# Patient Record
Sex: FEMALE | Race: OTHER | Employment: FULL TIME | URBAN - METROPOLITAN AREA
[De-identification: names, ages, dates, MRNs, and addresses within clinical notes are randomized per-mention and may not be internally consistent; named-entity substitution may affect disease eponyms.]

---

## 2017-07-12 ENCOUNTER — HOSPITAL ENCOUNTER (EMERGENCY)
Age: 22
Discharge: HOME | End: 2017-07-13
Attending: EMERGENCY MEDICINE
Payer: MEDICARE

## 2017-07-12 VITALS
TEMPERATURE: 97.9 F | WEIGHT: 165 LBS | HEIGHT: 63 IN | SYSTOLIC BLOOD PRESSURE: 93 MMHG | BODY MASS INDEX: 29.23 KG/M2 | OXYGEN SATURATION: 100 % | DIASTOLIC BLOOD PRESSURE: 53 MMHG | RESPIRATION RATE: 18 BRPM | HEART RATE: 89 BPM

## 2017-07-12 DIAGNOSIS — G89.18 POST-OPERATIVE PAIN: Primary | ICD-10-CM

## 2017-07-12 PROCEDURE — 99284 EMERGENCY DEPT VISIT MOD MDM: CPT

## 2017-07-12 RX ORDER — 0.9 % SODIUM CHLORIDE 0.9 %
2000 INTRAVENOUS SOLUTION INTRAVENOUS ONCE
Status: COMPLETED | OUTPATIENT
Start: 2017-07-13 | End: 2017-07-13

## 2017-07-12 RX ORDER — AZITHROMYCIN 250 MG/1
TABLET, FILM COATED ORAL
COMMUNITY
Start: 2017-04-24 | End: 2018-05-14

## 2017-07-12 RX ORDER — METRONIDAZOLE 500 MG/1
500 TABLET ORAL 3 TIMES DAILY
COMMUNITY
End: 2018-05-14

## 2017-07-12 RX ORDER — MISOPROSTOL 200 UG/1
200 TABLET ORAL 4 TIMES DAILY
COMMUNITY
End: 2018-05-14

## 2017-07-12 RX ORDER — IBUPROFEN 600 MG/1
600 TABLET ORAL
COMMUNITY
Start: 2017-04-24 | End: 2018-05-14

## 2017-07-12 RX ORDER — ONDANSETRON 2 MG/ML
4 INJECTION INTRAMUSCULAR; INTRAVENOUS ONCE
Status: COMPLETED | OUTPATIENT
Start: 2017-07-13 | End: 2017-07-13

## 2017-07-12 RX ORDER — FENTANYL CITRATE 50 UG/ML
50 INJECTION, SOLUTION INTRAMUSCULAR; INTRAVENOUS ONCE
Status: COMPLETED | OUTPATIENT
Start: 2017-07-13 | End: 2017-07-13

## 2017-07-12 ASSESSMENT — PAIN DESCRIPTION - LOCATION: LOCATION: ABDOMEN

## 2017-07-12 ASSESSMENT — PAIN SCALES - GENERAL: PAINLEVEL_OUTOF10: 10

## 2017-07-13 ENCOUNTER — APPOINTMENT (OUTPATIENT)
Dept: CT IMAGING | Age: 22
End: 2017-07-13
Payer: MEDICARE

## 2017-07-13 LAB
ABO/RH: NORMAL
ABSOLUTE EOS #: 0.2 K/UL (ref 0–0.4)
ABSOLUTE LYMPH #: 1.2 K/UL (ref 1.2–5.2)
ABSOLUTE MONO #: 0.7 K/UL (ref 0.2–0.8)
ALBUMIN SERPL-MCNC: 3.9 G/DL (ref 3.5–5.2)
ALBUMIN/GLOBULIN RATIO: NORMAL (ref 1–2.5)
ALP BLD-CCNC: 36 U/L (ref 35–104)
ALT SERPL-CCNC: 21 U/L (ref 5–33)
ANION GAP SERPL CALCULATED.3IONS-SCNC: 14 MMOL/L (ref 9–17)
AST SERPL-CCNC: 26 U/L
BASOPHILS # BLD: 0 %
BASOPHILS ABSOLUTE: 0 K/UL (ref 0–0.2)
BILIRUB SERPL-MCNC: 0.94 MG/DL (ref 0.3–1.2)
BILIRUBIN DIRECT: 0.2 MG/DL
BILIRUBIN, INDIRECT: 0.74 MG/DL (ref 0–1)
BUN BLDV-MCNC: 9 MG/DL (ref 6–20)
BUN/CREAT BLD: 12 (ref 9–20)
CALCIUM SERPL-MCNC: 8.6 MG/DL (ref 8.6–10.4)
CHLORIDE BLD-SCNC: 101 MMOL/L (ref 98–107)
CO2: 23 MMOL/L (ref 20–31)
CREAT SERPL-MCNC: 0.73 MG/DL (ref 0.5–0.9)
DIFFERENTIAL TYPE: ABNORMAL
EOSINOPHILS RELATIVE PERCENT: 2 %
GFR AFRICAN AMERICAN: >60 ML/MIN
GFR NON-AFRICAN AMERICAN: >60 ML/MIN
GFR SERPL CREATININE-BSD FRML MDRD: NORMAL ML/MIN/{1.73_M2}
GFR SERPL CREATININE-BSD FRML MDRD: NORMAL ML/MIN/{1.73_M2}
GLOBULIN: NORMAL G/DL (ref 1.5–3.8)
GLUCOSE BLD-MCNC: 89 MG/DL (ref 70–99)
HCG QUANTITATIVE: ABNORMAL IU/L
HCT VFR BLD CALC: 39.3 % (ref 36–46)
HEMOGLOBIN: 13.3 G/DL (ref 12–16)
LYMPHOCYTES # BLD: 10 %
MCH RBC QN AUTO: 29.6 PG (ref 26–34)
MCHC RBC AUTO-ENTMCNC: 33.8 G/DL (ref 31–37)
MCV RBC AUTO: 87.5 FL (ref 80–100)
MONOCYTES # BLD: 6 %
PDW BLD-RTO: 13.1 % (ref 11.5–14.5)
PLATELET # BLD: 216 K/UL (ref 130–400)
PLATELET ESTIMATE: ABNORMAL
PMV BLD AUTO: 8.7 FL (ref 6–12)
POTASSIUM SERPL-SCNC: 3.8 MMOL/L (ref 3.7–5.3)
RBC # BLD: 4.5 M/UL (ref 4–5.2)
RBC # BLD: ABNORMAL 10*6/UL
SEG NEUTROPHILS: 82 %
SEGMENTED NEUTROPHILS ABSOLUTE COUNT: 9.3 K/UL (ref 1.8–8)
SODIUM BLD-SCNC: 138 MMOL/L (ref 135–144)
TOTAL PROTEIN: 6.7 G/DL (ref 6.4–8.3)
WBC # BLD: 11.4 K/UL (ref 4.5–13.5)
WBC # BLD: ABNORMAL 10*3/UL

## 2017-07-13 PROCEDURE — 86901 BLOOD TYPING SEROLOGIC RH(D): CPT

## 2017-07-13 PROCEDURE — 96376 TX/PRO/DX INJ SAME DRUG ADON: CPT

## 2017-07-13 PROCEDURE — 86900 BLOOD TYPING SEROLOGIC ABO: CPT

## 2017-07-13 PROCEDURE — 6360000004 HC RX CONTRAST MEDICATION: Performed by: EMERGENCY MEDICINE

## 2017-07-13 PROCEDURE — 85025 COMPLETE CBC W/AUTO DIFF WBC: CPT

## 2017-07-13 PROCEDURE — 80048 BASIC METABOLIC PNL TOTAL CA: CPT

## 2017-07-13 PROCEDURE — 80076 HEPATIC FUNCTION PANEL: CPT

## 2017-07-13 PROCEDURE — 84702 CHORIONIC GONADOTROPIN TEST: CPT

## 2017-07-13 PROCEDURE — 2580000003 HC RX 258: Performed by: EMERGENCY MEDICINE

## 2017-07-13 PROCEDURE — 96374 THER/PROPH/DIAG INJ IV PUSH: CPT

## 2017-07-13 PROCEDURE — 96375 TX/PRO/DX INJ NEW DRUG ADDON: CPT

## 2017-07-13 PROCEDURE — 74177 CT ABD & PELVIS W/CONTRAST: CPT

## 2017-07-13 PROCEDURE — 6360000002 HC RX W HCPCS: Performed by: EMERGENCY MEDICINE

## 2017-07-13 RX ORDER — OXYCODONE HYDROCHLORIDE AND ACETAMINOPHEN 5; 325 MG/1; MG/1
1-2 TABLET ORAL EVERY 6 HOURS PRN
Qty: 20 TABLET | Refills: 0 | Status: SHIPPED | OUTPATIENT
Start: 2017-07-13 | End: 2018-05-14

## 2017-07-13 RX ORDER — AZITHROMYCIN 500 MG/1
1000 TABLET, FILM COATED ORAL ONCE
Qty: 2 TABLET | Refills: 0 | Status: SHIPPED | OUTPATIENT
Start: 2017-07-13 | End: 2017-07-13

## 2017-07-13 RX ORDER — ONDANSETRON 4 MG/1
4 TABLET, ORALLY DISINTEGRATING ORAL EVERY 4 HOURS PRN
Qty: 20 TABLET | Refills: 0 | Status: SHIPPED | OUTPATIENT
Start: 2017-07-13 | End: 2018-05-14

## 2017-07-13 RX ORDER — FENTANYL CITRATE 50 UG/ML
50 INJECTION, SOLUTION INTRAMUSCULAR; INTRAVENOUS ONCE
Status: COMPLETED | OUTPATIENT
Start: 2017-07-13 | End: 2017-07-13

## 2017-07-13 RX ADMIN — IOVERSOL 125 ML: 741 INJECTION INTRA-ARTERIAL; INTRAVENOUS at 01:27

## 2017-07-13 RX ADMIN — FENTANYL CITRATE 50 MCG: 50 INJECTION INTRAMUSCULAR; INTRAVENOUS at 00:12

## 2017-07-13 RX ADMIN — SODIUM CHLORIDE 2000 ML: 9 INJECTION, SOLUTION INTRAVENOUS at 00:11

## 2017-07-13 RX ADMIN — FENTANYL CITRATE 50 MCG: 50 INJECTION INTRAMUSCULAR; INTRAVENOUS at 02:43

## 2017-07-13 RX ADMIN — ONDANSETRON 4 MG: 2 INJECTION INTRAMUSCULAR; INTRAVENOUS at 00:12

## 2017-07-13 ASSESSMENT — PAIN SCALES - GENERAL
PAINLEVEL_OUTOF10: 7
PAINLEVEL_OUTOF10: 8

## 2018-05-14 ENCOUNTER — APPOINTMENT (OUTPATIENT)
Dept: CT IMAGING | Age: 23
DRG: 710 | End: 2018-05-14
Payer: MEDICARE

## 2018-05-14 ENCOUNTER — HOSPITAL ENCOUNTER (INPATIENT)
Age: 23
LOS: 5 days | Discharge: HOME | DRG: 710 | End: 2018-05-20
Attending: EMERGENCY MEDICINE | Admitting: FAMILY MEDICINE
Payer: MEDICARE

## 2018-05-14 DIAGNOSIS — N10 ACUTE PYELONEPHRITIS: Primary | ICD-10-CM

## 2018-05-14 DIAGNOSIS — K81.9 CHOLECYSTITIS: ICD-10-CM

## 2018-05-14 PROBLEM — N12 PYELONEPHRITIS: Status: ACTIVE | Noted: 2018-05-14

## 2018-05-14 PROBLEM — N39.0 UTI (URINARY TRACT INFECTION): Status: ACTIVE | Noted: 2018-05-14

## 2018-05-14 LAB
-: ABNORMAL
ABSOLUTE EOS #: 0 K/UL (ref 0–0.4)
ABSOLUTE IMMATURE GRANULOCYTE: ABNORMAL K/UL (ref 0–0.3)
ABSOLUTE LYMPH #: 0.8 K/UL (ref 1–4.8)
ABSOLUTE MONO #: 0.9 K/UL (ref 0.2–0.8)
ALBUMIN SERPL-MCNC: 4 G/DL (ref 3.5–5.2)
ALBUMIN/GLOBULIN RATIO: ABNORMAL (ref 1–2.5)
ALP BLD-CCNC: 59 U/L (ref 35–104)
ALT SERPL-CCNC: 78 U/L (ref 5–33)
AMORPHOUS: ABNORMAL
ANION GAP SERPL CALCULATED.3IONS-SCNC: 13 MMOL/L (ref 9–17)
AST SERPL-CCNC: 45 U/L
BACTERIA: ABNORMAL
BASOPHILS # BLD: 0 % (ref 0–2)
BASOPHILS ABSOLUTE: 0 K/UL (ref 0–0.2)
BILIRUB SERPL-MCNC: 0.82 MG/DL (ref 0.3–1.2)
BILIRUBIN DIRECT: 0.2 MG/DL
BILIRUBIN URINE: NEGATIVE
BILIRUBIN, INDIRECT: 0.62 MG/DL (ref 0–1)
BUN BLDV-MCNC: 8 MG/DL (ref 6–20)
BUN/CREAT BLD: 10 (ref 9–20)
CALCIUM SERPL-MCNC: 8.8 MG/DL (ref 8.6–10.4)
CASTS UA: ABNORMAL /LPF
CHLORIDE BLD-SCNC: 102 MMOL/L (ref 98–107)
CHP ED QC CHECK: YES
CO2: 25 MMOL/L (ref 20–31)
COLOR: YELLOW
COMMENT UA: ABNORMAL
CREAT SERPL-MCNC: 0.78 MG/DL (ref 0.5–0.9)
CRYSTALS, UA: ABNORMAL /HPF
DIFFERENTIAL TYPE: ABNORMAL
EKG ATRIAL RATE: 82 BPM
EKG P AXIS: 23 DEGREES
EKG P-R INTERVAL: 156 MS
EKG Q-T INTERVAL: 370 MS
EKG QRS DURATION: 76 MS
EKG QTC CALCULATION (BAZETT): 432 MS
EKG R AXIS: 61 DEGREES
EKG T AXIS: 34 DEGREES
EKG VENTRICULAR RATE: 82 BPM
EOSINOPHILS RELATIVE PERCENT: 0 % (ref 1–4)
EPITHELIAL CELLS UA: ABNORMAL /HPF (ref 0–5)
GFR AFRICAN AMERICAN: >60 ML/MIN
GFR NON-AFRICAN AMERICAN: >60 ML/MIN
GFR SERPL CREATININE-BSD FRML MDRD: ABNORMAL ML/MIN/{1.73_M2}
GFR SERPL CREATININE-BSD FRML MDRD: ABNORMAL ML/MIN/{1.73_M2}
GLOBULIN: ABNORMAL G/DL (ref 1.5–3.8)
GLUCOSE BLD-MCNC: 114 MG/DL (ref 70–99)
GLUCOSE URINE: NEGATIVE
HCT VFR BLD CALC: 40.3 % (ref 36–46)
HEMOGLOBIN: 13.3 G/DL (ref 12–16)
IMMATURE GRANULOCYTES: ABNORMAL %
KETONES, URINE: NEGATIVE
LACTIC ACID: 2 MMOL/L (ref 0.5–2.2)
LEUKOCYTE ESTERASE, URINE: ABNORMAL
LIPASE: 31 U/L (ref 13–60)
LYMPHOCYTES # BLD: 8 % (ref 25–45)
MCH RBC QN AUTO: 29.5 PG (ref 26–34)
MCHC RBC AUTO-ENTMCNC: 33.1 G/DL (ref 31–37)
MCV RBC AUTO: 89.1 FL (ref 80–100)
MONOCYTES # BLD: 8 % (ref 2–8)
MUCUS: ABNORMAL
NITRITE, URINE: POSITIVE
NRBC AUTOMATED: ABNORMAL PER 100 WBC
OTHER OBSERVATIONS UA: ABNORMAL
PDW BLD-RTO: 14 % (ref 11.5–14.5)
PH UA: 6 (ref 5–8)
PLATELET # BLD: 188 K/UL (ref 130–400)
PLATELET ESTIMATE: ABNORMAL
PMV BLD AUTO: 8 FL (ref 6–12)
POTASSIUM SERPL-SCNC: 4.2 MMOL/L (ref 3.7–5.3)
PREGNANCY TEST URINE, POC: NEGATIVE
PROTEIN UA: ABNORMAL
RBC # BLD: 4.52 M/UL (ref 4–5.2)
RBC # BLD: ABNORMAL 10*6/UL
RBC UA: ABNORMAL /HPF (ref 0–2)
RENAL EPITHELIAL, UA: ABNORMAL /HPF
SEG NEUTROPHILS: 84 % (ref 34–64)
SEGMENTED NEUTROPHILS ABSOLUTE COUNT: 9.3 K/UL (ref 1.8–7.7)
SODIUM BLD-SCNC: 140 MMOL/L (ref 135–144)
SPECIFIC GRAVITY UA: 1.02 (ref 1–1.03)
TOTAL PROTEIN: 7.4 G/DL (ref 6.4–8.3)
TRICHOMONAS: ABNORMAL
TURBIDITY: ABNORMAL
URINE HGB: ABNORMAL
UROBILINOGEN, URINE: NORMAL
WBC # BLD: 11.1 K/UL (ref 4.5–13.5)
WBC # BLD: ABNORMAL 10*3/UL
WBC UA: ABNORMAL /HPF (ref 0–5)
YEAST: ABNORMAL

## 2018-05-14 PROCEDURE — 96376 TX/PRO/DX INJ SAME DRUG ADON: CPT

## 2018-05-14 PROCEDURE — 2580000003 HC RX 258: Performed by: NURSE PRACTITIONER

## 2018-05-14 PROCEDURE — 99285 EMERGENCY DEPT VISIT HI MDM: CPT

## 2018-05-14 PROCEDURE — 6370000000 HC RX 637 (ALT 250 FOR IP): Performed by: CLINICAL NURSE SPECIALIST

## 2018-05-14 PROCEDURE — 84703 CHORIONIC GONADOTROPIN ASSAY: CPT

## 2018-05-14 PROCEDURE — 87086 URINE CULTURE/COLONY COUNT: CPT

## 2018-05-14 PROCEDURE — 96365 THER/PROPH/DIAG IV INF INIT: CPT

## 2018-05-14 PROCEDURE — 85025 COMPLETE CBC W/AUTO DIFF WBC: CPT

## 2018-05-14 PROCEDURE — 80076 HEPATIC FUNCTION PANEL: CPT

## 2018-05-14 PROCEDURE — 99222 1ST HOSP IP/OBS MODERATE 55: CPT | Performed by: FAMILY MEDICINE

## 2018-05-14 PROCEDURE — G0378 HOSPITAL OBSERVATION PER HR: HCPCS

## 2018-05-14 PROCEDURE — 36415 COLL VENOUS BLD VENIPUNCTURE: CPT

## 2018-05-14 PROCEDURE — 81001 URINALYSIS AUTO W/SCOPE: CPT

## 2018-05-14 PROCEDURE — 80048 BASIC METABOLIC PNL TOTAL CA: CPT

## 2018-05-14 PROCEDURE — 6360000002 HC RX W HCPCS: Performed by: NURSE PRACTITIONER

## 2018-05-14 PROCEDURE — 2580000003 HC RX 258: Performed by: CLINICAL NURSE SPECIALIST

## 2018-05-14 PROCEDURE — 6370000000 HC RX 637 (ALT 250 FOR IP): Performed by: EMERGENCY MEDICINE

## 2018-05-14 PROCEDURE — 83690 ASSAY OF LIPASE: CPT

## 2018-05-14 PROCEDURE — 93005 ELECTROCARDIOGRAM TRACING: CPT

## 2018-05-14 PROCEDURE — 96372 THER/PROPH/DIAG INJ SC/IM: CPT

## 2018-05-14 PROCEDURE — 74176 CT ABD & PELVIS W/O CONTRAST: CPT

## 2018-05-14 PROCEDURE — 83605 ASSAY OF LACTIC ACID: CPT

## 2018-05-14 PROCEDURE — 87186 SC STD MICRODIL/AGAR DIL: CPT

## 2018-05-14 PROCEDURE — 87088 URINE BACTERIA CULTURE: CPT

## 2018-05-14 PROCEDURE — 6360000002 HC RX W HCPCS: Performed by: CLINICAL NURSE SPECIALIST

## 2018-05-14 PROCEDURE — 96375 TX/PRO/DX INJ NEW DRUG ADDON: CPT

## 2018-05-14 RX ORDER — SODIUM CHLORIDE 0.9 % (FLUSH) 0.9 %
10 SYRINGE (ML) INJECTION PRN
Status: DISCONTINUED | OUTPATIENT
Start: 2018-05-14 | End: 2018-05-20 | Stop reason: HOSPADM

## 2018-05-14 RX ORDER — BISACODYL 10 MG
10 SUPPOSITORY, RECTAL RECTAL DAILY PRN
Status: DISCONTINUED | OUTPATIENT
Start: 2018-05-14 | End: 2018-05-20 | Stop reason: HOSPADM

## 2018-05-14 RX ORDER — 0.9 % SODIUM CHLORIDE 0.9 %
1000 INTRAVENOUS SOLUTION INTRAVENOUS ONCE
Status: COMPLETED | OUTPATIENT
Start: 2018-05-14 | End: 2018-05-14

## 2018-05-14 RX ORDER — ONDANSETRON 2 MG/ML
4 INJECTION INTRAMUSCULAR; INTRAVENOUS ONCE
Status: COMPLETED | OUTPATIENT
Start: 2018-05-14 | End: 2018-05-14

## 2018-05-14 RX ORDER — MORPHINE SULFATE 4 MG/ML
4 INJECTION, SOLUTION INTRAMUSCULAR; INTRAVENOUS
Status: COMPLETED | OUTPATIENT
Start: 2018-05-14 | End: 2018-05-14

## 2018-05-14 RX ORDER — CIPROFLOXACIN 2 MG/ML
400 INJECTION, SOLUTION INTRAVENOUS EVERY 12 HOURS
Status: DISCONTINUED | OUTPATIENT
Start: 2018-05-15 | End: 2018-05-19

## 2018-05-14 RX ORDER — SODIUM CHLORIDE 0.9 % (FLUSH) 0.9 %
10 SYRINGE (ML) INJECTION EVERY 12 HOURS SCHEDULED
Status: DISCONTINUED | OUTPATIENT
Start: 2018-05-14 | End: 2018-05-20 | Stop reason: HOSPADM

## 2018-05-14 RX ORDER — IBUPROFEN 800 MG/1
800 TABLET ORAL EVERY 6 HOURS PRN
COMMUNITY
Start: 2018-04-05 | End: 2019-02-14

## 2018-05-14 RX ORDER — NORETHINDRONE ACETATE AND ETHINYL ESTRADIOL 1MG-20(21)
1 KIT ORAL DAILY
COMMUNITY
Start: 2018-04-02 | End: 2019-02-14

## 2018-05-14 RX ORDER — 0.9 % SODIUM CHLORIDE 0.9 %
1000 INTRAVENOUS SOLUTION INTRAVENOUS ONCE
Status: DISCONTINUED | OUTPATIENT
Start: 2018-05-14 | End: 2018-05-14

## 2018-05-14 RX ORDER — PHENAZOPYRIDINE HYDROCHLORIDE 200 MG/1
200 TABLET, FILM COATED ORAL
Status: DISCONTINUED | OUTPATIENT
Start: 2018-05-14 | End: 2018-05-20 | Stop reason: HOSPADM

## 2018-05-14 RX ORDER — NORETHINDRONE ACETATE AND ETHINYL ESTRADIOL 1MG-20(21)
1 KIT ORAL DAILY
Status: DISCONTINUED | OUTPATIENT
Start: 2018-05-14 | End: 2018-05-20 | Stop reason: HOSPADM

## 2018-05-14 RX ORDER — ACETAMINOPHEN 500 MG
1000 TABLET ORAL ONCE
Status: COMPLETED | OUTPATIENT
Start: 2018-05-14 | End: 2018-05-14

## 2018-05-14 RX ORDER — MORPHINE SULFATE 4 MG/ML
4 INJECTION, SOLUTION INTRAMUSCULAR; INTRAVENOUS ONCE
Status: COMPLETED | OUTPATIENT
Start: 2018-05-14 | End: 2018-05-14

## 2018-05-14 RX ORDER — CIPROFLOXACIN 2 MG/ML
400 INJECTION, SOLUTION INTRAVENOUS ONCE
Status: COMPLETED | OUTPATIENT
Start: 2018-05-14 | End: 2018-05-14

## 2018-05-14 RX ORDER — MORPHINE SULFATE 4 MG/ML
4 INJECTION, SOLUTION INTRAMUSCULAR; INTRAVENOUS
Status: DISCONTINUED | OUTPATIENT
Start: 2018-05-14 | End: 2018-05-18

## 2018-05-14 RX ORDER — MORPHINE SULFATE 2 MG/ML
2 INJECTION, SOLUTION INTRAMUSCULAR; INTRAVENOUS
Status: DISCONTINUED | OUTPATIENT
Start: 2018-05-14 | End: 2018-05-18

## 2018-05-14 RX ORDER — HYDROCODONE BITARTRATE AND ACETAMINOPHEN 5; 325 MG/1; MG/1
1 TABLET ORAL EVERY 4 HOURS PRN
Status: DISCONTINUED | OUTPATIENT
Start: 2018-05-14 | End: 2018-05-20 | Stop reason: HOSPADM

## 2018-05-14 RX ORDER — ONDANSETRON 2 MG/ML
4 INJECTION INTRAMUSCULAR; INTRAVENOUS EVERY 6 HOURS PRN
Status: DISCONTINUED | OUTPATIENT
Start: 2018-05-14 | End: 2018-05-20 | Stop reason: HOSPADM

## 2018-05-14 RX ORDER — HYDROCODONE BITARTRATE AND ACETAMINOPHEN 5; 325 MG/1; MG/1
2 TABLET ORAL EVERY 4 HOURS PRN
Status: DISCONTINUED | OUTPATIENT
Start: 2018-05-14 | End: 2018-05-20 | Stop reason: HOSPADM

## 2018-05-14 RX ORDER — SODIUM CHLORIDE 9 MG/ML
INJECTION, SOLUTION INTRAVENOUS CONTINUOUS
Status: DISCONTINUED | OUTPATIENT
Start: 2018-05-14 | End: 2018-05-20

## 2018-05-14 RX ADMIN — SODIUM CHLORIDE 1000 ML: 9 INJECTION, SOLUTION INTRAVENOUS at 16:45

## 2018-05-14 RX ADMIN — MORPHINE SULFATE 4 MG: 4 INJECTION INTRAVENOUS at 17:43

## 2018-05-14 RX ADMIN — ENOXAPARIN SODIUM 40 MG: 40 INJECTION SUBCUTANEOUS at 18:03

## 2018-05-14 RX ADMIN — MORPHINE SULFATE 4 MG: 4 INJECTION INTRAVENOUS at 12:54

## 2018-05-14 RX ADMIN — ONDANSETRON 4 MG: 2 INJECTION INTRAMUSCULAR; INTRAVENOUS at 21:40

## 2018-05-14 RX ADMIN — ONDANSETRON 4 MG: 2 INJECTION INTRAMUSCULAR; INTRAVENOUS at 11:57

## 2018-05-14 RX ADMIN — Medication 10 ML: at 21:40

## 2018-05-14 RX ADMIN — PHENAZOPYRIDINE HYDROCHLORIDE 200 MG: 200 TABLET ORAL at 17:37

## 2018-05-14 RX ADMIN — SODIUM CHLORIDE: 9 INJECTION, SOLUTION INTRAVENOUS at 21:44

## 2018-05-14 RX ADMIN — SODIUM CHLORIDE: 9 INJECTION, SOLUTION INTRAVENOUS at 17:34

## 2018-05-14 RX ADMIN — SODIUM CHLORIDE 1000 ML: 9 INJECTION, SOLUTION INTRAVENOUS at 11:51

## 2018-05-14 RX ADMIN — CIPROFLOXACIN 400 MG: 2 INJECTION, SOLUTION INTRAVENOUS at 12:54

## 2018-05-14 RX ADMIN — ACETAMINOPHEN 1000 MG: 500 TABLET ORAL at 16:45

## 2018-05-14 RX ADMIN — MORPHINE SULFATE 4 MG: 4 INJECTION INTRAVENOUS at 11:58

## 2018-05-14 RX ADMIN — HYDROCODONE BITARTRATE AND ACETAMINOPHEN 2 TABLET: 5; 325 TABLET ORAL at 21:36

## 2018-05-14 ASSESSMENT — PAIN SCALES - GENERAL
PAINLEVEL_OUTOF10: 6
PAINLEVEL_OUTOF10: 8
PAINLEVEL_OUTOF10: 4
PAINLEVEL_OUTOF10: 6
PAINLEVEL_OUTOF10: 6
PAINLEVEL_OUTOF10: 8
PAINLEVEL_OUTOF10: 8
PAINLEVEL_OUTOF10: 5
PAINLEVEL_OUTOF10: 6
PAINLEVEL_OUTOF10: 7

## 2018-05-14 ASSESSMENT — ENCOUNTER SYMPTOMS
COUGH: 0
SHORTNESS OF BREATH: 0
NAUSEA: 1
DIARRHEA: 1
VOMITING: 1
ABDOMINAL PAIN: 1
BACK PAIN: 1
COLOR CHANGE: 0

## 2018-05-14 ASSESSMENT — PAIN DESCRIPTION - DESCRIPTORS
DESCRIPTORS: SHARP;SHOOTING;CONSTANT
DESCRIPTORS: CONSTANT
DESCRIPTORS: SHARP;SHOOTING;CONSTANT

## 2018-05-14 ASSESSMENT — PAIN DESCRIPTION - FREQUENCY: FREQUENCY: CONTINUOUS

## 2018-05-14 ASSESSMENT — PAIN DESCRIPTION - PAIN TYPE
TYPE: ACUTE PAIN

## 2018-05-14 ASSESSMENT — PAIN DESCRIPTION - DIRECTION: RADIATING_TOWARDS: LOWER ABDOMEN

## 2018-05-14 ASSESSMENT — PAIN DESCRIPTION - PROGRESSION: CLINICAL_PROGRESSION: NOT CHANGED

## 2018-05-14 ASSESSMENT — PAIN DESCRIPTION - LOCATION
LOCATION: FLANK;ABDOMEN
LOCATION: FLANK
LOCATION: FLANK
LOCATION: ABDOMEN

## 2018-05-14 ASSESSMENT — PAIN DESCRIPTION - ONSET: ONSET: ON-GOING

## 2018-05-14 ASSESSMENT — PAIN DESCRIPTION - ORIENTATION
ORIENTATION: RIGHT

## 2018-05-15 ENCOUNTER — APPOINTMENT (OUTPATIENT)
Dept: ULTRASOUND IMAGING | Age: 23
DRG: 710 | End: 2018-05-15
Payer: MEDICARE

## 2018-05-15 ENCOUNTER — APPOINTMENT (OUTPATIENT)
Dept: GENERAL RADIOLOGY | Age: 23
DRG: 710 | End: 2018-05-15
Payer: MEDICARE

## 2018-05-15 ENCOUNTER — APPOINTMENT (OUTPATIENT)
Dept: CT IMAGING | Age: 23
DRG: 710 | End: 2018-05-15
Payer: MEDICARE

## 2018-05-15 LAB
ALBUMIN SERPL-MCNC: 3 G/DL (ref 3.5–5.2)
ALBUMIN/GLOBULIN RATIO: ABNORMAL (ref 1–2.5)
ALP BLD-CCNC: 66 U/L (ref 35–104)
ALT SERPL-CCNC: 105 U/L (ref 5–33)
ANION GAP SERPL CALCULATED.3IONS-SCNC: 10 MMOL/L (ref 9–17)
AST SERPL-CCNC: 78 U/L
BILIRUB SERPL-MCNC: 1.12 MG/DL (ref 0.3–1.2)
BUN BLDV-MCNC: 5 MG/DL (ref 6–20)
BUN/CREAT BLD: 6 (ref 9–20)
CALCIUM SERPL-MCNC: 6.9 MG/DL (ref 8.6–10.4)
CHLORIDE BLD-SCNC: 105 MMOL/L (ref 98–107)
CO2: 24 MMOL/L (ref 20–31)
CREAT SERPL-MCNC: 0.8 MG/DL (ref 0.5–0.9)
CULTURE: ABNORMAL
CULTURE: ABNORMAL
GFR AFRICAN AMERICAN: >60 ML/MIN
GFR NON-AFRICAN AMERICAN: >60 ML/MIN
GFR SERPL CREATININE-BSD FRML MDRD: ABNORMAL ML/MIN/{1.73_M2}
GFR SERPL CREATININE-BSD FRML MDRD: ABNORMAL ML/MIN/{1.73_M2}
GLUCOSE BLD-MCNC: 115 MG/DL (ref 70–99)
HAV IGM SER IA-ACNC: NONREACTIVE
HCG, PREGNANCY URINE (POC): NEGATIVE
HCT VFR BLD CALC: 33.3 % (ref 36–46)
HEMOGLOBIN: 11.1 G/DL (ref 12–16)
HEPATITIS B CORE IGM ANTIBODY: NONREACTIVE
HEPATITIS B SURFACE ANTIGEN: NONREACTIVE
HEPATITIS C ANTIBODY: NONREACTIVE
INR BLD: 1.1
LACTIC ACID: 1.6 MMOL/L (ref 0.5–2.2)
Lab: ABNORMAL
MCH RBC QN AUTO: 29.7 PG (ref 26–34)
MCHC RBC AUTO-ENTMCNC: 33.2 G/DL (ref 31–37)
MCV RBC AUTO: 89.4 FL (ref 80–100)
NRBC AUTOMATED: ABNORMAL PER 100 WBC
ORGANISM: ABNORMAL
PDW BLD-RTO: 13.6 % (ref 11.5–14.5)
PLATELET # BLD: 148 K/UL (ref 130–400)
PMV BLD AUTO: 8.2 FL (ref 6–12)
POTASSIUM SERPL-SCNC: 3.6 MMOL/L (ref 3.7–5.3)
PROTHROMBIN TIME: 11.5 SEC (ref 9.7–11.6)
RBC # BLD: 3.72 M/UL (ref 4–5.2)
SODIUM BLD-SCNC: 139 MMOL/L (ref 135–144)
SPECIMEN DESCRIPTION: ABNORMAL
SPECIMEN DESCRIPTION: ABNORMAL
STATUS: ABNORMAL
TOTAL PROTEIN: 5.8 G/DL (ref 6.4–8.3)
WBC # BLD: 8.3 K/UL (ref 4.5–13.5)

## 2018-05-15 PROCEDURE — 85610 PROTHROMBIN TIME: CPT

## 2018-05-15 PROCEDURE — 96366 THER/PROPH/DIAG IV INF ADDON: CPT

## 2018-05-15 PROCEDURE — 6360000002 HC RX W HCPCS: Performed by: CLINICAL NURSE SPECIALIST

## 2018-05-15 PROCEDURE — 6360000002 HC RX W HCPCS: Performed by: NURSE PRACTITIONER

## 2018-05-15 PROCEDURE — 2580000003 HC RX 258: Performed by: CLINICAL NURSE SPECIALIST

## 2018-05-15 PROCEDURE — 87591 N.GONORRHOEAE DNA AMP PROB: CPT

## 2018-05-15 PROCEDURE — 96372 THER/PROPH/DIAG INJ SC/IM: CPT

## 2018-05-15 PROCEDURE — 76775 US EXAM ABDO BACK WALL LIM: CPT

## 2018-05-15 PROCEDURE — 71260 CT THORAX DX C+: CPT

## 2018-05-15 PROCEDURE — 36415 COLL VENOUS BLD VENIPUNCTURE: CPT

## 2018-05-15 PROCEDURE — 71045 X-RAY EXAM CHEST 1 VIEW: CPT

## 2018-05-15 PROCEDURE — 6370000000 HC RX 637 (ALT 250 FOR IP): Performed by: CLINICAL NURSE SPECIALIST

## 2018-05-15 PROCEDURE — 85027 COMPLETE CBC AUTOMATED: CPT

## 2018-05-15 PROCEDURE — 80053 COMPREHEN METABOLIC PANEL: CPT

## 2018-05-15 PROCEDURE — 87040 BLOOD CULTURE FOR BACTERIA: CPT

## 2018-05-15 PROCEDURE — 87149 DNA/RNA DIRECT PROBE: CPT

## 2018-05-15 PROCEDURE — 83605 ASSAY OF LACTIC ACID: CPT

## 2018-05-15 PROCEDURE — 87491 CHLMYD TRACH DNA AMP PROBE: CPT

## 2018-05-15 PROCEDURE — 6370000000 HC RX 637 (ALT 250 FOR IP): Performed by: NURSE PRACTITIONER

## 2018-05-15 PROCEDURE — 87205 SMEAR GRAM STAIN: CPT

## 2018-05-15 PROCEDURE — 80074 ACUTE HEPATITIS PANEL: CPT

## 2018-05-15 PROCEDURE — 6360000004 HC RX CONTRAST MEDICATION: Performed by: NURSE PRACTITIONER

## 2018-05-15 PROCEDURE — 96376 TX/PRO/DX INJ SAME DRUG ADON: CPT

## 2018-05-15 PROCEDURE — 2580000003 HC RX 258: Performed by: FAMILY MEDICINE

## 2018-05-15 PROCEDURE — 2580000003 HC RX 258: Performed by: NURSE PRACTITIONER

## 2018-05-15 PROCEDURE — 99232 SBSQ HOSP IP/OBS MODERATE 35: CPT | Performed by: FAMILY MEDICINE

## 2018-05-15 PROCEDURE — 1200000000 HC SEMI PRIVATE

## 2018-05-15 RX ORDER — PROMETHAZINE HYDROCHLORIDE 25 MG/ML
25 INJECTION, SOLUTION INTRAMUSCULAR; INTRAVENOUS EVERY 6 HOURS PRN
Status: DISCONTINUED | OUTPATIENT
Start: 2018-05-15 | End: 2018-05-20 | Stop reason: HOSPADM

## 2018-05-15 RX ORDER — ACETAMINOPHEN 650 MG/1
650 SUPPOSITORY RECTAL EVERY 4 HOURS PRN
Status: DISCONTINUED | OUTPATIENT
Start: 2018-05-15 | End: 2018-05-20 | Stop reason: HOSPADM

## 2018-05-15 RX ORDER — 0.9 % SODIUM CHLORIDE 0.9 %
50 INTRAVENOUS SOLUTION INTRAVENOUS ONCE
Status: COMPLETED | OUTPATIENT
Start: 2018-05-15 | End: 2018-05-15

## 2018-05-15 RX ORDER — 0.9 % SODIUM CHLORIDE 0.9 %
1000 INTRAVENOUS SOLUTION INTRAVENOUS ONCE
Status: COMPLETED | OUTPATIENT
Start: 2018-05-15 | End: 2018-05-15

## 2018-05-15 RX ORDER — SODIUM CHLORIDE 0.9 % (FLUSH) 0.9 %
10 SYRINGE (ML) INJECTION PRN
Status: DISCONTINUED | OUTPATIENT
Start: 2018-05-15 | End: 2018-05-20 | Stop reason: HOSPADM

## 2018-05-15 RX ADMIN — PHENAZOPYRIDINE HYDROCHLORIDE 200 MG: 200 TABLET ORAL at 20:38

## 2018-05-15 RX ADMIN — MORPHINE SULFATE 4 MG: 4 INJECTION INTRAVENOUS at 00:57

## 2018-05-15 RX ADMIN — SODIUM CHLORIDE 1000 ML: 9 INJECTION, SOLUTION INTRAVENOUS at 03:30

## 2018-05-15 RX ADMIN — CIPROFLOXACIN 400 MG: 2 INJECTION, SOLUTION INTRAVENOUS at 14:43

## 2018-05-15 RX ADMIN — SODIUM CHLORIDE: 9 INJECTION, SOLUTION INTRAVENOUS at 03:00

## 2018-05-15 RX ADMIN — SODIUM CHLORIDE 80 ML: 9 INJECTION, SOLUTION INTRAVENOUS at 04:42

## 2018-05-15 RX ADMIN — PHENAZOPYRIDINE HYDROCHLORIDE 200 MG: 200 TABLET ORAL at 09:16

## 2018-05-15 RX ADMIN — SODIUM CHLORIDE: 9 INJECTION, SOLUTION INTRAVENOUS at 19:28

## 2018-05-15 RX ADMIN — PHENAZOPYRIDINE HYDROCHLORIDE 200 MG: 200 TABLET ORAL at 14:44

## 2018-05-15 RX ADMIN — MORPHINE SULFATE 4 MG: 4 INJECTION INTRAVENOUS at 19:30

## 2018-05-15 RX ADMIN — CIPROFLOXACIN 400 MG: 2 INJECTION, SOLUTION INTRAVENOUS at 00:51

## 2018-05-15 RX ADMIN — ONDANSETRON 4 MG: 2 INJECTION INTRAMUSCULAR; INTRAVENOUS at 15:50

## 2018-05-15 RX ADMIN — Medication 10 ML: at 10:01

## 2018-05-15 RX ADMIN — Medication 10 ML: at 04:43

## 2018-05-15 RX ADMIN — IOPAMIDOL 75 ML: 755 INJECTION, SOLUTION INTRAVENOUS at 04:43

## 2018-05-15 RX ADMIN — PROMETHAZINE HYDROCHLORIDE 25 MG: 25 INJECTION INTRAMUSCULAR; INTRAVENOUS at 21:24

## 2018-05-15 RX ADMIN — PROMETHAZINE HYDROCHLORIDE 25 MG: 25 INJECTION INTRAMUSCULAR; INTRAVENOUS at 02:55

## 2018-05-15 RX ADMIN — ACETAMINOPHEN 650 MG: 650 SUPPOSITORY RECTAL at 22:23

## 2018-05-15 RX ADMIN — SODIUM CHLORIDE: 9 INJECTION, SOLUTION INTRAVENOUS at 09:14

## 2018-05-15 RX ADMIN — HYDROCODONE BITARTRATE AND ACETAMINOPHEN 2 TABLET: 5; 325 TABLET ORAL at 10:00

## 2018-05-15 RX ADMIN — ONDANSETRON 4 MG: 2 INJECTION INTRAMUSCULAR; INTRAVENOUS at 09:12

## 2018-05-15 RX ADMIN — ACETAMINOPHEN 650 MG: 650 SUPPOSITORY RECTAL at 04:11

## 2018-05-15 RX ADMIN — IBUPROFEN 600 MG: 200 TABLET, FILM COATED ORAL at 06:29

## 2018-05-15 ASSESSMENT — PAIN DESCRIPTION - PROGRESSION
CLINICAL_PROGRESSION: NOT CHANGED
CLINICAL_PROGRESSION: GRADUALLY WORSENING

## 2018-05-15 ASSESSMENT — PAIN DESCRIPTION - LOCATION
LOCATION: FLANK
LOCATION: FLANK
LOCATION: ABDOMEN;FLANK
LOCATION: ABDOMEN;FLANK

## 2018-05-15 ASSESSMENT — PAIN DESCRIPTION - FREQUENCY
FREQUENCY: INTERMITTENT
FREQUENCY: CONTINUOUS

## 2018-05-15 ASSESSMENT — PAIN DESCRIPTION - ONSET
ONSET: ON-GOING
ONSET: ON-GOING

## 2018-05-15 ASSESSMENT — PAIN DESCRIPTION - DESCRIPTORS
DESCRIPTORS: SHARP;SHOOTING
DESCRIPTORS: ACHING

## 2018-05-15 ASSESSMENT — PAIN SCALES - GENERAL
PAINLEVEL_OUTOF10: 8
PAINLEVEL_OUTOF10: 4
PAINLEVEL_OUTOF10: 0
PAINLEVEL_OUTOF10: 7
PAINLEVEL_OUTOF10: 8
PAINLEVEL_OUTOF10: 6
PAINLEVEL_OUTOF10: 7

## 2018-05-15 ASSESSMENT — PAIN DESCRIPTION - ORIENTATION
ORIENTATION: RIGHT
ORIENTATION: RIGHT
ORIENTATION: RIGHT;LEFT

## 2018-05-15 ASSESSMENT — PAIN DESCRIPTION - PAIN TYPE
TYPE: ACUTE PAIN

## 2018-05-16 PROBLEM — B96.20 E. COLI UTI (URINARY TRACT INFECTION): Status: ACTIVE | Noted: 2018-05-14

## 2018-05-16 LAB
ABSOLUTE EOS #: 0.1 K/UL (ref 0–0.4)
ABSOLUTE IMMATURE GRANULOCYTE: ABNORMAL K/UL (ref 0–0.3)
ABSOLUTE LYMPH #: 1.2 K/UL (ref 1–4.8)
ABSOLUTE MONO #: 0.8 K/UL (ref 0.2–0.8)
ALBUMIN SERPL-MCNC: 2.9 G/DL (ref 3.5–5.2)
ALBUMIN/GLOBULIN RATIO: ABNORMAL (ref 1–2.5)
ALP BLD-CCNC: 81 U/L (ref 35–104)
ALT SERPL-CCNC: 91 U/L (ref 5–33)
ANION GAP SERPL CALCULATED.3IONS-SCNC: 11 MMOL/L (ref 9–17)
AST SERPL-CCNC: 48 U/L
BASOPHILS # BLD: 0 % (ref 0–2)
BASOPHILS ABSOLUTE: 0 K/UL (ref 0–0.2)
BILIRUB SERPL-MCNC: 0.49 MG/DL (ref 0.3–1.2)
BILIRUBIN DIRECT: 0.17 MG/DL
BILIRUBIN, INDIRECT: 0.32 MG/DL (ref 0–1)
BUN BLDV-MCNC: 5 MG/DL (ref 6–20)
C. TRACHOMATIS DNA ,URINE: NEGATIVE
CALCIUM SERPL-MCNC: 7.7 MG/DL (ref 8.6–10.4)
CHLORIDE BLD-SCNC: 105 MMOL/L (ref 98–107)
CO2: 23 MMOL/L (ref 20–31)
CREAT SERPL-MCNC: 0.72 MG/DL (ref 0.5–0.9)
DIFFERENTIAL TYPE: ABNORMAL
EOSINOPHILS RELATIVE PERCENT: 1 % (ref 1–4)
GFR AFRICAN AMERICAN: >60 ML/MIN
GFR NON-AFRICAN AMERICAN: >60 ML/MIN
GFR SERPL CREATININE-BSD FRML MDRD: ABNORMAL ML/MIN/{1.73_M2}
GFR SERPL CREATININE-BSD FRML MDRD: ABNORMAL ML/MIN/{1.73_M2}
GLUCOSE BLD-MCNC: 103 MG/DL (ref 70–99)
HCT VFR BLD CALC: 35.6 % (ref 36–46)
HEMOGLOBIN: 11.7 G/DL (ref 12–16)
IMMATURE GRANULOCYTES: ABNORMAL %
LYMPHOCYTES # BLD: 13 % (ref 25–45)
MCH RBC QN AUTO: 29.3 PG (ref 26–34)
MCHC RBC AUTO-ENTMCNC: 32.8 G/DL (ref 31–37)
MCV RBC AUTO: 89.5 FL (ref 80–100)
MONOCYTES # BLD: 9 % (ref 2–8)
N. GONORRHOEAE DNA, URINE: NEGATIVE
NRBC AUTOMATED: ABNORMAL PER 100 WBC
PDW BLD-RTO: 13.6 % (ref 11.5–14.5)
PLATELET # BLD: 192 K/UL (ref 130–400)
PLATELET ESTIMATE: ABNORMAL
PMV BLD AUTO: 7.7 FL (ref 6–12)
POTASSIUM SERPL-SCNC: 3.7 MMOL/L (ref 3.7–5.3)
RBC # BLD: 3.97 M/UL (ref 4–5.2)
RBC # BLD: ABNORMAL 10*6/UL
SEG NEUTROPHILS: 77 % (ref 34–64)
SEGMENTED NEUTROPHILS ABSOLUTE COUNT: 7.2 K/UL (ref 1.8–7.7)
SODIUM BLD-SCNC: 139 MMOL/L (ref 135–144)
TOTAL PROTEIN: 5.8 G/DL (ref 6.4–8.3)
WBC # BLD: 9.3 K/UL (ref 4.5–13.5)
WBC # BLD: ABNORMAL 10*3/UL

## 2018-05-16 PROCEDURE — 99232 SBSQ HOSP IP/OBS MODERATE 35: CPT | Performed by: FAMILY MEDICINE

## 2018-05-16 PROCEDURE — 6370000000 HC RX 637 (ALT 250 FOR IP): Performed by: CLINICAL NURSE SPECIALIST

## 2018-05-16 PROCEDURE — 1200000000 HC SEMI PRIVATE

## 2018-05-16 PROCEDURE — 99253 IP/OBS CNSLTJ NEW/EST LOW 45: CPT | Performed by: INTERNAL MEDICINE

## 2018-05-16 PROCEDURE — 82248 BILIRUBIN DIRECT: CPT

## 2018-05-16 PROCEDURE — 80053 COMPREHEN METABOLIC PANEL: CPT

## 2018-05-16 PROCEDURE — 85025 COMPLETE CBC W/AUTO DIFF WBC: CPT

## 2018-05-16 PROCEDURE — 36415 COLL VENOUS BLD VENIPUNCTURE: CPT

## 2018-05-16 PROCEDURE — 6360000002 HC RX W HCPCS: Performed by: CLINICAL NURSE SPECIALIST

## 2018-05-16 PROCEDURE — 2580000003 HC RX 258: Performed by: FAMILY MEDICINE

## 2018-05-16 PROCEDURE — 6370000000 HC RX 637 (ALT 250 FOR IP): Performed by: FAMILY MEDICINE

## 2018-05-16 RX ORDER — OXYMETAZOLINE HYDROCHLORIDE 0.05 G/100ML
2 SPRAY NASAL 2 TIMES DAILY
Status: COMPLETED | OUTPATIENT
Start: 2018-05-16 | End: 2018-05-19

## 2018-05-16 RX ADMIN — BISACODYL 10 MG: 10 SUPPOSITORY RECTAL at 20:35

## 2018-05-16 RX ADMIN — ONDANSETRON 4 MG: 2 INJECTION INTRAMUSCULAR; INTRAVENOUS at 10:45

## 2018-05-16 RX ADMIN — OXYMETAZOLINE HYDROCHLORIDE 2 SPRAY: 5 SPRAY NASAL at 20:21

## 2018-05-16 RX ADMIN — PHENAZOPYRIDINE HYDROCHLORIDE 200 MG: 200 TABLET ORAL at 12:48

## 2018-05-16 RX ADMIN — SODIUM CHLORIDE: 9 INJECTION, SOLUTION INTRAVENOUS at 03:03

## 2018-05-16 RX ADMIN — PHENAZOPYRIDINE HYDROCHLORIDE 200 MG: 200 TABLET ORAL at 17:04

## 2018-05-16 RX ADMIN — CIPROFLOXACIN 400 MG: 2 INJECTION, SOLUTION INTRAVENOUS at 12:48

## 2018-05-16 RX ADMIN — HYDROCODONE BITARTRATE AND ACETAMINOPHEN 2 TABLET: 5; 325 TABLET ORAL at 17:04

## 2018-05-16 RX ADMIN — ENOXAPARIN SODIUM 40 MG: 40 INJECTION SUBCUTANEOUS at 10:45

## 2018-05-16 RX ADMIN — CIPROFLOXACIN 400 MG: 2 INJECTION, SOLUTION INTRAVENOUS at 00:48

## 2018-05-16 RX ADMIN — MORPHINE SULFATE 4 MG: 4 INJECTION INTRAVENOUS at 10:45

## 2018-05-16 RX ADMIN — SODIUM CHLORIDE: 9 INJECTION, SOLUTION INTRAVENOUS at 19:03

## 2018-05-16 ASSESSMENT — PAIN DESCRIPTION - DIRECTION: RADIATING_TOWARDS: BILATERAL FLANK

## 2018-05-16 ASSESSMENT — PAIN DESCRIPTION - LOCATION: LOCATION: ABDOMEN

## 2018-05-16 ASSESSMENT — PAIN SCALES - GENERAL
PAINLEVEL_OUTOF10: 7
PAINLEVEL_OUTOF10: 2
PAINLEVEL_OUTOF10: 2
PAINLEVEL_OUTOF10: 7

## 2018-05-16 ASSESSMENT — PAIN DESCRIPTION - PAIN TYPE: TYPE: ACUTE PAIN

## 2018-05-16 ASSESSMENT — PAIN DESCRIPTION - FREQUENCY: FREQUENCY: CONTINUOUS

## 2018-05-16 ASSESSMENT — PAIN DESCRIPTION - ONSET: ONSET: ON-GOING

## 2018-05-16 ASSESSMENT — PAIN DESCRIPTION - DESCRIPTORS: DESCRIPTORS: SHARP

## 2018-05-17 ENCOUNTER — APPOINTMENT (OUTPATIENT)
Dept: NUCLEAR MEDICINE | Age: 23
DRG: 710 | End: 2018-05-17
Payer: MEDICARE

## 2018-05-17 LAB
ABSOLUTE EOS #: 0.1 K/UL (ref 0–0.4)
ABSOLUTE IMMATURE GRANULOCYTE: ABNORMAL K/UL (ref 0–0.3)
ABSOLUTE LYMPH #: 1.5 K/UL (ref 1–4.8)
ABSOLUTE MONO #: 0.8 K/UL (ref 0.2–0.8)
ALBUMIN SERPL-MCNC: 2.9 G/DL (ref 3.5–5.2)
ALBUMIN/GLOBULIN RATIO: ABNORMAL (ref 1–2.5)
ALP BLD-CCNC: 74 U/L (ref 35–104)
ALT SERPL-CCNC: 69 U/L (ref 5–33)
ANION GAP SERPL CALCULATED.3IONS-SCNC: 12 MMOL/L (ref 9–17)
AST SERPL-CCNC: 30 U/L
BASOPHILS # BLD: 0 % (ref 0–2)
BASOPHILS ABSOLUTE: 0 K/UL (ref 0–0.2)
BILIRUB SERPL-MCNC: 0.39 MG/DL (ref 0.3–1.2)
BILIRUBIN DIRECT: 0.12 MG/DL
BILIRUBIN, INDIRECT: 0.27 MG/DL (ref 0–1)
BUN BLDV-MCNC: 3 MG/DL (ref 6–20)
CALCIUM SERPL-MCNC: 7.8 MG/DL (ref 8.6–10.4)
CHLORIDE BLD-SCNC: 104 MMOL/L (ref 98–107)
CO2: 24 MMOL/L (ref 20–31)
CREAT SERPL-MCNC: 0.75 MG/DL (ref 0.5–0.9)
DIFFERENTIAL TYPE: ABNORMAL
EOSINOPHILS RELATIVE PERCENT: 1 % (ref 1–4)
GFR AFRICAN AMERICAN: >60 ML/MIN
GFR NON-AFRICAN AMERICAN: >60 ML/MIN
GFR SERPL CREATININE-BSD FRML MDRD: ABNORMAL ML/MIN/{1.73_M2}
GFR SERPL CREATININE-BSD FRML MDRD: ABNORMAL ML/MIN/{1.73_M2}
GLUCOSE BLD-MCNC: 121 MG/DL (ref 70–99)
HCT VFR BLD CALC: 33.9 % (ref 36–46)
HEMOGLOBIN: 11.3 G/DL (ref 12–16)
IMMATURE GRANULOCYTES: ABNORMAL %
LYMPHOCYTES # BLD: 20 % (ref 25–45)
MCH RBC QN AUTO: 29.5 PG (ref 26–34)
MCHC RBC AUTO-ENTMCNC: 33.2 G/DL (ref 31–37)
MCV RBC AUTO: 89 FL (ref 80–100)
MONOCYTES # BLD: 10 % (ref 2–8)
NRBC AUTOMATED: ABNORMAL PER 100 WBC
PDW BLD-RTO: 13.6 % (ref 11.5–14.5)
PLATELET # BLD: 190 K/UL (ref 130–400)
PLATELET ESTIMATE: ABNORMAL
PMV BLD AUTO: 8 FL (ref 6–12)
POTASSIUM SERPL-SCNC: 3.7 MMOL/L (ref 3.7–5.3)
RBC # BLD: 3.81 M/UL (ref 4–5.2)
RBC # BLD: ABNORMAL 10*6/UL
SEG NEUTROPHILS: 69 % (ref 34–64)
SEGMENTED NEUTROPHILS ABSOLUTE COUNT: 5.1 K/UL (ref 1.8–7.7)
SODIUM BLD-SCNC: 140 MMOL/L (ref 135–144)
TOTAL PROTEIN: 6 G/DL (ref 6.4–8.3)
WBC # BLD: 7.4 K/UL (ref 4.5–13.5)
WBC # BLD: ABNORMAL 10*3/UL

## 2018-05-17 PROCEDURE — 80053 COMPREHEN METABOLIC PANEL: CPT

## 2018-05-17 PROCEDURE — 6370000000 HC RX 637 (ALT 250 FOR IP): Performed by: CLINICAL NURSE SPECIALIST

## 2018-05-17 PROCEDURE — 99232 SBSQ HOSP IP/OBS MODERATE 35: CPT | Performed by: FAMILY MEDICINE

## 2018-05-17 PROCEDURE — 85025 COMPLETE CBC W/AUTO DIFF WBC: CPT

## 2018-05-17 PROCEDURE — 78227 HEPATOBIL SYST IMAGE W/DRUG: CPT

## 2018-05-17 PROCEDURE — 3430000000 HC RX DIAGNOSTIC RADIOPHARMACEUTICAL: Performed by: SURGERY

## 2018-05-17 PROCEDURE — A9537 TC99M MEBROFENIN: HCPCS | Performed by: SURGERY

## 2018-05-17 PROCEDURE — 82248 BILIRUBIN DIRECT: CPT

## 2018-05-17 PROCEDURE — 2580000003 HC RX 258: Performed by: FAMILY MEDICINE

## 2018-05-17 PROCEDURE — 36415 COLL VENOUS BLD VENIPUNCTURE: CPT

## 2018-05-17 PROCEDURE — 99232 SBSQ HOSP IP/OBS MODERATE 35: CPT | Performed by: INTERNAL MEDICINE

## 2018-05-17 PROCEDURE — 1200000000 HC SEMI PRIVATE

## 2018-05-17 PROCEDURE — 6360000002 HC RX W HCPCS: Performed by: CLINICAL NURSE SPECIALIST

## 2018-05-17 RX ADMIN — MEBROFENIN 2.4 MILLICURIE: 45 INJECTION, POWDER, LYOPHILIZED, FOR SOLUTION INTRAVENOUS at 14:32

## 2018-05-17 RX ADMIN — OXYMETAZOLINE HYDROCHLORIDE 2 SPRAY: 5 SPRAY NASAL at 08:29

## 2018-05-17 RX ADMIN — SODIUM CHLORIDE: 9 INJECTION, SOLUTION INTRAVENOUS at 00:32

## 2018-05-17 RX ADMIN — PHENAZOPYRIDINE HYDROCHLORIDE 200 MG: 200 TABLET ORAL at 18:26

## 2018-05-17 RX ADMIN — SODIUM CHLORIDE: 9 INJECTION, SOLUTION INTRAVENOUS at 08:29

## 2018-05-17 RX ADMIN — ONDANSETRON 4 MG: 2 INJECTION INTRAMUSCULAR; INTRAVENOUS at 09:20

## 2018-05-17 RX ADMIN — Medication 2 MG: at 14:29

## 2018-05-17 RX ADMIN — MORPHINE SULFATE 4 MG: 4 INJECTION INTRAVENOUS at 09:20

## 2018-05-17 RX ADMIN — CIPROFLOXACIN 400 MG: 2 INJECTION, SOLUTION INTRAVENOUS at 12:25

## 2018-05-17 RX ADMIN — CIPROFLOXACIN 400 MG: 2 INJECTION, SOLUTION INTRAVENOUS at 00:31

## 2018-05-17 RX ADMIN — MEBROFENIN 3.4 MILLICURIE: 45 INJECTION, POWDER, LYOPHILIZED, FOR SOLUTION INTRAVENOUS at 13:05

## 2018-05-17 RX ADMIN — PHENAZOPYRIDINE HYDROCHLORIDE 200 MG: 200 TABLET ORAL at 08:29

## 2018-05-17 RX ADMIN — ENOXAPARIN SODIUM 40 MG: 40 INJECTION SUBCUTANEOUS at 08:29

## 2018-05-17 RX ADMIN — HYDROCODONE BITARTRATE AND ACETAMINOPHEN 2 TABLET: 5; 325 TABLET ORAL at 17:20

## 2018-05-17 RX ADMIN — OXYMETAZOLINE HYDROCHLORIDE 2 SPRAY: 5 SPRAY NASAL at 21:01

## 2018-05-17 RX ADMIN — SODIUM CHLORIDE: 9 INJECTION, SOLUTION INTRAVENOUS at 18:25

## 2018-05-17 ASSESSMENT — PAIN SCALES - GENERAL
PAINLEVEL_OUTOF10: 3
PAINLEVEL_OUTOF10: 8
PAINLEVEL_OUTOF10: 6
PAINLEVEL_OUTOF10: 10
PAINLEVEL_OUTOF10: 7

## 2018-05-17 ASSESSMENT — PAIN DESCRIPTION - DESCRIPTORS
DESCRIPTORS: SHARP
DESCRIPTORS: SHARP

## 2018-05-17 ASSESSMENT — PAIN DESCRIPTION - ONSET
ONSET: ON-GOING
ONSET: ON-GOING

## 2018-05-17 ASSESSMENT — PAIN DESCRIPTION - FREQUENCY
FREQUENCY: CONTINUOUS
FREQUENCY: CONTINUOUS

## 2018-05-17 ASSESSMENT — PAIN DESCRIPTION - PAIN TYPE
TYPE: ACUTE PAIN
TYPE: ACUTE PAIN

## 2018-05-17 ASSESSMENT — PAIN DESCRIPTION - LOCATION
LOCATION: ABDOMEN
LOCATION: ABDOMEN

## 2018-05-17 ASSESSMENT — PAIN DESCRIPTION - ORIENTATION
ORIENTATION: RIGHT;LEFT
ORIENTATION: RIGHT

## 2018-05-17 ASSESSMENT — PAIN DESCRIPTION - DIRECTION: RADIATING_TOWARDS: BILATERAL FLANK

## 2018-05-17 ASSESSMENT — PAIN DESCRIPTION - PROGRESSION: CLINICAL_PROGRESSION: NOT CHANGED

## 2018-05-18 ENCOUNTER — ANESTHESIA (OUTPATIENT)
Dept: OPERATING ROOM | Age: 23
DRG: 710 | End: 2018-05-18
Payer: MEDICARE

## 2018-05-18 ENCOUNTER — ANESTHESIA EVENT (OUTPATIENT)
Dept: OPERATING ROOM | Age: 23
DRG: 710 | End: 2018-05-18
Payer: MEDICARE

## 2018-05-18 VITALS — DIASTOLIC BLOOD PRESSURE: 56 MMHG | SYSTOLIC BLOOD PRESSURE: 107 MMHG | TEMPERATURE: 97.3 F | OXYGEN SATURATION: 90 %

## 2018-05-18 PROBLEM — N10 ACUTE PYELONEPHRITIS: Status: ACTIVE | Noted: 2018-05-14

## 2018-05-18 LAB
ABSOLUTE EOS #: 0.2 K/UL (ref 0–0.4)
ABSOLUTE IMMATURE GRANULOCYTE: ABNORMAL K/UL (ref 0–0.3)
ABSOLUTE LYMPH #: 1.7 K/UL (ref 1–4.8)
ABSOLUTE MONO #: 0.6 K/UL (ref 0.2–0.8)
BASOPHILS # BLD: 0 % (ref 0–2)
BASOPHILS ABSOLUTE: 0 K/UL (ref 0–0.2)
DIFFERENTIAL TYPE: ABNORMAL
EOSINOPHILS RELATIVE PERCENT: 2 % (ref 1–4)
HCT VFR BLD CALC: 37.6 % (ref 36–46)
HEMOGLOBIN: 12.4 G/DL (ref 12–16)
IMMATURE GRANULOCYTES: ABNORMAL %
LYMPHOCYTES # BLD: 24 % (ref 25–45)
MCH RBC QN AUTO: 29.1 PG (ref 26–34)
MCHC RBC AUTO-ENTMCNC: 32.9 G/DL (ref 31–37)
MCV RBC AUTO: 88.7 FL (ref 80–100)
MONOCYTES # BLD: 9 % (ref 2–8)
NRBC AUTOMATED: ABNORMAL PER 100 WBC
PDW BLD-RTO: 13.7 % (ref 11.5–14.5)
PLATELET # BLD: 241 K/UL (ref 130–400)
PLATELET ESTIMATE: ABNORMAL
PMV BLD AUTO: 8.1 FL (ref 6–12)
RBC # BLD: 4.24 M/UL (ref 4–5.2)
RBC # BLD: ABNORMAL 10*6/UL
SEG NEUTROPHILS: 65 % (ref 34–64)
SEGMENTED NEUTROPHILS ABSOLUTE COUNT: 4.6 K/UL (ref 1.8–7.7)
WBC # BLD: 7 K/UL (ref 4.5–13.5)
WBC # BLD: ABNORMAL 10*3/UL

## 2018-05-18 PROCEDURE — 1200000000 HC SEMI PRIVATE

## 2018-05-18 PROCEDURE — 99232 SBSQ HOSP IP/OBS MODERATE 35: CPT | Performed by: INTERNAL MEDICINE

## 2018-05-18 PROCEDURE — 36415 COLL VENOUS BLD VENIPUNCTURE: CPT

## 2018-05-18 PROCEDURE — 7100000001 HC PACU RECOVERY - ADDTL 15 MIN: Performed by: SURGERY

## 2018-05-18 PROCEDURE — 2720000010 HC SURG SUPPLY STERILE: Performed by: SURGERY

## 2018-05-18 PROCEDURE — 6370000000 HC RX 637 (ALT 250 FOR IP): Performed by: CLINICAL NURSE SPECIALIST

## 2018-05-18 PROCEDURE — 7100000000 HC PACU RECOVERY - FIRST 15 MIN: Performed by: SURGERY

## 2018-05-18 PROCEDURE — 2580000003 HC RX 258: Performed by: CLINICAL NURSE SPECIALIST

## 2018-05-18 PROCEDURE — 3600000003 HC SURGERY LEVEL 3 BASE: Performed by: SURGERY

## 2018-05-18 PROCEDURE — 3600000013 HC SURGERY LEVEL 3 ADDTL 15MIN: Performed by: SURGERY

## 2018-05-18 PROCEDURE — 88304 TISSUE EXAM BY PATHOLOGIST: CPT

## 2018-05-18 PROCEDURE — 6360000002 HC RX W HCPCS: Performed by: NURSE ANESTHETIST, CERTIFIED REGISTERED

## 2018-05-18 PROCEDURE — 85025 COMPLETE CBC W/AUTO DIFF WBC: CPT

## 2018-05-18 PROCEDURE — 6360000002 HC RX W HCPCS: Performed by: ANESTHESIOLOGY

## 2018-05-18 PROCEDURE — 2500000003 HC RX 250 WO HCPCS: Performed by: NURSE ANESTHETIST, CERTIFIED REGISTERED

## 2018-05-18 PROCEDURE — 99232 SBSQ HOSP IP/OBS MODERATE 35: CPT | Performed by: FAMILY MEDICINE

## 2018-05-18 PROCEDURE — 3700000001 HC ADD 15 MINUTES (ANESTHESIA): Performed by: SURGERY

## 2018-05-18 PROCEDURE — 0FT44ZZ RESECTION OF GALLBLADDER, PERCUTANEOUS ENDOSCOPIC APPROACH: ICD-10-PCS | Performed by: SURGERY

## 2018-05-18 PROCEDURE — 3700000000 HC ANESTHESIA ATTENDED CARE: Performed by: SURGERY

## 2018-05-18 PROCEDURE — 2580000003 HC RX 258: Performed by: FAMILY MEDICINE

## 2018-05-18 PROCEDURE — 6360000002 HC RX W HCPCS: Performed by: CLINICAL NURSE SPECIALIST

## 2018-05-18 PROCEDURE — 2500000003 HC RX 250 WO HCPCS

## 2018-05-18 PROCEDURE — 6360000002 HC RX W HCPCS: Performed by: SURGERY

## 2018-05-18 RX ORDER — FENTANYL CITRATE 50 UG/ML
25 INJECTION, SOLUTION INTRAMUSCULAR; INTRAVENOUS EVERY 5 MIN PRN
Status: DISCONTINUED | OUTPATIENT
Start: 2018-05-18 | End: 2018-05-18 | Stop reason: HOSPADM

## 2018-05-18 RX ORDER — FENTANYL CITRATE 50 UG/ML
INJECTION, SOLUTION INTRAMUSCULAR; INTRAVENOUS PRN
Status: DISCONTINUED | OUTPATIENT
Start: 2018-05-18 | End: 2018-05-18 | Stop reason: SDUPTHER

## 2018-05-18 RX ORDER — ONDANSETRON 2 MG/ML
INJECTION INTRAMUSCULAR; INTRAVENOUS PRN
Status: DISCONTINUED | OUTPATIENT
Start: 2018-05-18 | End: 2018-05-18 | Stop reason: SDUPTHER

## 2018-05-18 RX ORDER — ROCURONIUM BROMIDE 10 MG/ML
INJECTION, SOLUTION INTRAVENOUS PRN
Status: DISCONTINUED | OUTPATIENT
Start: 2018-05-18 | End: 2018-05-18 | Stop reason: SDUPTHER

## 2018-05-18 RX ORDER — MIDAZOLAM HYDROCHLORIDE 1 MG/ML
INJECTION INTRAMUSCULAR; INTRAVENOUS PRN
Status: DISCONTINUED | OUTPATIENT
Start: 2018-05-18 | End: 2018-05-18 | Stop reason: SDUPTHER

## 2018-05-18 RX ORDER — PROMETHAZINE HYDROCHLORIDE 25 MG/ML
6.25 INJECTION, SOLUTION INTRAMUSCULAR; INTRAVENOUS
Status: DISCONTINUED | OUTPATIENT
Start: 2018-05-18 | End: 2018-05-18 | Stop reason: HOSPADM

## 2018-05-18 RX ORDER — HYDROMORPHONE HCL 110MG/55ML
0.5 PATIENT CONTROLLED ANALGESIA SYRINGE INTRAVENOUS EVERY 5 MIN PRN
Status: DISCONTINUED | OUTPATIENT
Start: 2018-05-18 | End: 2018-05-18 | Stop reason: HOSPADM

## 2018-05-18 RX ORDER — LABETALOL HYDROCHLORIDE 5 MG/ML
5 INJECTION, SOLUTION INTRAVENOUS EVERY 10 MIN PRN
Status: DISCONTINUED | OUTPATIENT
Start: 2018-05-18 | End: 2018-05-18 | Stop reason: HOSPADM

## 2018-05-18 RX ORDER — PROPOFOL 10 MG/ML
INJECTION, EMULSION INTRAVENOUS PRN
Status: DISCONTINUED | OUTPATIENT
Start: 2018-05-18 | End: 2018-05-18 | Stop reason: SDUPTHER

## 2018-05-18 RX ORDER — ESMOLOL HYDROCHLORIDE 10 MG/ML
INJECTION INTRAVENOUS PRN
Status: DISCONTINUED | OUTPATIENT
Start: 2018-05-18 | End: 2018-05-18 | Stop reason: SDUPTHER

## 2018-05-18 RX ORDER — LIDOCAINE HYDROCHLORIDE 20 MG/ML
INJECTION, SOLUTION INFILTRATION; PERINEURAL PRN
Status: DISCONTINUED | OUTPATIENT
Start: 2018-05-18 | End: 2018-05-18 | Stop reason: SDUPTHER

## 2018-05-18 RX ORDER — HYDRALAZINE HYDROCHLORIDE 20 MG/ML
5 INJECTION INTRAMUSCULAR; INTRAVENOUS EVERY 10 MIN PRN
Status: DISCONTINUED | OUTPATIENT
Start: 2018-05-18 | End: 2018-05-18 | Stop reason: HOSPADM

## 2018-05-18 RX ORDER — DEXAMETHASONE SODIUM PHOSPHATE 10 MG/ML
INJECTION INTRAMUSCULAR; INTRAVENOUS PRN
Status: DISCONTINUED | OUTPATIENT
Start: 2018-05-18 | End: 2018-05-18 | Stop reason: SDUPTHER

## 2018-05-18 RX ORDER — ONDANSETRON 2 MG/ML
4 INJECTION INTRAMUSCULAR; INTRAVENOUS
Status: DISCONTINUED | OUTPATIENT
Start: 2018-05-18 | End: 2018-05-18 | Stop reason: HOSPADM

## 2018-05-18 RX ORDER — BUPIVACAINE HYDROCHLORIDE 2.5 MG/ML
INJECTION, SOLUTION INFILTRATION; PERINEURAL PRN
Status: DISCONTINUED | OUTPATIENT
Start: 2018-05-18 | End: 2018-05-18 | Stop reason: HOSPADM

## 2018-05-18 RX ADMIN — PHENAZOPYRIDINE HYDROCHLORIDE 200 MG: 200 TABLET ORAL at 17:52

## 2018-05-18 RX ADMIN — Medication 10 ML: at 20:56

## 2018-05-18 RX ADMIN — OXYMETAZOLINE HYDROCHLORIDE 2 SPRAY: 5 SPRAY NASAL at 20:49

## 2018-05-18 RX ADMIN — ONDANSETRON 4 MG: 2 INJECTION, SOLUTION INTRAMUSCULAR; INTRAVENOUS at 14:47

## 2018-05-18 RX ADMIN — LIDOCAINE HYDROCHLORIDE 80 MG: 20 INJECTION, SOLUTION INFILTRATION; PERINEURAL at 13:43

## 2018-05-18 RX ADMIN — PROPOFOL 150 MG: 10 INJECTION, EMULSION INTRAVENOUS at 13:43

## 2018-05-18 RX ADMIN — SODIUM CHLORIDE: 9 INJECTION, SOLUTION INTRAVENOUS at 16:41

## 2018-05-18 RX ADMIN — FENTANYL CITRATE 100 MCG: 50 INJECTION, SOLUTION INTRAMUSCULAR; INTRAVENOUS at 13:43

## 2018-05-18 RX ADMIN — CIPROFLOXACIN 400 MG: 2 INJECTION, SOLUTION INTRAVENOUS at 01:03

## 2018-05-18 RX ADMIN — HYDROMORPHONE HYDROCHLORIDE 0.5 MG: 2 INJECTION INTRAMUSCULAR; INTRAVENOUS; SUBCUTANEOUS at 15:33

## 2018-05-18 RX ADMIN — FENTANYL CITRATE 50 MCG: 50 INJECTION, SOLUTION INTRAMUSCULAR; INTRAVENOUS at 14:09

## 2018-05-18 RX ADMIN — HYDROMORPHONE HYDROCHLORIDE 0.5 MG: 2 INJECTION INTRAMUSCULAR; INTRAVENOUS; SUBCUTANEOUS at 15:43

## 2018-05-18 RX ADMIN — MORPHINE SULFATE 4 MG: 4 INJECTION INTRAVENOUS at 17:01

## 2018-05-18 RX ADMIN — HYDROMORPHONE HYDROCHLORIDE 0.5 MG: 1 INJECTION, SOLUTION INTRAMUSCULAR; INTRAVENOUS; SUBCUTANEOUS at 20:57

## 2018-05-18 RX ADMIN — MIDAZOLAM HYDROCHLORIDE 2 MG: 1 INJECTION, SOLUTION INTRAMUSCULAR; INTRAVENOUS at 13:33

## 2018-05-18 RX ADMIN — ESMOLOL HYDROCHLORIDE 10 MG: 10 INJECTION, SOLUTION INTRAVENOUS at 14:31

## 2018-05-18 RX ADMIN — FENTANYL CITRATE 50 MCG: 50 INJECTION, SOLUTION INTRAMUSCULAR; INTRAVENOUS at 14:28

## 2018-05-18 RX ADMIN — ROCURONIUM BROMIDE 50 MG: 10 INJECTION, SOLUTION INTRAVENOUS at 13:43

## 2018-05-18 RX ADMIN — HYDROCODONE BITARTRATE AND ACETAMINOPHEN 2 TABLET: 5; 325 TABLET ORAL at 22:16

## 2018-05-18 RX ADMIN — HYDROMORPHONE HYDROCHLORIDE 0.5 MG: 2 INJECTION INTRAMUSCULAR; INTRAVENOUS; SUBCUTANEOUS at 15:24

## 2018-05-18 RX ADMIN — FENTANYL CITRATE 50 MCG: 50 INJECTION, SOLUTION INTRAMUSCULAR; INTRAVENOUS at 13:49

## 2018-05-18 RX ADMIN — HYDROMORPHONE HYDROCHLORIDE 0.5 MG: 2 INJECTION INTRAMUSCULAR; INTRAVENOUS; SUBCUTANEOUS at 15:54

## 2018-05-18 RX ADMIN — Medication 50 MG: at 14:12

## 2018-05-18 RX ADMIN — DEXAMETHASONE SODIUM PHOSPHATE 4 MG: 10 INJECTION INTRAMUSCULAR; INTRAVENOUS at 14:19

## 2018-05-18 RX ADMIN — FENTANYL CITRATE 25 MCG: 50 INJECTION, SOLUTION INTRAMUSCULAR; INTRAVENOUS at 16:05

## 2018-05-18 RX ADMIN — CIPROFLOXACIN 400 MG: 2 INJECTION, SOLUTION INTRAVENOUS at 12:44

## 2018-05-18 RX ADMIN — OXYMETAZOLINE HYDROCHLORIDE 2 SPRAY: 5 SPRAY NASAL at 11:17

## 2018-05-18 RX ADMIN — SODIUM CHLORIDE: 9 INJECTION, SOLUTION INTRAVENOUS at 12:40

## 2018-05-18 RX ADMIN — SUGAMMADEX 200 MG: 100 INJECTION, SOLUTION INTRAVENOUS at 14:45

## 2018-05-18 ASSESSMENT — PULMONARY FUNCTION TESTS
PIF_VALUE: 1
PIF_VALUE: 27
PIF_VALUE: 23
PIF_VALUE: 27
PIF_VALUE: 24
PIF_VALUE: 18
PIF_VALUE: 0
PIF_VALUE: 18
PIF_VALUE: 1
PIF_VALUE: 28
PIF_VALUE: 24
PIF_VALUE: 24
PIF_VALUE: 2
PIF_VALUE: 25
PIF_VALUE: 22
PIF_VALUE: 26
PIF_VALUE: 21
PIF_VALUE: 25
PIF_VALUE: 25
PIF_VALUE: 24
PIF_VALUE: 1
PIF_VALUE: 27
PIF_VALUE: 22
PIF_VALUE: 1
PIF_VALUE: 2
PIF_VALUE: 23
PIF_VALUE: 2
PIF_VALUE: 27
PIF_VALUE: 23
PIF_VALUE: 2
PIF_VALUE: 1
PIF_VALUE: 22
PIF_VALUE: 25
PIF_VALUE: 1
PIF_VALUE: 1
PIF_VALUE: 27
PIF_VALUE: 27
PIF_VALUE: 23
PIF_VALUE: 16
PIF_VALUE: 21
PIF_VALUE: 28
PIF_VALUE: 21
PIF_VALUE: 1
PIF_VALUE: 27
PIF_VALUE: 22
PIF_VALUE: 26
PIF_VALUE: 1
PIF_VALUE: 2
PIF_VALUE: 27
PIF_VALUE: 23
PIF_VALUE: 27
PIF_VALUE: 21
PIF_VALUE: 28
PIF_VALUE: 23
PIF_VALUE: 2
PIF_VALUE: 23
PIF_VALUE: 22
PIF_VALUE: 1
PIF_VALUE: 2
PIF_VALUE: 2
PIF_VALUE: 23
PIF_VALUE: 1
PIF_VALUE: 27
PIF_VALUE: 26
PIF_VALUE: 22
PIF_VALUE: 27
PIF_VALUE: 26
PIF_VALUE: 22
PIF_VALUE: 23
PIF_VALUE: 26
PIF_VALUE: 23
PIF_VALUE: 27
PIF_VALUE: 24
PIF_VALUE: 23
PIF_VALUE: 24
PIF_VALUE: 26
PIF_VALUE: 21
PIF_VALUE: 26
PIF_VALUE: 26
PIF_VALUE: 27
PIF_VALUE: 26
PIF_VALUE: 26
PIF_VALUE: 0
PIF_VALUE: 1
PIF_VALUE: 23
PIF_VALUE: 27
PIF_VALUE: 3
PIF_VALUE: 22

## 2018-05-18 ASSESSMENT — PAIN DESCRIPTION - ONSET
ONSET: ON-GOING
ONSET: ON-GOING

## 2018-05-18 ASSESSMENT — PAIN DESCRIPTION - ORIENTATION
ORIENTATION: RIGHT;LEFT;LOWER;MID;UPPER
ORIENTATION: RIGHT;LEFT;LOWER;MID;UPPER

## 2018-05-18 ASSESSMENT — PAIN DESCRIPTION - LOCATION
LOCATION: ABDOMEN
LOCATION: ABDOMEN

## 2018-05-18 ASSESSMENT — PAIN SCALES - GENERAL
PAINLEVEL_OUTOF10: 7
PAINLEVEL_OUTOF10: 10
PAINLEVEL_OUTOF10: 10
PAINLEVEL_OUTOF10: 3
PAINLEVEL_OUTOF10: 10
PAINLEVEL_OUTOF10: 7
PAINLEVEL_OUTOF10: 5
PAINLEVEL_OUTOF10: 10
PAINLEVEL_OUTOF10: 9
PAINLEVEL_OUTOF10: 9
PAINLEVEL_OUTOF10: 6

## 2018-05-18 ASSESSMENT — PAIN DESCRIPTION - DESCRIPTORS
DESCRIPTORS: PATIENT UNABLE TO DESCRIBE
DESCRIPTORS: PATIENT UNABLE TO DESCRIBE

## 2018-05-18 ASSESSMENT — PAIN DESCRIPTION - PROGRESSION
CLINICAL_PROGRESSION: GRADUALLY WORSENING
CLINICAL_PROGRESSION: NOT CHANGED

## 2018-05-18 ASSESSMENT — PAIN DESCRIPTION - PAIN TYPE
TYPE: SURGICAL PAIN
TYPE: SURGICAL PAIN

## 2018-05-18 ASSESSMENT — PAIN DESCRIPTION - FREQUENCY
FREQUENCY: CONTINUOUS
FREQUENCY: CONTINUOUS

## 2018-05-19 LAB
ABSOLUTE EOS #: 0 K/UL (ref 0–0.4)
ABSOLUTE IMMATURE GRANULOCYTE: ABNORMAL K/UL (ref 0–0.3)
ABSOLUTE LYMPH #: 0.7 K/UL (ref 1–4.8)
ABSOLUTE MONO #: 0.6 K/UL (ref 0.2–0.8)
ALBUMIN SERPL-MCNC: 3.1 G/DL (ref 3.5–5.2)
ALBUMIN/GLOBULIN RATIO: ABNORMAL (ref 1–2.5)
ALP BLD-CCNC: 79 U/L (ref 35–104)
ALT SERPL-CCNC: 62 U/L (ref 5–33)
ANION GAP SERPL CALCULATED.3IONS-SCNC: 12 MMOL/L (ref 9–17)
AST SERPL-CCNC: 42 U/L
BASOPHILS # BLD: 1 % (ref 0–2)
BASOPHILS ABSOLUTE: 0.1 K/UL (ref 0–0.2)
BILIRUB SERPL-MCNC: 0.35 MG/DL (ref 0.3–1.2)
BILIRUBIN DIRECT: <0.08 MG/DL
BILIRUBIN, INDIRECT: ABNORMAL MG/DL (ref 0–1)
BUN BLDV-MCNC: 6 MG/DL (ref 6–20)
CALCIUM SERPL-MCNC: 8 MG/DL (ref 8.6–10.4)
CHLORIDE BLD-SCNC: 103 MMOL/L (ref 98–107)
CO2: 24 MMOL/L (ref 20–31)
CREAT SERPL-MCNC: 0.63 MG/DL (ref 0.5–0.9)
CULTURE: ABNORMAL
DIFFERENTIAL TYPE: ABNORMAL
EOSINOPHILS RELATIVE PERCENT: 0 % (ref 1–4)
GFR AFRICAN AMERICAN: >60 ML/MIN
GFR NON-AFRICAN AMERICAN: >60 ML/MIN
GFR SERPL CREATININE-BSD FRML MDRD: ABNORMAL ML/MIN/{1.73_M2}
GFR SERPL CREATININE-BSD FRML MDRD: ABNORMAL ML/MIN/{1.73_M2}
GLUCOSE BLD-MCNC: 130 MG/DL (ref 70–99)
HCT VFR BLD CALC: 37.6 % (ref 36–46)
HEMOGLOBIN: 12.4 G/DL (ref 12–16)
IMMATURE GRANULOCYTES: ABNORMAL %
LYMPHOCYTES # BLD: 6 % (ref 25–45)
Lab: ABNORMAL
MCH RBC QN AUTO: 29.2 PG (ref 26–34)
MCHC RBC AUTO-ENTMCNC: 32.8 G/DL (ref 31–37)
MCV RBC AUTO: 88.8 FL (ref 80–100)
MONOCYTES # BLD: 5 % (ref 2–8)
NRBC AUTOMATED: ABNORMAL PER 100 WBC
PDW BLD-RTO: 13.5 % (ref 11.5–14.5)
PLATELET # BLD: 332 K/UL (ref 130–400)
PLATELET ESTIMATE: ABNORMAL
PMV BLD AUTO: 7.8 FL (ref 6–12)
POTASSIUM SERPL-SCNC: 4.4 MMOL/L (ref 3.7–5.3)
RBC # BLD: 4.24 M/UL (ref 4–5.2)
RBC # BLD: ABNORMAL 10*6/UL
SEG NEUTROPHILS: 88 % (ref 34–64)
SEGMENTED NEUTROPHILS ABSOLUTE COUNT: 11.7 K/UL (ref 1.8–7.7)
SODIUM BLD-SCNC: 139 MMOL/L (ref 135–144)
SPECIMEN DESCRIPTION: ABNORMAL
SPECIMEN DESCRIPTION: ABNORMAL
STATUS: ABNORMAL
TOTAL PROTEIN: 6.3 G/DL (ref 6.4–8.3)
WBC # BLD: 13.1 K/UL (ref 4.5–13.5)
WBC # BLD: ABNORMAL 10*3/UL

## 2018-05-19 PROCEDURE — 6370000000 HC RX 637 (ALT 250 FOR IP): Performed by: CLINICAL NURSE SPECIALIST

## 2018-05-19 PROCEDURE — 36415 COLL VENOUS BLD VENIPUNCTURE: CPT

## 2018-05-19 PROCEDURE — 6370000000 HC RX 637 (ALT 250 FOR IP): Performed by: FAMILY MEDICINE

## 2018-05-19 PROCEDURE — 6360000002 HC RX W HCPCS: Performed by: CLINICAL NURSE SPECIALIST

## 2018-05-19 PROCEDURE — 2580000003 HC RX 258: Performed by: FAMILY MEDICINE

## 2018-05-19 PROCEDURE — 82248 BILIRUBIN DIRECT: CPT

## 2018-05-19 PROCEDURE — 99232 SBSQ HOSP IP/OBS MODERATE 35: CPT | Performed by: FAMILY MEDICINE

## 2018-05-19 PROCEDURE — 6360000002 HC RX W HCPCS: Performed by: SURGERY

## 2018-05-19 PROCEDURE — 85025 COMPLETE CBC W/AUTO DIFF WBC: CPT

## 2018-05-19 PROCEDURE — 80053 COMPREHEN METABOLIC PANEL: CPT

## 2018-05-19 PROCEDURE — 2580000003 HC RX 258: Performed by: CLINICAL NURSE SPECIALIST

## 2018-05-19 PROCEDURE — 1200000000 HC SEMI PRIVATE

## 2018-05-19 RX ORDER — DOCUSATE SODIUM 100 MG/1
100 CAPSULE, LIQUID FILLED ORAL 2 TIMES DAILY
Qty: 30 CAPSULE | Refills: 0 | Status: SHIPPED | OUTPATIENT
Start: 2018-05-19 | End: 2019-02-14

## 2018-05-19 RX ORDER — SENNA AND DOCUSATE SODIUM 50; 8.6 MG/1; MG/1
2 TABLET, FILM COATED ORAL DAILY
Status: DISCONTINUED | OUTPATIENT
Start: 2018-05-19 | End: 2018-05-20 | Stop reason: HOSPADM

## 2018-05-19 RX ORDER — HYDROCODONE BITARTRATE AND ACETAMINOPHEN 5; 325 MG/1; MG/1
1 TABLET ORAL EVERY 6 HOURS PRN
Qty: 28 TABLET | Refills: 0 | Status: SHIPPED | OUTPATIENT
Start: 2018-05-19 | End: 2018-05-26

## 2018-05-19 RX ORDER — CIPROFLOXACIN 500 MG/1
500 TABLET, FILM COATED ORAL EVERY 12 HOURS SCHEDULED
Status: DISCONTINUED | OUTPATIENT
Start: 2018-05-19 | End: 2018-05-20 | Stop reason: HOSPADM

## 2018-05-19 RX ADMIN — SODIUM CHLORIDE: 9 INJECTION, SOLUTION INTRAVENOUS at 05:49

## 2018-05-19 RX ADMIN — HYDROMORPHONE HYDROCHLORIDE 0.5 MG: 1 INJECTION, SOLUTION INTRAMUSCULAR; INTRAVENOUS; SUBCUTANEOUS at 05:48

## 2018-05-19 RX ADMIN — ENOXAPARIN SODIUM 40 MG: 40 INJECTION SUBCUTANEOUS at 08:15

## 2018-05-19 RX ADMIN — HYDROMORPHONE HYDROCHLORIDE 0.5 MG: 1 INJECTION, SOLUTION INTRAMUSCULAR; INTRAVENOUS; SUBCUTANEOUS at 00:57

## 2018-05-19 RX ADMIN — CIPROFLOXACIN 500 MG: 500 TABLET, FILM COATED ORAL at 22:59

## 2018-05-19 RX ADMIN — HYDROCODONE BITARTRATE AND ACETAMINOPHEN 2 TABLET: 5; 325 TABLET ORAL at 15:37

## 2018-05-19 RX ADMIN — OXYMETAZOLINE HYDROCHLORIDE 2 SPRAY: 5 SPRAY NASAL at 08:15

## 2018-05-19 RX ADMIN — CIPROFLOXACIN 400 MG: 2 INJECTION, SOLUTION INTRAVENOUS at 12:22

## 2018-05-19 RX ADMIN — PHENAZOPYRIDINE HYDROCHLORIDE 200 MG: 200 TABLET ORAL at 17:45

## 2018-05-19 RX ADMIN — DOCUSATE SODIUM AND SENNOSIDES 2 TABLET: 8.6; 5 TABLET, FILM COATED ORAL at 12:21

## 2018-05-19 RX ADMIN — PHENAZOPYRIDINE HYDROCHLORIDE 200 MG: 200 TABLET ORAL at 08:15

## 2018-05-19 RX ADMIN — PHENAZOPYRIDINE HYDROCHLORIDE 200 MG: 200 TABLET ORAL at 12:21

## 2018-05-19 RX ADMIN — HYDROCODONE BITARTRATE AND ACETAMINOPHEN 2 TABLET: 5; 325 TABLET ORAL at 02:19

## 2018-05-19 RX ADMIN — HYDROCODONE BITARTRATE AND ACETAMINOPHEN 2 TABLET: 5; 325 TABLET ORAL at 06:46

## 2018-05-19 RX ADMIN — CIPROFLOXACIN 400 MG: 2 INJECTION, SOLUTION INTRAVENOUS at 00:55

## 2018-05-19 RX ADMIN — SODIUM CHLORIDE: 9 INJECTION, SOLUTION INTRAVENOUS at 17:41

## 2018-05-19 RX ADMIN — Medication 10 ML: at 08:16

## 2018-05-19 ASSESSMENT — PAIN DESCRIPTION - PROGRESSION
CLINICAL_PROGRESSION: NOT CHANGED
CLINICAL_PROGRESSION: GRADUALLY WORSENING
CLINICAL_PROGRESSION: NOT CHANGED
CLINICAL_PROGRESSION: GRADUALLY WORSENING
CLINICAL_PROGRESSION: NOT CHANGED

## 2018-05-19 ASSESSMENT — PAIN DESCRIPTION - ORIENTATION
ORIENTATION: RIGHT;LEFT;LOWER;MID;UPPER

## 2018-05-19 ASSESSMENT — PAIN DESCRIPTION - PAIN TYPE
TYPE: SURGICAL PAIN

## 2018-05-19 ASSESSMENT — PAIN DESCRIPTION - LOCATION
LOCATION: ABDOMEN

## 2018-05-19 ASSESSMENT — PAIN SCALES - GENERAL
PAINLEVEL_OUTOF10: 9
PAINLEVEL_OUTOF10: 9
PAINLEVEL_OUTOF10: 5
PAINLEVEL_OUTOF10: 9
PAINLEVEL_OUTOF10: 9
PAINLEVEL_OUTOF10: 6
PAINLEVEL_OUTOF10: 8
PAINLEVEL_OUTOF10: 6
PAINLEVEL_OUTOF10: 9
PAINLEVEL_OUTOF10: 9
PAINLEVEL_OUTOF10: 7

## 2018-05-19 ASSESSMENT — PAIN DESCRIPTION - DESCRIPTORS
DESCRIPTORS: PATIENT UNABLE TO DESCRIBE
DESCRIPTORS: ACHING;DISCOMFORT

## 2018-05-19 ASSESSMENT — PAIN DESCRIPTION - ONSET
ONSET: ON-GOING

## 2018-05-19 ASSESSMENT — PAIN DESCRIPTION - FREQUENCY
FREQUENCY: CONTINUOUS

## 2018-05-20 VITALS
SYSTOLIC BLOOD PRESSURE: 126 MMHG | HEIGHT: 65 IN | OXYGEN SATURATION: 93 % | RESPIRATION RATE: 17 BRPM | WEIGHT: 183.3 LBS | DIASTOLIC BLOOD PRESSURE: 87 MMHG | HEART RATE: 53 BPM | TEMPERATURE: 97.9 F | BODY MASS INDEX: 30.54 KG/M2

## 2018-05-20 LAB
ABSOLUTE EOS #: 0.1 K/UL (ref 0–0.4)
ABSOLUTE IMMATURE GRANULOCYTE: ABNORMAL K/UL (ref 0–0.3)
ABSOLUTE LYMPH #: 2.5 K/UL (ref 1–4.8)
ABSOLUTE MONO #: 0.5 K/UL (ref 0.2–0.8)
BASOPHILS # BLD: 0 % (ref 0–2)
BASOPHILS ABSOLUTE: 0 K/UL (ref 0–0.2)
DIFFERENTIAL TYPE: ABNORMAL
EOSINOPHILS RELATIVE PERCENT: 1 % (ref 1–4)
HCT VFR BLD CALC: 35.2 % (ref 36–46)
HEMOGLOBIN: 11.6 G/DL (ref 12–16)
IMMATURE GRANULOCYTES: ABNORMAL %
LYMPHOCYTES # BLD: 27 % (ref 25–45)
MCH RBC QN AUTO: 29 PG (ref 26–34)
MCHC RBC AUTO-ENTMCNC: 33 G/DL (ref 31–37)
MCV RBC AUTO: 87.9 FL (ref 80–100)
MONOCYTES # BLD: 5 % (ref 2–8)
NRBC AUTOMATED: ABNORMAL PER 100 WBC
PDW BLD-RTO: 12.4 % (ref 11.5–14.5)
PLATELET # BLD: 307 K/UL (ref 130–400)
PLATELET ESTIMATE: ABNORMAL
PMV BLD AUTO: ABNORMAL FL (ref 6–12)
RBC # BLD: 4 M/UL (ref 4–5.2)
RBC # BLD: ABNORMAL 10*6/UL
SEG NEUTROPHILS: 67 % (ref 34–64)
SEGMENTED NEUTROPHILS ABSOLUTE COUNT: 6.2 K/UL (ref 1.8–7.7)
WBC # BLD: 9.3 K/UL (ref 4.5–13.5)
WBC # BLD: ABNORMAL 10*3/UL

## 2018-05-20 PROCEDURE — 85025 COMPLETE CBC W/AUTO DIFF WBC: CPT

## 2018-05-20 PROCEDURE — 99233 SBSQ HOSP IP/OBS HIGH 50: CPT | Performed by: INTERNAL MEDICINE

## 2018-05-20 PROCEDURE — 36415 COLL VENOUS BLD VENIPUNCTURE: CPT

## 2018-05-20 PROCEDURE — 6370000000 HC RX 637 (ALT 250 FOR IP): Performed by: FAMILY MEDICINE

## 2018-05-20 PROCEDURE — 6370000000 HC RX 637 (ALT 250 FOR IP): Performed by: CLINICAL NURSE SPECIALIST

## 2018-05-20 PROCEDURE — 6360000002 HC RX W HCPCS: Performed by: CLINICAL NURSE SPECIALIST

## 2018-05-20 RX ORDER — CIPROFLOXACIN 500 MG/1
500 TABLET, FILM COATED ORAL EVERY 12 HOURS SCHEDULED
Qty: 8 TABLET | Refills: 0 | Status: SHIPPED | OUTPATIENT
Start: 2018-05-20 | End: 2018-05-24

## 2018-05-20 RX ADMIN — CIPROFLOXACIN 500 MG: 500 TABLET, FILM COATED ORAL at 09:19

## 2018-05-20 RX ADMIN — PHENAZOPYRIDINE HYDROCHLORIDE 200 MG: 200 TABLET ORAL at 09:19

## 2018-05-20 RX ADMIN — ENOXAPARIN SODIUM 40 MG: 40 INJECTION SUBCUTANEOUS at 09:19

## 2018-05-20 RX ADMIN — HYDROCODONE BITARTRATE AND ACETAMINOPHEN 2 TABLET: 5; 325 TABLET ORAL at 09:19

## 2018-05-20 RX ADMIN — DOCUSATE SODIUM AND SENNOSIDES 2 TABLET: 8.6; 5 TABLET, FILM COATED ORAL at 09:19

## 2018-05-20 ASSESSMENT — PAIN DESCRIPTION - LOCATION: LOCATION: ABDOMEN

## 2018-05-20 ASSESSMENT — PAIN DESCRIPTION - DESCRIPTORS: DESCRIPTORS: ACHING;DISCOMFORT

## 2018-05-20 ASSESSMENT — PAIN SCALES - GENERAL
PAINLEVEL_OUTOF10: 5
PAINLEVEL_OUTOF10: 9

## 2018-05-20 ASSESSMENT — PAIN DESCRIPTION - ONSET: ONSET: ON-GOING

## 2018-05-20 ASSESSMENT — PAIN DESCRIPTION - FREQUENCY: FREQUENCY: CONTINUOUS

## 2018-05-20 ASSESSMENT — PAIN DESCRIPTION - ORIENTATION: ORIENTATION: RIGHT;LEFT

## 2018-05-20 ASSESSMENT — PAIN DESCRIPTION - PROGRESSION: CLINICAL_PROGRESSION: NOT CHANGED

## 2018-05-20 ASSESSMENT — PAIN DESCRIPTION - PAIN TYPE: TYPE: SURGICAL PAIN

## 2018-05-21 LAB
CULTURE: NORMAL
CULTURE: NORMAL
Lab: NORMAL
SPECIMEN DESCRIPTION: NORMAL
SPECIMEN DESCRIPTION: NORMAL
STATUS: NORMAL

## 2018-05-22 LAB — SURGICAL PATHOLOGY REPORT: NORMAL

## 2018-06-14 ENCOUNTER — HOSPITAL ENCOUNTER (EMERGENCY)
Age: 23
Discharge: HOME | End: 2018-06-14
Payer: MEDICARE

## 2018-06-14 VITALS
DIASTOLIC BLOOD PRESSURE: 69 MMHG | SYSTOLIC BLOOD PRESSURE: 127 MMHG | HEIGHT: 65 IN | TEMPERATURE: 98.4 F | OXYGEN SATURATION: 99 % | WEIGHT: 170.38 LBS | HEART RATE: 91 BPM | BODY MASS INDEX: 28.39 KG/M2 | RESPIRATION RATE: 16 BRPM

## 2018-06-14 DIAGNOSIS — L50.9 URTICARIA: Primary | ICD-10-CM

## 2018-06-14 PROCEDURE — 6370000000 HC RX 637 (ALT 250 FOR IP): Performed by: NURSE PRACTITIONER

## 2018-06-14 PROCEDURE — 99282 EMERGENCY DEPT VISIT SF MDM: CPT

## 2018-06-14 PROCEDURE — 96372 THER/PROPH/DIAG INJ SC/IM: CPT

## 2018-06-14 PROCEDURE — 6360000002 HC RX W HCPCS: Performed by: NURSE PRACTITIONER

## 2018-06-14 RX ORDER — DIPHENHYDRAMINE HCL 25 MG
25 TABLET ORAL ONCE
Status: COMPLETED | OUTPATIENT
Start: 2018-06-14 | End: 2018-06-14

## 2018-06-14 RX ORDER — DIPHENHYDRAMINE HCL 25 MG
25 CAPSULE ORAL EVERY 6 HOURS PRN
Qty: 20 CAPSULE | Refills: 0 | Status: SHIPPED | OUTPATIENT
Start: 2018-06-14 | End: 2018-06-24

## 2018-06-14 RX ORDER — FAMOTIDINE 20 MG/1
20 TABLET, FILM COATED ORAL ONCE
Status: COMPLETED | OUTPATIENT
Start: 2018-06-14 | End: 2018-06-14

## 2018-06-14 RX ORDER — METHYLPREDNISOLONE 4 MG/1
4 TABLET ORAL SEE ADMIN INSTRUCTIONS
COMMUNITY
End: 2019-02-14

## 2018-06-14 RX ORDER — FAMOTIDINE 20 MG/1
20 TABLET, FILM COATED ORAL 2 TIMES DAILY
Qty: 30 TABLET | Refills: 0 | Status: SHIPPED | OUTPATIENT
Start: 2018-06-14 | End: 2019-02-14

## 2018-06-14 RX ORDER — DEXAMETHASONE SODIUM PHOSPHATE 10 MG/ML
8 INJECTION INTRAMUSCULAR; INTRAVENOUS ONCE
Status: COMPLETED | OUTPATIENT
Start: 2018-06-14 | End: 2018-06-14

## 2018-06-14 RX ADMIN — DIPHENHYDRAMINE HCL 25 MG: 25 TABLET ORAL at 20:37

## 2018-06-14 RX ADMIN — FAMOTIDINE 20 MG: 20 TABLET, FILM COATED ORAL at 20:37

## 2018-06-14 RX ADMIN — DEXAMETHASONE SODIUM PHOSPHATE 8 MG: 10 INJECTION INTRAMUSCULAR; INTRAVENOUS at 20:30

## 2018-06-14 ASSESSMENT — ENCOUNTER SYMPTOMS
ABDOMINAL PAIN: 0
COLOR CHANGE: 1
TROUBLE SWALLOWING: 0
VOICE CHANGE: 0
SHORTNESS OF BREATH: 0
COUGH: 0
SORE THROAT: 0
FACIAL SWELLING: 0

## 2018-06-14 ASSESSMENT — PAIN SCALES - GENERAL: PAINLEVEL_OUTOF10: 8

## 2018-06-14 ASSESSMENT — PAIN DESCRIPTION - LOCATION: LOCATION: ABDOMEN

## 2018-06-14 ASSESSMENT — PAIN DESCRIPTION - PAIN TYPE: TYPE: ACUTE PAIN

## 2018-06-14 ASSESSMENT — PAIN DESCRIPTION - DESCRIPTORS: DESCRIPTORS: BURNING;TENDER

## 2019-02-14 ENCOUNTER — HOSPITAL ENCOUNTER (INPATIENT)
Age: 24
LOS: 3 days | Discharge: HOME | DRG: 463 | End: 2019-02-18
Attending: EMERGENCY MEDICINE | Admitting: INTERNAL MEDICINE
Payer: MEDICARE

## 2019-02-14 DIAGNOSIS — N12 PYELONEPHRITIS: Primary | ICD-10-CM

## 2019-02-14 DIAGNOSIS — R11.2 NAUSEA AND VOMITING, INTRACTABILITY OF VOMITING NOT SPECIFIED, UNSPECIFIED VOMITING TYPE: ICD-10-CM

## 2019-02-14 LAB
CHP ED QC CHECK: NORMAL
PREGNANCY TEST URINE, POC: NEGATIVE

## 2019-02-14 PROCEDURE — 99285 EMERGENCY DEPT VISIT HI MDM: CPT

## 2019-02-14 PROCEDURE — 81001 URINALYSIS AUTO W/SCOPE: CPT

## 2019-02-14 PROCEDURE — 84703 CHORIONIC GONADOTROPIN ASSAY: CPT

## 2019-02-14 RX ORDER — ONDANSETRON 2 MG/ML
4 INJECTION INTRAMUSCULAR; INTRAVENOUS ONCE
Status: DISCONTINUED | OUTPATIENT
Start: 2019-02-15 | End: 2019-02-15

## 2019-02-14 RX ORDER — 0.9 % SODIUM CHLORIDE 0.9 %
1000 INTRAVENOUS SOLUTION INTRAVENOUS ONCE
Status: DISCONTINUED | OUTPATIENT
Start: 2019-02-15 | End: 2019-02-15

## 2019-02-14 RX ORDER — NORETHINDRONE ACETATE AND ETHINYL ESTRADIOL 1; .02 MG/1; MG/1
1 TABLET ORAL DAILY
COMMUNITY
End: 2020-01-27

## 2019-02-14 ASSESSMENT — ENCOUNTER SYMPTOMS
NAUSEA: 1
VOMITING: 1
ABDOMINAL PAIN: 1

## 2019-02-14 ASSESSMENT — PAIN SCALES - GENERAL: PAINLEVEL_OUTOF10: 8

## 2019-02-14 ASSESSMENT — PAIN DESCRIPTION - PAIN TYPE: TYPE: ACUTE PAIN

## 2019-02-14 ASSESSMENT — PAIN DESCRIPTION - LOCATION: LOCATION: ABDOMEN

## 2019-02-15 PROBLEM — N12 PYELONEPHRITIS: Status: ACTIVE | Noted: 2019-02-15

## 2019-02-15 LAB
-: ABNORMAL
ABSOLUTE EOS #: 0.1 K/UL (ref 0–0.4)
ABSOLUTE IMMATURE GRANULOCYTE: ABNORMAL K/UL (ref 0–0.3)
ABSOLUTE LYMPH #: 0.9 K/UL (ref 1–4.8)
ABSOLUTE MONO #: 0.6 K/UL (ref 0.2–0.8)
AMORPHOUS: ABNORMAL
ANION GAP SERPL CALCULATED.3IONS-SCNC: 16 MMOL/L (ref 9–17)
BACTERIA: ABNORMAL
BASOPHILS # BLD: 0 % (ref 0–2)
BASOPHILS ABSOLUTE: 0 K/UL (ref 0–0.2)
BILIRUBIN URINE: NEGATIVE
BUN BLDV-MCNC: 12 MG/DL (ref 6–20)
BUN/CREAT BLD: 18 (ref 9–20)
CALCIUM SERPL-MCNC: 9.3 MG/DL (ref 8.6–10.4)
CASTS UA: ABNORMAL /LPF
CHLORIDE BLD-SCNC: 101 MMOL/L (ref 98–107)
CO2: 21 MMOL/L (ref 20–31)
COLOR: YELLOW
COMMENT UA: ABNORMAL
CREAT SERPL-MCNC: 0.67 MG/DL (ref 0.5–0.9)
CRYSTALS, UA: ABNORMAL /HPF
DIFFERENTIAL TYPE: ABNORMAL
EOSINOPHILS RELATIVE PERCENT: 1 % (ref 1–4)
EPITHELIAL CELLS UA: ABNORMAL /HPF (ref 0–5)
GFR AFRICAN AMERICAN: >60 ML/MIN
GFR NON-AFRICAN AMERICAN: >60 ML/MIN
GFR SERPL CREATININE-BSD FRML MDRD: NORMAL ML/MIN/{1.73_M2}
GFR SERPL CREATININE-BSD FRML MDRD: NORMAL ML/MIN/{1.73_M2}
GLUCOSE BLD-MCNC: 90 MG/DL (ref 70–99)
GLUCOSE URINE: NEGATIVE
HCG, PREGNANCY URINE (POC): NEGATIVE
HCT VFR BLD CALC: 40.2 % (ref 36–46)
HEMOGLOBIN: 13.6 G/DL (ref 12–16)
IMMATURE GRANULOCYTES: ABNORMAL %
KETONES, URINE: ABNORMAL
LEUKOCYTE ESTERASE, URINE: ABNORMAL
LYMPHOCYTES # BLD: 12 % (ref 24–44)
MCH RBC QN AUTO: 29.3 PG (ref 26–34)
MCHC RBC AUTO-ENTMCNC: 34 G/DL (ref 31–37)
MCV RBC AUTO: 86.1 FL (ref 80–100)
MONOCYTES # BLD: 8 % (ref 1–7)
MUCUS: ABNORMAL
NITRITE, URINE: NEGATIVE
NRBC AUTOMATED: ABNORMAL PER 100 WBC
OTHER OBSERVATIONS UA: ABNORMAL
PDW BLD-RTO: 13.7 % (ref 11.5–14.5)
PH UA: 6 (ref 5–8)
PLATELET # BLD: 192 K/UL (ref 130–400)
PLATELET ESTIMATE: ABNORMAL
PMV BLD AUTO: 8.4 FL (ref 6–12)
POTASSIUM SERPL-SCNC: 4.1 MMOL/L (ref 3.7–5.3)
PROTEIN UA: NEGATIVE
RBC # BLD: 4.66 M/UL (ref 4–5.2)
RBC # BLD: ABNORMAL 10*6/UL
RBC UA: ABNORMAL /HPF (ref 0–2)
RENAL EPITHELIAL, UA: ABNORMAL /HPF
SEG NEUTROPHILS: 79 % (ref 36–66)
SEGMENTED NEUTROPHILS ABSOLUTE COUNT: 5.9 K/UL (ref 1.8–7.7)
SODIUM BLD-SCNC: 138 MMOL/L (ref 135–144)
SPECIFIC GRAVITY UA: 1.03 (ref 1–1.03)
TRICHOMONAS: ABNORMAL
TURBIDITY: ABNORMAL
URINE HGB: ABNORMAL
UROBILINOGEN, URINE: NORMAL
WBC # BLD: 7.5 K/UL (ref 3.5–11)
WBC # BLD: ABNORMAL 10*3/UL
WBC UA: ABNORMAL /HPF (ref 0–5)
YEAST: ABNORMAL

## 2019-02-15 PROCEDURE — 2580000003 HC RX 258: Performed by: EMERGENCY MEDICINE

## 2019-02-15 PROCEDURE — 36415 COLL VENOUS BLD VENIPUNCTURE: CPT

## 2019-02-15 PROCEDURE — 87086 URINE CULTURE/COLONY COUNT: CPT

## 2019-02-15 PROCEDURE — 96375 TX/PRO/DX INJ NEW DRUG ADDON: CPT

## 2019-02-15 PROCEDURE — 87186 SC STD MICRODIL/AGAR DIL: CPT

## 2019-02-15 PROCEDURE — 85025 COMPLETE CBC W/AUTO DIFF WBC: CPT

## 2019-02-15 PROCEDURE — 96365 THER/PROPH/DIAG IV INF INIT: CPT

## 2019-02-15 PROCEDURE — 6360000002 HC RX W HCPCS: Performed by: INTERNAL MEDICINE

## 2019-02-15 PROCEDURE — 6360000002 HC RX W HCPCS: Performed by: EMERGENCY MEDICINE

## 2019-02-15 PROCEDURE — 6370000000 HC RX 637 (ALT 250 FOR IP): Performed by: EMERGENCY MEDICINE

## 2019-02-15 PROCEDURE — 2580000003 HC RX 258: Performed by: INTERNAL MEDICINE

## 2019-02-15 PROCEDURE — 87077 CULTURE AEROBIC IDENTIFY: CPT

## 2019-02-15 PROCEDURE — 1200000000 HC SEMI PRIVATE

## 2019-02-15 PROCEDURE — 6360000002 HC RX W HCPCS: Performed by: NURSE PRACTITIONER

## 2019-02-15 PROCEDURE — 6370000000 HC RX 637 (ALT 250 FOR IP): Performed by: NURSE PRACTITIONER

## 2019-02-15 PROCEDURE — 80048 BASIC METABOLIC PNL TOTAL CA: CPT

## 2019-02-15 PROCEDURE — 99222 1ST HOSP IP/OBS MODERATE 55: CPT | Performed by: INTERNAL MEDICINE

## 2019-02-15 RX ORDER — ONDANSETRON 4 MG/1
4 TABLET, ORALLY DISINTEGRATING ORAL EVERY 6 HOURS PRN
Status: DISCONTINUED | OUTPATIENT
Start: 2019-02-15 | End: 2019-02-18 | Stop reason: HOSPADM

## 2019-02-15 RX ORDER — NORETHINDRONE ACETATE AND ETHINYL ESTRADIOL 1; .02 MG/1; MG/1
1 TABLET ORAL DAILY
Status: DISCONTINUED | OUTPATIENT
Start: 2019-02-15 | End: 2019-02-18 | Stop reason: HOSPADM

## 2019-02-15 RX ORDER — ONDANSETRON 4 MG/1
4 TABLET, ORALLY DISINTEGRATING ORAL ONCE
Status: COMPLETED | OUTPATIENT
Start: 2019-02-15 | End: 2019-02-15

## 2019-02-15 RX ORDER — 0.9 % SODIUM CHLORIDE 0.9 %
1000 INTRAVENOUS SOLUTION INTRAVENOUS ONCE
Status: COMPLETED | OUTPATIENT
Start: 2019-02-15 | End: 2019-02-15

## 2019-02-15 RX ORDER — ONDANSETRON 2 MG/ML
4 INJECTION INTRAMUSCULAR; INTRAVENOUS EVERY 6 HOURS PRN
Status: DISCONTINUED | OUTPATIENT
Start: 2019-02-15 | End: 2019-02-15

## 2019-02-15 RX ORDER — SODIUM CHLORIDE 0.9 % (FLUSH) 0.9 %
10 SYRINGE (ML) INJECTION EVERY 12 HOURS SCHEDULED
Status: DISCONTINUED | OUTPATIENT
Start: 2019-02-15 | End: 2019-02-18 | Stop reason: HOSPADM

## 2019-02-15 RX ORDER — CIPROFLOXACIN 2 MG/ML
400 INJECTION, SOLUTION INTRAVENOUS EVERY 12 HOURS
Status: DISCONTINUED | OUTPATIENT
Start: 2019-02-15 | End: 2019-02-17

## 2019-02-15 RX ORDER — KETOROLAC TROMETHAMINE 15 MG/ML
15 INJECTION, SOLUTION INTRAMUSCULAR; INTRAVENOUS ONCE
Status: COMPLETED | OUTPATIENT
Start: 2019-02-15 | End: 2019-02-15

## 2019-02-15 RX ORDER — CIPROFLOXACIN 2 MG/ML
400 INJECTION, SOLUTION INTRAVENOUS ONCE
Status: COMPLETED | OUTPATIENT
Start: 2019-02-15 | End: 2019-02-15

## 2019-02-15 RX ORDER — MORPHINE SULFATE 2 MG/ML
2 INJECTION, SOLUTION INTRAMUSCULAR; INTRAVENOUS EVERY 4 HOURS PRN
Status: DISCONTINUED | OUTPATIENT
Start: 2019-02-15 | End: 2019-02-18 | Stop reason: HOSPADM

## 2019-02-15 RX ORDER — ONDANSETRON 2 MG/ML
4 INJECTION INTRAMUSCULAR; INTRAVENOUS ONCE
Status: COMPLETED | OUTPATIENT
Start: 2019-02-15 | End: 2019-02-15

## 2019-02-15 RX ORDER — SODIUM CHLORIDE 9 MG/ML
INJECTION, SOLUTION INTRAVENOUS CONTINUOUS
Status: DISCONTINUED | OUTPATIENT
Start: 2019-02-15 | End: 2019-02-18 | Stop reason: HOSPADM

## 2019-02-15 RX ORDER — CIPROFLOXACIN 500 MG/1
500 TABLET, FILM COATED ORAL ONCE
Status: DISCONTINUED | OUTPATIENT
Start: 2019-02-15 | End: 2019-02-15

## 2019-02-15 RX ORDER — METOCLOPRAMIDE HYDROCHLORIDE 5 MG/ML
10 INJECTION INTRAMUSCULAR; INTRAVENOUS ONCE
Status: COMPLETED | OUTPATIENT
Start: 2019-02-15 | End: 2019-02-15

## 2019-02-15 RX ORDER — IBUPROFEN 200 MG
400 TABLET ORAL EVERY 8 HOURS PRN
Status: ON HOLD | COMMUNITY
End: 2019-02-16 | Stop reason: HOSPADM

## 2019-02-15 RX ORDER — SODIUM CHLORIDE 0.9 % (FLUSH) 0.9 %
10 SYRINGE (ML) INJECTION PRN
Status: DISCONTINUED | OUTPATIENT
Start: 2019-02-15 | End: 2019-02-18 | Stop reason: HOSPADM

## 2019-02-15 RX ORDER — ONDANSETRON 2 MG/ML
4 INJECTION INTRAMUSCULAR; INTRAVENOUS EVERY 6 HOURS PRN
Status: DISCONTINUED | OUTPATIENT
Start: 2019-02-15 | End: 2019-02-18 | Stop reason: HOSPADM

## 2019-02-15 RX ORDER — MORPHINE SULFATE 10 MG/ML
6 INJECTION, SOLUTION INTRAMUSCULAR; INTRAVENOUS ONCE
Status: COMPLETED | OUTPATIENT
Start: 2019-02-15 | End: 2019-02-15

## 2019-02-15 RX ADMIN — ONDANSETRON 4 MG: 2 INJECTION INTRAMUSCULAR; INTRAVENOUS at 21:43

## 2019-02-15 RX ADMIN — CIPROFLOXACIN 400 MG: 2 INJECTION, SOLUTION INTRAVENOUS at 02:45

## 2019-02-15 RX ADMIN — ONDANSETRON 4 MG: 2 INJECTION INTRAMUSCULAR; INTRAVENOUS at 02:44

## 2019-02-15 RX ADMIN — KETOROLAC TROMETHAMINE 15 MG: 15 INJECTION, SOLUTION INTRAMUSCULAR; INTRAVENOUS at 02:47

## 2019-02-15 RX ADMIN — MORPHINE SULFATE 6 MG: 10 INJECTION INTRAVENOUS at 04:31

## 2019-02-15 RX ADMIN — SODIUM CHLORIDE: 9 INJECTION, SOLUTION INTRAVENOUS at 08:53

## 2019-02-15 RX ADMIN — CIPROFLOXACIN 400 MG: 2 INJECTION, SOLUTION INTRAVENOUS at 12:13

## 2019-02-15 RX ADMIN — SODIUM CHLORIDE 1000 ML: 9 INJECTION, SOLUTION INTRAVENOUS at 02:44

## 2019-02-15 RX ADMIN — MORPHINE SULFATE 2 MG: 2 INJECTION, SOLUTION INTRAMUSCULAR; INTRAVENOUS at 21:43

## 2019-02-15 RX ADMIN — ENOXAPARIN SODIUM 40 MG: 40 INJECTION SUBCUTANEOUS at 12:13

## 2019-02-15 RX ADMIN — ONDANSETRON 4 MG: 4 TABLET, ORALLY DISINTEGRATING ORAL at 01:40

## 2019-02-15 RX ADMIN — METOCLOPRAMIDE 10 MG: 5 INJECTION, SOLUTION INTRAMUSCULAR; INTRAVENOUS at 04:00

## 2019-02-15 RX ADMIN — MORPHINE SULFATE 2 MG: 2 INJECTION, SOLUTION INTRAMUSCULAR; INTRAVENOUS at 08:51

## 2019-02-15 RX ADMIN — MORPHINE SULFATE 2 MG: 2 INJECTION, SOLUTION INTRAMUSCULAR; INTRAVENOUS at 15:52

## 2019-02-15 ASSESSMENT — PAIN DESCRIPTION - FREQUENCY
FREQUENCY: INTERMITTENT
FREQUENCY: INTERMITTENT

## 2019-02-15 ASSESSMENT — PAIN SCALES - GENERAL
PAINLEVEL_OUTOF10: 6
PAINLEVEL_OUTOF10: 0
PAINLEVEL_OUTOF10: 8
PAINLEVEL_OUTOF10: 6
PAINLEVEL_OUTOF10: 7
PAINLEVEL_OUTOF10: 8
PAINLEVEL_OUTOF10: 0
PAINLEVEL_OUTOF10: 8
PAINLEVEL_OUTOF10: 0

## 2019-02-15 ASSESSMENT — PAIN DESCRIPTION - LOCATION
LOCATION: ABDOMEN

## 2019-02-15 ASSESSMENT — PAIN DESCRIPTION - ORIENTATION
ORIENTATION: LEFT
ORIENTATION: LEFT

## 2019-02-15 ASSESSMENT — PAIN - FUNCTIONAL ASSESSMENT
PAIN_FUNCTIONAL_ASSESSMENT: ACTIVITIES ARE NOT PREVENTED
PAIN_FUNCTIONAL_ASSESSMENT: ACTIVITIES ARE NOT PREVENTED

## 2019-02-15 ASSESSMENT — PAIN DESCRIPTION - PROGRESSION
CLINICAL_PROGRESSION: GRADUALLY WORSENING
CLINICAL_PROGRESSION: GRADUALLY WORSENING

## 2019-02-15 ASSESSMENT — PAIN DESCRIPTION - PAIN TYPE
TYPE: ACUTE PAIN

## 2019-02-15 ASSESSMENT — PAIN DESCRIPTION - ONSET
ONSET: ON-GOING
ONSET: ON-GOING

## 2019-02-15 ASSESSMENT — PAIN DESCRIPTION - DESCRIPTORS
DESCRIPTORS: ACHING

## 2019-02-16 ENCOUNTER — APPOINTMENT (OUTPATIENT)
Dept: GENERAL RADIOLOGY | Age: 24
DRG: 463 | End: 2019-02-16
Payer: MEDICARE

## 2019-02-16 LAB
ABSOLUTE EOS #: 0.1 K/UL (ref 0–0.4)
ABSOLUTE IMMATURE GRANULOCYTE: ABNORMAL K/UL (ref 0–0.3)
ABSOLUTE LYMPH #: 1.6 K/UL (ref 1–4.8)
ABSOLUTE MONO #: 0.5 K/UL (ref 0.2–0.8)
ANION GAP SERPL CALCULATED.3IONS-SCNC: 9 MMOL/L (ref 9–17)
BASOPHILS # BLD: 0 % (ref 0–2)
BASOPHILS ABSOLUTE: 0 K/UL (ref 0–0.2)
BUN BLDV-MCNC: 6 MG/DL (ref 6–20)
BUN/CREAT BLD: 7 (ref 9–20)
CALCIUM SERPL-MCNC: 7.7 MG/DL (ref 8.6–10.4)
CHLORIDE BLD-SCNC: 106 MMOL/L (ref 98–107)
CO2: 25 MMOL/L (ref 20–31)
CREAT SERPL-MCNC: 0.9 MG/DL (ref 0.5–0.9)
CULTURE: ABNORMAL
DIFFERENTIAL TYPE: ABNORMAL
EOSINOPHILS RELATIVE PERCENT: 3 % (ref 1–4)
GFR AFRICAN AMERICAN: >60 ML/MIN
GFR NON-AFRICAN AMERICAN: >60 ML/MIN
GFR SERPL CREATININE-BSD FRML MDRD: ABNORMAL ML/MIN/{1.73_M2}
GFR SERPL CREATININE-BSD FRML MDRD: ABNORMAL ML/MIN/{1.73_M2}
GLUCOSE BLD-MCNC: 94 MG/DL (ref 70–99)
HCT VFR BLD CALC: 34.9 % (ref 36–46)
HEMOGLOBIN: 11.9 G/DL (ref 12–16)
IMMATURE GRANULOCYTES: ABNORMAL %
LYMPHOCYTES # BLD: 41 % (ref 24–44)
Lab: ABNORMAL
MCH RBC QN AUTO: 29.2 PG (ref 26–34)
MCHC RBC AUTO-ENTMCNC: 34 G/DL (ref 31–37)
MCV RBC AUTO: 85.9 FL (ref 80–100)
MONOCYTES # BLD: 12 % (ref 1–7)
NRBC AUTOMATED: ABNORMAL PER 100 WBC
PDW BLD-RTO: 13.5 % (ref 11.5–14.5)
PLATELET # BLD: 174 K/UL (ref 130–400)
PLATELET ESTIMATE: ABNORMAL
PMV BLD AUTO: ABNORMAL FL (ref 6–12)
POTASSIUM SERPL-SCNC: 3.6 MMOL/L (ref 3.7–5.3)
RBC # BLD: 4.07 M/UL (ref 4–5.2)
RBC # BLD: ABNORMAL 10*6/UL
SEG NEUTROPHILS: 44 % (ref 36–66)
SEGMENTED NEUTROPHILS ABSOLUTE COUNT: 1.7 K/UL (ref 1.8–7.7)
SODIUM BLD-SCNC: 140 MMOL/L (ref 135–144)
SPECIMEN DESCRIPTION: ABNORMAL
WBC # BLD: 3.9 K/UL (ref 3.5–11)
WBC # BLD: ABNORMAL 10*3/UL

## 2019-02-16 PROCEDURE — 2580000003 HC RX 258: Performed by: INTERNAL MEDICINE

## 2019-02-16 PROCEDURE — 6370000000 HC RX 637 (ALT 250 FOR IP): Performed by: INTERNAL MEDICINE

## 2019-02-16 PROCEDURE — 6360000002 HC RX W HCPCS: Performed by: NURSE PRACTITIONER

## 2019-02-16 PROCEDURE — 74018 RADEX ABDOMEN 1 VIEW: CPT

## 2019-02-16 PROCEDURE — 80048 BASIC METABOLIC PNL TOTAL CA: CPT

## 2019-02-16 PROCEDURE — 6360000002 HC RX W HCPCS: Performed by: INTERNAL MEDICINE

## 2019-02-16 PROCEDURE — 85025 COMPLETE CBC W/AUTO DIFF WBC: CPT

## 2019-02-16 PROCEDURE — 99232 SBSQ HOSP IP/OBS MODERATE 35: CPT | Performed by: INTERNAL MEDICINE

## 2019-02-16 PROCEDURE — 1200000000 HC SEMI PRIVATE

## 2019-02-16 PROCEDURE — 36415 COLL VENOUS BLD VENIPUNCTURE: CPT

## 2019-02-16 RX ORDER — CIPROFLOXACIN 500 MG/1
500 TABLET, FILM COATED ORAL 2 TIMES DAILY
Qty: 12 TABLET | Refills: 0 | Status: SHIPPED | OUTPATIENT
Start: 2019-02-16 | End: 2019-02-17 | Stop reason: HOSPADM

## 2019-02-16 RX ORDER — ONDANSETRON 4 MG/1
4 TABLET, ORALLY DISINTEGRATING ORAL EVERY 8 HOURS PRN
Qty: 20 TABLET | Refills: 0 | Status: SHIPPED | OUTPATIENT
Start: 2019-02-16 | End: 2019-08-05

## 2019-02-16 RX ORDER — POLYETHYLENE GLYCOL 3350 17 G/17G
17 POWDER, FOR SOLUTION ORAL DAILY
Status: DISCONTINUED | OUTPATIENT
Start: 2019-02-16 | End: 2019-02-18 | Stop reason: HOSPADM

## 2019-02-16 RX ORDER — 0.9 % SODIUM CHLORIDE 0.9 %
500 INTRAVENOUS SOLUTION INTRAVENOUS ONCE
Status: COMPLETED | OUTPATIENT
Start: 2019-02-16 | End: 2019-02-16

## 2019-02-16 RX ORDER — POTASSIUM CHLORIDE 20 MEQ/1
10 TABLET, EXTENDED RELEASE ORAL ONCE
Status: COMPLETED | OUTPATIENT
Start: 2019-02-16 | End: 2019-02-16

## 2019-02-16 RX ADMIN — POLYETHYLENE GLYCOL 3350 17 G: 17 POWDER, FOR SOLUTION ORAL at 16:16

## 2019-02-16 RX ADMIN — CIPROFLOXACIN 400 MG: 2 INJECTION, SOLUTION INTRAVENOUS at 03:08

## 2019-02-16 RX ADMIN — MORPHINE SULFATE 2 MG: 2 INJECTION, SOLUTION INTRAMUSCULAR; INTRAVENOUS at 09:18

## 2019-02-16 RX ADMIN — MORPHINE SULFATE 2 MG: 2 INJECTION, SOLUTION INTRAMUSCULAR; INTRAVENOUS at 03:11

## 2019-02-16 RX ADMIN — MORPHINE SULFATE 2 MG: 2 INJECTION, SOLUTION INTRAMUSCULAR; INTRAVENOUS at 16:16

## 2019-02-16 RX ADMIN — CIPROFLOXACIN 400 MG: 2 INJECTION, SOLUTION INTRAVENOUS at 14:54

## 2019-02-16 RX ADMIN — SODIUM CHLORIDE 500 ML: 9 INJECTION, SOLUTION INTRAVENOUS at 12:54

## 2019-02-16 RX ADMIN — POTASSIUM CHLORIDE 10 MEQ: 20 TABLET, EXTENDED RELEASE ORAL at 09:18

## 2019-02-16 RX ADMIN — SODIUM CHLORIDE: 9 INJECTION, SOLUTION INTRAVENOUS at 03:12

## 2019-02-16 RX ADMIN — MORPHINE SULFATE 2 MG: 2 INJECTION, SOLUTION INTRAMUSCULAR; INTRAVENOUS at 23:21

## 2019-02-16 RX ADMIN — ENOXAPARIN SODIUM 40 MG: 40 INJECTION SUBCUTANEOUS at 09:18

## 2019-02-16 ASSESSMENT — PAIN SCALES - GENERAL
PAINLEVEL_OUTOF10: 8
PAINLEVEL_OUTOF10: 0
PAINLEVEL_OUTOF10: 6
PAINLEVEL_OUTOF10: 5
PAINLEVEL_OUTOF10: 9
PAINLEVEL_OUTOF10: 0
PAINLEVEL_OUTOF10: 7
PAINLEVEL_OUTOF10: 7

## 2019-02-16 ASSESSMENT — PAIN DESCRIPTION - PROGRESSION: CLINICAL_PROGRESSION: GRADUALLY WORSENING

## 2019-02-16 ASSESSMENT — PAIN DESCRIPTION - DESCRIPTORS
DESCRIPTORS: ACHING

## 2019-02-16 ASSESSMENT — PAIN DESCRIPTION - PAIN TYPE
TYPE: ACUTE PAIN

## 2019-02-16 ASSESSMENT — PAIN DESCRIPTION - ORIENTATION
ORIENTATION: LEFT

## 2019-02-16 ASSESSMENT — PAIN DESCRIPTION - LOCATION
LOCATION: BACK;FLANK
LOCATION: ABDOMEN;FLANK
LOCATION: BACK

## 2019-02-16 ASSESSMENT — PAIN DESCRIPTION - FREQUENCY: FREQUENCY: INTERMITTENT

## 2019-02-16 ASSESSMENT — PAIN DESCRIPTION - ONSET: ONSET: ON-GOING

## 2019-02-17 PROCEDURE — 2580000003 HC RX 258: Performed by: INTERNAL MEDICINE

## 2019-02-17 PROCEDURE — 6370000000 HC RX 637 (ALT 250 FOR IP): Performed by: NURSE PRACTITIONER

## 2019-02-17 PROCEDURE — 6360000002 HC RX W HCPCS: Performed by: NURSE PRACTITIONER

## 2019-02-17 PROCEDURE — 99232 SBSQ HOSP IP/OBS MODERATE 35: CPT | Performed by: INTERNAL MEDICINE

## 2019-02-17 PROCEDURE — 6370000000 HC RX 637 (ALT 250 FOR IP): Performed by: INTERNAL MEDICINE

## 2019-02-17 PROCEDURE — 6360000002 HC RX W HCPCS: Performed by: INTERNAL MEDICINE

## 2019-02-17 PROCEDURE — 1200000000 HC SEMI PRIVATE

## 2019-02-17 RX ORDER — OXYCODONE HYDROCHLORIDE AND ACETAMINOPHEN 5; 325 MG/1; MG/1
1 TABLET ORAL EVERY 6 HOURS PRN
Status: DISCONTINUED | OUTPATIENT
Start: 2019-02-17 | End: 2019-02-18 | Stop reason: HOSPADM

## 2019-02-17 RX ADMIN — CEFTRIAXONE SODIUM 1 G: 1 INJECTION, POWDER, FOR SOLUTION INTRAMUSCULAR; INTRAVENOUS at 10:26

## 2019-02-17 RX ADMIN — OXYCODONE AND ACETAMINOPHEN 1 TABLET: 5; 325 TABLET ORAL at 19:50

## 2019-02-17 RX ADMIN — OXYCODONE AND ACETAMINOPHEN 1 TABLET: 5; 325 TABLET ORAL at 13:33

## 2019-02-17 RX ADMIN — SODIUM CHLORIDE: 9 INJECTION, SOLUTION INTRAVENOUS at 00:55

## 2019-02-17 RX ADMIN — MAGNESIUM HYDROXIDE 30 ML: 400 SUSPENSION ORAL at 09:52

## 2019-02-17 RX ADMIN — MORPHINE SULFATE 2 MG: 2 INJECTION, SOLUTION INTRAMUSCULAR; INTRAVENOUS at 03:32

## 2019-02-17 RX ADMIN — ENOXAPARIN SODIUM 40 MG: 40 INJECTION SUBCUTANEOUS at 09:52

## 2019-02-17 RX ADMIN — POLYETHYLENE GLYCOL 3350 17 G: 17 POWDER, FOR SOLUTION ORAL at 09:52

## 2019-02-17 RX ADMIN — CIPROFLOXACIN 400 MG: 2 INJECTION, SOLUTION INTRAVENOUS at 00:55

## 2019-02-17 ASSESSMENT — PAIN DESCRIPTION - FREQUENCY: FREQUENCY: INTERMITTENT

## 2019-02-17 ASSESSMENT — PAIN SCALES - GENERAL
PAINLEVEL_OUTOF10: 7
PAINLEVEL_OUTOF10: 0
PAINLEVEL_OUTOF10: 0
PAINLEVEL_OUTOF10: 6
PAINLEVEL_OUTOF10: 5
PAINLEVEL_OUTOF10: 0
PAINLEVEL_OUTOF10: 9

## 2019-02-17 ASSESSMENT — PAIN - FUNCTIONAL ASSESSMENT: PAIN_FUNCTIONAL_ASSESSMENT: ACTIVITIES ARE NOT PREVENTED

## 2019-02-17 ASSESSMENT — PAIN DESCRIPTION - ONSET: ONSET: ON-GOING

## 2019-02-17 ASSESSMENT — PAIN DESCRIPTION - PROGRESSION: CLINICAL_PROGRESSION: GRADUALLY IMPROVING

## 2019-02-17 ASSESSMENT — PAIN DESCRIPTION - LOCATION: LOCATION: ABDOMEN

## 2019-02-17 ASSESSMENT — PAIN DESCRIPTION - PAIN TYPE: TYPE: ACUTE PAIN

## 2019-02-17 ASSESSMENT — PAIN DESCRIPTION - DESCRIPTORS: DESCRIPTORS: ACHING

## 2019-02-17 ASSESSMENT — PAIN DESCRIPTION - ORIENTATION: ORIENTATION: LEFT

## 2019-02-18 ENCOUNTER — APPOINTMENT (OUTPATIENT)
Dept: ULTRASOUND IMAGING | Age: 24
DRG: 463 | End: 2019-02-18
Payer: MEDICARE

## 2019-02-18 VITALS
TEMPERATURE: 98.5 F | SYSTOLIC BLOOD PRESSURE: 100 MMHG | DIASTOLIC BLOOD PRESSURE: 63 MMHG | RESPIRATION RATE: 18 BRPM | OXYGEN SATURATION: 98 % | BODY MASS INDEX: 31.12 KG/M2 | WEIGHT: 182.3 LBS | HEART RATE: 59 BPM | HEIGHT: 64 IN

## 2019-02-18 LAB
ABSOLUTE EOS #: 0.1 K/UL (ref 0–0.4)
ABSOLUTE IMMATURE GRANULOCYTE: ABNORMAL K/UL (ref 0–0.3)
ABSOLUTE LYMPH #: 1.9 K/UL (ref 1–4.8)
ABSOLUTE MONO #: 0.6 K/UL (ref 0.2–0.8)
ALBUMIN SERPL-MCNC: 3.4 G/DL (ref 3.5–5.2)
ALBUMIN/GLOBULIN RATIO: ABNORMAL (ref 1–2.5)
ALP BLD-CCNC: 38 U/L (ref 35–104)
ALT SERPL-CCNC: 17 U/L (ref 5–33)
ANION GAP SERPL CALCULATED.3IONS-SCNC: 11 MMOL/L (ref 9–17)
AST SERPL-CCNC: 20 U/L
BASOPHILS # BLD: 0 % (ref 0–2)
BASOPHILS ABSOLUTE: 0 K/UL (ref 0–0.2)
BILIRUB SERPL-MCNC: 0.28 MG/DL (ref 0.3–1.2)
BILIRUBIN DIRECT: <0.08 MG/DL
BILIRUBIN, INDIRECT: ABNORMAL MG/DL (ref 0–1)
BUN BLDV-MCNC: 8 MG/DL (ref 6–20)
BUN/CREAT BLD: 10 (ref 9–20)
CALCIUM SERPL-MCNC: 8.3 MG/DL (ref 8.6–10.4)
CHLORIDE BLD-SCNC: 103 MMOL/L (ref 98–107)
CO2: 24 MMOL/L (ref 20–31)
CREAT SERPL-MCNC: 0.82 MG/DL (ref 0.5–0.9)
DIFFERENTIAL TYPE: ABNORMAL
EOSINOPHILS RELATIVE PERCENT: 2 % (ref 1–4)
GFR AFRICAN AMERICAN: >60 ML/MIN
GFR NON-AFRICAN AMERICAN: >60 ML/MIN
GFR SERPL CREATININE-BSD FRML MDRD: ABNORMAL ML/MIN/{1.73_M2}
GFR SERPL CREATININE-BSD FRML MDRD: ABNORMAL ML/MIN/{1.73_M2}
GLOBULIN: ABNORMAL G/DL (ref 1.5–3.8)
GLUCOSE BLD-MCNC: 96 MG/DL (ref 70–99)
HCT VFR BLD CALC: 38.5 % (ref 36–46)
HEMOGLOBIN: 12.8 G/DL (ref 12–16)
IMMATURE GRANULOCYTES: ABNORMAL %
LIPASE: 25 U/L (ref 13–60)
LYMPHOCYTES # BLD: 29 % (ref 24–44)
MAGNESIUM: 1.7 MG/DL (ref 1.6–2.6)
MCH RBC QN AUTO: 29.3 PG (ref 26–34)
MCHC RBC AUTO-ENTMCNC: 33.3 G/DL (ref 31–37)
MCV RBC AUTO: 87.8 FL (ref 80–100)
MONOCYTES # BLD: 9 % (ref 1–7)
NRBC AUTOMATED: ABNORMAL PER 100 WBC
PDW BLD-RTO: 13 % (ref 11.5–14.5)
PLATELET # BLD: 178 K/UL (ref 130–400)
PLATELET ESTIMATE: ABNORMAL
PMV BLD AUTO: ABNORMAL FL (ref 6–12)
POTASSIUM SERPL-SCNC: 3.9 MMOL/L (ref 3.7–5.3)
RBC # BLD: 4.38 M/UL (ref 4–5.2)
RBC # BLD: ABNORMAL 10*6/UL
SEG NEUTROPHILS: 60 % (ref 36–66)
SEGMENTED NEUTROPHILS ABSOLUTE COUNT: 4 K/UL (ref 1.8–7.7)
SODIUM BLD-SCNC: 138 MMOL/L (ref 135–144)
TOTAL PROTEIN: 5.9 G/DL (ref 6.4–8.3)
WBC # BLD: 6.6 K/UL (ref 3.5–11)
WBC # BLD: ABNORMAL 10*3/UL

## 2019-02-18 PROCEDURE — 76705 ECHO EXAM OF ABDOMEN: CPT

## 2019-02-18 PROCEDURE — 83690 ASSAY OF LIPASE: CPT

## 2019-02-18 PROCEDURE — 99254 IP/OBS CNSLTJ NEW/EST MOD 60: CPT | Performed by: INTERNAL MEDICINE

## 2019-02-18 PROCEDURE — 99238 HOSP IP/OBS DSCHRG MGMT 30/<: CPT | Performed by: INTERNAL MEDICINE

## 2019-02-18 PROCEDURE — 6370000000 HC RX 637 (ALT 250 FOR IP): Performed by: NURSE PRACTITIONER

## 2019-02-18 PROCEDURE — 36415 COLL VENOUS BLD VENIPUNCTURE: CPT

## 2019-02-18 PROCEDURE — 6370000000 HC RX 637 (ALT 250 FOR IP): Performed by: INTERNAL MEDICINE

## 2019-02-18 PROCEDURE — 6360000002 HC RX W HCPCS: Performed by: INTERNAL MEDICINE

## 2019-02-18 PROCEDURE — 85025 COMPLETE CBC W/AUTO DIFF WBC: CPT

## 2019-02-18 PROCEDURE — 83735 ASSAY OF MAGNESIUM: CPT

## 2019-02-18 PROCEDURE — 76775 US EXAM ABDO BACK WALL LIM: CPT

## 2019-02-18 PROCEDURE — 80076 HEPATIC FUNCTION PANEL: CPT

## 2019-02-18 PROCEDURE — 6360000002 HC RX W HCPCS: Performed by: NURSE PRACTITIONER

## 2019-02-18 PROCEDURE — 2580000003 HC RX 258: Performed by: INTERNAL MEDICINE

## 2019-02-18 PROCEDURE — 80048 BASIC METABOLIC PNL TOTAL CA: CPT

## 2019-02-18 RX ORDER — CEFDINIR 300 MG/1
300 CAPSULE ORAL 2 TIMES DAILY
Qty: 18 CAPSULE | Refills: 0 | Status: SHIPPED | OUTPATIENT
Start: 2019-02-18 | End: 2019-02-27

## 2019-02-18 RX ORDER — PANTOPRAZOLE SODIUM 40 MG/1
40 TABLET, DELAYED RELEASE ORAL
Status: DISCONTINUED | OUTPATIENT
Start: 2019-02-18 | End: 2019-02-18 | Stop reason: HOSPADM

## 2019-02-18 RX ORDER — POLYETHYLENE GLYCOL 3350 17 G/17G
17 POWDER, FOR SOLUTION ORAL DAILY PRN
Qty: 527 G | Refills: 0 | Status: SHIPPED | OUTPATIENT
Start: 2019-02-18 | End: 2019-03-20

## 2019-02-18 RX ADMIN — PANTOPRAZOLE SODIUM 40 MG: 40 TABLET, DELAYED RELEASE ORAL at 08:40

## 2019-02-18 RX ADMIN — MORPHINE SULFATE 2 MG: 2 INJECTION, SOLUTION INTRAMUSCULAR; INTRAVENOUS at 08:38

## 2019-02-18 RX ADMIN — POLYETHYLENE GLYCOL 3350 17 G: 17 POWDER, FOR SOLUTION ORAL at 08:38

## 2019-02-18 RX ADMIN — MORPHINE SULFATE 2 MG: 2 INJECTION, SOLUTION INTRAMUSCULAR; INTRAVENOUS at 16:33

## 2019-02-18 RX ADMIN — CEFTRIAXONE SODIUM 1 G: 1 INJECTION, POWDER, FOR SOLUTION INTRAMUSCULAR; INTRAVENOUS at 08:40

## 2019-02-18 RX ADMIN — ONDANSETRON 4 MG: 4 TABLET, ORALLY DISINTEGRATING ORAL at 08:40

## 2019-02-18 RX ADMIN — ENOXAPARIN SODIUM 40 MG: 40 INJECTION SUBCUTANEOUS at 08:40

## 2019-02-18 RX ADMIN — MAGNESIUM HYDROXIDE 30 ML: 400 SUSPENSION ORAL at 08:40

## 2019-02-18 ASSESSMENT — ENCOUNTER SYMPTOMS
EYES NEGATIVE: 1
SORE THROAT: 0
WHEEZING: 0
COLOR CHANGE: 0
TROUBLE SWALLOWING: 0
BACK PAIN: 1
SHORTNESS OF BREATH: 0
CHOKING: 0
COUGH: 0
VOICE CHANGE: 0

## 2019-02-18 ASSESSMENT — PAIN SCALES - GENERAL
PAINLEVEL_OUTOF10: 8
PAINLEVEL_OUTOF10: 7

## 2019-08-05 ENCOUNTER — HOSPITAL ENCOUNTER (OUTPATIENT)
Age: 24
Setting detail: OBSERVATION
Discharge: HOME | End: 2019-08-07
Attending: EMERGENCY MEDICINE | Admitting: INTERNAL MEDICINE
Payer: MEDICARE

## 2019-08-05 ENCOUNTER — APPOINTMENT (OUTPATIENT)
Dept: CT IMAGING | Age: 24
End: 2019-08-05
Payer: MEDICARE

## 2019-08-05 DIAGNOSIS — R10.9 ABDOMINAL PAIN, UNSPECIFIED ABDOMINAL LOCATION: ICD-10-CM

## 2019-08-05 DIAGNOSIS — N30.01 ACUTE CYSTITIS WITH HEMATURIA: Primary | ICD-10-CM

## 2019-08-05 DIAGNOSIS — R11.2 NAUSEA AND VOMITING, INTRACTABILITY OF VOMITING NOT SPECIFIED, UNSPECIFIED VOMITING TYPE: ICD-10-CM

## 2019-08-05 PROBLEM — N39.0 UTI (URINARY TRACT INFECTION): Status: ACTIVE | Noted: 2019-08-05

## 2019-08-05 PROBLEM — Z87.442 HISTORY OF KIDNEY STONES: Status: ACTIVE | Noted: 2019-08-05

## 2019-08-05 LAB
-: ABNORMAL
ABSOLUTE EOS #: 0.13 K/UL (ref 0–0.44)
ABSOLUTE IMMATURE GRANULOCYTE: 0.06 K/UL (ref 0–0.3)
ABSOLUTE LYMPH #: 1.49 K/UL (ref 1.1–3.7)
ABSOLUTE MONO #: 0.43 K/UL (ref 0.1–1.2)
ALBUMIN SERPL-MCNC: 4 G/DL (ref 3.5–5.2)
ALBUMIN/GLOBULIN RATIO: ABNORMAL (ref 1–2.5)
ALP BLD-CCNC: 44 U/L (ref 35–104)
ALT SERPL-CCNC: <5 U/L (ref 5–33)
AMORPHOUS: ABNORMAL
AMYLASE: 82 U/L (ref 28–100)
ANION GAP SERPL CALCULATED.3IONS-SCNC: 14 MMOL/L (ref 9–17)
AST SERPL-CCNC: 14 U/L
BACTERIA: ABNORMAL
BASOPHILS # BLD: 0 % (ref 0–2)
BASOPHILS ABSOLUTE: 0.05 K/UL (ref 0–0.2)
BILIRUB SERPL-MCNC: 0.67 MG/DL (ref 0.3–1.2)
BILIRUBIN DIRECT: 0.16 MG/DL
BILIRUBIN URINE: NEGATIVE
BILIRUBIN, INDIRECT: 0.51 MG/DL (ref 0–1)
BUN BLDV-MCNC: 12 MG/DL (ref 6–20)
BUN/CREAT BLD: 12 (ref 9–20)
CALCIUM SERPL-MCNC: 8.7 MG/DL (ref 8.6–10.4)
CASTS UA: ABNORMAL /LPF
CHLORIDE BLD-SCNC: 101 MMOL/L (ref 98–107)
CHP ED QC CHECK: NORMAL
CO2: 25 MMOL/L (ref 20–31)
COLOR: YELLOW
COMMENT UA: ABNORMAL
CREAT SERPL-MCNC: 1 MG/DL (ref 0.5–0.9)
CRYSTALS, UA: ABNORMAL /HPF
DIFFERENTIAL TYPE: ABNORMAL
EOSINOPHILS RELATIVE PERCENT: 1 % (ref 1–4)
EPITHELIAL CELLS UA: ABNORMAL /HPF (ref 0–5)
GFR AFRICAN AMERICAN: >60 ML/MIN
GFR NON-AFRICAN AMERICAN: >60 ML/MIN
GFR SERPL CREATININE-BSD FRML MDRD: ABNORMAL ML/MIN/{1.73_M2}
GFR SERPL CREATININE-BSD FRML MDRD: ABNORMAL ML/MIN/{1.73_M2}
GLOBULIN: ABNORMAL G/DL (ref 1.5–3.8)
GLUCOSE BLD-MCNC: 105 MG/DL (ref 70–99)
GLUCOSE URINE: NEGATIVE
HCG, PREGNANCY URINE (POC): NEGATIVE
HCT VFR BLD CALC: 41.7 % (ref 36.3–47.1)
HEMOGLOBIN: 13.3 G/DL (ref 11.9–15.1)
IMMATURE GRANULOCYTES: 1 %
KETONES, URINE: NEGATIVE
LACTIC ACID: 0.8 MMOL/L (ref 0.5–2.2)
LEUKOCYTE ESTERASE, URINE: ABNORMAL
LIPASE: 37 U/L (ref 13–60)
LYMPHOCYTES # BLD: 13 % (ref 24–43)
MCH RBC QN AUTO: 28.7 PG (ref 25.2–33.5)
MCHC RBC AUTO-ENTMCNC: 31.9 G/DL (ref 28.4–34.8)
MCV RBC AUTO: 90.1 FL (ref 82.6–102.9)
MONOCYTES # BLD: 4 % (ref 3–12)
MUCUS: ABNORMAL
NITRITE, URINE: POSITIVE
NRBC AUTOMATED: 0 PER 100 WBC
OTHER OBSERVATIONS UA: ABNORMAL
PDW BLD-RTO: 13.4 % (ref 11.8–14.4)
PH UA: 6.5 (ref 5–8)
PLATELET # BLD: 211 K/UL (ref 138–453)
PLATELET ESTIMATE: ABNORMAL
PMV BLD AUTO: 10.1 FL (ref 8.1–13.5)
POTASSIUM SERPL-SCNC: 3.6 MMOL/L (ref 3.7–5.3)
PREGNANCY TEST URINE, POC: NORMAL
PROTEIN UA: ABNORMAL
RBC # BLD: 4.63 M/UL (ref 3.95–5.11)
RBC # BLD: ABNORMAL 10*6/UL
RBC UA: ABNORMAL /HPF (ref 0–2)
RENAL EPITHELIAL, UA: ABNORMAL /HPF
SEG NEUTROPHILS: 81 % (ref 36–65)
SEGMENTED NEUTROPHILS ABSOLUTE COUNT: 9.07 K/UL (ref 1.5–8.1)
SODIUM BLD-SCNC: 140 MMOL/L (ref 135–144)
SPECIFIC GRAVITY UA: 1.01 (ref 1–1.03)
TOTAL PROTEIN: 6.9 G/DL (ref 6.4–8.3)
TRICHOMONAS: ABNORMAL
TURBIDITY: ABNORMAL
URINE HGB: ABNORMAL
UROBILINOGEN, URINE: NORMAL
WBC # BLD: 11.2 K/UL (ref 3.5–11.3)
WBC # BLD: ABNORMAL 10*3/UL
WBC UA: ABNORMAL /HPF (ref 0–5)
YEAST: ABNORMAL

## 2019-08-05 PROCEDURE — 6370000000 HC RX 637 (ALT 250 FOR IP): Performed by: NURSE PRACTITIONER

## 2019-08-05 PROCEDURE — 81001 URINALYSIS AUTO W/SCOPE: CPT

## 2019-08-05 PROCEDURE — 80076 HEPATIC FUNCTION PANEL: CPT

## 2019-08-05 PROCEDURE — 99285 EMERGENCY DEPT VISIT HI MDM: CPT

## 2019-08-05 PROCEDURE — 83690 ASSAY OF LIPASE: CPT

## 2019-08-05 PROCEDURE — 6360000002 HC RX W HCPCS: Performed by: NURSE PRACTITIONER

## 2019-08-05 PROCEDURE — 2580000003 HC RX 258: Performed by: NURSE PRACTITIONER

## 2019-08-05 PROCEDURE — 36415 COLL VENOUS BLD VENIPUNCTURE: CPT

## 2019-08-05 PROCEDURE — 87086 URINE CULTURE/COLONY COUNT: CPT

## 2019-08-05 PROCEDURE — 6370000000 HC RX 637 (ALT 250 FOR IP): Performed by: EMERGENCY MEDICINE

## 2019-08-05 PROCEDURE — 81025 URINE PREGNANCY TEST: CPT

## 2019-08-05 PROCEDURE — 96365 THER/PROPH/DIAG IV INF INIT: CPT

## 2019-08-05 PROCEDURE — 85025 COMPLETE CBC W/AUTO DIFF WBC: CPT

## 2019-08-05 PROCEDURE — 87186 SC STD MICRODIL/AGAR DIL: CPT

## 2019-08-05 PROCEDURE — 82150 ASSAY OF AMYLASE: CPT

## 2019-08-05 PROCEDURE — 6360000002 HC RX W HCPCS: Performed by: EMERGENCY MEDICINE

## 2019-08-05 PROCEDURE — 96361 HYDRATE IV INFUSION ADD-ON: CPT

## 2019-08-05 PROCEDURE — 83605 ASSAY OF LACTIC ACID: CPT

## 2019-08-05 PROCEDURE — 87040 BLOOD CULTURE FOR BACTERIA: CPT

## 2019-08-05 PROCEDURE — 2580000003 HC RX 258: Performed by: EMERGENCY MEDICINE

## 2019-08-05 PROCEDURE — 96366 THER/PROPH/DIAG IV INF ADDON: CPT

## 2019-08-05 PROCEDURE — 6360000004 HC RX CONTRAST MEDICATION: Performed by: EMERGENCY MEDICINE

## 2019-08-05 PROCEDURE — G0378 HOSPITAL OBSERVATION PER HR: HCPCS

## 2019-08-05 PROCEDURE — 80048 BASIC METABOLIC PNL TOTAL CA: CPT

## 2019-08-05 PROCEDURE — 74177 CT ABD & PELVIS W/CONTRAST: CPT

## 2019-08-05 PROCEDURE — 96375 TX/PRO/DX INJ NEW DRUG ADDON: CPT

## 2019-08-05 PROCEDURE — 87077 CULTURE AEROBIC IDENTIFY: CPT

## 2019-08-05 PROCEDURE — 96376 TX/PRO/DX INJ SAME DRUG ADON: CPT

## 2019-08-05 PROCEDURE — 99223 1ST HOSP IP/OBS HIGH 75: CPT | Performed by: INTERNAL MEDICINE

## 2019-08-05 RX ORDER — ONDANSETRON 2 MG/ML
4 INJECTION INTRAMUSCULAR; INTRAVENOUS EVERY 6 HOURS PRN
Status: DISCONTINUED | OUTPATIENT
Start: 2019-08-05 | End: 2019-08-07 | Stop reason: HOSPADM

## 2019-08-05 RX ORDER — ONDANSETRON 2 MG/ML
8 INJECTION INTRAMUSCULAR; INTRAVENOUS ONCE
Status: COMPLETED | OUTPATIENT
Start: 2019-08-05 | End: 2019-08-05

## 2019-08-05 RX ORDER — PROMETHAZINE HYDROCHLORIDE 25 MG/ML
25 INJECTION, SOLUTION INTRAMUSCULAR; INTRAVENOUS ONCE
Status: COMPLETED | OUTPATIENT
Start: 2019-08-05 | End: 2019-08-05

## 2019-08-05 RX ORDER — CEFAZOLIN SODIUM 1 G/50ML
1 INJECTION, SOLUTION INTRAVENOUS EVERY 8 HOURS
Status: DISCONTINUED | OUTPATIENT
Start: 2019-08-05 | End: 2019-08-06

## 2019-08-05 RX ORDER — FENTANYL CITRATE 50 UG/ML
50 INJECTION, SOLUTION INTRAMUSCULAR; INTRAVENOUS ONCE
Status: COMPLETED | OUTPATIENT
Start: 2019-08-05 | End: 2019-08-05

## 2019-08-05 RX ORDER — SODIUM CHLORIDE 0.9 % (FLUSH) 0.9 %
10 SYRINGE (ML) INJECTION EVERY 12 HOURS SCHEDULED
Status: DISCONTINUED | OUTPATIENT
Start: 2019-08-05 | End: 2019-08-07 | Stop reason: HOSPADM

## 2019-08-05 RX ORDER — SODIUM CHLORIDE 0.9 % (FLUSH) 0.9 %
10 SYRINGE (ML) INJECTION PRN
Status: DISCONTINUED | OUTPATIENT
Start: 2019-08-05 | End: 2019-08-07 | Stop reason: HOSPADM

## 2019-08-05 RX ORDER — NORETHINDRONE ACETATE AND ETHINYL ESTRADIOL 1; .02 MG/1; MG/1
1 TABLET ORAL DAILY
Status: DISCONTINUED | OUTPATIENT
Start: 2019-08-05 | End: 2019-08-07 | Stop reason: HOSPADM

## 2019-08-05 RX ORDER — 0.9 % SODIUM CHLORIDE 0.9 %
80 INTRAVENOUS SOLUTION INTRAVENOUS ONCE
Status: COMPLETED | OUTPATIENT
Start: 2019-08-05 | End: 2019-08-05

## 2019-08-05 RX ORDER — ACETAMINOPHEN 325 MG/1
650 TABLET ORAL EVERY 4 HOURS PRN
Status: DISCONTINUED | OUTPATIENT
Start: 2019-08-05 | End: 2019-08-07 | Stop reason: HOSPADM

## 2019-08-05 RX ORDER — CEFAZOLIN SODIUM 1 G/50ML
1 INJECTION, SOLUTION INTRAVENOUS ONCE
Status: COMPLETED | OUTPATIENT
Start: 2019-08-05 | End: 2019-08-05

## 2019-08-05 RX ORDER — PROMETHAZINE HYDROCHLORIDE 25 MG/ML
12.5 INJECTION, SOLUTION INTRAMUSCULAR; INTRAVENOUS ONCE
Status: DISCONTINUED | OUTPATIENT
Start: 2019-08-05 | End: 2019-08-05

## 2019-08-05 RX ORDER — ACETAMINOPHEN 500 MG
1000 TABLET ORAL ONCE
Status: COMPLETED | OUTPATIENT
Start: 2019-08-05 | End: 2019-08-05

## 2019-08-05 RX ORDER — ONDANSETRON 2 MG/ML
4 INJECTION INTRAMUSCULAR; INTRAVENOUS ONCE
Status: COMPLETED | OUTPATIENT
Start: 2019-08-05 | End: 2019-08-05

## 2019-08-05 RX ORDER — 0.9 % SODIUM CHLORIDE 0.9 %
30 INTRAVENOUS SOLUTION INTRAVENOUS ONCE
Status: COMPLETED | OUTPATIENT
Start: 2019-08-05 | End: 2019-08-05

## 2019-08-05 RX ORDER — KETOROLAC TROMETHAMINE 30 MG/ML
30 INJECTION, SOLUTION INTRAMUSCULAR; INTRAVENOUS EVERY 6 HOURS PRN
Status: DISCONTINUED | OUTPATIENT
Start: 2019-08-05 | End: 2019-08-06

## 2019-08-05 RX ORDER — ONDANSETRON 4 MG/1
4 TABLET, ORALLY DISINTEGRATING ORAL EVERY 6 HOURS PRN
Status: DISCONTINUED | OUTPATIENT
Start: 2019-08-05 | End: 2019-08-07 | Stop reason: HOSPADM

## 2019-08-05 RX ORDER — SODIUM CHLORIDE 9 MG/ML
INJECTION, SOLUTION INTRAVENOUS CONTINUOUS
Status: DISCONTINUED | OUTPATIENT
Start: 2019-08-05 | End: 2019-08-07 | Stop reason: HOSPADM

## 2019-08-05 RX ORDER — ONDANSETRON 2 MG/ML
4 INJECTION INTRAMUSCULAR; INTRAVENOUS EVERY 6 HOURS PRN
Status: DISCONTINUED | OUTPATIENT
Start: 2019-08-05 | End: 2019-08-05 | Stop reason: SDUPTHER

## 2019-08-05 RX ADMIN — IOPAMIDOL 75 ML: 755 INJECTION, SOLUTION INTRAVENOUS at 03:10

## 2019-08-05 RX ADMIN — SODIUM CHLORIDE 2454 ML: 9 INJECTION, SOLUTION INTRAVENOUS at 02:36

## 2019-08-05 RX ADMIN — FENTANYL CITRATE 50 MCG: 50 INJECTION, SOLUTION INTRAMUSCULAR; INTRAVENOUS at 04:42

## 2019-08-05 RX ADMIN — KETOROLAC TROMETHAMINE 30 MG: 30 INJECTION, SOLUTION INTRAMUSCULAR at 13:03

## 2019-08-05 RX ADMIN — ONDANSETRON 8 MG: 2 INJECTION INTRAMUSCULAR; INTRAVENOUS at 02:36

## 2019-08-05 RX ADMIN — SODIUM CHLORIDE: 9 INJECTION, SOLUTION INTRAVENOUS at 22:50

## 2019-08-05 RX ADMIN — SODIUM CHLORIDE: 9 INJECTION, SOLUTION INTRAVENOUS at 06:44

## 2019-08-05 RX ADMIN — SODIUM CHLORIDE 80 ML: 9 INJECTION, SOLUTION INTRAVENOUS at 03:10

## 2019-08-05 RX ADMIN — PROMETHAZINE HYDROCHLORIDE 25 MG: 25 INJECTION INTRAMUSCULAR; INTRAVENOUS at 05:36

## 2019-08-05 RX ADMIN — ONDANSETRON 4 MG: 4 TABLET, ORALLY DISINTEGRATING ORAL at 17:20

## 2019-08-05 RX ADMIN — CEFAZOLIN SODIUM 1 G: 1 INJECTION, SOLUTION INTRAVENOUS at 11:54

## 2019-08-05 RX ADMIN — KETOROLAC TROMETHAMINE 30 MG: 30 INJECTION, SOLUTION INTRAMUSCULAR at 06:24

## 2019-08-05 RX ADMIN — CEFAZOLIN SODIUM 1 G: 1 INJECTION, SOLUTION INTRAVENOUS at 22:54

## 2019-08-05 RX ADMIN — CEFAZOLIN SODIUM 1 G: 1 INJECTION, SOLUTION INTRAVENOUS at 03:57

## 2019-08-05 RX ADMIN — ACETAMINOPHEN 1000 MG: 500 TABLET ORAL at 05:23

## 2019-08-05 RX ADMIN — Medication 10 ML: at 03:10

## 2019-08-05 RX ADMIN — ONDANSETRON 4 MG: 2 INJECTION INTRAMUSCULAR; INTRAVENOUS at 05:23

## 2019-08-05 RX ADMIN — SODIUM CHLORIDE, PRESERVATIVE FREE 10 ML: 5 INJECTION INTRAVENOUS at 20:31

## 2019-08-05 RX ADMIN — ACETAMINOPHEN 650 MG: 325 TABLET ORAL at 11:53

## 2019-08-05 RX ADMIN — KETOROLAC TROMETHAMINE 30 MG: 30 INJECTION, SOLUTION INTRAMUSCULAR at 22:49

## 2019-08-05 ASSESSMENT — PAIN DESCRIPTION - FREQUENCY
FREQUENCY: CONTINUOUS
FREQUENCY: CONTINUOUS

## 2019-08-05 ASSESSMENT — PAIN DESCRIPTION - PROGRESSION

## 2019-08-05 ASSESSMENT — PAIN DESCRIPTION - ONSET
ONSET: ON-GOING
ONSET: ON-GOING

## 2019-08-05 ASSESSMENT — PAIN SCALES - GENERAL
PAINLEVEL_OUTOF10: 9
PAINLEVEL_OUTOF10: 8
PAINLEVEL_OUTOF10: 9
PAINLEVEL_OUTOF10: 7
PAINLEVEL_OUTOF10: 8
PAINLEVEL_OUTOF10: 9
PAINLEVEL_OUTOF10: 9
PAINLEVEL_OUTOF10: 10
PAINLEVEL_OUTOF10: 8

## 2019-08-05 ASSESSMENT — PAIN DESCRIPTION - PAIN TYPE
TYPE: ACUTE PAIN
TYPE: ACUTE PAIN

## 2019-08-05 ASSESSMENT — PAIN DESCRIPTION - ORIENTATION
ORIENTATION: RIGHT
ORIENTATION: RIGHT

## 2019-08-05 ASSESSMENT — PAIN DESCRIPTION - LOCATION
LOCATION: FLANK;HEAD
LOCATION: FLANK

## 2019-08-05 ASSESSMENT — PAIN - FUNCTIONAL ASSESSMENT: PAIN_FUNCTIONAL_ASSESSMENT: ACTIVITIES ARE NOT PREVENTED

## 2019-08-05 ASSESSMENT — PAIN DESCRIPTION - DESCRIPTORS
DESCRIPTORS: ACHING;DISCOMFORT
DESCRIPTORS: ACHING;CRAMPING

## 2019-08-05 NOTE — ED PROVIDER NOTES
was reviewed and negative. PHYSICAL EXAM    (up to 7 for level 4, 8 or more for level 5)     Vitals:    08/05/19 0204   BP: 137/81   Pulse: 103   Resp: 16   Temp: 98.6 °F (37 °C)   SpO2: 100%   Weight: 180 lb 4 oz (81.8 kg)   Height: 5' 4\" (1.626 m)       Physical exam reflects an uncomfortable female who is actively vomiting she is currently afebrile with stable vital signs to include pulse ox of 100% on room air. She is not hypoxic. She is alert conversive and appropriate in behavior. Integument warm and dry. Heart regular rate and rhythm normal S1-S2 no murmurs rubs gallops. Lungs are clear to auscultation without wheezes rales rhonchi. Abdomen is tender in the pelvic region she has right flank pain noted. No focal rebound or guarding anteriorly. Extremities show no gross abnormality. Integument without rash or lesion. No neurovascular deficits are noted      DIAGNOSTIC RESULTS:    RADIOLOGY:   Non-plain film images such as CT, Ultrasound and MRI are read by the radiologist. Plain radiographic images are visualized and preliminarily interpreted by the emergency physician with the below findings:    CT ABDOMEN PELVIS W IV CONTRAST   Status: Final result   Order Providers     Authorizing Billing   Roseann Roys, MD Nancylee Hodgkin, MD          Signed by     Signed Date/Time  Phone Pager   Rosangela Quevedo 8/05/2019 03:42 [phone]    Reading Providers     Read Date Phone Pager   Rosangela Quevedo Aug 5, 2019 [phone]    Radiation Dose Estimates     No radiation information found for this patient   Narrative   EXAMINATION:   CT OF THE ABDOMEN AND PELVIS WITH CONTRAST 8/5/2019 3:18 am       TECHNIQUE:   CT of the abdomen and pelvis was performed with the administration of   intravenous contrast. Multiplanar reformatted images are provided for review.    Dose modulation, iterative reconstruction, and/or weight based adjustment of   the mA/kV was utilized to reduce the radiation dose to as low words are mis-transcribed.)    Pranay Rose MD  Attending Emergency Physician         Pranay Rose MD  08/05/19 3855

## 2019-08-05 NOTE — H&P (VIEW-ONLY)
POCT urine pregnancy    Collection Time: 08/05/19  2:28 AM   Result Value Ref Range    HCG, Pregnancy Urine (POC) NEGATIVE NEGATIVE   Amylase    Collection Time: 08/05/19  2:32 AM   Result Value Ref Range    Amylase 82 28 - 100 U/L   Culture Blood #1    Collection Time: 08/05/19  2:32 AM   Result Value Ref Range    Specimen Description . BLOOD     Special Requests LT AC 10 ML     Culture NO GROWTH 5 HOURS    Basic Metabolic Panel    Collection Time: 08/05/19  2:32 AM   Result Value Ref Range    Glucose 105 (H) 70 - 99 mg/dL    BUN 12 6 - 20 mg/dL    CREATININE 1.00 (H) 0.50 - 0.90 mg/dL    Bun/Cre Ratio 12 9 - 20    Calcium 8.7 8.6 - 10.4 mg/dL    Sodium 140 135 - 144 mmol/L    Potassium 3.6 (L) 3.7 - 5.3 mmol/L    Chloride 101 98 - 107 mmol/L    CO2 25 20 - 31 mmol/L    Anion Gap 14 9 - 17 mmol/L    GFR Non-African American >60 >60 mL/min    GFR African American >60 >60 mL/min    GFR Comment          GFR Staging NOT REPORTED    CBC Auto Differential    Collection Time: 08/05/19  2:32 AM   Result Value Ref Range    WBC 11.2 3.5 - 11.3 k/uL    RBC 4.63 3.95 - 5.11 m/uL    Hemoglobin 13.3 11.9 - 15.1 g/dL    Hematocrit 41.7 36.3 - 47.1 %    MCV 90.1 82.6 - 102.9 fL    MCH 28.7 25.2 - 33.5 pg    MCHC 31.9 28.4 - 34.8 g/dL    RDW 13.4 11.8 - 14.4 %    Platelets 126 424 - 359 k/uL    MPV 10.1 8.1 - 13.5 fL    NRBC Automated 0.0 0.0 per 100 WBC    Differential Type NOT REPORTED     Seg Neutrophils 81 (H) 36 - 65 %    Lymphocytes 13 (L) 24 - 43 %    Monocytes 4 3 - 12 %    Eosinophils % 1 1 - 4 %    Basophils 0 0 - 2 %    Immature Granulocytes 1 (H) 0 %    Segs Absolute 9.07 (H) 1.50 - 8.10 k/uL    Absolute Lymph # 1.49 1.10 - 3.70 k/uL    Absolute Mono # 0.43 0.10 - 1.20 k/uL    Absolute Eos # 0.13 0.00 - 0.44 k/uL    Basophils # 0.05 0.00 - 0.20 k/uL    Absolute Immature Granulocyte 0.06 0.00 - 0.30 k/uL    WBC Morphology NOT REPORTED     RBC Morphology NOT REPORTED     Platelet Estimate NOT REPORTED    Hepatic Function

## 2019-08-05 NOTE — FLOWSHEET NOTE
Patient receives Sacrament of the Sick (anointing) from [facility].    [facility] will follow as needed. (writer charting for [employer].)     68/89/54 9731   Encounter Summary   Services provided to: Patient   Referral/Consult From: Rounding   Continue Visiting   (8/5/19 anointed)   Complexity of Encounter Low   Length of Encounter 15 minutes   Routine   Type Follow up   Sacraments   Sacrament of Sick-Anointing Anointed  (8/5/19 Fr. Vince Delaney)

## 2019-08-05 NOTE — H&P
Panel    Collection Time: 08/05/19  2:32 AM   Result Value Ref Range    Alb 4.0 3.5 - 5.2 g/dL    Alkaline Phosphatase 44 35 - 104 U/L    ALT <5 (L) 5 - 33 U/L    AST 14 <32 U/L    Total Bilirubin 0.67 0.3 - 1.2 mg/dL    Bilirubin, Direct 0.16 <0.31 mg/dL    Bilirubin, Indirect 0.51 0.00 - 1.00 mg/dL    Total Protein 6.9 6.4 - 8.3 g/dL    Globulin NOT REPORTED 1.5 - 3.8 g/dL    Albumin/Globulin Ratio NOT REPORTED 1.0 - 2.5   Lipase    Collection Time: 08/05/19  2:32 AM   Result Value Ref Range    Lipase 37 13 - 60 U/L   Lactic Acid    Collection Time: 08/05/19  2:32 AM   Result Value Ref Range    Lactic Acid 0.8 0.5 - 2.2 mmol/L   Culture Blood #1    Collection Time: 08/05/19  2:39 AM   Result Value Ref Range    Specimen Description . BLOOD     Special Requests RT AC 12 ML     Culture NO GROWTH 5 HOURS    POCT urine pregnancy    Collection Time: 08/05/19  2:39 AM   Result Value Ref Range    Preg Test, Ur neg     QC OK? ok        Imaging/Diagnostics:    Ct Abdomen Pelvis W Iv Contrast    Result Date: 8/5/2019  No acute findings. Assessment :      Primary Problem  UTI (urinary tract infection)    Active Hospital Problems    Diagnosis Date Noted    UTI (urinary tract infection) [N39.0] 08/05/2019     Priority: High    History of kidney stones [Z87.442] 08/05/2019    Acute pyelonephritis [N10] 05/14/2018       Plan:     Patient status Admit as observation in the  Med/Surge    1. Continue IV fluids  2. Pain control  3. Continue IV Ancef as already started in the ER  4. DVT prophylaxis  5. Consult urology    Consultations:   IP CONSULT TO INTERNAL MEDICINE     Patient is admitted as inpatient status because of co-morbidities listed above, severity of signs and symptoms as outlined, requirement for current medical therapies and most importantly because of direct risk to patient if care not provided in a hospital setting.     Maryam Gupta MD  8/5/2019  4:00 PM    Copy sent to Dr. Esthela Tapia, APRN - CNP

## 2019-08-06 LAB
CULTURE: ABNORMAL
Lab: ABNORMAL
SPECIMEN DESCRIPTION: ABNORMAL

## 2019-08-06 PROCEDURE — 6370000000 HC RX 637 (ALT 250 FOR IP): Performed by: INTERNAL MEDICINE

## 2019-08-06 PROCEDURE — 99232 SBSQ HOSP IP/OBS MODERATE 35: CPT | Performed by: INTERNAL MEDICINE

## 2019-08-06 PROCEDURE — 6370000000 HC RX 637 (ALT 250 FOR IP): Performed by: NURSE PRACTITIONER

## 2019-08-06 PROCEDURE — G0378 HOSPITAL OBSERVATION PER HR: HCPCS

## 2019-08-06 PROCEDURE — 96366 THER/PROPH/DIAG IV INF ADDON: CPT

## 2019-08-06 PROCEDURE — 6360000002 HC RX W HCPCS: Performed by: NURSE PRACTITIONER

## 2019-08-06 PROCEDURE — 96372 THER/PROPH/DIAG INJ SC/IM: CPT

## 2019-08-06 PROCEDURE — 96361 HYDRATE IV INFUSION ADD-ON: CPT

## 2019-08-06 RX ORDER — OXYCODONE HYDROCHLORIDE AND ACETAMINOPHEN 5; 325 MG/1; MG/1
1 TABLET ORAL EVERY 4 HOURS PRN
Qty: 10 TABLET | Refills: 0 | Status: SHIPPED | OUTPATIENT
Start: 2019-08-06 | End: 2019-08-10

## 2019-08-06 RX ORDER — OXYCODONE HYDROCHLORIDE AND ACETAMINOPHEN 5; 325 MG/1; MG/1
1 TABLET ORAL EVERY 4 HOURS PRN
Status: DISCONTINUED | OUTPATIENT
Start: 2019-08-06 | End: 2019-08-07 | Stop reason: HOSPADM

## 2019-08-06 RX ORDER — CEFUROXIME AXETIL 250 MG/1
250 TABLET ORAL 2 TIMES DAILY
Qty: 10 TABLET | Refills: 0 | Status: SHIPPED | OUTPATIENT
Start: 2019-08-06 | End: 2019-08-11

## 2019-08-06 RX ORDER — CEFUROXIME AXETIL 500 MG/1
500 TABLET ORAL EVERY 12 HOURS SCHEDULED
Status: DISCONTINUED | OUTPATIENT
Start: 2019-08-06 | End: 2019-08-07 | Stop reason: HOSPADM

## 2019-08-06 RX ORDER — CEFUROXIME AXETIL 500 MG/1
500 TABLET ORAL ONCE
Status: COMPLETED | OUTPATIENT
Start: 2019-08-06 | End: 2019-08-06

## 2019-08-06 RX ADMIN — OXYCODONE AND ACETAMINOPHEN 1 TABLET: 5; 325 TABLET ORAL at 10:41

## 2019-08-06 RX ADMIN — OXYCODONE AND ACETAMINOPHEN 1 TABLET: 5; 325 TABLET ORAL at 20:29

## 2019-08-06 RX ADMIN — ENOXAPARIN SODIUM 40 MG: 40 INJECTION SUBCUTANEOUS at 10:41

## 2019-08-06 RX ADMIN — CEFUROXIME AXETIL 500 MG: 500 TABLET ORAL at 15:16

## 2019-08-06 RX ADMIN — ONDANSETRON 4 MG: 4 TABLET, ORALLY DISINTEGRATING ORAL at 18:32

## 2019-08-06 RX ADMIN — CEFUROXIME AXETIL 500 MG: 500 TABLET ORAL at 20:29

## 2019-08-06 RX ADMIN — CEFAZOLIN SODIUM 1 G: 1 INJECTION, SOLUTION INTRAVENOUS at 06:59

## 2019-08-06 RX ADMIN — OXYCODONE AND ACETAMINOPHEN 1 TABLET: 5; 325 TABLET ORAL at 15:09

## 2019-08-06 ASSESSMENT — PAIN DESCRIPTION - PAIN TYPE
TYPE: ACUTE PAIN
TYPE: ACUTE PAIN

## 2019-08-06 ASSESSMENT — PAIN DESCRIPTION - PROGRESSION

## 2019-08-06 ASSESSMENT — PAIN SCALES - GENERAL
PAINLEVEL_OUTOF10: 9
PAINLEVEL_OUTOF10: 9
PAINLEVEL_OUTOF10: 7
PAINLEVEL_OUTOF10: 9

## 2019-08-06 ASSESSMENT — PAIN DESCRIPTION - ONSET
ONSET: ON-GOING
ONSET: ON-GOING

## 2019-08-06 ASSESSMENT — PAIN DESCRIPTION - FREQUENCY
FREQUENCY: CONTINUOUS
FREQUENCY: CONTINUOUS

## 2019-08-06 ASSESSMENT — PAIN DESCRIPTION - ORIENTATION
ORIENTATION: RIGHT
ORIENTATION: RIGHT

## 2019-08-06 ASSESSMENT — PAIN DESCRIPTION - LOCATION
LOCATION: BACK;FLANK
LOCATION: BACK;FLANK

## 2019-08-06 ASSESSMENT — PAIN DESCRIPTION - DESCRIPTORS
DESCRIPTORS: ACHING;DISCOMFORT
DESCRIPTORS: ACHING;DISCOMFORT;TENDER

## 2019-08-06 NOTE — DISCHARGE SUMMARY
[facility]    Discharge Summary     Patient ID: Brigido Carnes  :  1995   MRN: [id number]     ACCOUNT:  [de-identified]   Patient's PCP: KEMAR Smith CNP  Admit Date: 2019   Discharge Date: 2019     Length of Stay: 0  Code Status:  Full Code  Admitting Physician: Drexel Cabot, MD  Discharge Physician: Drexel Cabot, MD     Active Discharge Diagnoses:     Hospital Problem Lists:  Principal Problem:    UTI (urinary tract infection)  Active Problems:    Acute pyelonephritis    History of kidney stones  Resolved Problems:    * No resolved hospital problems. *      Admission Condition:  poor     Discharged Condition: good    Hospital Stay:   Admitting history;  Alicia Asencio a 22 y.o. female who presents to the emergency department for evaluation of illness.  Patient states she feels like she is \"getting another kidney infection\".  She states  that she gets kidney infections once or twice a year. Jeanette Hamm presents with vomiting. Jeanette Hamm states she has had nausea and vomiting for 2 days. Jeanette Hamm has had fevers and chills.  She has severe right flank pain she has lower pelvic pain. She is still having nausea but not vomiting  Still has right flank pain     Hospital Course:   Was started on IV antibiotics  Was given medications to control pain  Did not go home yesterday because of pain  Pain is much better today  Denies nausea vomiting  Urine culture showing E. coli which is being treated with antibiotics  Has been seen by urology who will follow her as outpatient  Wants to go home today    Primary Problem  UTI (urinary tract infection)-E. coli infection           Active Hospital Problems     Diagnosis Date Noted    UTI (urinary tract infection) [N39.0] 2019       Priority: High    History of kidney stones [Z87.442] 2019    Acute pyelonephritis [N10] 2018         Plan:            1. Percocet as needed  2.  Continue oral [phone]   · cefUROXime 250 MG tablet     You can get these medications from any pharmacy    Bring a paper prescription for each of these medications  · oxyCODONE-acetaminophen 5-325 MG per tablet         Time Spent on discharge is  20 mins in patient examination, evaluation, counseling as well as medication reconciliation, prescriptions for required medications, discharge plan and follow up. Electronically signed by   Rhoda Duarte MD  8/6/2019  3:04 PM      Thank you KEMAR Roberson - JING for the opportunity to be involved in this patient's care.

## 2019-08-06 NOTE — CONSULTS
non-distended with no CVA, flank pain or hepatosplenomegaly. No hernias. Kidneys normal.  Lymphatics: No palpable lymphadenopathy. Bladder non-tender and not distended. Pelvic exam: deferred  Rectal exam not indicated    LABS:   Recent Labs     08/05/19 0232   WBC 11.2   HGB 13.3   HCT 41.7   MCV 90.1        Recent Labs     08/05/19 0232      K 3.6*      CO2 25   BUN 12   CREATININE 1.00*       Additional Lab/culture results:    Urinalysis:   Recent Labs     08/05/19 0228   COLORU YELLOW   PHUR 6.5   WBCUA TOO NUMEROUS TO COUNT   RBCUA 20 TO 50   MUCUS NOT REPORTED   TRICHOMONAS NOT REPORTED   YEAST NOT REPORTED   BACTERIA MANY*   SPECGRAV 1.010   LEUKOCYTESUR LARGE*   UROBILINOGEN Normal   BILIRUBINUR NEGATIVE        -----------------------------------------------------------------  Imaging Results:  Organs: Cholecystectomy.  Enhanced parenchymal attenuation pattern of the   liver, spleen, pancreas, adrenal glands is unremarkable.  The kidneys appear   normal in size and demonstrate symmetric enhancement.  No focal renal mass. No hydronephrosis. No perinephric stranding.       GI/Bowel: No bowel dilatation to suggest obstruction.  No evidence of acute   appendicitis.       Pelvis:  The urinary bladder appears unremarkable.  The pelvic organs   demonstrate no acute abnormality.       Peritoneum/Retroperitoneum: The abdominal aorta is normal in caliber.  No   gross lymphadenopathy.  No fluid collection.  No free air.       Bones/Soft Tissues: No acute findings           Assessment and Plan   Impression: Recurrent E. coli urinary tract infection, recurrent pyelonephritis mainly around menstrual cycle,  Patient Active Problem List   Diagnosis    Allergic reaction caused by a drug    E. coli UTI (urinary tract infection)    Acute pyelonephritis    Cholecystitis    Pyelonephritis    UTI (urinary tract infection)    History of kidney stones       Plan: Discussed with the patient urologic

## 2019-08-07 VITALS
SYSTOLIC BLOOD PRESSURE: 109 MMHG | WEIGHT: 180.25 LBS | DIASTOLIC BLOOD PRESSURE: 61 MMHG | HEART RATE: 54 BPM | TEMPERATURE: 98.2 F | OXYGEN SATURATION: 98 % | BODY MASS INDEX: 30.77 KG/M2 | RESPIRATION RATE: 16 BRPM | HEIGHT: 64 IN

## 2019-08-07 PROCEDURE — 2580000003 HC RX 258: Performed by: NURSE PRACTITIONER

## 2019-08-07 PROCEDURE — 96372 THER/PROPH/DIAG INJ SC/IM: CPT

## 2019-08-07 PROCEDURE — 99232 SBSQ HOSP IP/OBS MODERATE 35: CPT | Performed by: INTERNAL MEDICINE

## 2019-08-07 PROCEDURE — 6370000000 HC RX 637 (ALT 250 FOR IP): Performed by: INTERNAL MEDICINE

## 2019-08-07 PROCEDURE — 6360000002 HC RX W HCPCS: Performed by: NURSE PRACTITIONER

## 2019-08-07 PROCEDURE — G0378 HOSPITAL OBSERVATION PER HR: HCPCS

## 2019-08-07 PROCEDURE — 96361 HYDRATE IV INFUSION ADD-ON: CPT

## 2019-08-07 RX ADMIN — SODIUM CHLORIDE: 9 INJECTION, SOLUTION INTRAVENOUS at 02:34

## 2019-08-07 RX ADMIN — OXYCODONE AND ACETAMINOPHEN 1 TABLET: 5; 325 TABLET ORAL at 08:32

## 2019-08-07 RX ADMIN — CEFUROXIME AXETIL 500 MG: 500 TABLET ORAL at 08:32

## 2019-08-07 RX ADMIN — OXYCODONE AND ACETAMINOPHEN 1 TABLET: 5; 325 TABLET ORAL at 01:18

## 2019-08-07 RX ADMIN — ENOXAPARIN SODIUM 40 MG: 40 INJECTION SUBCUTANEOUS at 08:32

## 2019-08-07 ASSESSMENT — PAIN DESCRIPTION - ONSET: ONSET: ON-GOING

## 2019-08-07 ASSESSMENT — PAIN - FUNCTIONAL ASSESSMENT: PAIN_FUNCTIONAL_ASSESSMENT: ACTIVITIES ARE NOT PREVENTED

## 2019-08-07 ASSESSMENT — PAIN DESCRIPTION - ORIENTATION: ORIENTATION: RIGHT

## 2019-08-07 ASSESSMENT — PAIN DESCRIPTION - FREQUENCY: FREQUENCY: CONTINUOUS

## 2019-08-07 ASSESSMENT — PAIN SCALES - GENERAL
PAINLEVEL_OUTOF10: 8
PAINLEVEL_OUTOF10: 9

## 2019-08-07 ASSESSMENT — PAIN DESCRIPTION - DESCRIPTORS: DESCRIPTORS: CONSTANT;SHARP

## 2019-08-07 ASSESSMENT — PAIN DESCRIPTION - PAIN TYPE: TYPE: ACUTE PAIN

## 2019-08-07 ASSESSMENT — PAIN DESCRIPTION - PROGRESSION: CLINICAL_PROGRESSION: NOT CHANGED

## 2019-08-07 ASSESSMENT — PAIN DESCRIPTION - LOCATION: LOCATION: FLANK

## 2019-08-07 NOTE — PLAN OF CARE
Problem: Pain:  Goal: Pain level will decrease  Description  Pain level will decrease  8/6/2019 0252 by Amisha Heredia RN  Outcome: Ongoing     Problem: Pain:  Goal: Control of acute pain  Description  Control of acute pain  8/6/2019 0252 by Amisha Heredia RN  Outcome: Ongoing     Problem: Falls - Risk of:  Goal: Will remain free from falls  Description  Will remain free from falls  8/6/2019 0252 by Amisha Heredia RN  Outcome: Ongoing     Problem: Nausea/Vomiting:  Goal: Absence of nausea/vomiting  Description  Absence of nausea/vomiting  8/6/2019 0252 by Amisha Heredia RN  Outcome: Ongoing     Problem: Nausea/Vomiting:  Goal: Able to drink  Description  Able to drink  8/6/2019 0252 by Amisha Heredia RN  Outcome: Ongoing     Problem: Nausea/Vomiting:  Goal: Able to eat  Description  Able to eat  8/6/2019 0252 by Amisha Heredia RN  Outcome: Ongoing     Problem: Nausea/Vomiting:  Goal: Ability to achieve adequate nutritional intake will improve  Description  Ability to achieve adequate nutritional intake will improve  8/6/2019 0252 by Amisha Heredia RN  Outcome: Ongoing
Problem: Pain:  Goal: Pain level will decrease  Description  Pain level will decrease  Outcome: Ongoing  Note:   Pain level assessment complete. Patient educated on pain scale and control interventions  PRN pain medication given per patient request  Patient instructed to call out with new onset of pain or unrelieved pain    Goal: Control of acute pain  Description  Control of acute pain  Outcome: Ongoing  Goal: Control of chronic pain  Description  Control of chronic pain  Outcome: Ongoing     Problem: Falls - Risk of:  Goal: Will remain free from falls  Description  Will remain free from falls  Outcome: Ongoing  Note:   Siderails up x 2  Hourly rounding  Call light in reach  Instructed to call for assist before attempting out of bed.   Remains free from falls and accidental injury at this time   Floor free from obstacles  Bed is locked and in lowest position  Adequate lighting provided        Goal: Absence of physical injury  Description  Absence of physical injury  Outcome: Ongoing     Problem: Nausea/Vomiting:  Goal: Absence of nausea/vomiting  Description  Absence of nausea/vomiting  Outcome: Ongoing  Goal: Able to drink  Description  Able to drink  Outcome: Ongoing  Goal: Able to eat  Description  Able to eat  Outcome: Ongoing  Goal: Ability to achieve adequate nutritional intake will improve  Description  Ability to achieve adequate nutritional intake will improve  Outcome: Ongoing     Problem: Sensory:  Goal: General experience of comfort will improve  Description  General experience of comfort will improve  Outcome: Ongoing     Problem: Urinary Elimination:  Goal: Signs and symptoms of infection will decrease  Description  Signs and symptoms of infection will decrease  Outcome: Ongoing  Goal: Ability to reestablish a normal urinary elimination pattern will improve - after catheter removal  Description  Ability to reestablish a normal urinary elimination pattern will improve  Outcome: Ongoing  Goal:
Pt medicated with pain medication prn. Assessed all pain characteristics including level, type, location, frequency, and onset. Non-pharmacologic interventions offered to pt as well. Pt states pain is tolerable at this time. Will continue to monitor     Patient tolerating general diet well. Able to drink fluids without any nausea. Urine output adequate.
will improve  Outcome: Ongoing     Problem: Sensory:  Goal: General experience of comfort will improve  Description  General experience of comfort will improve  Outcome: Ongoing     Problem: Urinary Elimination:  Goal: Signs and symptoms of infection will decrease  Description  Signs and symptoms of infection will decrease  Outcome: Ongoing  Goal: Ability to reestablish a normal urinary elimination pattern will improve - after catheter removal  Description  Ability to reestablish a normal urinary elimination pattern will improve  Outcome: Ongoing  Goal: Complications related to the disease process, condition or treatment will be avoided or minimized  Description  Complications related to the disease process, condition or treatment will be avoided or minimized  Outcome: Ongoing

## 2019-08-07 NOTE — PROGRESS NOTES
Pt admitted to room. Oriented to room, call light and bed mechanics. Side rails up x2. Call light within reach. Orders reviewed.
2 times per day    enoxaparin  40 mg Subcutaneous Daily    ibuprofen  600 mg Oral Once     Continuous Infusions:    sodium chloride 75 mL/hr at 19 0234     PRN Meds: oxyCODONE-acetaminophen, sodium chloride flush, sodium chloride flush, magnesium hydroxide, acetaminophen, ondansetron **OR** ondansetron    Data:     Past Medical History:   has a past medical history of Gall stones, Kidney stone, and UTI (lower urinary tract infection). Social History:   reports that she has never smoked. She has never used smokeless tobacco. She reports that she drinks alcohol. She reports that she does not use drugs. Family History: History reviewed. No pertinent family history. Vitals:  /61   Pulse 54   Temp 98.2 °F (36.8 °C) (Oral)   Resp 16   Ht 5' 4\" (1.626 m)   Wt 180 lb 4 oz (81.8 kg)   SpO2 98%   BMI 30.94 kg/m²   Temp (24hrs), Av °F (36.7 °C), Min:97.9 °F (36.6 °C), Max:98.2 °F (36.8 °C)    No results for input(s): POCGLU in the last 72 hours. I/O (24Hr):     Intake/Output Summary (Last 24 hours) at 2019  Last data filed at 2019 0526  Gross per 24 hour   Intake 1158 ml   Output --   Net 1158 ml       Labs:  Hematology:  Recent Labs     19   WBC 11.2   RBC 4.63   HGB 13.3   HCT 41.7   MCV 90.1   MCH 28.7   MCHC 31.9   RDW 13.4      MPV 10.1     Chemistry:  Recent Labs     19      K 3.6*      CO2 25   GLUCOSE 105*   BUN 12   CREATININE 1.00*   ANIONGAP 14   LABGLOM >60   GFRAA >60   CALCIUM 8.7     Recent Labs     19   PROT 6.9   LABALBU 4.0   AST 14   ALT <5*   ALKPHOS 44   BILITOT 0.67   BILIDIR 0.16   AMYLASE 82   LIPASE 37     ABG:No results found for: POCPH, PHART, PH, POCPCO2, NEF3UFS, PCO2, POCPO2, PO2ART, PO2, POCHCO3, VOD1CIS, HCO3, NBEA, PBEA, BEART, BE, THGBART, THB, IKP3OEI, DNMI0VQM, Y4LAOGQJ, O2SAT, FIO2  Lab Results   Component Value Date/Time    SPECIAL RT AC 12 ML 2019 02:39 AM     Lab Results
ibuprofen  600 mg Oral Once     Continuous Infusions:    sodium chloride 75 mL/hr at 19 2250     PRN Meds: sodium chloride flush, sodium chloride flush, magnesium hydroxide, ketorolac, acetaminophen, ondansetron **OR** ondansetron    Data:     Past Medical History:   has a past medical history of Gall stones, Kidney stone, and UTI (lower urinary tract infection). Social History:   reports that she has never smoked. She has never used smokeless tobacco. She reports that she drinks alcohol. She reports that she does not use drugs. Family History: History reviewed. No pertinent family history. Vitals:  /63   Pulse 55   Temp 98.4 °F (36.9 °C) (Oral)   Resp 18   Ht 5' 4\" (1.626 m)   Wt 180 lb 4 oz (81.8 kg)   SpO2 99%   BMI 30.94 kg/m²   Temp (24hrs), Av.3 °F (36.8 °C), Min:98.1 °F (36.7 °C), Max:98.4 °F (36.9 °C)    No results for input(s): POCGLU in the last 72 hours. I/O (24Hr):     Intake/Output Summary (Last 24 hours) at 2019 0900  Last data filed at 2019 0658  Gross per 24 hour   Intake 1524 ml   Output --   Net 1524 ml       Labs:  Hematology:  Recent Labs     19  023   WBC 11.2   RBC 4.63   HGB 13.3   HCT 41.7   MCV 90.1   MCH 28.7   MCHC 31.9   RDW 13.4      MPV 10.1     Chemistry:  Recent Labs     19  0232      K 3.6*      CO2 25   GLUCOSE 105*   BUN 12   CREATININE 1.00*   ANIONGAP 14   LABGLOM >60   GFRAA >60   CALCIUM 8.7     Recent Labs     19  0232   PROT 6.9   LABALBU 4.0   AST 14   ALT <5*   ALKPHOS 44   BILITOT 0.67   BILIDIR 0.16   AMYLASE 82   LIPASE 37     ABG:No results found for: POCPH, PHART, PH, POCPCO2, SSO6KUS, PCO2, POCPO2, PO2ART, PO2, POCHCO3, IUH3JYQ, HCO3, NBEA, PBEA, BEART, BE, THGBART, THB, WRT2JUF, TRVF8BGC, T1WVKIGW, O2SAT, FIO2  Lab Results   Component Value Date/Time    SPECIAL RT AC 12 ML 2019 02:39 AM     Lab Results   Component Value Date/Time    CULTURE NO GROWTH 13 HOURS 2019 02:39 AM

## 2019-08-11 LAB
CULTURE: NORMAL
CULTURE: NORMAL
Lab: NORMAL
Lab: NORMAL
SPECIMEN DESCRIPTION: NORMAL
SPECIMEN DESCRIPTION: NORMAL

## 2019-09-03 ENCOUNTER — ANESTHESIA EVENT (OUTPATIENT)
Dept: OPERATING ROOM | Age: 24
End: 2019-09-03
Payer: MEDICARE

## 2019-09-04 ENCOUNTER — APPOINTMENT (OUTPATIENT)
Dept: GENERAL RADIOLOGY | Age: 24
End: 2019-09-04
Attending: UROLOGY
Payer: MEDICARE

## 2019-09-04 ENCOUNTER — ANESTHESIA (OUTPATIENT)
Dept: OPERATING ROOM | Age: 24
End: 2019-09-04
Payer: MEDICARE

## 2019-09-04 ENCOUNTER — HOSPITAL ENCOUNTER (OUTPATIENT)
Age: 24
Setting detail: OUTPATIENT SURGERY
Discharge: HOME | End: 2019-09-04
Attending: UROLOGY | Admitting: UROLOGY
Payer: MEDICARE

## 2019-09-04 VITALS — SYSTOLIC BLOOD PRESSURE: 101 MMHG | OXYGEN SATURATION: 100 % | DIASTOLIC BLOOD PRESSURE: 54 MMHG

## 2019-09-04 VITALS
WEIGHT: 174 LBS | BODY MASS INDEX: 28.99 KG/M2 | SYSTOLIC BLOOD PRESSURE: 111 MMHG | TEMPERATURE: 96.8 F | OXYGEN SATURATION: 100 % | RESPIRATION RATE: 18 BRPM | DIASTOLIC BLOOD PRESSURE: 69 MMHG | HEART RATE: 56 BPM | HEIGHT: 65 IN

## 2019-09-04 PROBLEM — N39.0 UTI (URINARY TRACT INFECTION): Status: RESOLVED | Noted: 2019-08-05 | Resolved: 2019-09-04

## 2019-09-04 LAB
-: ABNORMAL
AMORPHOUS: ABNORMAL
BACTERIA: ABNORMAL
BILIRUBIN URINE: NEGATIVE
CASTS UA: ABNORMAL /LPF
COLOR: YELLOW
COMMENT UA: ABNORMAL
CRYSTALS, UA: ABNORMAL /HPF
EPITHELIAL CELLS UA: ABNORMAL /HPF (ref 0–5)
GLUCOSE URINE: NEGATIVE
HCG QUALITATIVE: NEGATIVE
KETONES, URINE: NEGATIVE
LEUKOCYTE ESTERASE, URINE: ABNORMAL
MUCUS: ABNORMAL
NITRITE, URINE: POSITIVE
OTHER OBSERVATIONS UA: ABNORMAL
PH UA: 6 (ref 5–8)
PROTEIN UA: NEGATIVE
RBC UA: ABNORMAL /HPF (ref 0–2)
RENAL EPITHELIAL, UA: ABNORMAL /HPF
SPECIFIC GRAVITY UA: 1.03 (ref 1–1.03)
TRICHOMONAS: ABNORMAL
TURBIDITY: ABNORMAL
URINE HGB: ABNORMAL
UROBILINOGEN, URINE: NORMAL
WBC UA: ABNORMAL /HPF (ref 0–5)
YEAST: ABNORMAL

## 2019-09-04 PROCEDURE — 3600000012 HC SURGERY LEVEL 2 ADDTL 15MIN: Performed by: UROLOGY

## 2019-09-04 PROCEDURE — 3700000001 HC ADD 15 MINUTES (ANESTHESIA): Performed by: UROLOGY

## 2019-09-04 PROCEDURE — 81001 URINALYSIS AUTO W/SCOPE: CPT

## 2019-09-04 PROCEDURE — 6360000002 HC RX W HCPCS: Performed by: UROLOGY

## 2019-09-04 PROCEDURE — 7100000011 HC PHASE II RECOVERY - ADDTL 15 MIN: Performed by: UROLOGY

## 2019-09-04 PROCEDURE — C1769 GUIDE WIRE: HCPCS | Performed by: UROLOGY

## 2019-09-04 PROCEDURE — 2500000003 HC RX 250 WO HCPCS: Performed by: NURSE ANESTHETIST, CERTIFIED REGISTERED

## 2019-09-04 PROCEDURE — 6360000002 HC RX W HCPCS: Performed by: NURSE ANESTHETIST, CERTIFIED REGISTERED

## 2019-09-04 PROCEDURE — 3700000000 HC ANESTHESIA ATTENDED CARE: Performed by: UROLOGY

## 2019-09-04 PROCEDURE — 87077 CULTURE AEROBIC IDENTIFY: CPT

## 2019-09-04 PROCEDURE — 3209999900 FLUORO FOR SURGICAL PROCEDURES

## 2019-09-04 PROCEDURE — 74420 UROGRAPHY RTRGR +-KUB: CPT

## 2019-09-04 PROCEDURE — 2580000003 HC RX 258: Performed by: ANESTHESIOLOGY

## 2019-09-04 PROCEDURE — 3600000002 HC SURGERY LEVEL 2 BASE: Performed by: UROLOGY

## 2019-09-04 PROCEDURE — 2709999900 HC NON-CHARGEABLE SUPPLY: Performed by: UROLOGY

## 2019-09-04 PROCEDURE — 6360000002 HC RX W HCPCS: Performed by: ANESTHESIOLOGY

## 2019-09-04 PROCEDURE — 87186 SC STD MICRODIL/AGAR DIL: CPT

## 2019-09-04 PROCEDURE — 7100000001 HC PACU RECOVERY - ADDTL 15 MIN: Performed by: UROLOGY

## 2019-09-04 PROCEDURE — 7100000000 HC PACU RECOVERY - FIRST 15 MIN: Performed by: UROLOGY

## 2019-09-04 PROCEDURE — 6370000000 HC RX 637 (ALT 250 FOR IP): Performed by: UROLOGY

## 2019-09-04 PROCEDURE — 7100000010 HC PHASE II RECOVERY - FIRST 15 MIN: Performed by: UROLOGY

## 2019-09-04 PROCEDURE — 51600 INJECTION FOR BLADDER X-RAY: CPT

## 2019-09-04 PROCEDURE — 84703 CHORIONIC GONADOTROPIN ASSAY: CPT

## 2019-09-04 PROCEDURE — 6360000004 HC RX CONTRAST MEDICATION: Performed by: UROLOGY

## 2019-09-04 PROCEDURE — 87086 URINE CULTURE/COLONY COUNT: CPT

## 2019-09-04 RX ORDER — LIDOCAINE HYDROCHLORIDE 10 MG/ML
1 INJECTION, SOLUTION EPIDURAL; INFILTRATION; INTRACAUDAL; PERINEURAL
Status: DISCONTINUED | OUTPATIENT
Start: 2019-09-04 | End: 2019-09-04 | Stop reason: HOSPADM

## 2019-09-04 RX ORDER — SODIUM CHLORIDE 0.9 % (FLUSH) 0.9 %
10 SYRINGE (ML) INJECTION PRN
Status: DISCONTINUED | OUTPATIENT
Start: 2019-09-04 | End: 2019-09-04 | Stop reason: HOSPADM

## 2019-09-04 RX ORDER — MIDAZOLAM HYDROCHLORIDE 1 MG/ML
INJECTION INTRAMUSCULAR; INTRAVENOUS PRN
Status: DISCONTINUED | OUTPATIENT
Start: 2019-09-04 | End: 2019-09-04 | Stop reason: SDUPTHER

## 2019-09-04 RX ORDER — FENTANYL CITRATE 50 UG/ML
50 INJECTION, SOLUTION INTRAMUSCULAR; INTRAVENOUS EVERY 5 MIN PRN
Status: DISCONTINUED | OUTPATIENT
Start: 2019-09-04 | End: 2019-09-04 | Stop reason: HOSPADM

## 2019-09-04 RX ORDER — FENTANYL CITRATE 50 UG/ML
INJECTION, SOLUTION INTRAMUSCULAR; INTRAVENOUS PRN
Status: DISCONTINUED | OUTPATIENT
Start: 2019-09-04 | End: 2019-09-04 | Stop reason: SDUPTHER

## 2019-09-04 RX ORDER — CIPROFLOXACIN 2 MG/ML
400 INJECTION, SOLUTION INTRAVENOUS ONCE
Status: COMPLETED | OUTPATIENT
Start: 2019-09-04 | End: 2019-09-04

## 2019-09-04 RX ORDER — FENTANYL CITRATE 50 UG/ML
25 INJECTION, SOLUTION INTRAMUSCULAR; INTRAVENOUS EVERY 5 MIN PRN
Status: DISCONTINUED | OUTPATIENT
Start: 2019-09-04 | End: 2019-09-04 | Stop reason: HOSPADM

## 2019-09-04 RX ORDER — SODIUM CHLORIDE 0.9 % (FLUSH) 0.9 %
10 SYRINGE (ML) INJECTION EVERY 12 HOURS SCHEDULED
Status: DISCONTINUED | OUTPATIENT
Start: 2019-09-04 | End: 2019-09-04 | Stop reason: HOSPADM

## 2019-09-04 RX ORDER — HYDROMORPHONE HCL 110MG/55ML
0.25 PATIENT CONTROLLED ANALGESIA SYRINGE INTRAVENOUS EVERY 5 MIN PRN
Status: DISCONTINUED | OUTPATIENT
Start: 2019-09-04 | End: 2019-09-04 | Stop reason: HOSPADM

## 2019-09-04 RX ORDER — SODIUM CHLORIDE, SODIUM LACTATE, POTASSIUM CHLORIDE, CALCIUM CHLORIDE 600; 310; 30; 20 MG/100ML; MG/100ML; MG/100ML; MG/100ML
INJECTION, SOLUTION INTRAVENOUS CONTINUOUS
Status: DISCONTINUED | OUTPATIENT
Start: 2019-09-04 | End: 2019-09-04 | Stop reason: HOSPADM

## 2019-09-04 RX ORDER — SODIUM CHLORIDE, SODIUM LACTATE, POTASSIUM CHLORIDE, AND CALCIUM CHLORIDE .6; .31; .03; .02 G/100ML; G/100ML; G/100ML; G/100ML
500 INJECTION, SOLUTION INTRAVENOUS ONCE
Status: COMPLETED | OUTPATIENT
Start: 2019-09-04 | End: 2019-09-04

## 2019-09-04 RX ORDER — HYDROMORPHONE HCL 110MG/55ML
0.5 PATIENT CONTROLLED ANALGESIA SYRINGE INTRAVENOUS EVERY 5 MIN PRN
Status: DISCONTINUED | OUTPATIENT
Start: 2019-09-04 | End: 2019-09-04 | Stop reason: HOSPADM

## 2019-09-04 RX ORDER — PROPOFOL 10 MG/ML
INJECTION, EMULSION INTRAVENOUS PRN
Status: DISCONTINUED | OUTPATIENT
Start: 2019-09-04 | End: 2019-09-04 | Stop reason: SDUPTHER

## 2019-09-04 RX ORDER — LIDOCAINE HYDROCHLORIDE 20 MG/ML
INJECTION, SOLUTION EPIDURAL; INFILTRATION; INTRACAUDAL; PERINEURAL PRN
Status: DISCONTINUED | OUTPATIENT
Start: 2019-09-04 | End: 2019-09-04 | Stop reason: SDUPTHER

## 2019-09-04 RX ORDER — PROMETHAZINE HYDROCHLORIDE 25 MG/ML
6.25 INJECTION, SOLUTION INTRAMUSCULAR; INTRAVENOUS ONCE
Status: DISCONTINUED | OUTPATIENT
Start: 2019-09-04 | End: 2019-09-04 | Stop reason: HOSPADM

## 2019-09-04 RX ORDER — NITROFURANTOIN 25; 75 MG/1; MG/1
100 CAPSULE ORAL 2 TIMES DAILY
Qty: 20 CAPSULE | Refills: 0 | Status: SHIPPED | OUTPATIENT
Start: 2019-09-04 | End: 2019-09-14

## 2019-09-04 RX ORDER — TOBRAMYCIN SULFATE 40 MG/ML
INJECTION, SOLUTION INTRAMUSCULAR; INTRAVENOUS PRN
Status: DISCONTINUED | OUTPATIENT
Start: 2019-09-04 | End: 2019-09-04 | Stop reason: ALTCHOICE

## 2019-09-04 RX ORDER — ONDANSETRON 2 MG/ML
4 INJECTION INTRAMUSCULAR; INTRAVENOUS
Status: COMPLETED | OUTPATIENT
Start: 2019-09-04 | End: 2019-09-04

## 2019-09-04 RX ORDER — SODIUM CHLORIDE 9 MG/ML
INJECTION, SOLUTION INTRAVENOUS CONTINUOUS
Status: DISCONTINUED | OUTPATIENT
Start: 2019-09-04 | End: 2019-09-04

## 2019-09-04 RX ADMIN — SODIUM CHLORIDE, POTASSIUM CHLORIDE, SODIUM LACTATE AND CALCIUM CHLORIDE 500 ML: 600; 310; 30; 20 INJECTION, SOLUTION INTRAVENOUS at 15:09

## 2019-09-04 RX ADMIN — PROPOFOL 20 MG: 10 INJECTION, EMULSION INTRAVENOUS at 13:46

## 2019-09-04 RX ADMIN — PROPOFOL 20 MG: 10 INJECTION, EMULSION INTRAVENOUS at 13:31

## 2019-09-04 RX ADMIN — PROPOFOL 20 MG: 10 INJECTION, EMULSION INTRAVENOUS at 13:28

## 2019-09-04 RX ADMIN — PROPOFOL 20 MG: 10 INJECTION, EMULSION INTRAVENOUS at 13:37

## 2019-09-04 RX ADMIN — SODIUM CHLORIDE, POTASSIUM CHLORIDE, SODIUM LACTATE AND CALCIUM CHLORIDE: 600; 310; 30; 20 INJECTION, SOLUTION INTRAVENOUS at 12:31

## 2019-09-04 RX ADMIN — PROPOFOL 20 MG: 10 INJECTION, EMULSION INTRAVENOUS at 13:34

## 2019-09-04 RX ADMIN — CIPROFLOXACIN 400 MG: 2 INJECTION, SOLUTION INTRAVENOUS at 13:27

## 2019-09-04 RX ADMIN — PROPOFOL 20 MG: 10 INJECTION, EMULSION INTRAVENOUS at 13:43

## 2019-09-04 RX ADMIN — PROPOFOL 20 MG: 10 INJECTION, EMULSION INTRAVENOUS at 13:40

## 2019-09-04 RX ADMIN — LIDOCAINE HYDROCHLORIDE 100 MG: 20 INJECTION, SOLUTION EPIDURAL; INFILTRATION; INTRACAUDAL; PERINEURAL at 13:25

## 2019-09-04 RX ADMIN — PROPOFOL 50 MG: 10 INJECTION, EMULSION INTRAVENOUS at 13:25

## 2019-09-04 RX ADMIN — ONDANSETRON 4 MG: 2 INJECTION INTRAMUSCULAR; INTRAVENOUS at 14:23

## 2019-09-04 RX ADMIN — PROPOFOL 50 MG: 10 INJECTION, EMULSION INTRAVENOUS at 13:49

## 2019-09-04 RX ADMIN — PROPOFOL 40 MG: 10 INJECTION, EMULSION INTRAVENOUS at 13:47

## 2019-09-04 RX ADMIN — Medication 50 MCG: at 13:51

## 2019-09-04 RX ADMIN — Medication 50 MCG: at 13:40

## 2019-09-04 RX ADMIN — MIDAZOLAM 2 MG: 1 INJECTION INTRAMUSCULAR; INTRAVENOUS at 13:21

## 2019-09-04 RX ADMIN — FENTANYL CITRATE 25 MCG: 50 INJECTION INTRAMUSCULAR; INTRAVENOUS at 14:16

## 2019-09-04 ASSESSMENT — PULMONARY FUNCTION TESTS
PIF_VALUE: 0
PIF_VALUE: 1
PIF_VALUE: 0
PIF_VALUE: 1
PIF_VALUE: 1
PIF_VALUE: 0
PIF_VALUE: 1
PIF_VALUE: 0
PIF_VALUE: 1
PIF_VALUE: 0
PIF_VALUE: 1
PIF_VALUE: 1
PIF_VALUE: 0
PIF_VALUE: 0
PIF_VALUE: 1
PIF_VALUE: 0
PIF_VALUE: 1
PIF_VALUE: 1
PIF_VALUE: 0
PIF_VALUE: 0
PIF_VALUE: 1

## 2019-09-04 ASSESSMENT — PAIN SCALES - GENERAL
PAINLEVEL_OUTOF10: 3
PAINLEVEL_OUTOF10: 6

## 2019-09-04 ASSESSMENT — PAIN - FUNCTIONAL ASSESSMENT
PAIN_FUNCTIONAL_ASSESSMENT: 0-10
PAIN_FUNCTIONAL_ASSESSMENT: ACTIVITIES ARE NOT PREVENTED

## 2019-09-04 ASSESSMENT — PAIN DESCRIPTION - PAIN TYPE: TYPE: SURGICAL PAIN

## 2019-09-04 ASSESSMENT — PAIN DESCRIPTION - LOCATION: LOCATION: ABDOMEN

## 2019-09-04 NOTE — ANESTHESIA PRE PROCEDURE
Department of Anesthesiology  Preprocedure Note       Name:  Issac Villatoro   Age:  22 y.o.  :  1995                                          MRN:  [id number]         Date:  2019      Surgeon: Wander Perez):  Marissa Madrid MD    Procedure: CYSTOSCOPY RETROGRADE PYELOGRAM WITH VOIDING CYSTOGRAM (N/A )    Medications prior to admission:   Prior to Admission medications    Medication Sig Start Date End Date Taking? Authorizing Provider   norethindrone-ethinyl estradiol (MICROGESTIN) 1-20 MG-MCG per tablet Take 1 tablet by mouth daily    Historical Provider, MD       Current medications:    Current Facility-Administered Medications   Medication Dose Route Frequency Provider Last Rate Last Dose    lactated ringers infusion   Intravenous Continuous Marge Cornelius MD        sodium chloride flush 0.9 % injection 10 mL  10 mL Intravenous 2 times per day Jermaine Mcintyre MD        sodium chloride flush 0.9 % injection 10 mL  10 mL Intravenous PRN Marge Cornelius MD        lidocaine PF 1 % injection 1 mL  1 mL Intradermal Once PRN Jermaine Mcintyre MD        ciprofloxacin (CIPRO) IVPB 400 mg  400 mg Intravenous Once Marissa Madrid MD        fentaNYL (SUBLIMAZE) injection 25 mcg  25 mcg Intravenous Q5 Min PRN Aki Hammond MD        fentaNYL (SUBLIMAZE) injection 50 mcg  50 mcg Intravenous Q5 Min PRN Aki Hammond MD        HYDROmorphone (DILAUDID) injection 0.25 mg  0.25 mg Intravenous Q5 Min PRN Aki Hammond MD        HYDROmorphone (DILAUDID) injection 0.5 mg  0.5 mg Intravenous Q5 Min PRN Cristóbal Jacob MD        ondansetron (ZOFRAN) injection 4 mg  4 mg Intravenous Once PRN Aki Hammond MD           Allergies:     Allergies   Allergen Reactions    Amoxicillin Hives    Milk-Related Compounds      Can have milk in foods, but not by itself (I.e., glass of milk)       Problem List:    Patient Active Problem List   Diagnosis Code    Allergic reaction caused by a drug T78.40XA    E. coli UTI 08/05/2019    K 3.6 08/05/2019     08/05/2019    CO2 25 08/05/2019    BUN 12 08/05/2019    CREATININE 1.00 08/05/2019    GFRAA >60 08/05/2019    LABGLOM >60 08/05/2019    GLUCOSE 105 08/05/2019    PROT 6.9 08/05/2019    CALCIUM 8.7 08/05/2019    BILITOT 0.67 08/05/2019    ALKPHOS 44 08/05/2019    AST 14 08/05/2019    ALT <5 08/05/2019       POC Tests: No results for input(s): POCGLU, POCNA, POCK, POCCL, POCBUN, POCHEMO, POCHCT in the last 72 hours. Coags:   Lab Results   Component Value Date    PROTIME 11.5 05/15/2018    INR 1.1 05/15/2018       HCG (If Applicable):   Lab Results   Component Value Date    PREGTESTUR neg 08/05/2019    HCG NEGATIVE 08/05/2019    HCGQUANT 28,269 (H) 07/13/2017        ABGs: No results found for: PHART, PO2ART, COL1SFP, SNK9HPS, BEART, X3BMWELM     Type & Screen (If Applicable):  No results found for: LABABO, LABRH    Anesthesia Evaluation   no history of anesthetic complications:   Airway: Mallampati: I  TM distance: >3 FB   Neck ROM: full  Mouth opening: > = 3 FB Dental:          Pulmonary:Negative Pulmonary ROS breath sounds clear to auscultation                             Cardiovascular:  Exercise tolerance: good (>4 METS),       (-)  angina and  BARONE      Rhythm: regular  Rate: normal           Beta Blocker:  Not on Beta Blocker         Neuro/Psych:   Negative Neuro/Psych ROS              GI/Hepatic/Renal:             Endo/Other:                     Abdominal:           Vascular:                                        Anesthesia Plan      MAC     ASA 1       Induction: intravenous. Anesthetic plan and risks discussed with patient.                       Pipe Rice MD   9/4/2019

## 2019-09-05 LAB
CULTURE: ABNORMAL
Lab: ABNORMAL
SPECIMEN DESCRIPTION: ABNORMAL

## 2019-09-07 ENCOUNTER — HOSPITAL ENCOUNTER (EMERGENCY)
Age: 24
Discharge: HOME | End: 2019-09-07
Attending: EMERGENCY MEDICINE
Payer: MEDICARE

## 2019-09-07 VITALS
WEIGHT: 174 LBS | HEART RATE: 88 BPM | SYSTOLIC BLOOD PRESSURE: 106 MMHG | OXYGEN SATURATION: 99 % | TEMPERATURE: 98 F | BODY MASS INDEX: 29.71 KG/M2 | HEIGHT: 64 IN | DIASTOLIC BLOOD PRESSURE: 71 MMHG | RESPIRATION RATE: 16 BRPM

## 2019-09-07 DIAGNOSIS — R42 LIGHTHEADEDNESS: ICD-10-CM

## 2019-09-07 DIAGNOSIS — R11.2 NON-INTRACTABLE VOMITING WITH NAUSEA, UNSPECIFIED VOMITING TYPE: Primary | ICD-10-CM

## 2019-09-07 DIAGNOSIS — Z87.442 HISTORY OF KIDNEY STONES: ICD-10-CM

## 2019-09-07 DIAGNOSIS — E86.9 VOLUME DEPLETION: ICD-10-CM

## 2019-09-07 LAB
-: NORMAL
ABSOLUTE EOS #: 0.1 K/UL (ref 0–0.44)
ABSOLUTE IMMATURE GRANULOCYTE: 0.02 K/UL (ref 0–0.3)
ABSOLUTE LYMPH #: 2.05 K/UL (ref 1.1–3.7)
ABSOLUTE MONO #: 0.52 K/UL (ref 0.1–1.2)
ALBUMIN SERPL-MCNC: 4.2 G/DL (ref 3.5–5.2)
ALBUMIN/GLOBULIN RATIO: ABNORMAL (ref 1–2.5)
ALP BLD-CCNC: 42 U/L (ref 35–104)
ALT SERPL-CCNC: <5 U/L (ref 5–33)
ANION GAP SERPL CALCULATED.3IONS-SCNC: 12 MMOL/L (ref 9–17)
AST SERPL-CCNC: 12 U/L
BASOPHILS # BLD: 1 % (ref 0–2)
BASOPHILS ABSOLUTE: 0.05 K/UL (ref 0–0.2)
BILIRUB SERPL-MCNC: 0.56 MG/DL (ref 0.3–1.2)
BILIRUBIN DIRECT: 0.14 MG/DL
BILIRUBIN URINE: NEGATIVE
BILIRUBIN, INDIRECT: 0.42 MG/DL (ref 0–1)
BUN BLDV-MCNC: 12 MG/DL (ref 6–20)
BUN/CREAT BLD: 16 (ref 9–20)
CALCIUM SERPL-MCNC: 8.6 MG/DL (ref 8.6–10.4)
CHLORIDE BLD-SCNC: 104 MMOL/L (ref 98–107)
CHP ED QC CHECK: NORMAL
CO2: 25 MMOL/L (ref 20–31)
COLOR: YELLOW
COMMENT UA: NORMAL
CREAT SERPL-MCNC: 0.76 MG/DL (ref 0.5–0.9)
DIFFERENTIAL TYPE: NORMAL
EOSINOPHILS RELATIVE PERCENT: 1 % (ref 1–4)
GFR AFRICAN AMERICAN: >60 ML/MIN
GFR NON-AFRICAN AMERICAN: >60 ML/MIN
GFR SERPL CREATININE-BSD FRML MDRD: ABNORMAL ML/MIN/{1.73_M2}
GFR SERPL CREATININE-BSD FRML MDRD: ABNORMAL ML/MIN/{1.73_M2}
GLOBULIN: ABNORMAL G/DL (ref 1.5–3.8)
GLUCOSE BLD-MCNC: 97 MG/DL (ref 70–99)
GLUCOSE URINE: NEGATIVE
HCT VFR BLD CALC: 43.9 % (ref 36.3–47.1)
HEMOGLOBIN: 14.4 G/DL (ref 11.9–15.1)
IMMATURE GRANULOCYTES: 0 %
KETONES, URINE: NEGATIVE
LACTIC ACID: 1.1 MMOL/L (ref 0.5–2.2)
LEUKOCYTE ESTERASE, URINE: NEGATIVE
LYMPHOCYTES # BLD: 28 % (ref 24–43)
MAGNESIUM: 1.8 MG/DL (ref 1.6–2.6)
MCH RBC QN AUTO: 28.5 PG (ref 25.2–33.5)
MCHC RBC AUTO-ENTMCNC: 32.8 G/DL (ref 28.4–34.8)
MCV RBC AUTO: 86.9 FL (ref 82.6–102.9)
MONOCYTES # BLD: 7 % (ref 3–12)
NITRITE, URINE: NEGATIVE
NRBC AUTOMATED: 0 PER 100 WBC
PDW BLD-RTO: 13.2 % (ref 11.8–14.4)
PH UA: 6 (ref 5–8)
PLATELET # BLD: 219 K/UL (ref 138–453)
PLATELET ESTIMATE: NORMAL
PMV BLD AUTO: 10.8 FL (ref 8.1–13.5)
POTASSIUM SERPL-SCNC: 3.6 MMOL/L (ref 3.7–5.3)
PREGNANCY TEST URINE, POC: NEGATIVE
PROTEIN UA: NEGATIVE
RBC # BLD: 5.05 M/UL (ref 3.95–5.11)
RBC # BLD: NORMAL 10*6/UL
REASON FOR REJECTION: NORMAL
SEG NEUTROPHILS: 63 % (ref 36–65)
SEGMENTED NEUTROPHILS ABSOLUTE COUNT: 4.51 K/UL (ref 1.5–8.1)
SODIUM BLD-SCNC: 141 MMOL/L (ref 135–144)
SPECIFIC GRAVITY UA: 1.01 (ref 1–1.03)
TOTAL PROTEIN: 6.9 G/DL (ref 6.4–8.3)
TURBIDITY: CLEAR
URINE HGB: NEGATIVE
UROBILINOGEN, URINE: NORMAL
WBC # BLD: 7.3 K/UL (ref 3.5–11.3)
WBC # BLD: NORMAL 10*3/UL
ZZ NTE CLEAN UP: ORDERED TEST: NORMAL
ZZ NTE WITH NAME CLEAN UP: SPECIMEN SOURCE: NORMAL

## 2019-09-07 PROCEDURE — 6360000002 HC RX W HCPCS: Performed by: EMERGENCY MEDICINE

## 2019-09-07 PROCEDURE — 81025 URINE PREGNANCY TEST: CPT

## 2019-09-07 PROCEDURE — 83605 ASSAY OF LACTIC ACID: CPT

## 2019-09-07 PROCEDURE — C9113 INJ PANTOPRAZOLE SODIUM, VIA: HCPCS | Performed by: EMERGENCY MEDICINE

## 2019-09-07 PROCEDURE — 96375 TX/PRO/DX INJ NEW DRUG ADDON: CPT

## 2019-09-07 PROCEDURE — 99284 EMERGENCY DEPT VISIT MOD MDM: CPT

## 2019-09-07 PROCEDURE — 87086 URINE CULTURE/COLONY COUNT: CPT

## 2019-09-07 PROCEDURE — 81003 URINALYSIS AUTO W/O SCOPE: CPT

## 2019-09-07 PROCEDURE — 36415 COLL VENOUS BLD VENIPUNCTURE: CPT

## 2019-09-07 PROCEDURE — 2580000003 HC RX 258: Performed by: EMERGENCY MEDICINE

## 2019-09-07 PROCEDURE — 85025 COMPLETE CBC W/AUTO DIFF WBC: CPT

## 2019-09-07 PROCEDURE — 83735 ASSAY OF MAGNESIUM: CPT

## 2019-09-07 PROCEDURE — 80048 BASIC METABOLIC PNL TOTAL CA: CPT

## 2019-09-07 PROCEDURE — 6370000000 HC RX 637 (ALT 250 FOR IP): Performed by: EMERGENCY MEDICINE

## 2019-09-07 PROCEDURE — 96374 THER/PROPH/DIAG INJ IV PUSH: CPT

## 2019-09-07 PROCEDURE — 80076 HEPATIC FUNCTION PANEL: CPT

## 2019-09-07 RX ORDER — OXYCODONE HYDROCHLORIDE AND ACETAMINOPHEN 5; 325 MG/1; MG/1
1 TABLET ORAL EVERY 6 HOURS PRN
Qty: 12 TABLET | Refills: 0 | Status: SHIPPED | OUTPATIENT
Start: 2019-09-07 | End: 2019-09-10

## 2019-09-07 RX ORDER — PHENAZOPYRIDINE HYDROCHLORIDE 200 MG/1
200 TABLET, FILM COATED ORAL ONCE
Status: COMPLETED | OUTPATIENT
Start: 2019-09-07 | End: 2019-09-07

## 2019-09-07 RX ORDER — ONDANSETRON 2 MG/ML
8 INJECTION INTRAMUSCULAR; INTRAVENOUS ONCE
Status: COMPLETED | OUTPATIENT
Start: 2019-09-07 | End: 2019-09-07

## 2019-09-07 RX ORDER — 0.9 % SODIUM CHLORIDE 0.9 %
2000 INTRAVENOUS SOLUTION INTRAVENOUS ONCE
Status: COMPLETED | OUTPATIENT
Start: 2019-09-07 | End: 2019-09-07

## 2019-09-07 RX ORDER — PANTOPRAZOLE SODIUM 40 MG/10ML
40 INJECTION, POWDER, LYOPHILIZED, FOR SOLUTION INTRAVENOUS ONCE
Status: COMPLETED | OUTPATIENT
Start: 2019-09-07 | End: 2019-09-07

## 2019-09-07 RX ORDER — ONDANSETRON 4 MG/1
4 TABLET, ORALLY DISINTEGRATING ORAL 3 TIMES DAILY PRN
Qty: 21 TABLET | Refills: 0 | Status: SHIPPED | OUTPATIENT
Start: 2019-09-07 | End: 2020-01-27

## 2019-09-07 RX ORDER — 0.9 % SODIUM CHLORIDE 0.9 %
10 VIAL (ML) INJECTION ONCE
Status: COMPLETED | OUTPATIENT
Start: 2019-09-07 | End: 2019-09-07

## 2019-09-07 RX ADMIN — Medication 10 ML: at 06:54

## 2019-09-07 RX ADMIN — PANTOPRAZOLE SODIUM 40 MG: 40 INJECTION, POWDER, FOR SOLUTION INTRAVENOUS at 06:54

## 2019-09-07 RX ADMIN — ONDANSETRON 8 MG: 2 INJECTION INTRAMUSCULAR; INTRAVENOUS at 06:54

## 2019-09-07 RX ADMIN — PHENAZOPYRIDINE HYDROCHLORIDE 200 MG: 200 TABLET ORAL at 08:28

## 2019-09-07 RX ADMIN — SODIUM CHLORIDE 2000 ML: 9 INJECTION, SOLUTION INTRAVENOUS at 06:53

## 2019-09-07 ASSESSMENT — PAIN SCALES - GENERAL: PAINLEVEL_OUTOF10: 7

## 2019-09-07 ASSESSMENT — PAIN DESCRIPTION - LOCATION: LOCATION: ABDOMEN

## 2019-09-07 ASSESSMENT — PAIN DESCRIPTION - ORIENTATION: ORIENTATION: LOWER

## 2019-09-07 NOTE — ED PROVIDER NOTES
BP: 106/71   Pulse: 88   Resp: 16   Temp: 98 °F (36.7 °C)   TempSrc: Oral   SpO2: 99%   Weight: 174 lb (78.9 kg)   Height: 5' 4\" (1.626 m)     Patient is evaluated. IV access is established. She is treated with IV fluids and medications for her nausea and discomfort. Laboratory studies are requested. Care is turned over to Dr. Freddy Taveras at shift change for reevaluation patient status review of investigations, additional care and ultimate disposition    CONSULTS:  None    PROCEDURES:  None    FINAL IMPRESSION      1. Non-intractable vomiting with nausea, unspecified vomiting type    2. Volume depletion    3. Lightheadedness          DISPOSITION/PLAN   DISPOSITION    Disposition per Dr Monique Rice:   No follow-up provider specified.     DISCHARGE MEDICATIONS:     New Prescriptions    No medications on file           (Please note that portions of this note were completed with a voice recognition program.  Efforts were made to edit the dictations but occasionally words are mis-transcribed.)    Ray Grullon MD  Attending Emergency Physician         Ray Grullon MD  09/07/19 5701

## 2019-09-07 NOTE — LETTER
[facility] ED  [address] [place] [address]  Phone: [phone]             September 7, 2019    Patient: Nargis Montenegro   YOB: 1995   Date of Visit: 9/7/2019       To Whom It May Concern:    Nargis Montenegro was seen and treated in our emergency department on 9/7/2019. She is to be excused from work 9/7/2019. Can return to work on 9/8/2019.       Sincerely,   Dr Wesley Hamper            Signature:__________________________________

## 2019-09-07 NOTE — ED PROVIDER NOTES
ADDENDUM:        Care of this patient was assumed from Dr. Yanira Garcias    at  0700    . The patient was seen for Emesis (lightheaded)  . The patient's initial evaluation and plan have been discussed with the prior provider who initially evaluated the patient. Nursing Notes, Past Medical Hx, Past Surgical Hx, Social Hx, Allergies, and Family Hx were all reviewed. I performed a repeat evaluation of the patient and reviewed tests completed so far. ED Course       The patient was endorsed to me pending reevaluation and clinical improvement, please see Dr. Yanira Garcias note for prior ED course. Patient was reevaluated able to tolerate oral intake pain controlled. Patient request to be discharged home with a short course of antiemetics and pain meds. She is given a work note for today and given parameters to return to the emergency department. Also given outpatient follow-up with urology. The patient was given the following medications:  Orders Placed This Encounter   Medications    ondansetron (ZOFRAN) injection 8 mg    AND Linked Order Group     pantoprazole (PROTONIX) injection 40 mg     sodium chloride (PF) 0.9 % injection 10 mL    0.9 % sodium chloride bolus    phenazopyridine (PYRIDIUM) tablet 200 mg    ondansetron (ZOFRAN-ODT) 4 MG disintegrating tablet     Sig: Take 1 tablet by mouth 3 times daily as needed for Nausea or Vomiting     Dispense:  21 tablet     Refill:  0    oxyCODONE-acetaminophen (PERCOCET) 5-325 MG per tablet     Sig: Take 1 tablet by mouth every 6 hours as needed for Pain for up to 3 days. Intended supply: 3 days.  Take lowest dose possible to manage pain     Dispense:  12 tablet     Refill:  0       RECENT VITALS:  BP: 106/71, Temp: 98 °F (36.7 °C), Pulse: 88, Resp: 16     RADIOLOGY:All plain film, CT, MRI, and formal ultrasound images (except ED bedside ultrasound) are read by the radiologist and the images and interpretations are directly viewed by the emergency physician. No orders to display         LABS: All lab results were reviewed by myself, and all abnormals are listed below. Labs Reviewed   BASIC METABOLIC PANEL - Abnormal; Notable for the following components:       Result Value    Potassium 3.6 (*)     All other components within normal limits   HEPATIC FUNCTION PANEL - Abnormal; Notable for the following components:    ALT <5 (*)     All other components within normal limits   POCT URINE PREGNANCY - Normal   URINE CULTURE   CBC WITH AUTO DIFFERENTIAL   URINALYSIS   SPECIMEN REJECTION   LACTIC ACID   MAGNESIUM           Disposition   DISPOSITION:    DISPOSITION Decision To Discharge 09/07/2019 08:01:13 AM      CLINICAL IMPRESSION:  1. Non-intractable vomiting with nausea, unspecified vomiting type    2. Volume depletion    3. Lightheadedness    4. History of kidney stones        PATIENT REFERRED TO:  Bee Olvera MD  [address] [place] [address]  [phone]    Schedule an appointment as soon as possible for a visit         DISCHARGE MEDICATIONS:  New Prescriptions    ONDANSETRON (ZOFRAN-ODT) 4 MG DISINTEGRATING TABLET    Take 1 tablet by mouth 3 times daily as needed for Nausea or Vomiting    OXYCODONE-ACETAMINOPHEN (PERCOCET) 5-325 MG PER TABLET    Take 1 tablet by mouth every 6 hours as needed for Pain for up to 3 days. Intended supply: 3 days.  Take lowest dose possible to manage pain       Rayna Rogers MD  Attending Emergency Physician              Rayna Rogers MD  28/82/71 7733

## 2019-09-08 LAB
CULTURE: NO GROWTH
Lab: NORMAL
SPECIMEN DESCRIPTION: NORMAL

## 2019-09-09 LAB — HCG, PREGNANCY URINE (POC): NEGATIVE

## 2020-01-21 ENCOUNTER — HOSPITAL ENCOUNTER (EMERGENCY)
Age: 25
Discharge: HOME | End: 2020-01-21
Attending: EMERGENCY MEDICINE
Payer: MEDICARE

## 2020-01-21 VITALS
TEMPERATURE: 97.7 F | DIASTOLIC BLOOD PRESSURE: 76 MMHG | WEIGHT: 170 LBS | HEIGHT: 65 IN | HEART RATE: 74 BPM | SYSTOLIC BLOOD PRESSURE: 122 MMHG | RESPIRATION RATE: 16 BRPM | OXYGEN SATURATION: 99 % | BODY MASS INDEX: 28.32 KG/M2

## 2020-01-21 LAB
-: ABNORMAL
ABSOLUTE EOS #: 0.14 K/UL (ref 0–0.44)
ABSOLUTE IMMATURE GRANULOCYTE: 0.02 K/UL (ref 0–0.3)
ABSOLUTE LYMPH #: 2.92 K/UL (ref 1.1–3.7)
ABSOLUTE MONO #: 0.64 K/UL (ref 0.1–1.2)
AMORPHOUS: ABNORMAL
ANION GAP SERPL CALCULATED.3IONS-SCNC: 11 MMOL/L (ref 9–17)
BACTERIA: ABNORMAL
BASOPHILS # BLD: 1 % (ref 0–2)
BASOPHILS ABSOLUTE: 0.06 K/UL (ref 0–0.2)
BILIRUBIN URINE: NEGATIVE
BUN BLDV-MCNC: 10 MG/DL (ref 6–20)
BUN/CREAT BLD: 13 (ref 9–20)
CALCIUM SERPL-MCNC: 8.6 MG/DL (ref 8.6–10.4)
CASTS UA: ABNORMAL /LPF
CHLORIDE BLD-SCNC: 102 MMOL/L (ref 98–107)
CHP ED QC CHECK: NORMAL
CO2: 24 MMOL/L (ref 20–31)
COLOR: YELLOW
COMMENT UA: ABNORMAL
CREAT SERPL-MCNC: 0.76 MG/DL (ref 0.5–0.9)
CRYSTALS, UA: ABNORMAL /HPF
DIFFERENTIAL TYPE: NORMAL
EOSINOPHILS RELATIVE PERCENT: 1 % (ref 1–4)
EPITHELIAL CELLS UA: ABNORMAL /HPF (ref 0–5)
GFR AFRICAN AMERICAN: >60 ML/MIN
GFR NON-AFRICAN AMERICAN: >60 ML/MIN
GFR SERPL CREATININE-BSD FRML MDRD: ABNORMAL ML/MIN/{1.73_M2}
GFR SERPL CREATININE-BSD FRML MDRD: ABNORMAL ML/MIN/{1.73_M2}
GLUCOSE BLD-MCNC: 103 MG/DL (ref 70–99)
GLUCOSE URINE: NEGATIVE
HCG, PREGNANCY URINE (POC): NEGATIVE
HCT VFR BLD CALC: 42.4 % (ref 36.3–47.1)
HEMOGLOBIN: 13.7 G/DL (ref 11.9–15.1)
IMMATURE GRANULOCYTES: 0 %
KETONES, URINE: NEGATIVE
LACTIC ACID, SEPSIS WHOLE BLOOD: NORMAL MMOL/L (ref 0.5–1.9)
LACTIC ACID, SEPSIS WHOLE BLOOD: NORMAL MMOL/L (ref 0.5–1.9)
LACTIC ACID, SEPSIS: 0.6 MMOL/L (ref 0.5–1.9)
LACTIC ACID, SEPSIS: 1.2 MMOL/L (ref 0.5–1.9)
LEUKOCYTE ESTERASE, URINE: ABNORMAL
LYMPHOCYTES # BLD: 30 % (ref 24–43)
MCH RBC QN AUTO: 28.7 PG (ref 25.2–33.5)
MCHC RBC AUTO-ENTMCNC: 32.3 G/DL (ref 28.4–34.8)
MCV RBC AUTO: 88.9 FL (ref 82.6–102.9)
MONOCYTES # BLD: 7 % (ref 3–12)
MUCUS: ABNORMAL
NITRITE, URINE: POSITIVE
NRBC AUTOMATED: 0 PER 100 WBC
OTHER OBSERVATIONS UA: ABNORMAL
PDW BLD-RTO: 12.9 % (ref 11.8–14.4)
PH UA: 6 (ref 5–8)
PLATELET # BLD: 234 K/UL (ref 138–453)
PLATELET ESTIMATE: NORMAL
PMV BLD AUTO: 10.8 FL (ref 8.1–13.5)
POTASSIUM SERPL-SCNC: 3.7 MMOL/L (ref 3.7–5.3)
PREGNANCY TEST URINE, POC: NORMAL
PROTEIN UA: NEGATIVE
RBC # BLD: 4.77 M/UL (ref 3.95–5.11)
RBC # BLD: NORMAL 10*6/UL
RBC UA: ABNORMAL /HPF (ref 0–2)
RENAL EPITHELIAL, UA: ABNORMAL /HPF
SEG NEUTROPHILS: 61 % (ref 36–65)
SEGMENTED NEUTROPHILS ABSOLUTE COUNT: 6.01 K/UL (ref 1.5–8.1)
SODIUM BLD-SCNC: 137 MMOL/L (ref 135–144)
SPECIFIC GRAVITY UA: 1.02 (ref 1–1.03)
TRICHOMONAS: ABNORMAL
TURBIDITY: ABNORMAL
URINE HGB: ABNORMAL
UROBILINOGEN, URINE: NORMAL
WBC # BLD: 9.8 K/UL (ref 3.5–11.3)
WBC # BLD: NORMAL 10*3/UL
WBC UA: ABNORMAL /HPF (ref 0–5)
YEAST: ABNORMAL

## 2020-01-21 PROCEDURE — 36415 COLL VENOUS BLD VENIPUNCTURE: CPT

## 2020-01-21 PROCEDURE — 87086 URINE CULTURE/COLONY COUNT: CPT

## 2020-01-21 PROCEDURE — 96375 TX/PRO/DX INJ NEW DRUG ADDON: CPT

## 2020-01-21 PROCEDURE — 87040 BLOOD CULTURE FOR BACTERIA: CPT

## 2020-01-21 PROCEDURE — 6360000002 HC RX W HCPCS: Performed by: EMERGENCY MEDICINE

## 2020-01-21 PROCEDURE — 87186 SC STD MICRODIL/AGAR DIL: CPT

## 2020-01-21 PROCEDURE — 96365 THER/PROPH/DIAG IV INF INIT: CPT

## 2020-01-21 PROCEDURE — 99283 EMERGENCY DEPT VISIT LOW MDM: CPT

## 2020-01-21 PROCEDURE — 2580000003 HC RX 258: Performed by: EMERGENCY MEDICINE

## 2020-01-21 PROCEDURE — 81001 URINALYSIS AUTO W/SCOPE: CPT

## 2020-01-21 PROCEDURE — 87077 CULTURE AEROBIC IDENTIFY: CPT

## 2020-01-21 PROCEDURE — 80048 BASIC METABOLIC PNL TOTAL CA: CPT

## 2020-01-21 PROCEDURE — 83605 ASSAY OF LACTIC ACID: CPT

## 2020-01-21 PROCEDURE — 81025 URINE PREGNANCY TEST: CPT

## 2020-01-21 PROCEDURE — 85025 COMPLETE CBC W/AUTO DIFF WBC: CPT

## 2020-01-21 RX ORDER — OXYCODONE HYDROCHLORIDE AND ACETAMINOPHEN 5; 325 MG/1; MG/1
1 TABLET ORAL EVERY 6 HOURS PRN
Qty: 20 TABLET | Refills: 0 | Status: SHIPPED | OUTPATIENT
Start: 2020-01-21 | End: 2020-01-21 | Stop reason: SDUPTHER

## 2020-01-21 RX ORDER — 0.9 % SODIUM CHLORIDE 0.9 %
1000 INTRAVENOUS SOLUTION INTRAVENOUS ONCE
Status: COMPLETED | OUTPATIENT
Start: 2020-01-21 | End: 2020-01-21

## 2020-01-21 RX ORDER — ONDANSETRON 4 MG/1
4 TABLET, ORALLY DISINTEGRATING ORAL EVERY 4 HOURS PRN
Qty: 20 TABLET | Refills: 0 | Status: SHIPPED | OUTPATIENT
Start: 2020-01-21 | End: 2021-11-29

## 2020-01-21 RX ORDER — ONDANSETRON 2 MG/ML
8 INJECTION INTRAMUSCULAR; INTRAVENOUS ONCE
Status: COMPLETED | OUTPATIENT
Start: 2020-01-21 | End: 2020-01-21

## 2020-01-21 RX ORDER — LEVOFLOXACIN 5 MG/ML
500 INJECTION, SOLUTION INTRAVENOUS ONCE
Status: COMPLETED | OUTPATIENT
Start: 2020-01-21 | End: 2020-01-21

## 2020-01-21 RX ORDER — OXYCODONE HYDROCHLORIDE AND ACETAMINOPHEN 5; 325 MG/1; MG/1
1 TABLET ORAL EVERY 6 HOURS PRN
Qty: 20 TABLET | Refills: 0 | Status: SHIPPED | OUTPATIENT
Start: 2020-01-21 | End: 2020-01-26

## 2020-01-21 RX ORDER — LEVOFLOXACIN 500 MG/1
500 TABLET, FILM COATED ORAL DAILY
Qty: 10 TABLET | Refills: 0 | Status: SHIPPED | OUTPATIENT
Start: 2020-01-21 | End: 2020-01-31

## 2020-01-21 RX ORDER — FENTANYL CITRATE 50 UG/ML
50 INJECTION, SOLUTION INTRAMUSCULAR; INTRAVENOUS ONCE
Status: COMPLETED | OUTPATIENT
Start: 2020-01-21 | End: 2020-01-21

## 2020-01-21 RX ORDER — 0.9 % SODIUM CHLORIDE 0.9 %
30 INTRAVENOUS SOLUTION INTRAVENOUS ONCE
Status: COMPLETED | OUTPATIENT
Start: 2020-01-21 | End: 2020-01-21

## 2020-01-21 RX ADMIN — FENTANYL CITRATE 50 MCG: 50 INJECTION, SOLUTION INTRAMUSCULAR; INTRAVENOUS at 04:46

## 2020-01-21 RX ADMIN — SODIUM CHLORIDE 1000 ML: 9 INJECTION, SOLUTION INTRAVENOUS at 05:53

## 2020-01-21 RX ADMIN — SODIUM CHLORIDE 2313 ML: 9 INJECTION, SOLUTION INTRAVENOUS at 04:46

## 2020-01-21 RX ADMIN — ONDANSETRON 8 MG: 2 INJECTION INTRAMUSCULAR; INTRAVENOUS at 04:46

## 2020-01-21 RX ADMIN — LEVOFLOXACIN 500 MG: 5 INJECTION, SOLUTION INTRAVENOUS at 04:50

## 2020-01-21 ASSESSMENT — PAIN SCALES - GENERAL
PAINLEVEL_OUTOF10: 7
PAINLEVEL_OUTOF10: 7

## 2020-01-21 NOTE — ED PROVIDER NOTES
performed by Dl French MD at [address] [place] [address] N/A 5/18/2018    CHOLECYSTECTOMY LAPAROSCOPIC performed by Suzette Bey MD at [address] [place] [address]     History reviewed. No pertinent family history. No family status information on file. SOCIAL HISTORY      reports that she has never smoked. She has never used smokeless tobacco. She reports current alcohol use. She reports that she does not use drugs. REVIEW OF SYSTEMS    (2-9 systems for level 4, 10 or more for level 5)     Review of Systems   All other systems reviewed and are negative. Except as noted above the remainder of the review of systems was reviewed and negative. PHYSICAL EXAM    (up to 7 for level 4, 8 or more for level 5)     Vitals:    01/21/20 0411   BP: 122/76   Pulse: 74   Resp: 16   Temp: 97.7 °F (36.5 °C)   TempSrc: Oral   SpO2: 99%   Weight: 170 lb (77.1 kg)   Height: 5' 5\" (1.651 m)       Physical exam reflects a well-nourished female who is currently afebrile. Vital signs are stable to include pulse ox of 99% on room air. She is not hypoxic. She is alert conversive and appropriate in behavior. Integument warm and dry. Oral pharyngeal exam is without lesion. No difficulty breathing speaking or swallowing. Heart regular rate and rhythm normal S1-S2 no murmurs rubs gallops. Lungs are clear to auscultation without wheezes rales rhonchi. She does have bilateral flank discomfort and lower abdominal discomfort. Extremities show no gross abnormality. Integument without rash or lesion.   She has no neurovascular deficits    DIAGNOSTIC RESULTS         RADIOLOGY:   Non-plain film images such as CT, Ultrasound and MRI are read by the radiologist. Plain radiographic images are visualized and preliminarily interpreted by the emergency physician with the below findings:      Interpretation per the Radiologist below, if available at the time of this note:    No orders to display         LABS:  Labs Reviewed   URINALYSIS - Abnormal; Notable for the following components:       Result Value    Turbidity UA SLIGHTLY CLOUDY (*)     Urine Hgb TRACE (*)     Nitrite, Urine POSITIVE (*)     Leukocyte Esterase, Urine TRACE (*)     All other components within normal limits   BASIC METABOLIC PANEL - Abnormal; Notable for the following components:    Glucose 103 (*)     All other components within normal limits   MICROSCOPIC URINALYSIS - Abnormal; Notable for the following components:    Bacteria, UA MANY (*)     Mucus, UA 1+ (*)     All other components within normal limits   POCT URINE PREGNANCY - Normal   URINE CULTURE   CULTURE BLOOD #1   CULTURE BLOOD #1   CBC WITH AUTO DIFFERENTIAL   LACTATE, SEPSIS   LACTATE, SEPSIS       All other labs were within normal range or not returned as of this dictation. EMERGENCY DEPARTMENT COURSE and DIFFERENTIAL DIAGNOSIS/MDM:   Vitals:    Vitals:    01/21/20 0411   BP: 122/76   Pulse: 74   Resp: 16   Temp: 97.7 °F (36.5 °C)   TempSrc: Oral   SpO2: 99%   Weight: 170 lb (77.1 kg)   Height: 5' 5\" (1.651 m)     Patient is evaluated. Previous records are reviewed. Patient does have nitrite positive UTI. Antibiotics are started based on multiple previous culture results which demonstrate quinolone sensitive infection. She is covered with Levaquin while here. Remainder of labs to include CBC and lactic acid are without significant abnormality. She is afebrile. She has no nausea vomiting. Care is discussed with the urology service to facilitate follow-up. It is felt patient is appropriate for trial of outpatient management. Patient is aware to return to the ER with worsening symptoms. She has received her initial dose of antibiotic coverage. She will be discharged home on symptomatic medications and antibiotic coverage. She is advised follow-up with urology.   She is aware to return to the ER with worsening symptoms    CONSULTS:  IP CONSULT TO UROLOGY    PROCEDURES:  None    FINAL IMPRESSION      1. Acute cystitis with hematuria          DISPOSITION/PLAN   DISPOSITION        PATIENT REFERRED TO:   Marlen Peterson MD  [address] [address]  [phone]    Schedule an appointment as soon as possible for a visit         DISCHARGE MEDICATIONS:     New Prescriptions    LEVOFLOXACIN (LEVAQUIN) 500 MG TABLET    Take 1 tablet by mouth daily for 10 days    ONDANSETRON (ZOFRAN ODT) 4 MG DISINTEGRATING TABLET    Take 1 tablet by mouth every 4 hours as needed for Nausea    OXYCODONE-ACETAMINOPHEN (PERCOCET) 5-325 MG PER TABLET    Take 1 tablet by mouth every 6 hours as needed for Pain for up to 5 days. Controlled Substance Monitoring:    Acute and Chronic Pain Monitoring:   RX Monitoring 1/21/2020   Periodic Controlled Substance Monitoring No signs of potential drug abuse or diversion identified.          (Please note that portions of this note were completed with a voice recognition program.  Efforts were made to edit the dictations but occasionally words are mis-transcribed.)    Hallie Palmer MD  Attending Emergency Physician          Hallie Palmer MD  01/21/20 7250

## 2020-01-22 LAB
CULTURE: ABNORMAL
Lab: ABNORMAL
SPECIMEN DESCRIPTION: ABNORMAL

## 2020-01-27 ENCOUNTER — HOSPITAL ENCOUNTER (EMERGENCY)
Age: 25
Discharge: HOME | End: 2020-01-27
Attending: EMERGENCY MEDICINE
Payer: MEDICARE

## 2020-01-27 ENCOUNTER — APPOINTMENT (OUTPATIENT)
Dept: GENERAL RADIOLOGY | Age: 25
End: 2020-01-27
Payer: MEDICARE

## 2020-01-27 VITALS
RESPIRATION RATE: 16 BRPM | DIASTOLIC BLOOD PRESSURE: 77 MMHG | HEIGHT: 65 IN | WEIGHT: 170 LBS | HEART RATE: 95 BPM | TEMPERATURE: 98.3 F | OXYGEN SATURATION: 98 % | BODY MASS INDEX: 28.32 KG/M2 | SYSTOLIC BLOOD PRESSURE: 119 MMHG

## 2020-01-27 LAB
BILIRUBIN, POC: NEGATIVE
BLOOD URINE, POC: NORMAL
CHP ED QC CHECK: NORMAL
CHP ED QC CHECK: NORMAL
CLARITY, POC: CLEAR
COLOR, POC: YELLOW
CULTURE: NORMAL
CULTURE: NORMAL
GLUCOSE URINE, POC: NEGATIVE
KETONES, POC: NEGATIVE
LEUKOCYTE EST, POC: NEGATIVE
Lab: NORMAL
Lab: NORMAL
NITRITE, POC: NEGATIVE
PH, POC: 7
PREGNANCY TEST URINE, POC: NEGATIVE
PROTEIN, POC: NEGATIVE
SPECIFIC GRAVITY, POC: 1.02
SPECIMEN DESCRIPTION: NORMAL
SPECIMEN DESCRIPTION: NORMAL
UROBILINOGEN, POC: 1

## 2020-01-27 PROCEDURE — 81025 URINE PREGNANCY TEST: CPT

## 2020-01-27 PROCEDURE — 81003 URINALYSIS AUTO W/O SCOPE: CPT

## 2020-01-27 PROCEDURE — 99283 EMERGENCY DEPT VISIT LOW MDM: CPT

## 2020-01-27 PROCEDURE — 74018 RADEX ABDOMEN 1 VIEW: CPT

## 2020-01-27 RX ORDER — ACETAMINOPHEN 500 MG
1000 TABLET ORAL EVERY 6 HOURS PRN
Qty: 120 TABLET | Refills: 0 | Status: SHIPPED | OUTPATIENT
Start: 2020-01-27 | End: 2022-03-23 | Stop reason: ALTCHOICE

## 2020-01-27 RX ORDER — POLYETHYLENE GLYCOL 3350 17 G/17G
17 POWDER, FOR SOLUTION ORAL DAILY
Qty: 527 G | Refills: 0 | Status: SHIPPED | OUTPATIENT
Start: 2020-01-27 | End: 2020-02-26

## 2020-01-27 RX ORDER — IBUPROFEN 600 MG/1
600 TABLET ORAL EVERY 6 HOURS PRN
Qty: 40 TABLET | Refills: 0 | Status: ON HOLD | OUTPATIENT
Start: 2020-01-27 | End: 2021-12-03 | Stop reason: HOSPADM

## 2020-01-27 ASSESSMENT — ENCOUNTER SYMPTOMS
SHORTNESS OF BREATH: 0
COUGH: 0
EYE REDNESS: 0
NAUSEA: 1
COLOR CHANGE: 0
EYE DISCHARGE: 0
DIARRHEA: 0
ABDOMINAL PAIN: 0
CONSTIPATION: 1
VOMITING: 1
FACIAL SWELLING: 0

## 2020-01-27 ASSESSMENT — PAIN DESCRIPTION - ORIENTATION: ORIENTATION: LOWER

## 2020-01-27 ASSESSMENT — PAIN SCALES - GENERAL: PAINLEVEL_OUTOF10: 6

## 2020-01-27 ASSESSMENT — PAIN DESCRIPTION - LOCATION: LOCATION: ABDOMEN;BACK

## 2020-01-27 ASSESSMENT — PAIN DESCRIPTION - DESCRIPTORS: DESCRIPTORS: ACHING;THROBBING

## 2020-01-27 NOTE — ED PROVIDER NOTES
ADDENDUM:        Care of this patient was assumed from Dr. Laurel Rogers at    0700  . The patient was seen for Urinary Tract Infection and Constipation (no bm x2 days)  . The patient's initial evaluation and plan have been discussed with the prior provider who initially evaluated the patient. Nursing Notes, Past Medical Hx, Past Surgical Hx, Social Hx, Allergies, and Family Hx were all reviewed. I performed a repeat evaluation of the patient and reviewed tests completed so far. ED Course     ED Course as of Jan 27 0820 Mon Jan 27, 2020   8080 Reviewed recent labs blood cultures, urine culture from 1/21. [WM]   0732 Recent urine culture grew e coli, sensitive to cipro and keflex, no significant resistances identified, not esbl    [WM]   0817 Reviewed past med records and recent ct abd studies, no evidence of kidney stones in past.  I do not think she has appendicitis or kidney stones or obstruction. Repeat assessment she is feeling fine she states, abd soft nt. Discussed with patient anticipatory guidance, discharge instructions, follow up PCP 24 hours. DW patient to finish levaquin course, only 3 more days, to stop the percocet.       Rx miralax, motrin, tylenol    [WM]      ED Course User Index  [WM] Jami Barron MD       The patient was given the following medications:  Orders Placed This Encounter   Medications    ibuprofen (IBU) 600 MG tablet     Sig: Take 1 tablet by mouth every 6 hours as needed for Pain     Dispense:  40 tablet     Refill:  0    acetaminophen (TYLENOL) 500 MG tablet     Sig: Take 2 tablets by mouth every 6 hours as needed for Pain     Dispense:  120 tablet     Refill:  0    polyethylene glycol (MIRALAX) packet     Sig: Take 17 g by mouth daily     Dispense:  527 g     Refill:  0    magnesium citrate solution     Sig: Take 296 mLs by mouth once for 1 dose     Dispense:  296 mL     Refill:  0       RECENT VITALS:  BP: 119/77, Temp: 98.3 °F (36.8 °C), Pulse: 95, Resp: 16 RADIOLOGY:All plain film, CT, MRI, and formal ultrasound images (except ED bedside ultrasound) are read by the radiologist and the images and interpretations are directly viewed by the emergency physician. XR ABDOMEN (KUB) (SINGLE AP VIEW)   Final Result   Mild fecal stasis in the colon with a nonspecific nonobstructive bowel gas   pattern. LABS: All lab results were reviewed by myself, and all abnormals are listed below. Labs Reviewed   POCT URINALYSIS DIPSTICK - Normal   POCT URINE PREGNANCY - Normal           Disposition   DISPOSITION:    DISPOSITION Decision To Discharge 01/27/2020 08:19:09 AM      CLINICAL IMPRESSION:  1.  Acute flank pain        PATIENT REFERRED TO:  Clemencia Willingham, KEMAR - JING  [address] [phone]    Schedule an appointment as soon as possible for a visit in 1 day        DISCHARGE MEDICATIONS:  New Prescriptions    ACETAMINOPHEN (TYLENOL) 500 MG TABLET    Take 2 tablets by mouth every 6 hours as needed for Pain    IBUPROFEN (IBU) 600 MG TABLET    Take 1 tablet by mouth every 6 hours as needed for Pain    MAGNESIUM CITRATE SOLUTION    Take 296 mLs by mouth once for 1 dose    POLYETHYLENE GLYCOL (MIRALAX) PACKET    Take 17 g by mouth daily       Donato Lucio MD  Attending Emergency Physician              Donato Lucio MD  01/27/20 4195

## 2020-01-27 NOTE — ED PROVIDER NOTES
never used smokeless tobacco. She reports current alcohol use. She reports that she does not use drugs. REVIEW OF SYSTEMS    (2-9 systems for level 4, 10 or more for level 5)     Review of Systems   Constitutional: Negative for chills, fatigue and fever. HENT: Negative for congestion, ear discharge and facial swelling. Eyes: Negative for discharge and redness. Respiratory: Negative for cough and shortness of breath. Cardiovascular: Negative for chest pain. Gastrointestinal: Positive for constipation, nausea and vomiting. Negative for abdominal pain and diarrhea. Genitourinary: Positive for dysuria. Negative for hematuria. Musculoskeletal: Negative for arthralgias. Skin: Negative for color change and rash. Neurological: Negative for syncope, numbness and headaches. Hematological: Negative for adenopathy. Psychiatric/Behavioral: Negative for confusion. The patient is not nervous/anxious. Except as noted above the remainder of the review of systems was reviewed and negative. PHYSICAL EXAM    (up to 7 for level 4, 8 or more for level 5)     Vitals:    01/27/20 0630   BP: 119/77   Pulse: 95   Resp: 16   Temp: 98.3 °F (36.8 °C)   TempSrc: Oral   SpO2: 98%   Weight: 170 lb (77.1 kg)   Height: 5' 4.5\" (1.638 m)       Physical Exam  Vitals signs reviewed. Constitutional:       General: She is not in acute distress. Appearance: She is well-developed. She is not diaphoretic. HENT:      Head: Normocephalic and atraumatic. Eyes:      General: No scleral icterus. Right eye: No discharge. Left eye: No discharge. Neck:      Musculoskeletal: Neck supple. Cardiovascular:      Rate and Rhythm: Normal rate and regular rhythm. Pulmonary:      Effort: Pulmonary effort is normal. No respiratory distress. Breath sounds: Normal breath sounds. No stridor. No wheezing or rales. Abdominal:      General: There is no distension. Palpations: Abdomen is soft. Tenderness: There is no abdominal tenderness. Genitourinary:     Comments: No CVA tenderness. Musculoskeletal: Normal range of motion. Lymphadenopathy:      Cervical: No cervical adenopathy. Skin:     General: Skin is warm and dry. Findings: No erythema or rash. Neurological:      Mental Status: She is alert and oriented to person, place, and time. Psychiatric:         Behavior: Behavior normal.             DIAGNOSTIC RESULTS     EKG: All EKG's are interpreted by the Emergency Department Physician who either signs or Co-signs this chart in the absence of a cardiologist.    RADIOLOGY:   Non-plain film images such as CT, Ultrasound and MRI are read by the radiologist. Plain radiographic images are visualized and preliminarily interpreted by the emergency physician with the below findings:    Interpretation per the Radiologist below, if available at the time of this note:        LABS:  Labs Reviewed - No data to display    All other labs were within normal range or not returned as of this dictation. EMERGENCY DEPARTMENT COURSE and DIFFERENTIAL DIAGNOSIS/MDM:   Vitals:    Vitals:    01/27/20 0630   BP: 119/77   Pulse: 95   Resp: 16   Temp: 98.3 °F (36.8 °C)   TempSrc: Oral   SpO2: 98%   Weight: 170 lb (77.1 kg)   Height: 5' 4.5\" (1.638 m)       No orders of the defined types were placed in this encounter. Medical Decision Making: Tests are ordered and the patient is signed out to Dr. Ludivina Soria.         (Please note that portions of this note were completed with a voice recognition program.  Efforts were made to edit the dictations but occasionally words are mis-transcribed.)    Garo Alicea MD  Attending Emergency Physician           Garo Alicea MD  01/27/20 5517

## 2020-01-28 LAB — HCG, PREGNANCY URINE (POC): NEGATIVE

## 2020-12-16 ENCOUNTER — HOSPITAL ENCOUNTER (EMERGENCY)
Age: 25
Discharge: HOME | End: 2020-12-16
Attending: EMERGENCY MEDICINE
Payer: MEDICARE

## 2020-12-16 ENCOUNTER — APPOINTMENT (OUTPATIENT)
Dept: ULTRASOUND IMAGING | Age: 25
End: 2020-12-16
Payer: MEDICARE

## 2020-12-16 VITALS
SYSTOLIC BLOOD PRESSURE: 131 MMHG | OXYGEN SATURATION: 100 % | TEMPERATURE: 98 F | RESPIRATION RATE: 14 BRPM | HEART RATE: 95 BPM | DIASTOLIC BLOOD PRESSURE: 65 MMHG | BODY MASS INDEX: 31.4 KG/M2 | WEIGHT: 185.8 LBS

## 2020-12-16 LAB
-: ABNORMAL
ABSOLUTE EOS #: 0.04 K/UL (ref 0–0.44)
ABSOLUTE IMMATURE GRANULOCYTE: 0.05 K/UL (ref 0–0.3)
ABSOLUTE LYMPH #: 1.77 K/UL (ref 1.1–3.7)
ABSOLUTE MONO #: 0.69 K/UL (ref 0.1–1.2)
AMORPHOUS: ABNORMAL
ANION GAP SERPL CALCULATED.3IONS-SCNC: 9 MMOL/L (ref 9–17)
BACTERIA: ABNORMAL
BASOPHILS # BLD: 0 % (ref 0–2)
BASOPHILS ABSOLUTE: 0.05 K/UL (ref 0–0.2)
BILIRUBIN URINE: NEGATIVE
BUN BLDV-MCNC: 6 MG/DL (ref 6–20)
BUN/CREAT BLD: 8 (ref 9–20)
CALCIUM SERPL-MCNC: 8.9 MG/DL (ref 8.6–10.4)
CASTS UA: ABNORMAL /LPF
CHLORIDE BLD-SCNC: 104 MMOL/L (ref 98–107)
CHP ED QC CHECK: NORMAL
CO2: 25 MMOL/L (ref 20–31)
COLOR: YELLOW
COMMENT UA: ABNORMAL
CREAT SERPL-MCNC: 0.77 MG/DL (ref 0.5–0.9)
CRYSTALS, UA: ABNORMAL /HPF
DIFFERENTIAL TYPE: ABNORMAL
DIRECT EXAM: ABNORMAL
EOSINOPHILS RELATIVE PERCENT: 0 % (ref 1–4)
EPITHELIAL CELLS UA: ABNORMAL /HPF (ref 0–5)
GFR AFRICAN AMERICAN: >60 ML/MIN
GFR NON-AFRICAN AMERICAN: >60 ML/MIN
GFR SERPL CREATININE-BSD FRML MDRD: ABNORMAL ML/MIN/{1.73_M2}
GFR SERPL CREATININE-BSD FRML MDRD: ABNORMAL ML/MIN/{1.73_M2}
GLUCOSE BLD-MCNC: 90 MG/DL (ref 70–99)
GLUCOSE URINE: NEGATIVE
HCG QUANTITATIVE: ABNORMAL IU/L
HCT VFR BLD CALC: 41.8 % (ref 36.3–47.1)
HEMOGLOBIN: 13.7 G/DL (ref 11.9–15.1)
IMMATURE GRANULOCYTES: 0 %
KETONES, URINE: ABNORMAL
LEUKOCYTE ESTERASE, URINE: ABNORMAL
LYMPHOCYTES # BLD: 15 % (ref 24–43)
Lab: ABNORMAL
MCH RBC QN AUTO: 29.2 PG (ref 25.2–33.5)
MCHC RBC AUTO-ENTMCNC: 32.8 G/DL (ref 28.4–34.8)
MCV RBC AUTO: 89.1 FL (ref 82.6–102.9)
MONOCYTES # BLD: 6 % (ref 3–12)
MUCUS: ABNORMAL
NITRITE, URINE: NEGATIVE
NRBC AUTOMATED: 0 PER 100 WBC
OTHER OBSERVATIONS UA: ABNORMAL
PDW BLD-RTO: 13 % (ref 11.8–14.4)
PH UA: 6.5 (ref 5–8)
PLATELET # BLD: 233 K/UL (ref 138–453)
PLATELET ESTIMATE: ABNORMAL
PMV BLD AUTO: 10.3 FL (ref 8.1–13.5)
POTASSIUM SERPL-SCNC: 3.5 MMOL/L (ref 3.7–5.3)
PREGNANCY TEST URINE, POC: NORMAL
PROTEIN UA: NEGATIVE
RBC # BLD: 4.69 M/UL (ref 3.95–5.11)
RBC # BLD: ABNORMAL 10*6/UL
RBC UA: ABNORMAL /HPF (ref 0–2)
RENAL EPITHELIAL, UA: ABNORMAL /HPF
SEG NEUTROPHILS: 79 % (ref 36–65)
SEGMENTED NEUTROPHILS ABSOLUTE COUNT: 8.89 K/UL (ref 1.5–8.1)
SODIUM BLD-SCNC: 138 MMOL/L (ref 135–144)
SPECIFIC GRAVITY UA: 1.02 (ref 1–1.03)
SPECIMEN DESCRIPTION: ABNORMAL
TRICHOMONAS: ABNORMAL
TURBIDITY: ABNORMAL
URINE HGB: NEGATIVE
UROBILINOGEN, URINE: NORMAL
WBC # BLD: 11.5 K/UL (ref 3.5–11.3)
WBC # BLD: ABNORMAL 10*3/UL
WBC UA: ABNORMAL /HPF (ref 0–5)
YEAST: ABNORMAL

## 2020-12-16 PROCEDURE — 87480 CANDIDA DNA DIR PROBE: CPT

## 2020-12-16 PROCEDURE — 87491 CHLMYD TRACH DNA AMP PROBE: CPT

## 2020-12-16 PROCEDURE — 81001 URINALYSIS AUTO W/SCOPE: CPT

## 2020-12-16 PROCEDURE — 76801 OB US < 14 WKS SINGLE FETUS: CPT

## 2020-12-16 PROCEDURE — 87510 GARDNER VAG DNA DIR PROBE: CPT

## 2020-12-16 PROCEDURE — 87660 TRICHOMONAS VAGIN DIR PROBE: CPT

## 2020-12-16 PROCEDURE — 87086 URINE CULTURE/COLONY COUNT: CPT

## 2020-12-16 PROCEDURE — 85025 COMPLETE CBC W/AUTO DIFF WBC: CPT

## 2020-12-16 PROCEDURE — 76817 TRANSVAGINAL US OBSTETRIC: CPT

## 2020-12-16 PROCEDURE — 99282 EMERGENCY DEPT VISIT SF MDM: CPT

## 2020-12-16 PROCEDURE — 87591 N.GONORRHOEAE DNA AMP PROB: CPT

## 2020-12-16 PROCEDURE — 84702 CHORIONIC GONADOTROPIN TEST: CPT

## 2020-12-16 PROCEDURE — 80048 BASIC METABOLIC PNL TOTAL CA: CPT

## 2020-12-16 RX ORDER — NITROFURANTOIN 25; 75 MG/1; MG/1
100 CAPSULE ORAL 2 TIMES DAILY
Qty: 14 CAPSULE | Refills: 0 | Status: SHIPPED | OUTPATIENT
Start: 2020-12-16 | End: 2020-12-23

## 2020-12-16 ASSESSMENT — ENCOUNTER SYMPTOMS
BACK PAIN: 1
DIARRHEA: 0
ABDOMINAL PAIN: 1
SHORTNESS OF BREATH: 0
COUGH: 0
NAUSEA: 1
VOMITING: 0
COLOR CHANGE: 0

## 2020-12-16 ASSESSMENT — PAIN SCALES - GENERAL: PAINLEVEL_OUTOF10: 5

## 2020-12-17 LAB
CULTURE: NORMAL
Lab: NORMAL
SPECIMEN DESCRIPTION: NORMAL

## 2020-12-18 LAB
C TRACH DNA GENITAL QL NAA+PROBE: NEGATIVE
N. GONORRHOEAE DNA: NEGATIVE
SPECIMEN DESCRIPTION: NORMAL

## 2020-12-28 ENCOUNTER — HOSPITAL ENCOUNTER (EMERGENCY)
Age: 25
Discharge: HOME | End: 2020-12-28
Attending: EMERGENCY MEDICINE
Payer: MEDICARE

## 2020-12-28 VITALS
WEIGHT: 180 LBS | HEIGHT: 64 IN | DIASTOLIC BLOOD PRESSURE: 73 MMHG | HEART RATE: 91 BPM | BODY MASS INDEX: 30.73 KG/M2 | OXYGEN SATURATION: 100 % | SYSTOLIC BLOOD PRESSURE: 107 MMHG | TEMPERATURE: 98.3 F | RESPIRATION RATE: 20 BRPM

## 2020-12-28 LAB
-: ABNORMAL
ABSOLUTE EOS #: 0.03 K/UL (ref 0–0.44)
ABSOLUTE IMMATURE GRANULOCYTE: 0.07 K/UL (ref 0–0.3)
ABSOLUTE LYMPH #: 1.62 K/UL (ref 1.1–3.7)
ABSOLUTE MONO #: 0.64 K/UL (ref 0.1–1.2)
ALBUMIN SERPL-MCNC: 4.1 G/DL (ref 3.5–5.2)
ALBUMIN/GLOBULIN RATIO: ABNORMAL (ref 1–2.5)
ALP BLD-CCNC: 59 U/L (ref 35–104)
ALT SERPL-CCNC: 34 U/L (ref 5–33)
AMORPHOUS: ABNORMAL
AMYLASE: 150 U/L (ref 28–100)
ANION GAP SERPL CALCULATED.3IONS-SCNC: 12 MMOL/L (ref 9–17)
AST SERPL-CCNC: 25 U/L
BACTERIA: ABNORMAL
BASOPHILS # BLD: 0 % (ref 0–2)
BASOPHILS ABSOLUTE: 0.05 K/UL (ref 0–0.2)
BILIRUB SERPL-MCNC: 1.02 MG/DL (ref 0.3–1.2)
BILIRUBIN URINE: NEGATIVE
BUN BLDV-MCNC: 7 MG/DL (ref 6–20)
BUN/CREAT BLD: 10 (ref 9–20)
CALCIUM SERPL-MCNC: 9.4 MG/DL (ref 8.6–10.4)
CASTS UA: ABNORMAL /LPF
CHLORIDE BLD-SCNC: 101 MMOL/L (ref 98–107)
CO2: 23 MMOL/L (ref 20–31)
COLOR: ABNORMAL
COMMENT UA: ABNORMAL
CREAT SERPL-MCNC: 0.72 MG/DL (ref 0.5–0.9)
CRYSTALS, UA: ABNORMAL /HPF
DIFFERENTIAL TYPE: ABNORMAL
EOSINOPHILS RELATIVE PERCENT: 0 % (ref 1–4)
EPITHELIAL CELLS UA: ABNORMAL /HPF (ref 0–5)
GFR AFRICAN AMERICAN: >60 ML/MIN
GFR NON-AFRICAN AMERICAN: >60 ML/MIN
GFR SERPL CREATININE-BSD FRML MDRD: ABNORMAL ML/MIN/{1.73_M2}
GFR SERPL CREATININE-BSD FRML MDRD: ABNORMAL ML/MIN/{1.73_M2}
GLUCOSE BLD-MCNC: 88 MG/DL (ref 70–99)
GLUCOSE URINE: NEGATIVE
HCT VFR BLD CALC: 43.3 % (ref 36.3–47.1)
HEMOGLOBIN: 14.4 G/DL (ref 11.9–15.1)
IMMATURE GRANULOCYTES: 1 %
KETONES, URINE: ABNORMAL
LEUKOCYTE ESTERASE, URINE: NEGATIVE
LIPASE: 51 U/L (ref 13–60)
LYMPHOCYTES # BLD: 13 % (ref 24–43)
MCH RBC QN AUTO: 29.6 PG (ref 25.2–33.5)
MCHC RBC AUTO-ENTMCNC: 33.3 G/DL (ref 28.4–34.8)
MCV RBC AUTO: 88.9 FL (ref 82.6–102.9)
MONOCYTES # BLD: 5 % (ref 3–12)
MUCUS: ABNORMAL
NITRITE, URINE: NEGATIVE
NRBC AUTOMATED: 0 PER 100 WBC
OTHER OBSERVATIONS UA: ABNORMAL
PDW BLD-RTO: 13 % (ref 11.8–14.4)
PH UA: 6.5 (ref 5–8)
PLATELET # BLD: 246 K/UL (ref 138–453)
PLATELET ESTIMATE: ABNORMAL
PMV BLD AUTO: 10.4 FL (ref 8.1–13.5)
POTASSIUM SERPL-SCNC: 3.6 MMOL/L (ref 3.7–5.3)
PROTEIN UA: NEGATIVE
RBC # BLD: 4.87 M/UL (ref 3.95–5.11)
RBC # BLD: ABNORMAL 10*6/UL
RBC UA: ABNORMAL /HPF (ref 0–2)
RENAL EPITHELIAL, UA: ABNORMAL /HPF
SEG NEUTROPHILS: 81 % (ref 36–65)
SEGMENTED NEUTROPHILS ABSOLUTE COUNT: 10.27 K/UL (ref 1.5–8.1)
SODIUM BLD-SCNC: 136 MMOL/L (ref 135–144)
SPECIFIC GRAVITY UA: 1.02 (ref 1–1.03)
TOTAL PROTEIN: 7.4 G/DL (ref 6.4–8.3)
TRICHOMONAS: ABNORMAL
TURBIDITY: ABNORMAL
URINE HGB: NEGATIVE
UROBILINOGEN, URINE: ABNORMAL
WBC # BLD: 12.7 K/UL (ref 3.5–11.3)
WBC # BLD: ABNORMAL 10*3/UL
WBC UA: ABNORMAL /HPF (ref 0–5)
YEAST: ABNORMAL

## 2020-12-28 PROCEDURE — 96374 THER/PROPH/DIAG INJ IV PUSH: CPT

## 2020-12-28 PROCEDURE — 96375 TX/PRO/DX INJ NEW DRUG ADDON: CPT

## 2020-12-28 PROCEDURE — 81001 URINALYSIS AUTO W/SCOPE: CPT

## 2020-12-28 PROCEDURE — 83690 ASSAY OF LIPASE: CPT

## 2020-12-28 PROCEDURE — 6370000000 HC RX 637 (ALT 250 FOR IP): Performed by: EMERGENCY MEDICINE

## 2020-12-28 PROCEDURE — 82150 ASSAY OF AMYLASE: CPT

## 2020-12-28 PROCEDURE — 6360000002 HC RX W HCPCS: Performed by: EMERGENCY MEDICINE

## 2020-12-28 PROCEDURE — 2580000003 HC RX 258: Performed by: EMERGENCY MEDICINE

## 2020-12-28 PROCEDURE — 96361 HYDRATE IV INFUSION ADD-ON: CPT

## 2020-12-28 PROCEDURE — 85025 COMPLETE CBC W/AUTO DIFF WBC: CPT

## 2020-12-28 PROCEDURE — 99283 EMERGENCY DEPT VISIT LOW MDM: CPT

## 2020-12-28 PROCEDURE — 87086 URINE CULTURE/COLONY COUNT: CPT

## 2020-12-28 PROCEDURE — 80053 COMPREHEN METABOLIC PANEL: CPT

## 2020-12-28 RX ORDER — 0.9 % SODIUM CHLORIDE 0.9 %
1000 INTRAVENOUS SOLUTION INTRAVENOUS ONCE
Status: COMPLETED | OUTPATIENT
Start: 2020-12-28 | End: 2020-12-28

## 2020-12-28 RX ORDER — DIPHENHYDRAMINE HYDROCHLORIDE 50 MG/ML
25 INJECTION INTRAMUSCULAR; INTRAVENOUS ONCE
Status: COMPLETED | OUTPATIENT
Start: 2020-12-28 | End: 2020-12-28

## 2020-12-28 RX ORDER — METOCLOPRAMIDE HYDROCHLORIDE 5 MG/ML
10 INJECTION INTRAMUSCULAR; INTRAVENOUS ONCE
Status: COMPLETED | OUTPATIENT
Start: 2020-12-28 | End: 2020-12-28

## 2020-12-28 RX ORDER — ACETAMINOPHEN 500 MG
1000 TABLET ORAL ONCE
Status: COMPLETED | OUTPATIENT
Start: 2020-12-28 | End: 2020-12-28

## 2020-12-28 RX ADMIN — METOCLOPRAMIDE 10 MG: 5 INJECTION, SOLUTION INTRAMUSCULAR; INTRAVENOUS at 19:11

## 2020-12-28 RX ADMIN — SODIUM CHLORIDE 1000 ML: 9 INJECTION, SOLUTION INTRAVENOUS at 19:32

## 2020-12-28 RX ADMIN — SODIUM CHLORIDE 1000 ML: 9 INJECTION, SOLUTION INTRAVENOUS at 18:35

## 2020-12-28 RX ADMIN — DIPHENHYDRAMINE HYDROCHLORIDE 25 MG: 50 INJECTION, SOLUTION INTRAMUSCULAR; INTRAVENOUS at 19:11

## 2020-12-28 RX ADMIN — ACETAMINOPHEN 1000 MG: 500 TABLET ORAL at 19:11

## 2020-12-28 ASSESSMENT — ENCOUNTER SYMPTOMS
ABDOMINAL DISTENTION: 0
BACK PAIN: 0
ABDOMINAL PAIN: 1
EYE PAIN: 0
EYE DISCHARGE: 0
FACIAL SWELLING: 0
CHEST TIGHTNESS: 0
SHORTNESS OF BREATH: 0

## 2020-12-28 ASSESSMENT — PAIN SCALES - GENERAL
PAINLEVEL_OUTOF10: 5
PAINLEVEL_OUTOF10: 8
PAINLEVEL_OUTOF10: 8

## 2020-12-28 NOTE — ED PROVIDER NOTES
EMERGENCY DEPARTMENT ENCOUNTER    Pt Name: Peggy Chu  MRN: [id number]  [place] 1995  Date of evaluation: 12/28/20  CHIEF COMPLAINT       Chief Complaint   Patient presents with    Back Pain    Flank Pain     HISTORY OF PRESENT ILLNESS   HPI   Patient is a 61-year-old G3, P1 female 8 weeks pregnant presented to the emergency department secondary to flank pain. Patient stated she was initially on antibiotics several weeks ago completed full course however symptoms have gotten progressively worse, she has had urinary tract infections in the past.  She is complaining of pain localized to her right flank 10 out of 10 on the pain scale. Difficulty urinating. No vaginal bleeding or spotting. Confirm IUP. Patient denies chest pain, shortness of breath, nausea, vomiting, fevers or chills. REVIEW OF SYSTEMS     Review of Systems   Constitutional: Negative for chills, diaphoresis and fever. HENT: Negative for congestion, ear pain and facial swelling. Eyes: Negative for pain, discharge and visual disturbance. Respiratory: Negative for chest tightness and shortness of breath. Cardiovascular: Negative for chest pain and palpitations. Gastrointestinal: Positive for abdominal pain. Negative for abdominal distention. Genitourinary: Positive for difficulty urinating, dysuria and flank pain. Negative for vaginal bleeding, vaginal discharge and vaginal pain. Musculoskeletal: Negative for back pain. Skin: Negative for wound. Neurological: Negative for dizziness, light-headedness and headaches.      PASTMEDICAL HISTORY     Past Medical History:   Diagnosis Date    Gall stones     Kidney stone     Patient denies    UTI (lower urinary tract infection)      SURGICAL HISTORY       Past Surgical History:   Procedure Laterality Date    ARM SURGERY Right     BREAST REDUCTION SURGERY  07/25/2018    CHOLECYSTECTOMY  05/18/2018    Laparoscopic    CYSTOSCOPY  09/04/2019    CYSTOSCOPY N/A 9/4/2019 CYSTOSCOPY RETROGRADE PYELOGRAM WITH  CYSTOGRAM, performed by Erich Cortez MD at [facility] 5/18/2018    CHOLECYSTECTOMY LAPAROSCOPIC performed by Jannette Norton MD at [facility]       Discharge Medication List as of 12/28/2020 11:25 PM      CONTINUE these medications which have NOT CHANGED    Details   ibuprofen (IBU) 600 MG tablet Take 1 tablet by mouth every 6 hours as needed for Pain, Disp-40 tablet, R-0Print      acetaminophen (TYLENOL) 500 MG tablet Take 2 tablets by mouth every 6 hours as needed for Pain, Disp-120 tablet, R-0Print      magnesium citrate solution Take 296 mLs by mouth once for 1 dose, Disp-296 mL, R-0Print      ondansetron (ZOFRAN ODT) 4 MG disintegrating tablet Take 1 tablet by mouth every 4 hours as needed for Nausea, Disp-20 tablet, R-0Print           ALLERGIES     is allergic to amoxicillin and milk-related compounds. FAMILY HISTORY     has no family status information on file. SOCIAL HISTORY       Social History     Tobacco Use    Smoking status: Never Smoker    Smokeless tobacco: Never Used   Substance Use Topics    Alcohol use: Yes     Comment: social drinker     Drug use: No     PHYSICAL EXAM     INITIAL VITALS: /73   Pulse 91   Temp 98.3 °F (36.8 °C) (Oral)   Resp 20   Ht 5' 4\" (1.626 m)   Wt 180 lb (81.6 kg)   LMP 11/01/2020   SpO2 100%   BMI 30.90 kg/m²    Physical Exam  Vitals signs and nursing note reviewed. Constitutional:       General: She is not in acute distress. Appearance: She is well-developed. She is not diaphoretic. HENT:      Head: Normocephalic and atraumatic. Eyes:      Pupils: Pupils are equal, round, and reactive to light. Neck:      Musculoskeletal: Normal range of motion and neck supple. Cardiovascular:      Rate and Rhythm: Normal rate and regular rhythm.    Pulmonary:      Effort: Pulmonary effort is normal. Breath sounds: Normal breath sounds. Abdominal:      General: Bowel sounds are normal.      Palpations: Abdomen is soft. Musculoskeletal: Normal range of motion. Skin:     General: Skin is warm. Capillary Refill: Capillary refill takes less than 2 seconds. Neurological:      Mental Status: She is alert and oriented to person, place, and time. MEDICAL DECISION MAKING:   The patient is a 23-year-old  female who presented to the emergency secondary to back pain no focal neuro deficits on exam no trauma or injury. No abdominal pain vaginal bleeding or spotting. Patient received IV fluids, will be reevaluated. Patient endorsed to Dr. Jcarlos Schmidt pending dilation and clinical improvement, please see her note for final ED disposition. All patient's question's and concerns were answered prior to disposition and patient and/or family expressed understanding and agreement of treatment plan. CRITICAL CARE:              NIH STROKE SCALE:            PROCEDURES:    Procedures    DIAGNOSTIC RESULTS   EKG:All EKG's are interpreted by the Emergency Department Physician who either signs or Co-signs this chart in the absence of a cardiologist.        RADIOLOGY:All plain film, CT, MRI, and formal ultrasound images (except ED bedside ultrasound) are read by the radiologist, see reports below, unless otherwisenoted in MDM or here. No orders to display     LABS: All lab results were reviewed by myself, and all abnormals are listed below.   Labs Reviewed   CBC WITH AUTO DIFFERENTIAL - Abnormal; Notable for the following components:       Result Value    WBC 12.7 (*)     Seg Neutrophils 81 (*)     Lymphocytes 13 (*)     Eosinophils % 0 (*)     Immature Granulocytes 1 (*)     Segs Absolute 10.27 (*)     All other components within normal limits   COMPREHENSIVE METABOLIC PANEL W/ REFLEX TO MG FOR LOW K - Abnormal; Notable for the following components:    Potassium 3.6 (*)     ALT 34 (*) All other components within normal limits   AMYLASE - Abnormal; Notable for the following components:    Amylase 150 (*)     All other components within normal limits   URINALYSIS WITH MICROSCOPIC - Abnormal; Notable for the following components:    Color, UA REGAN (*)     Turbidity UA SLIGHTLY CLOUDY (*)     Ketones, Urine 3+ (*)     Urobilinogen, Urine ELEVATED (*)     Bacteria, UA MODERATE (*)     Mucus, UA 2+ (*)     Amorphous, UA 2+ (*)     All other components within normal limits   CULTURE, URINE   LIPASE       EMERGENCY DEPARTMENTCOURSE:         Vitals:    Vitals:    12/28/20 1448 12/28/20 1938   BP: 106/63 107/73   Pulse: 95 91   Resp: 16 20   Temp: 98.5 °F (36.9 °C) 98.3 °F (36.8 °C)   TempSrc: Oral Oral   SpO2: 99% 100%   Weight: 180 lb (81.6 kg)    Height: 5' 4\" (1.626 m)        The patient was given the following medications while in the emergency department:  Orders Placed This Encounter   Medications    0.9 % sodium chloride bolus    metoclopramide (REGLAN) injection 10 mg    diphenhydrAMINE (BENADRYL) injection 25 mg    acetaminophen (TYLENOL) tablet 1,000 mg    0.9 % sodium chloride bolus     CONSULTS:  None    FINAL IMPRESSION      1. Complication of pregnancy in first trimester    2.  Volume depletion          DISPOSITION/PLAN   DISPOSITION Decision To Discharge 12/28/2020 11:22:48 PM      PATIENT REFERRED TO:  KEMAR Garcia - CNP  [address] [address] [address] [address] 9739 [id number]    Call in 1 day      Contact your OB service in the morning for additional care and recommendations          DISCHARGE MEDICATIONS:  Discharge Medication List as of 12/28/2020 57:51 PM        Elizabeth Lake MD  Attending Emergency Physician This note was created with the assistance of a speech-recognition program. While intending to generate a document that actually reflects the content of the visit, no guarantees can be provided that every mistake has been identified and corrected by editing.                     Lorie Shanks MD  02/30/76 6612

## 2020-12-29 LAB
CULTURE: NORMAL
Lab: NORMAL
SPECIMEN DESCRIPTION: NORMAL

## 2020-12-29 NOTE — ED PROVIDER NOTES
[address] [place] [address] ED  eMERGENCY dEPARTMENT eNCOUnter      Pt Name: Priscilla Ty  MRN: [id number]  [place] 1995  Date of evaluation: 12/28/2020  Provider: Joan Sam MD    CHIEF COMPLAINT       Chief Complaint   Patient presents with    Back Pain    Flank Pain     Care is turned over to me at shift change by Dr. Rivers. I am asked to follow-up on results of UA and ultimate disposition. Urinalysis was pending. Although urinalysis did demonstrate bacteria she is nitrite negative and has increased epithelial cells suggesting potential surface contaminant urine has been sent on for culture for further evaluation. Patient also appears to be mildly volume depleted. She is able to tolerate oral intake. No indication for further emergent investigations or interventions at this time.       DIAGNOSTIC RESULTS         RADIOLOGY:   Non-plain film images such as CT, Ultrasound and MRI are read by the radiologist. Plain radiographic images are visualized and preliminarily interpreted by the emergency physician with the below findings:    Interpretation per the Radiologist below, if available at the time of this note:    No orders to display           LABS:  Labs Reviewed   CBC WITH AUTO DIFFERENTIAL - Abnormal; Notable for the following components:       Result Value    WBC 12.7 (*)     Seg Neutrophils 81 (*)     Lymphocytes 13 (*)     Eosinophils % 0 (*)     Immature Granulocytes 1 (*)     Segs Absolute 10.27 (*)     All other components within normal limits   COMPREHENSIVE METABOLIC PANEL W/ REFLEX TO MG FOR LOW K - Abnormal; Notable for the following components:    Potassium 3.6 (*)     ALT 34 (*)     All other components within normal limits   AMYLASE - Abnormal; Notable for the following components:    Amylase 150 (*)     All other components within normal limits   URINALYSIS WITH MICROSCOPIC - Abnormal; Notable for the following components:    Color, UA REGAN (*)     Turbidity UA SLIGHTLY CLOUDY (*) Ketones, Urine 3+ (*)     Urobilinogen, Urine ELEVATED (*)     Bacteria, UA MODERATE (*)     Mucus, UA 2+ (*)     Amorphous, UA 2+ (*)     All other components within normal limits   CULTURE, URINE   LIPASE       All other labs were within normal range or not returned as of this dictation. EMERGENCY DEPARTMENT COURSE and DIFFERENTIAL DIAGNOSIS/MDM:   Vitals:    Vitals:    12/28/20 1448 12/28/20 1938   BP: 106/63 107/73   Pulse: 95 91   Resp: 16 20   Temp: 98.5 °F (36.9 °C) 98.3 °F (36.8 °C)   TempSrc: Oral Oral   SpO2: 99% 100%   Weight: 180 lb (81.6 kg)    Height: 5' 4\" (1.626 m)          CONSULTS:  None    PROCEDURES:  None    FINAL IMPRESSION      1. Complication of pregnancy in first trimester    2.  Volume depletion          DISPOSITION/PLAN   DISPOSITION    Decision to Discharge    PATIENT REFERRED TO:   Amy Brannon, APRN - CNP  [address]  [phone]    Call in 1 day      Contact your OB service in the morning for additional care and recommendations            DISCHARGE MEDICATIONS:     New Prescriptions    No medications on file       (Please note that portions of this note were completed with a voice recognition program.  Efforts were made to edit the dictations but occasionally words are mis-transcribed.)    Joy Suh MD  Attending Emergency Physician          Joy Suh MD  05/40/18 Ellie Martinez MD  47/65/38 [id number]

## 2021-11-24 ENCOUNTER — APPOINTMENT (OUTPATIENT)
Dept: ULTRASOUND IMAGING | Age: 26
End: 2021-11-24
Payer: COMMERCIAL

## 2021-11-24 ENCOUNTER — HOSPITAL ENCOUNTER (EMERGENCY)
Age: 26
Discharge: HOME | End: 2021-11-24
Attending: EMERGENCY MEDICINE
Payer: COMMERCIAL

## 2021-11-24 VITALS
WEIGHT: 180 LBS | RESPIRATION RATE: 16 BRPM | HEIGHT: 65 IN | DIASTOLIC BLOOD PRESSURE: 95 MMHG | HEART RATE: 86 BPM | SYSTOLIC BLOOD PRESSURE: 133 MMHG | TEMPERATURE: 98.1 F | OXYGEN SATURATION: 99 % | BODY MASS INDEX: 29.99 KG/M2

## 2021-11-24 DIAGNOSIS — R10.31 RIGHT LOWER QUADRANT ABDOMINAL PAIN: Primary | ICD-10-CM

## 2021-11-24 DIAGNOSIS — O20.0 THREATENED MISCARRIAGE: ICD-10-CM

## 2021-11-24 DIAGNOSIS — N30.01 ACUTE CYSTITIS WITH HEMATURIA: ICD-10-CM

## 2021-11-24 LAB
-: ABNORMAL
-: NORMAL
ABO/RH: NORMAL
ABSOLUTE EOS #: 0.06 K/UL (ref 0–0.44)
ABSOLUTE IMMATURE GRANULOCYTE: 0.04 K/UL (ref 0–0.3)
ABSOLUTE LYMPH #: 1.93 K/UL (ref 1.1–3.7)
ABSOLUTE MONO #: 0.66 K/UL (ref 0.1–1.2)
ALBUMIN SERPL-MCNC: 4.2 G/DL (ref 3.5–5.2)
ALBUMIN/GLOBULIN RATIO: ABNORMAL (ref 1–2.5)
ALP BLD-CCNC: 48 U/L (ref 35–104)
ALT SERPL-CCNC: 31 U/L (ref 5–33)
AMORPHOUS: ABNORMAL
ANION GAP SERPL CALCULATED.3IONS-SCNC: 10 MMOL/L (ref 9–17)
AST SERPL-CCNC: 20 U/L
BACTERIA: ABNORMAL
BASOPHILS # BLD: 1 % (ref 0–2)
BASOPHILS ABSOLUTE: 0.06 K/UL (ref 0–0.2)
BILIRUB SERPL-MCNC: 0.47 MG/DL (ref 0.3–1.2)
BILIRUBIN URINE: NEGATIVE
BUN BLDV-MCNC: 14 MG/DL (ref 6–20)
BUN/CREAT BLD: 18 (ref 9–20)
CALCIUM SERPL-MCNC: 8.8 MG/DL (ref 8.6–10.4)
CASTS UA: ABNORMAL /LPF
CHLORIDE BLD-SCNC: 105 MMOL/L (ref 98–107)
CO2: 23 MMOL/L (ref 20–31)
COLOR: YELLOW
COMMENT UA: ABNORMAL
CREAT SERPL-MCNC: 0.76 MG/DL (ref 0.5–0.9)
CRYSTALS, UA: ABNORMAL /HPF
DIFFERENTIAL TYPE: ABNORMAL
EOSINOPHILS RELATIVE PERCENT: 1 % (ref 1–4)
EPITHELIAL CELLS UA: ABNORMAL /HPF (ref 0–5)
GFR AFRICAN AMERICAN: >60 ML/MIN
GFR NON-AFRICAN AMERICAN: >60 ML/MIN
GFR SERPL CREATININE-BSD FRML MDRD: ABNORMAL ML/MIN/{1.73_M2}
GFR SERPL CREATININE-BSD FRML MDRD: ABNORMAL ML/MIN/{1.73_M2}
GLUCOSE BLD-MCNC: 114 MG/DL (ref 70–99)
GLUCOSE URINE: NEGATIVE
HCG QUALITATIVE: POSITIVE
HCG QUANTITATIVE: 345 IU/L
HCG(URINE) PREGNANCY TEST: POSITIVE
HCT VFR BLD CALC: 39.5 % (ref 36.3–47.1)
HEMOGLOBIN: 12.9 G/DL (ref 11.9–15.1)
IMMATURE GRANULOCYTES: 0 %
KETONES, URINE: NEGATIVE
LACTIC ACID: 0.9 MMOL/L (ref 0.5–2.2)
LEUKOCYTE ESTERASE, URINE: NEGATIVE
LIPASE: 44 U/L (ref 13–60)
LYMPHOCYTES # BLD: 18 % (ref 24–43)
MCH RBC QN AUTO: 29.3 PG (ref 25.2–33.5)
MCHC RBC AUTO-ENTMCNC: 32.7 G/DL (ref 28.4–34.8)
MCV RBC AUTO: 89.6 FL (ref 82.6–102.9)
MONOCYTES # BLD: 6 % (ref 3–12)
MUCUS: ABNORMAL
NITRITE, URINE: NEGATIVE
NRBC AUTOMATED: 0 PER 100 WBC
OTHER OBSERVATIONS UA: ABNORMAL
PDW BLD-RTO: 12.8 % (ref 11.8–14.4)
PH UA: 6 (ref 5–8)
PLATELET # BLD: 215 K/UL (ref 138–453)
PLATELET ESTIMATE: ABNORMAL
PMV BLD AUTO: 10.4 FL (ref 8.1–13.5)
POTASSIUM SERPL-SCNC: 4.1 MMOL/L (ref 3.7–5.3)
PROTEIN UA: NEGATIVE
RBC # BLD: 4.41 M/UL (ref 3.95–5.11)
RBC # BLD: ABNORMAL 10*6/UL
RBC UA: ABNORMAL /HPF (ref 0–2)
REASON FOR REJECTION: NORMAL
RENAL EPITHELIAL, UA: ABNORMAL /HPF
SEG NEUTROPHILS: 74 % (ref 36–65)
SEGMENTED NEUTROPHILS ABSOLUTE COUNT: 8.23 K/UL (ref 1.5–8.1)
SODIUM BLD-SCNC: 138 MMOL/L (ref 135–144)
SPECIFIC GRAVITY UA: 1.02 (ref 1–1.03)
TOTAL PROTEIN: 6.7 G/DL (ref 6.4–8.3)
TRICHOMONAS: ABNORMAL
TURBIDITY: ABNORMAL
URINE HGB: NEGATIVE
UROBILINOGEN, URINE: NORMAL
WBC # BLD: 11 K/UL (ref 3.5–11.3)
WBC # BLD: ABNORMAL 10*3/UL
WBC UA: ABNORMAL /HPF (ref 0–5)
YEAST: ABNORMAL
ZZ NTE CLEAN UP: ORDERED TEST: NORMAL
ZZ NTE WITH NAME CLEAN UP: SPECIMEN SOURCE: NORMAL

## 2021-11-24 PROCEDURE — 96374 THER/PROPH/DIAG INJ IV PUSH: CPT

## 2021-11-24 PROCEDURE — 81025 URINE PREGNANCY TEST: CPT

## 2021-11-24 PROCEDURE — 87077 CULTURE AEROBIC IDENTIFY: CPT

## 2021-11-24 PROCEDURE — 84703 CHORIONIC GONADOTROPIN ASSAY: CPT

## 2021-11-24 PROCEDURE — 2580000003 HC RX 258: Performed by: EMERGENCY MEDICINE

## 2021-11-24 PROCEDURE — 83605 ASSAY OF LACTIC ACID: CPT

## 2021-11-24 PROCEDURE — 86900 BLOOD TYPING SEROLOGIC ABO: CPT

## 2021-11-24 PROCEDURE — 87086 URINE CULTURE/COLONY COUNT: CPT

## 2021-11-24 PROCEDURE — 76817 TRANSVAGINAL US OBSTETRIC: CPT

## 2021-11-24 PROCEDURE — 76801 OB US < 14 WKS SINGLE FETUS: CPT

## 2021-11-24 PROCEDURE — 99282 EMERGENCY DEPT VISIT SF MDM: CPT

## 2021-11-24 PROCEDURE — 87186 SC STD MICRODIL/AGAR DIL: CPT

## 2021-11-24 PROCEDURE — 86901 BLOOD TYPING SEROLOGIC RH(D): CPT

## 2021-11-24 PROCEDURE — 6360000002 HC RX W HCPCS: Performed by: EMERGENCY MEDICINE

## 2021-11-24 PROCEDURE — 83690 ASSAY OF LIPASE: CPT

## 2021-11-24 PROCEDURE — 85025 COMPLETE CBC W/AUTO DIFF WBC: CPT

## 2021-11-24 PROCEDURE — 84702 CHORIONIC GONADOTROPIN TEST: CPT

## 2021-11-24 PROCEDURE — 96375 TX/PRO/DX INJ NEW DRUG ADDON: CPT

## 2021-11-24 PROCEDURE — 80053 COMPREHEN METABOLIC PANEL: CPT

## 2021-11-24 PROCEDURE — 81001 URINALYSIS AUTO W/SCOPE: CPT

## 2021-11-24 RX ORDER — METOCLOPRAMIDE HYDROCHLORIDE 5 MG/ML
10 INJECTION INTRAMUSCULAR; INTRAVENOUS ONCE
Status: COMPLETED | OUTPATIENT
Start: 2021-11-24 | End: 2021-11-24

## 2021-11-24 RX ORDER — CEPHALEXIN 500 MG/1
500 CAPSULE ORAL 2 TIMES DAILY
Qty: 14 CAPSULE | Refills: 0 | Status: SHIPPED | OUTPATIENT
Start: 2021-11-24 | End: 2021-12-01

## 2021-11-24 RX ORDER — 0.9 % SODIUM CHLORIDE 0.9 %
1000 INTRAVENOUS SOLUTION INTRAVENOUS ONCE
Status: COMPLETED | OUTPATIENT
Start: 2021-11-24 | End: 2021-11-24

## 2021-11-24 RX ORDER — ONDANSETRON 2 MG/ML
4 INJECTION INTRAMUSCULAR; INTRAVENOUS ONCE
Status: COMPLETED | OUTPATIENT
Start: 2021-11-24 | End: 2021-11-24

## 2021-11-24 RX ORDER — ONDANSETRON 2 MG/ML
4 INJECTION INTRAMUSCULAR; INTRAVENOUS ONCE
Status: DISCONTINUED | OUTPATIENT
Start: 2021-11-24 | End: 2021-11-24

## 2021-11-24 RX ORDER — MORPHINE SULFATE 4 MG/ML
4 INJECTION, SOLUTION INTRAMUSCULAR; INTRAVENOUS ONCE
Status: COMPLETED | OUTPATIENT
Start: 2021-11-24 | End: 2021-11-24

## 2021-11-24 RX ADMIN — SODIUM CHLORIDE 1000 ML: 9 INJECTION, SOLUTION INTRAVENOUS at 05:46

## 2021-11-24 RX ADMIN — MORPHINE SULFATE 4 MG: 4 INJECTION INTRAVENOUS at 05:46

## 2021-11-24 RX ADMIN — ONDANSETRON 4 MG: 2 INJECTION INTRAMUSCULAR; INTRAVENOUS at 05:46

## 2021-11-24 RX ADMIN — METOCLOPRAMIDE 10 MG: 5 INJECTION, SOLUTION INTRAMUSCULAR; INTRAVENOUS at 06:21

## 2021-11-24 ASSESSMENT — PAIN DESCRIPTION - LOCATION: LOCATION: ABDOMEN

## 2021-11-24 ASSESSMENT — PAIN SCALES - GENERAL
PAINLEVEL_OUTOF10: 10
PAINLEVEL_OUTOF10: 10

## 2021-11-24 ASSESSMENT — PAIN DESCRIPTION - PAIN TYPE: TYPE: ACUTE PAIN

## 2021-11-24 NOTE — ED PROVIDER NOTES
EMERGENCY DEPARTMENT ENCOUNTER    Pt Name: Frank Cage  MRN: [id number]  [place] 1995  Date of evaluation: 21  CHIEF COMPLAINT       Chief Complaint   Patient presents with    Emesis    Abdominal Pain     RLQ started yesterday     HISTORY OF PRESENT ILLNESS   Patient is a 55-year-old female, , LMP 10/24/21, with PMH of kidney stones who presents to the ED complaining of abdominal pain. Pain started gradually yesterday no neuro deficits. No neck midline tenderness. Mental status at baseline. No clinical signs of intoxication. No distracting injuries. Based on Nexus criteria no C-spine imaging indicated. Left lower quadrant. This morning she woke up with severe pain to right lower quadrant. Patient started her period yesterday with light spotting. No fevers, cough, shortness of breath, chest pain. REVIEW OF SYSTEMS     Review of Systems   All other systems reviewed and are negative.     PASTMEDICAL HISTORY     Past Medical History:   Diagnosis Date    Gall stones     Kidney stone     Patient denies    UTI (lower urinary tract infection)      SURGICAL HISTORY       Past Surgical History:   Procedure Laterality Date    ARM SURGERY Right     BREAST REDUCTION SURGERY  2018    CHOLECYSTECTOMY  2018    Laparoscopic    CYSTOSCOPY  2019    CYSTOSCOPY N/A 2019    CYSTOSCOPY RETROGRADE PYELOGRAM WITH  CYSTOGRAM, performed by Preet Maldonado MD at [facility] 2018    CHOLECYSTECTOMY LAPAROSCOPIC performed by Dequan Loredo MD at [facility]       Previous Medications    ACETAMINOPHEN (TYLENOL) 500 MG TABLET    Take 2 tablets by mouth every 6 hours as needed for Pain    IBUPROFEN (IBU) 600 MG TABLET    Take 1 tablet by mouth every 6 hours as needed for Pain    MAGNESIUM CITRATE SOLUTION    Take 296 mLs by mouth once for 1 dose    ONDANSETRON (ZOFRAN ODT) 4 MG DISINTEGRATING TABLET    Take 1 tablet by mouth every 4 hours as needed for Nausea     ALLERGIES     is allergic to amoxicillin and milk-related compounds. FAMILY HISTORY     has no family status information on file. SOCIAL HISTORY       Social History     Tobacco Use    Smoking status: Never Smoker    Smokeless tobacco: Never Used   Vaping Use    Vaping Use: Never used   Substance Use Topics    Alcohol use: Yes     Comment: social drinker     Drug use: No     PHYSICAL EXAM     INITIAL VITALS: BP (!) 133/95   Pulse 86   Temp 98.1 °F (36.7 °C) (Oral)   Resp 16   Ht 5' 4.5\" (1.638 m)   Wt 180 lb (81.6 kg)   LMP 11/23/2021   SpO2 99%   Breastfeeding Unknown   BMI 30.42 kg/m²    Physical Exam  Constitutional:       Comments: Sitting up in bed, clearly uncomfortable from pain. HENT:      Head: Normocephalic. Right Ear: External ear normal.      Left Ear: External ear normal.      Nose: Nose normal.   Eyes:      Conjunctiva/sclera: Conjunctivae normal.   Cardiovascular:      Rate and Rhythm: Normal rate. Pulmonary:      Effort: Pulmonary effort is normal.   Abdominal:      General: Abdomen is flat. Tenderness: There is abdominal tenderness. There is no guarding or rebound. Skin:     General: Skin is dry. Neurological:      Mental Status: She is alert. Mental status is at baseline. Psychiatric:         Mood and Affect: Mood normal.         Behavior: Behavior normal.         MEDICAL DECISION MAKING:   The patient is hemodynamically stable, afebrile, nontoxic-appearing. Physical exam notable for generalized abdominal tenderness. Based on history and exam differential includes ascites, constipation, muscle cramps, kidney stones, pregnancy, cystitis.   ED plan for basic labs, UA, fluids, morphine, Zofran, CT and pelvis, reassess         DIAGNOSTIC RESULTS   EKG:All EKG's are interpreted by the Emergency Department Physician who either signs or Co-signs this chart in the absence of a cardiologist.        RADIOLOGY:All plain film, CT, MRI, and formal ultrasound images (except ED bedside ultrasound) are read by the radiologist, see reports below, unless otherwisenoted in MDM or here. US OB LESS THAN 14 WEEKS SINGLE OR FIRST GESTATION    (Results Pending)     LABS: All lab results were reviewed by myself, and all abnormals are listed below. Labs Reviewed   HCG, SERUM, QUALITATIVE - Abnormal; Notable for the following components:       Result Value    hCG Qual POSITIVE (*)     All other components within normal limits   CBC WITH AUTO DIFFERENTIAL - Abnormal; Notable for the following components:    Seg Neutrophils 74 (*)     Lymphocytes 18 (*)     Segs Absolute 8.23 (*)     All other components within normal limits   LACTIC ACID   SPECIMEN REJECTION   COMPREHENSIVE METABOLIC PANEL W/ REFLEX TO MG FOR LOW K   LIPASE   URINE RT REFLEX TO CULTURE   PREGNANCY, URINE   HCG, QUANTITATIVE, PREGNANCY   ABO/RH       EMERGENCY DEPARTMENTCOURSE:   Beta hCG positive. CT abdomen pelvis change to transvaginal ultrasound. Given Reglan for nausea. Patient care signed out to Dr. Juan J Germain. Refer to his note for diagnosis and disposition. Vitals:    Vitals:    11/24/21 0500 11/24/21 0501   BP:  (!) 133/95   Pulse:  86   Resp:  16   Temp: 98.1 °F (36.7 °C)    TempSrc: Oral    SpO2:  99%   Weight:  180 lb (81.6 kg)   Height:  5' 4.5\" (1.638 m)       The patient was given the following medications while in the emergency department:  Orders Placed This Encounter   Medications    0.9 % sodium chloride bolus    ondansetron (ZOFRAN) injection 4 mg    morphine sulfate (PF) injection 4 mg    DISCONTD: ondansetron (ZOFRAN) injection 4 mg    metoclopramide (REGLAN) injection 10 mg     CONSULTS:  None    FINAL IMPRESSION      1. Right lower quadrant abdominal pain          DISPOSITION/PLAN   DISPOSITION        PATIENT REFERRED TO:  No follow-up provider specified.   DISCHARGE MEDICATIONS:  New Prescriptions No medications on file     Marie Murphy MD  Attending Emergency Physician                    Davin Akers MD  11/24/21 9782

## 2021-11-24 NOTE — LETTER
[facility] ED  [address] [address]  Phone: [phone]             November 28, 2021    Patient: Maribel Bellamy   YOB: 1995   Date of Visit: 11/24/2021       To Whom It May Concern:    Maribel Bellamy was seen and treated in our emergency department on 11/24/2021. She may be off work 11/24-11/26 can return to work on 11/27.       DR GEORGE PRATHER            Signature:__________________________________

## 2021-11-24 NOTE — ED PROVIDER NOTES
[facility] ED  EMERGENCY DEPARTMENT ENCOUNTER   ATTENDING ATTESTATION     Pt Name: Curtis Hall  MRN: [id number]  [place] 1995  Date of evaluation: 11/24/21       Curtis Hall is a 23 y.o. female who presents with Emesis and Abdominal Pain (RLQ started yesterday)      MDM:     This is a 56-year-old female who presented with complaints of lower abdominal pain, the patient has a positive pregnancy test and we are awaiting ultrasound results. 9:10 AM EST  Patient's ultrasound examination showed no findings suggestive of an ectopic pregnancy, there is no definitive intrauterine pregnancy at this time but the patient is very early, her hCG levels only 345. Plan is outpatient follow-up with OB/GYN, her urine shows some possible evidence of UTI, due to the her pregnancy status and her symptoms we will initiate treatment and obtain a culture. Patient's repeat abdominal exam is benign, this does not appear to be an ectopic pregnancy, I do not believe she requires transfer to an OB facility.     Impression: Threatened miscarriage acute cystitis    Vitals:   Vitals:    11/24/21 0500 11/24/21 0501   BP:  (!) 133/95   Pulse:  86   Resp:  16   Temp: 98.1 °F (36.7 °C)    TempSrc: Oral    SpO2:  99%   Weight:  180 lb (81.6 kg)   Height:  5' 4.5\" (1.638 m)             Aureliano Cisneros MD  Attending Emergency  Physician                  Dang Howell MD  11/24/21 [id number]

## 2021-11-26 LAB
CULTURE: ABNORMAL
Lab: ABNORMAL
SPECIMEN DESCRIPTION: ABNORMAL

## 2021-11-29 ENCOUNTER — OFFICE VISIT (OUTPATIENT)
Dept: OBGYN | Age: 26
End: 2021-11-29
Payer: COMMERCIAL

## 2021-11-29 ENCOUNTER — TELEPHONE (OUTPATIENT)
Dept: OBGYN | Age: 26
End: 2021-11-29

## 2021-11-29 ENCOUNTER — HOSPITAL ENCOUNTER (OUTPATIENT)
Age: 26
Setting detail: SPECIMEN
Discharge: HOME | End: 2021-11-29

## 2021-11-29 VITALS
HEART RATE: 88 BPM | HEIGHT: 65 IN | BODY MASS INDEX: 30.82 KG/M2 | DIASTOLIC BLOOD PRESSURE: 74 MMHG | WEIGHT: 185 LBS | SYSTOLIC BLOOD PRESSURE: 109 MMHG

## 2021-11-29 DIAGNOSIS — O36.80X0 PREGNANCY OF UNKNOWN ANATOMIC LOCATION: Primary | ICD-10-CM

## 2021-11-29 DIAGNOSIS — N30.00 ACUTE CYSTITIS WITHOUT HEMATURIA: Primary | ICD-10-CM

## 2021-11-29 DIAGNOSIS — O36.80X0 PREGNANCY OF UNKNOWN ANATOMIC LOCATION: ICD-10-CM

## 2021-11-29 DIAGNOSIS — N30.00 ACUTE CYSTITIS WITHOUT HEMATURIA: ICD-10-CM

## 2021-11-29 LAB
ABSOLUTE EOS #: 0.04 K/UL (ref 0–0.44)
ABSOLUTE IMMATURE GRANULOCYTE: 0.04 K/UL (ref 0–0.3)
ABSOLUTE LYMPH #: 1.81 K/UL (ref 1.1–3.7)
ABSOLUTE MONO #: 0.49 K/UL (ref 0.1–1.2)
BASOPHILS # BLD: 1 % (ref 0–2)
BASOPHILS ABSOLUTE: 0.04 K/UL (ref 0–0.2)
DIFFERENTIAL TYPE: ABNORMAL
EOSINOPHILS RELATIVE PERCENT: 1 % (ref 1–4)
HCG QUANTITATIVE: 927 IU/L
HCT VFR BLD CALC: 42.4 % (ref 36.3–47.1)
HEMOGLOBIN: 13.6 G/DL (ref 11.9–15.1)
IMMATURE GRANULOCYTES: 1 %
LYMPHOCYTES # BLD: 22 % (ref 24–43)
MCH RBC QN AUTO: 29.4 PG (ref 25.2–33.5)
MCHC RBC AUTO-ENTMCNC: 32.1 G/DL (ref 28.4–34.8)
MCV RBC AUTO: 91.6 FL (ref 82.6–102.9)
MONOCYTES # BLD: 6 % (ref 3–12)
NRBC AUTOMATED: 0 PER 100 WBC
PDW BLD-RTO: 13.2 % (ref 11.8–14.4)
PLATELET # BLD: 229 K/UL (ref 138–453)
PLATELET ESTIMATE: ABNORMAL
PMV BLD AUTO: 11.3 FL (ref 8.1–13.5)
RBC # BLD: 4.63 M/UL (ref 3.95–5.11)
RBC # BLD: ABNORMAL 10*6/UL
SEG NEUTROPHILS: 69 % (ref 36–65)
SEGMENTED NEUTROPHILS ABSOLUTE COUNT: 6 K/UL (ref 1.5–8.1)
WBC # BLD: 8.4 K/UL (ref 3.5–11.3)
WBC # BLD: ABNORMAL 10*3/UL

## 2021-11-29 PROCEDURE — G8417 CALC BMI ABV UP PARAM F/U: HCPCS | Performed by: STUDENT IN AN ORGANIZED HEALTH CARE EDUCATION/TRAINING PROGRAM

## 2021-11-29 PROCEDURE — 99211 OFF/OP EST MAY X REQ PHY/QHP: CPT | Performed by: OBSTETRICS & GYNECOLOGY

## 2021-11-29 PROCEDURE — G8484 FLU IMMUNIZE NO ADMIN: HCPCS | Performed by: STUDENT IN AN ORGANIZED HEALTH CARE EDUCATION/TRAINING PROGRAM

## 2021-11-29 PROCEDURE — 1036F TOBACCO NON-USER: CPT | Performed by: STUDENT IN AN ORGANIZED HEALTH CARE EDUCATION/TRAINING PROGRAM

## 2021-11-29 PROCEDURE — 99213 OFFICE O/P EST LOW 20 MIN: CPT | Performed by: STUDENT IN AN ORGANIZED HEALTH CARE EDUCATION/TRAINING PROGRAM

## 2021-11-29 PROCEDURE — G8427 DOCREV CUR MEDS BY ELIG CLIN: HCPCS | Performed by: STUDENT IN AN ORGANIZED HEALTH CARE EDUCATION/TRAINING PROGRAM

## 2021-11-29 RX ORDER — NITROFURANTOIN 25; 75 MG/1; MG/1
100 CAPSULE ORAL 2 TIMES DAILY
Qty: 20 CAPSULE | Refills: 0 | Status: SHIPPED | OUTPATIENT
Start: 2021-11-29 | End: 2021-12-09

## 2021-11-29 NOTE — TELEPHONE ENCOUNTER
Spoke with patient regarding her recent emergency room visit. Patient had a beta-hCG quant of 345. Requested that patient get a repeat beta-hCG quant drawn today prior to her visit. Patient states she is still bleeding about as heavy as a period and she has been since 11-23-21. Will discuss further management and plan of care at visit today.     Discussed with Dr. Alicia Turner

## 2021-11-29 NOTE — TELEPHONE ENCOUNTER
Isabell called to schedule an appointment for follow up from ED visit on 11/24/2021 for vaginal bleeding and abdominal pain during pregnancy. She was told by the ed to follow up with OB/GYN for Threatened Miscarriage and she would like to been seen in this office. Please Advise.

## 2021-11-29 NOTE — PROGRESS NOTES
OB/GYN Problem Visit    Helio Smith  11/29/2021                       Primary Care Physician: No primary care provider on file. CC:   Chief Complaint   Patient presents with    Follow-up     ER follow up possible miscarry. lmp 11/23/2021, pt is currently taking UTi medication , pt states she has a vag cream white discahrge x 4-5 days , She also needs her US results, they did not explain to her anything         HPI: Helio Smith is a 23 y.o. female [id number]    The patient was seen and examined. She is here for an ED follow up. Patient presented to [facility] [address] ED for right sided pelvic pain and vaginal bleeding. Patient bHCG at that time was 345, other labs were stable. Patient was also diagnosed with a UTI for which she was given an Rx for Keflex. UCx came back + Enterococcus Faecalis, which did not have listed sensitivities to keflex. Patient states she is still having burning with urination and had an isolated fever of 101. Patient states she continues to have bleeding similar to a period and intermittent RLQ pain. She denies lightheadedness, severe pain, N/V/D or loss of appetite. Her Patient's last menstrual period was 11/23/2021 (exact date). and she complains of irregular/heavy bleeding and dysmenorrhea. Her periods are regular and last 5-7 days. She describes them as moderate. Her bowel habits are regular. She denies any bloating. She complains of dysuria. She denies urinary leaking.      REVIEW OF SYSTEMS:   Constitutional: negative fever, negative chills  HEENT: negative visual disturbances, negative headaches  Respiratory: negative dyspnea, negative cough  Cardiovascular: negative chest pain,  negative palpitations  Gastrointestinal: positive abdominal pain, negative RUQ pain, negative N/V, negative diarrhea, negative constipation  Genitourinary: negative dysuria, positive vaginal bleeding  Dermatological: negative rash  Hematologic: negative bruising  Immunologic/Lymphatic: negative recent illness, negative recent sick contact  Musculoskeletal: negative back pain, negative myalgias, negative arthralgias  Neurological:  negative dizziness, negative weakness  Behavior/Psych: negative depression, negative anxiety    ________________________________________________________________________  OBSTETRICAL HISTORY:  OB History    Para Term  AB Living   2             SAB IAB Ectopic Molar Multiple Live Births                    # Outcome Date GA Lbr Zohaib/2nd Weight Sex Delivery Anes PTL Lv   2             1                 PAST MEDICAL HISTORY:      Diagnosis Date    Gall stones     Kidney stone     Patient denies    UTI (lower urinary tract infection)        PAST SURGICAL HISTORY:                                                                    Procedure Laterality Date    ARM SURGERY Right     BREAST REDUCTION SURGERY  2018    CHOLECYSTECTOMY  2018    Laparoscopic    CYSTOSCOPY  2019    CYSTOSCOPY N/A 2019    CYSTOSCOPY RETROGRADE PYELOGRAM WITH  CYSTOGRAM, performed by Sandeep Kiser MD at [facility] 2018    CHOLECYSTECTOMY LAPAROSCOPIC performed by Henrry Villa MD at [address] [facility] [address]:  Current Outpatient Medications   Medication Sig Dispense Refill    nitrofurantoin, macrocrystal-monohydrate, (MACROBID) 100 MG capsule Take 1 capsule by mouth 2 times daily for 10 days 20 capsule 0    cephALEXin (KEFLEX) 500 MG capsule Take 1 capsule by mouth 2 times daily for 7 days 14 capsule 0    ibuprofen (IBU) 600 MG tablet Take 1 tablet by mouth every 6 hours as needed for Pain 40 tablet 0    acetaminophen (TYLENOL) 500 MG tablet Take 2 tablets by mouth every 6 hours as needed for Pain 120 tablet 0    magnesium citrate solution Take 296 mLs by mouth once for 1 dose 296 mL 0    ondansetron (ZOFRAN ODT) 4 MG disintegrating tablet Take 1 tablet by mouth every 4 hours as needed for Nausea 20 tablet 0     No current facility-administered medications for this visit. ALLERGIES:  Allergies as of 11/29/2021 - Fully Reviewed 11/29/2021   Allergen Reaction Noted    Amoxicillin Hives 05/17/2015    Other  11/24/2021                                   VITALS:  Vitals:    11/29/21 1451   BP: 109/74   Pulse: 88   Weight: 185 lb (83.9 kg)   Height: 5' 5\" (1.651 m)                                                                                                                                                  PHYSICAL EXAM:   General Appearance: Appears healthy. Alert; in no acute distress. Pleasant. Skin: Normal  Lymphatic: No cervical, superclavicular, axillary, or inguinal adenopathy. HEENT: normocephalic and atraumatic, Thyroid normal to inspection and palpation  Respiratory: clear to auscultation, no wheezes, rales or rhonchi, symmetric air entry  Cardiovascular: regular rate and rhythm  Abdomen: soft, minimal diffuse tenderness, non-distended, no right upper quadrant tenderness and no CVA tenderness  Pelvic Exam: declined at this time  Rectal Exam: declined  Extremities: non-tender BLE and non-edematous  Musculoskeletal: no gross abnormalities  Psych: Normal. and Alert and oriented, appropriate affect. DATA:  No results found for this visit on 11/29/21.     ASSESSMENT & PLAN:    Kenneth Pak is a 23 y.o. female [id number]  ED follow up    - Presented to ED with RLQ pain and vaginal bleeding    - bHCG 345, repeat quant ordered today    - Keflex switched to Macrobid based on sensitivities    - F/u appointment in one week or sooner if needed    - Will call patient will results of bHCG quant    - CBC ordered to assess WBC and Hgb   - Standard precautions given and patient counseled to present to ED for worsening abdominal pain, heavy bleeding soaking >2 pads per hour, or continued fevers, development of flank pain    Patient Active Problem List    Diagnosis Date Noted    History of kidney stones 08/05/2019    Pyelonephritis 02/15/2019    Cholecystitis     E. coli UTI (urinary tract infection) 05/14/2018    Acute pyelonephritis 05/14/2018    Allergic reaction caused by a drug        Return in about 1 week (around 12/6/2021) for Follow up. Patient was seen with total face to face time of 25 minutes. More than 50% of this visit was on counseling and education regarding her diagnose(s) as listed below and options. She was also counseled on her preventative health maintenance recommendations and follow-up. Diagnosis Orders   1. Acute cystitis without hematuria  CBC With Auto Differential   2.  Pregnancy of unknown anatomic location         Lesli Kirk DO  Ob/Gyn Resident  [facility], [address]  11/29/2021, 4:15 PM

## 2021-11-30 ENCOUNTER — TELEPHONE (OUTPATIENT)
Dept: OBGYN | Age: 26
End: 2021-11-30

## 2021-11-30 DIAGNOSIS — O36.80X0 PREGNANCY OF UNKNOWN ANATOMIC LOCATION: Primary | ICD-10-CM

## 2021-11-30 NOTE — TELEPHONE ENCOUNTER
Discussed bHCG not rising appropriately. bHCG 345 on 11/24 and 927 on 11/29. Will obtain additional quant in 48 hours and discuss management plan from there. Patient states symptoms are stable, she continues to have dull RLQ and mild vaginal bleeding consistent with a period. Order for repeat bHCG quant on 12/1 placed. Advised patient of standard bleeding and pain precautions. Patient to present to ED for worsening or severe pain, bleeding soaking >2 pads per hour, or lightheaded.     Discussed case with Dr. Lopez Lute

## 2021-12-01 ENCOUNTER — HOSPITAL ENCOUNTER (OUTPATIENT)
Age: 26
Discharge: HOME | End: 2021-12-01
Payer: COMMERCIAL

## 2021-12-01 DIAGNOSIS — O36.80X0 PREGNANCY OF UNKNOWN ANATOMIC LOCATION: ICD-10-CM

## 2021-12-01 LAB — HCG QUANTITATIVE: 991 IU/L

## 2021-12-01 PROCEDURE — 84702 CHORIONIC GONADOTROPIN TEST: CPT

## 2021-12-01 PROCEDURE — 36415 COLL VENOUS BLD VENIPUNCTURE: CPT

## 2021-12-02 ENCOUNTER — TELEPHONE (OUTPATIENT)
Dept: OBGYN | Age: 26
End: 2021-12-02

## 2021-12-02 DIAGNOSIS — O03.4 INCOMPLETE ABORTION: Primary | ICD-10-CM

## 2021-12-02 NOTE — TELEPHONE ENCOUNTER
Called patient to discuss her [facility] quant. Her quant went from [phone] on  to 991 on  consistent with a non-viable pregnancy. Discussed expectant vs. Surgical vs. Medical management with patient. Patient continues to have spotting and discomfort and has not had success with medical management in the past. Patient is requesting definitive surgical management of her incomplete . Suspect that the pregnancy is intra-uterine based on small gestational sac that was seen on US 21. Suction D&C scheduled with surgery scheduling desk for 1100 with Dr. Geraldine Paul. Patient amenable to above plan of care. Patient told advised not to eat or drink after 0000 12/3 and is to receive 200mg of doxycycline prior to the procedure. Dicussed case with Dr. Samantha Engel, in house laborist  and Dr. Janice Guerin who will be performing the surgery.     Lizzie Underwood DO

## 2021-12-03 ENCOUNTER — ANESTHESIA EVENT (OUTPATIENT)
Dept: OPERATING ROOM | Age: 26
End: 2021-12-03
Payer: COMMERCIAL

## 2021-12-03 ENCOUNTER — HOSPITAL ENCOUNTER (OUTPATIENT)
Age: 26
Setting detail: OUTPATIENT SURGERY
Discharge: HOME | End: 2021-12-03
Attending: OBSTETRICS & GYNECOLOGY | Admitting: OBSTETRICS & GYNECOLOGY
Payer: COMMERCIAL

## 2021-12-03 ENCOUNTER — ANESTHESIA (OUTPATIENT)
Dept: OPERATING ROOM | Age: 26
End: 2021-12-03
Payer: COMMERCIAL

## 2021-12-03 VITALS
RESPIRATION RATE: 14 BRPM | DIASTOLIC BLOOD PRESSURE: 68 MMHG | BODY MASS INDEX: 30.82 KG/M2 | HEART RATE: 66 BPM | SYSTOLIC BLOOD PRESSURE: 105 MMHG | HEIGHT: 65 IN | WEIGHT: 185 LBS | OXYGEN SATURATION: 96 % | TEMPERATURE: 97.5 F

## 2021-12-03 VITALS — OXYGEN SATURATION: 100 % | DIASTOLIC BLOOD PRESSURE: 85 MMHG | TEMPERATURE: 96.4 F | SYSTOLIC BLOOD PRESSURE: 122 MMHG

## 2021-12-03 DIAGNOSIS — G89.18 POST-OP PAIN: Primary | ICD-10-CM

## 2021-12-03 DIAGNOSIS — O02.1 MISSED ABORTION: ICD-10-CM

## 2021-12-03 LAB
SARS-COV-2, RAPID: NOT DETECTED
SPECIMEN DESCRIPTION: NORMAL

## 2021-12-03 PROCEDURE — 2580000003 HC RX 258: Performed by: ANESTHESIOLOGY

## 2021-12-03 PROCEDURE — 2580000003 HC RX 258: Performed by: OBSTETRICS & GYNECOLOGY

## 2021-12-03 PROCEDURE — 87635 SARS-COV-2 COVID-19 AMP PRB: CPT

## 2021-12-03 PROCEDURE — 3600000012 HC SURGERY LEVEL 2 ADDTL 15MIN: Performed by: OBSTETRICS & GYNECOLOGY

## 2021-12-03 PROCEDURE — 6360000002 HC RX W HCPCS

## 2021-12-03 PROCEDURE — 7100000010 HC PHASE II RECOVERY - FIRST 15 MIN: Performed by: OBSTETRICS & GYNECOLOGY

## 2021-12-03 PROCEDURE — 6370000000 HC RX 637 (ALT 250 FOR IP): Performed by: STUDENT IN AN ORGANIZED HEALTH CARE EDUCATION/TRAINING PROGRAM

## 2021-12-03 PROCEDURE — 6360000002 HC RX W HCPCS: Performed by: NURSE ANESTHETIST, CERTIFIED REGISTERED

## 2021-12-03 PROCEDURE — 59820 CARE OF MISCARRIAGE: CPT | Performed by: OBSTETRICS & GYNECOLOGY

## 2021-12-03 PROCEDURE — 3700000000 HC ANESTHESIA ATTENDED CARE: Performed by: OBSTETRICS & GYNECOLOGY

## 2021-12-03 PROCEDURE — 7100000000 HC PACU RECOVERY - FIRST 15 MIN: Performed by: OBSTETRICS & GYNECOLOGY

## 2021-12-03 PROCEDURE — 2709999900 HC NON-CHARGEABLE SUPPLY: Performed by: OBSTETRICS & GYNECOLOGY

## 2021-12-03 PROCEDURE — 88305 TISSUE EXAM BY PATHOLOGIST: CPT

## 2021-12-03 PROCEDURE — 7100000001 HC PACU RECOVERY - ADDTL 15 MIN: Performed by: OBSTETRICS & GYNECOLOGY

## 2021-12-03 PROCEDURE — 6360000002 HC RX W HCPCS: Performed by: ANESTHESIOLOGY

## 2021-12-03 PROCEDURE — 2500000003 HC RX 250 WO HCPCS: Performed by: NURSE ANESTHETIST, CERTIFIED REGISTERED

## 2021-12-03 PROCEDURE — 3700000001 HC ADD 15 MINUTES (ANESTHESIA): Performed by: OBSTETRICS & GYNECOLOGY

## 2021-12-03 PROCEDURE — 3600000002 HC SURGERY LEVEL 2 BASE: Performed by: OBSTETRICS & GYNECOLOGY

## 2021-12-03 PROCEDURE — 2580000003 HC RX 258: Performed by: NURSE ANESTHETIST, CERTIFIED REGISTERED

## 2021-12-03 PROCEDURE — 7100000011 HC PHASE II RECOVERY - ADDTL 15 MIN: Performed by: OBSTETRICS & GYNECOLOGY

## 2021-12-03 RX ORDER — PROMETHAZINE HYDROCHLORIDE 25 MG/ML
6.25 INJECTION, SOLUTION INTRAMUSCULAR; INTRAVENOUS
Status: DISCONTINUED | OUTPATIENT
Start: 2021-12-03 | End: 2021-12-03 | Stop reason: HOSPADM

## 2021-12-03 RX ORDER — DOCUSATE SODIUM 100 MG/1
100 CAPSULE, LIQUID FILLED ORAL DAILY
Qty: 30 CAPSULE | Refills: 0 | Status: ON HOLD | OUTPATIENT
Start: 2021-12-03 | End: 2021-12-10 | Stop reason: SDUPTHER

## 2021-12-03 RX ORDER — LIDOCAINE HYDROCHLORIDE 10 MG/ML
INJECTION, SOLUTION EPIDURAL; INFILTRATION; INTRACAUDAL; PERINEURAL PRN
Status: DISCONTINUED | OUTPATIENT
Start: 2021-12-03 | End: 2021-12-03 | Stop reason: SDUPTHER

## 2021-12-03 RX ORDER — PROMETHAZINE HYDROCHLORIDE 25 MG/ML
12.5 INJECTION, SOLUTION INTRAMUSCULAR; INTRAVENOUS ONCE
Status: COMPLETED | OUTPATIENT
Start: 2021-12-03 | End: 2021-12-03

## 2021-12-03 RX ORDER — SODIUM CHLORIDE, SODIUM LACTATE, POTASSIUM CHLORIDE, CALCIUM CHLORIDE 600; 310; 30; 20 MG/100ML; MG/100ML; MG/100ML; MG/100ML
INJECTION, SOLUTION INTRAVENOUS CONTINUOUS PRN
Status: DISCONTINUED | OUTPATIENT
Start: 2021-12-03 | End: 2021-12-03 | Stop reason: SDUPTHER

## 2021-12-03 RX ORDER — PROMETHAZINE HYDROCHLORIDE 25 MG/ML
INJECTION, SOLUTION INTRAMUSCULAR; INTRAVENOUS
Status: COMPLETED
Start: 2021-12-03 | End: 2021-12-03

## 2021-12-03 RX ORDER — ONDANSETRON 2 MG/ML
4 INJECTION INTRAMUSCULAR; INTRAVENOUS ONCE
Status: COMPLETED | OUTPATIENT
Start: 2021-12-03 | End: 2021-12-03

## 2021-12-03 RX ORDER — IBUPROFEN 600 MG/1
600 TABLET ORAL EVERY 6 HOURS PRN
Qty: 30 TABLET | Refills: 0 | Status: ON HOLD | OUTPATIENT
Start: 2021-12-03 | End: 2021-12-10 | Stop reason: SDUPTHER

## 2021-12-03 RX ORDER — MAGNESIUM HYDROXIDE 1200 MG/15ML
LIQUID ORAL CONTINUOUS PRN
Status: COMPLETED | OUTPATIENT
Start: 2021-12-03 | End: 2021-12-03

## 2021-12-03 RX ORDER — FENTANYL CITRATE 50 UG/ML
INJECTION, SOLUTION INTRAMUSCULAR; INTRAVENOUS PRN
Status: DISCONTINUED | OUTPATIENT
Start: 2021-12-03 | End: 2021-12-03 | Stop reason: SDUPTHER

## 2021-12-03 RX ORDER — DEXAMETHASONE SODIUM PHOSPHATE 10 MG/ML
INJECTION INTRAMUSCULAR; INTRAVENOUS PRN
Status: DISCONTINUED | OUTPATIENT
Start: 2021-12-03 | End: 2021-12-03 | Stop reason: SDUPTHER

## 2021-12-03 RX ORDER — SODIUM CHLORIDE, SODIUM LACTATE, POTASSIUM CHLORIDE, CALCIUM CHLORIDE 600; 310; 30; 20 MG/100ML; MG/100ML; MG/100ML; MG/100ML
INJECTION, SOLUTION INTRAVENOUS CONTINUOUS
Status: DISCONTINUED | OUTPATIENT
Start: 2021-12-03 | End: 2021-12-03 | Stop reason: HOSPADM

## 2021-12-03 RX ORDER — HYDRALAZINE HYDROCHLORIDE 20 MG/ML
5 INJECTION INTRAMUSCULAR; INTRAVENOUS EVERY 10 MIN PRN
Status: DISCONTINUED | OUTPATIENT
Start: 2021-12-03 | End: 2021-12-03 | Stop reason: HOSPADM

## 2021-12-03 RX ORDER — ONDANSETRON 4 MG/1
4 TABLET, ORALLY DISINTEGRATING ORAL EVERY 8 HOURS PRN
Qty: 12 TABLET | Refills: 0 | Status: ON HOLD | OUTPATIENT
Start: 2021-12-03 | End: 2021-12-10 | Stop reason: SDUPTHER

## 2021-12-03 RX ORDER — MEPERIDINE HYDROCHLORIDE 50 MG/ML
12.5 INJECTION INTRAMUSCULAR; INTRAVENOUS; SUBCUTANEOUS EVERY 5 MIN PRN
Status: DISCONTINUED | OUTPATIENT
Start: 2021-12-03 | End: 2021-12-03 | Stop reason: HOSPADM

## 2021-12-03 RX ORDER — PROPOFOL 10 MG/ML
INJECTION, EMULSION INTRAVENOUS PRN
Status: DISCONTINUED | OUTPATIENT
Start: 2021-12-03 | End: 2021-12-03 | Stop reason: SDUPTHER

## 2021-12-03 RX ORDER — ACETAMINOPHEN AND CODEINE PHOSPHATE 300; 30 MG/1; MG/1
1 TABLET ORAL EVERY 4 HOURS PRN
Qty: 5 TABLET | Refills: 0 | Status: SHIPPED | OUTPATIENT
Start: 2021-12-03 | End: 2021-12-08

## 2021-12-03 RX ORDER — DOXYCYCLINE HYCLATE 100 MG
200 TABLET ORAL ONCE
Status: COMPLETED | OUTPATIENT
Start: 2021-12-03 | End: 2021-12-03

## 2021-12-03 RX ORDER — HYDROCODONE BITARTRATE AND ACETAMINOPHEN 5; 325 MG/1; MG/1
1 TABLET ORAL
Status: DISCONTINUED | OUTPATIENT
Start: 2021-12-03 | End: 2021-12-03 | Stop reason: HOSPADM

## 2021-12-03 RX ORDER — METOCLOPRAMIDE HYDROCHLORIDE 5 MG/ML
10 INJECTION INTRAMUSCULAR; INTRAVENOUS
Status: DISCONTINUED | OUTPATIENT
Start: 2021-12-03 | End: 2021-12-03 | Stop reason: HOSPADM

## 2021-12-03 RX ORDER — ONDANSETRON 2 MG/ML
INJECTION INTRAMUSCULAR; INTRAVENOUS PRN
Status: DISCONTINUED | OUTPATIENT
Start: 2021-12-03 | End: 2021-12-03 | Stop reason: SDUPTHER

## 2021-12-03 RX ORDER — METRONIDAZOLE 500 MG/1
500 TABLET ORAL
COMMUNITY
Start: 2021-09-22 | End: 2022-01-13 | Stop reason: ALTCHOICE

## 2021-12-03 RX ORDER — MIDAZOLAM HYDROCHLORIDE 1 MG/ML
INJECTION INTRAMUSCULAR; INTRAVENOUS PRN
Status: DISCONTINUED | OUTPATIENT
Start: 2021-12-03 | End: 2021-12-03 | Stop reason: SDUPTHER

## 2021-12-03 RX ORDER — DIPHENHYDRAMINE HYDROCHLORIDE 50 MG/ML
12.5 INJECTION INTRAMUSCULAR; INTRAVENOUS
Status: DISCONTINUED | OUTPATIENT
Start: 2021-12-03 | End: 2021-12-03 | Stop reason: HOSPADM

## 2021-12-03 RX ORDER — KETOROLAC TROMETHAMINE 30 MG/ML
INJECTION, SOLUTION INTRAMUSCULAR; INTRAVENOUS PRN
Status: DISCONTINUED | OUTPATIENT
Start: 2021-12-03 | End: 2021-12-03 | Stop reason: SDUPTHER

## 2021-12-03 RX ORDER — ONDANSETRON 2 MG/ML
INJECTION INTRAMUSCULAR; INTRAVENOUS
Status: COMPLETED
Start: 2021-12-03 | End: 2021-12-03

## 2021-12-03 RX ADMIN — ONDANSETRON 4 MG: 2 INJECTION INTRAMUSCULAR; INTRAVENOUS at 11:03

## 2021-12-03 RX ADMIN — DOXYCYCLINE HYCLATE 200 MG: 100 TABLET, COATED ORAL at 10:25

## 2021-12-03 RX ADMIN — PROMETHAZINE HYDROCHLORIDE 12.5 MG: 25 INJECTION INTRAMUSCULAR; INTRAVENOUS at 12:10

## 2021-12-03 RX ADMIN — HYDROMORPHONE HYDROCHLORIDE 0.5 MG: 1 INJECTION, SOLUTION INTRAMUSCULAR; INTRAVENOUS; SUBCUTANEOUS at 13:03

## 2021-12-03 RX ADMIN — PROPOFOL 200 MG: 10 INJECTION, EMULSION INTRAVENOUS at 11:18

## 2021-12-03 RX ADMIN — PROMETHAZINE HYDROCHLORIDE 12.5 MG: 25 INJECTION, SOLUTION INTRAMUSCULAR; INTRAVENOUS at 12:10

## 2021-12-03 RX ADMIN — ONDANSETRON 4 MG: 2 INJECTION, SOLUTION INTRAMUSCULAR; INTRAVENOUS at 11:18

## 2021-12-03 RX ADMIN — DEXAMETHASONE SODIUM PHOSPHATE 10 MG: 10 INJECTION INTRAMUSCULAR; INTRAVENOUS at 11:18

## 2021-12-03 RX ADMIN — HYDROMORPHONE HYDROCHLORIDE 0.5 MG: 1 INJECTION, SOLUTION INTRAMUSCULAR; INTRAVENOUS; SUBCUTANEOUS at 14:40

## 2021-12-03 RX ADMIN — KETOROLAC TROMETHAMINE 30 MG: 30 INJECTION, SOLUTION INTRAMUSCULAR at 11:45

## 2021-12-03 RX ADMIN — LIDOCAINE HYDROCHLORIDE 50 MG: 10 INJECTION, SOLUTION EPIDURAL; INFILTRATION; INTRACAUDAL; PERINEURAL at 11:18

## 2021-12-03 RX ADMIN — FENTANYL CITRATE 100 MCG: 50 INJECTION, SOLUTION INTRAMUSCULAR; INTRAVENOUS at 11:18

## 2021-12-03 RX ADMIN — MIDAZOLAM HYDROCHLORIDE 2 MG: 1 INJECTION, SOLUTION INTRAMUSCULAR; INTRAVENOUS at 11:18

## 2021-12-03 RX ADMIN — SODIUM CHLORIDE, POTASSIUM CHLORIDE, SODIUM LACTATE AND CALCIUM CHLORIDE: 600; 310; 30; 20 INJECTION, SOLUTION INTRAVENOUS at 11:15

## 2021-12-03 RX ADMIN — SODIUM CHLORIDE, POTASSIUM CHLORIDE, SODIUM LACTATE AND CALCIUM CHLORIDE: 600; 310; 30; 20 INJECTION, SOLUTION INTRAVENOUS at 12:38

## 2021-12-03 ASSESSMENT — PULMONARY FUNCTION TESTS
PIF_VALUE: 6
PIF_VALUE: 18
PIF_VALUE: 6
PIF_VALUE: 16
PIF_VALUE: 2
PIF_VALUE: 6
PIF_VALUE: 7
PIF_VALUE: 7
PIF_VALUE: 6
PIF_VALUE: 7
PIF_VALUE: 5
PIF_VALUE: 6
PIF_VALUE: 1
PIF_VALUE: 14
PIF_VALUE: 18
PIF_VALUE: 6
PIF_VALUE: 17
PIF_VALUE: 1
PIF_VALUE: 18
PIF_VALUE: 6
PIF_VALUE: 0
PIF_VALUE: 5
PIF_VALUE: 17
PIF_VALUE: 23
PIF_VALUE: 6
PIF_VALUE: 6
PIF_VALUE: 17
PIF_VALUE: 1
PIF_VALUE: 6
PIF_VALUE: 19
PIF_VALUE: 6
PIF_VALUE: 22
PIF_VALUE: 6
PIF_VALUE: 0
PIF_VALUE: 7
PIF_VALUE: 5
PIF_VALUE: 6
PIF_VALUE: 6

## 2021-12-03 ASSESSMENT — PAIN DESCRIPTION - DESCRIPTORS
DESCRIPTORS: NAGGING;DISCOMFORT
DESCRIPTORS: BURNING

## 2021-12-03 ASSESSMENT — PAIN - FUNCTIONAL ASSESSMENT: PAIN_FUNCTIONAL_ASSESSMENT: 0-10

## 2021-12-03 ASSESSMENT — PAIN SCALES - GENERAL
PAINLEVEL_OUTOF10: 3
PAINLEVEL_OUTOF10: 6
PAINLEVEL_OUTOF10: 7
PAINLEVEL_OUTOF10: 7

## 2021-12-03 NOTE — H&P
OB/GYN Pre-Op H&P  [address] [facility]    Patient Name: David Saldana     Patient : 1995  Room/Bed: [facility]/NONE  Admission Date/Time: 12/3/2021  9:10 AM  Primary Care Physician: No primary care provider on file. MRN: [id number]    Date: 12/3/2021  Time: 10:31 AM    The patient was seen in pre-op holding. She is here for suction dilation and curettage. Patient is a [phone] with missed . Patient presented to [facility] [address] ED 21 with pelvic pain and vaginal bleeding. TVUS 21 showing possible small intrauterine gestational sac with no visualized IUP. HCG quant at that time was 345. HCG 21 927 and repeat HCG 21 991, consistent with nonviable pregnancy. Patient requesting surgical management at this time. The procedure risks and complications were reviewed. The labs, Consent, and H&P were reviewed and updated. The patient was counseled on the possibility of  the need of a second surgery. The patient voiced understanding and had all of her questions answered. The possibility of incomplete removal of abnormal tissue was discussed. OBSTETRICAL HISTORY:   OB History    Para Term  AB Living   2 0 0 0 0 0   SAB IAB Ectopic Molar Multiple Live Births   0 0 0 0 0 0      # Outcome Date GA Lbr Zohaib/2nd Weight Sex Delivery Anes PTL Lv   2             1                 PAST MEDICAL HISTORY:   has a past medical history of Gall stones, Kidney stone, and UTI (lower urinary tract infection). PAST SURGICAL HISTORY:   has a past surgical history that includes Arm Surgery (Right); [place] tooth extraction; Cholecystectomy (2018); pr lap,cholecystectomy (N/A, 2018); Breast reduction surgery (2018); Cystocopy (2019); and Cystoscopy (N/A, 2019).     ALLERGIES:  Allergies as of 2021 - Fully Reviewed 2021   Allergen Reaction Noted    Amoxicillin Hives 2015    Other  2021       MEDICATIONS:  No current facility-administered medications for this encounter. FAMILY HISTORY:  family history is not on file. SOCIAL HISTORY:   reports that she has never smoked. She has never used smokeless tobacco. She reports current alcohol use. She reports that she does not use drugs. VITALS:  Vitals:    12/03/21 1012   BP: 111/64   Pulse: 67   Resp: 18   Temp: 97.2 °F (36.2 °C)   TempSrc: Temporal   SpO2: 98%   Weight: 185 lb (83.9 kg)   Height: 5' 5\" (1.651 m)                                                                                                                               PHYSICAL EXAM:     Unchanged from Prior H&P  CONSTITUTIONAL:  Alert and oriented, no acute distress  HEAD: normocephalic, atraumatic  EYES: Pupils equal and reactive to light   ENT: Mucus membranes moist, No otorrhea, no rhinorrhea  NECK:  supple, symmetrical, trachea midline   LUNGS:  Good air movement bilaterally, unlabored respirations   CARDIOVASCULAR: Regular rate and rhythm   ABDOMEN: soft, non tender, non distended, no rebound or guarding   MUSCULOSKELETAL:  Equal strength bilaterally, normal muscle tone  SKIN: No abscess or rash  NEUROLOGIC: no focal deficits  PSYCH: affect appropriate  Pelvic Exam: deferred to OR      LAB RESULTS:  Admission on 12/03/2021   Component Date Value Ref Range Status    Specimen Description 12/03/2021 . NASOPHARYNGEAL SWAB   Final    SARS-CoV-2, Rapid 12/03/2021 Not Detected  Not Detected Final    Comment:       Rapid NAAT:  The specimen is NEGATIVE for SARS-CoV-2, the novel coronavirus associated with   COVID-19. The ID NOW COVID-19 assay is designed to detect the virus that causes COVID-19 in patients   with signs and symptoms of infection who are suspected of COVID-19. An individual without symptoms of COVID-19 and who is not shedding SARS-CoV-2 virus would   expect to have a negative (not detected) result in this assay.   Negative results should be treated as presumptive and, if inconsistent with clinical signs   and symptoms or necessary for patient management,  should be tested with an alternative molecular assay. Negative results do not preclude   SARS-CoV-2 infection and   should not be used as the sole basis for patient management decisions. Fact sheet for Healthcare Providers: [employer]si  Fact sheet for Patients: [employer]si          Methodology: Isothermal Nucleic Acid Amplification     Hospital Outpatient Visit on 12/01/2021   Component Date Value Ref Range Status    hCG Quant 12/01/2021 991* <5 IU/L Final    Comment:    Non-preg premeno   <=5  Postmeno           <=8  Male               <=3  If HCG results do not concur with clinical observations, additional testing to confirm   results is recommended. Elevated results not associated with pregnancy may be found in patients with other diseases   such as tumors of the germ cells (testis, ovaries, etc.), bladder, pancreas, stomach, lungs,   and liver. Hospital Outpatient Visit on 11/29/2021   Component Date Value Ref Range Status    hCG Quant 11/29/2021 927* <5 IU/L Final    Comment:    Non-preg premeno   <=5  Postmeno           <=8  Male               <=3  If HCG results do not concur with clinical observations, additional testing to confirm   results is recommended. Elevated results not associated with pregnancy may be found in patients with other diseases   such as tumors of the germ cells (testis, ovaries, etc.), bladder, pancreas, stomach, lungs,   and liver.       WBC 11/29/2021 8.4  3.5 - 11.3 k/uL Final    RBC 11/29/2021 4.63  3.95 - 5.11 m/uL Final    Hemoglobin 11/29/2021 13.6  11.9 - 15.1 g/dL Final    Hematocrit 11/29/2021 42.4  36.3 - 47.1 % Final    Platelets 81/47/5972 229  138 - 453 k/uL Final    WBC 11/24/2021 11.0  3.5 - 11.3 k/uL Final    RBC 11/24/2021 4.41  3.95 - 5.11 m/uL Final    Hemoglobin 11/24/2021 12.9  11.9 - 15.1 g/dL Final history and physical as well as all supporting surgical documentation will be forwarded to the pre-operative holding area. The patient is aware that this procedure may not alleviate her symptoms. That there may be a necessity for a second surgery and that there may be an incomplete removal of abnormal tissue.     Reba Stock DO  Ob/Gyn Resident    [facility], [address]  12/3/2021, 10:31 AM

## 2021-12-03 NOTE — ANESTHESIA PRE PROCEDURE
Department of Anesthesiology  Preprocedure Note       Name:  Oskar Hernandes   Age:  23 y.o.  :  1995                                          MRN:  [id number]         Date:  12/3/2021      Surgeon: Nita Comer):  Zaynab Bianchi MD    Procedure: DILATATION AND CURETTAGE SUCTION (N/A )    Medications prior to admission:   Prior to Admission medications    Medication Sig Start Date End Date Taking? Authorizing Provider   nitrofurantoin, macrocrystal-monohydrate, (MACROBID) 100 MG capsule Take 1 capsule by mouth 2 times daily for 10 days 21  Millie Dan DO   ibuprofen (IBU) 600 MG tablet Take 1 tablet by mouth every 6 hours as needed for Pain 20   Stephen Godoy MD   acetaminophen (TYLENOL) 500 MG tablet Take 2 tablets by mouth every 6 hours as needed for Pain 20   Stephen Godoy MD       Current medications:    No current facility-administered medications for this visit. No current outpatient medications on file. Facility-Administered Medications Ordered in Other Visits   Medication Dose Route Frequency Provider Last Rate Last Admin    doxycycline hyclate (VIBRA-TABS) tablet 200 mg  200 mg Oral Once Rodríguez Quiñonez DO           Allergies:     Allergies   Allergen Reactions    Amoxicillin Hives    Lactose      Can have milk in foods, but not by itself (I.e., glass of milk)    Other      Can have milk in foods, but not by itself (I.e., glass of milk)       Problem List:    Patient Active Problem List   Diagnosis Code    Allergic reaction caused by a drug T78.40XA    E. coli UTI (urinary tract infection) N39.0, B96.20    Acute pyelonephritis N10    Cholecystitis K81.9    Pyelonephritis N12    History of kidney stones Z87.442       Past Medical History:        Diagnosis Date    Gall stones     Kidney stone     Patient denies    UTI (lower urinary tract infection)        Past Surgical History:        Procedure Laterality Date    ARM SURGERY Right     BREAST REDUCTION SURGERY  07/25/2018    CHOLECYSTECTOMY  05/18/2018    Laparoscopic    CYSTOSCOPY  09/04/2019    CYSTOSCOPY N/A 9/4/2019    CYSTOSCOPY RETROGRADE PYELOGRAM WITH  CYSTOGRAM, performed by Estevan Daugherty MD at [address] [place] [address] N/A 5/18/2018    CHOLECYSTECTOMY LAPAROSCOPIC performed by Kisha Cardenas MD at [facility]         Social History:    Social History     Tobacco Use    Smoking status: Never Smoker    Smokeless tobacco: Never Used   Substance Use Topics    Alcohol use: Yes     Comment: social drinker                                 Counseling given: Not Answered      Vital Signs (Current): There were no vitals filed for this visit. BP Readings from Last 3 Encounters:   11/29/21 109/74   11/24/21 (!) 133/95   12/28/20 107/73       NPO Status:                                                                                 BMI:   Wt Readings from Last 3 Encounters:   11/29/21 185 lb (83.9 kg)   11/24/21 180 lb (81.6 kg)   12/28/20 180 lb (81.6 kg)     There is no height or weight on file to calculate BMI.    CBC:   Lab Results   Component Value Date    WBC 8.4 11/29/2021    RBC 4.63 11/29/2021    HGB 13.6 11/29/2021    HCT 42.4 11/29/2021    MCV 91.6 11/29/2021    RDW 13.2 11/29/2021     11/29/2021       CMP:   Lab Results   Component Value Date     11/24/2021    K 4.1 11/24/2021     11/24/2021    CO2 23 11/24/2021    BUN 14 11/24/2021    CREATININE 0.76 11/24/2021    GFRAA >60 11/24/2021    LABGLOM >60 11/24/2021    GLUCOSE 114 11/24/2021    PROT 6.7 11/24/2021    CALCIUM 8.8 11/24/2021    BILITOT 0.47 11/24/2021    ALKPHOS 48 11/24/2021    AST 20 11/24/2021    ALT 31 11/24/2021       POC Tests: No results for input(s): POCGLU, POCNA, POCK, POCCL, POCBUN, POCHEMO, POCHCT in the last 72 hours.     Coags:   Lab Results   Component Value Date    PROTIME 11.5 05/15/2018    INR 1.1 05/15/2018

## 2021-12-03 NOTE — OP NOTE
Operative Note  Department of Obstetrics and Gynecology  [facility]       Patient: Bry Rodriguez   : 1995  MRN: [id number]       Acct: [de-identified]   PCP: No primary care provider on file. Date of Procedure: 12/3/21    Pre-operative Diagnosis: 23 y.o. female [id number]  Missed      Post-operative Diagnosis: same, suspected cervical fibroid    Procedure: Suction Dilation and Curettage     Surgeon: Dr. Ava Ornelas  Assistants: Emilee Shafer DO, PGY2; Gen Castro, MS4    Anesthesia: general    Indications: Patient is a [phone] with missed . Patient presented to [address] [place] [address],[address] ED 21 with pelvic pain and vaginal bleeding. TVUS 21 showing possible small intrauterine gestational sac with no visualized IUP. HCG quant at that time was 345. HCG 21 927 and repeat HCG 21 991, consistent with nonviable pregnancy. Patient requesting surgical management at this time. Procedure Details: The patient was seen in the pre-op room. The risks, benefits, complications, treatment options, and expected outcomes were discussed with the patient. The patient concurred with the proposed plan, giving informed consent. The patient was taken to the Operating Room and identified as Bry Rodriguez and the procedure was verified. A Time Out was held and the above information confirmed. After administration of general anesthesia, the patient was placed in the dorsolithotomy position and examination under general anesthesia performed with findings as noted above. The patient was prepped and draped in the usual sterile fashion. A weighted speculum was placed into the vagina to visualize the cervix. The cervix was grasped with a single-tooth tenaculum. The cervix was dilated with constanza dilators to size 17. A curved 7-mm suction curettage was advanced into the uterine cavity without difficulty and products of conception were evacuated.   A sharp curette was then passed into the uterine cavity to ensure expulsion of all products of conception. The suction curette was passed a last time to ensure all remaining products were expelled. All instruments were then removed. Hemostasis was visualized at the tenaculum site on the cervix. Instrument, sponge, and needle counts were correct at the conclusion of the case. SCDs for DVT prophylaxis remain in place for the post operative period. Dr. Daisy Yap was present for the entire operation.     Findings:  Approximately 9 week sized anteverted uterus, products of conception, suspected cervical fibroid palpated on exam  Estimated Blood Loss: 75ml  Drains:  None  Total IV Fluids: 700ml  Urine output: patient voided prior to procedure   Specimens: products of conception, sent to pathology  Instrument and Sponge Count: Correct  Complications: none  Condition: stable, transfer to post anesthesia recovery    Sofia Garcia DO  Ob/Gyn Resident  12/3/2021, 11:54 AM

## 2021-12-07 ENCOUNTER — TELEPHONE (OUTPATIENT)
Dept: OBGYN | Age: 26
End: 2021-12-07

## 2021-12-07 LAB — SURGICAL PATHOLOGY REPORT: NORMAL

## 2021-12-10 ENCOUNTER — ANESTHESIA EVENT (OUTPATIENT)
Dept: OPERATING ROOM | Age: 26
DRG: 818 | End: 2021-12-10
Payer: COMMERCIAL

## 2021-12-10 ENCOUNTER — HOSPITAL ENCOUNTER (INPATIENT)
Age: 26
LOS: 1 days | Discharge: HOME | DRG: 818 | End: 2021-12-11
Attending: EMERGENCY MEDICINE | Admitting: OBSTETRICS & GYNECOLOGY
Payer: COMMERCIAL

## 2021-12-10 ENCOUNTER — APPOINTMENT (OUTPATIENT)
Dept: ULTRASOUND IMAGING | Age: 26
DRG: 818 | End: 2021-12-10
Payer: COMMERCIAL

## 2021-12-10 ENCOUNTER — ANESTHESIA (OUTPATIENT)
Dept: OPERATING ROOM | Age: 26
DRG: 818 | End: 2021-12-10
Payer: COMMERCIAL

## 2021-12-10 VITALS — SYSTOLIC BLOOD PRESSURE: 131 MMHG | TEMPERATURE: 95.9 F | DIASTOLIC BLOOD PRESSURE: 69 MMHG | OXYGEN SATURATION: 100 %

## 2021-12-10 DIAGNOSIS — R10.84 GENERALIZED ABDOMINAL PAIN: ICD-10-CM

## 2021-12-10 DIAGNOSIS — G89.18 POST-OP PAIN: Primary | ICD-10-CM

## 2021-12-10 PROBLEM — Z98.890 H/O LAPAROSCOPY: Status: ACTIVE | Noted: 2021-12-10

## 2021-12-10 PROBLEM — O00.90 ECTOPIC PREGNANCY: Status: ACTIVE | Noted: 2021-12-10

## 2021-12-10 PROBLEM — Z98.890 POSTOPERATIVE STATE: Status: ACTIVE | Noted: 2021-12-10

## 2021-12-10 LAB
ABO/RH: NORMAL
ABSOLUTE EOS #: 0.11 K/UL (ref 0–0.44)
ABSOLUTE EOS #: <0.03 K/UL (ref 0–0.44)
ABSOLUTE IMMATURE GRANULOCYTE: 0.06 K/UL (ref 0–0.3)
ABSOLUTE IMMATURE GRANULOCYTE: 0.06 K/UL (ref 0–0.3)
ABSOLUTE LYMPH #: 0.76 K/UL (ref 1.1–3.7)
ABSOLUTE LYMPH #: 3.79 K/UL (ref 1.1–3.7)
ABSOLUTE MONO #: 0.17 K/UL (ref 0.1–1.2)
ABSOLUTE MONO #: 0.88 K/UL (ref 0.1–1.2)
ANION GAP SERPL CALCULATED.3IONS-SCNC: 12 MMOL/L (ref 9–17)
ANION GAP SERPL CALCULATED.3IONS-SCNC: 16 MMOL/L (ref 9–17)
ANTIBODY SCREEN: NEGATIVE
ARM BAND NUMBER: NORMAL
BASOPHILS # BLD: 0 % (ref 0–2)
BASOPHILS # BLD: 0 % (ref 0–2)
BASOPHILS ABSOLUTE: 0.06 K/UL (ref 0–0.2)
BASOPHILS ABSOLUTE: <0.03 K/UL (ref 0–0.2)
BUN BLDV-MCNC: 12 MG/DL (ref 6–20)
BUN/CREAT BLD: ABNORMAL (ref 9–20)
CALCIUM SERPL-MCNC: 9.4 MG/DL (ref 8.6–10.4)
CHLORIDE BLD-SCNC: 101 MMOL/L (ref 98–107)
CHLORIDE BLD-SCNC: 104 MMOL/L (ref 98–107)
CO2: 20 MMOL/L (ref 20–31)
CO2: 20 MMOL/L (ref 20–31)
CREAT SERPL-MCNC: 0.75 MG/DL (ref 0.5–0.9)
DIFFERENTIAL TYPE: ABNORMAL
DIFFERENTIAL TYPE: ABNORMAL
DIRECT EXAM: NORMAL
EOSINOPHILS RELATIVE PERCENT: 0 % (ref 1–4)
EOSINOPHILS RELATIVE PERCENT: 1 % (ref 1–4)
EXPIRATION DATE: NORMAL
GFR AFRICAN AMERICAN: >60 ML/MIN
GFR NON-AFRICAN AMERICAN: >60 ML/MIN
GFR SERPL CREATININE-BSD FRML MDRD: ABNORMAL ML/MIN/{1.73_M2}
GFR SERPL CREATININE-BSD FRML MDRD: ABNORMAL ML/MIN/{1.73_M2}
GLUCOSE BLD-MCNC: 105 MG/DL (ref 70–99)
HCG QUANTITATIVE: 727 IU/L
HCT VFR BLD CALC: 39.3 % (ref 36.3–47.1)
HCT VFR BLD CALC: 40.8 % (ref 36.3–47.1)
HEMOGLOBIN: 13.2 G/DL (ref 11.9–15.1)
HEMOGLOBIN: 14.2 G/DL (ref 11.9–15.1)
IMMATURE GRANULOCYTES: 0 %
IMMATURE GRANULOCYTES: 1 %
LYMPHOCYTES # BLD: 27 % (ref 24–43)
LYMPHOCYTES # BLD: 7 % (ref 24–43)
Lab: NORMAL
MCH RBC QN AUTO: 30 PG (ref 25.2–33.5)
MCH RBC QN AUTO: 30 PG (ref 25.2–33.5)
MCHC RBC AUTO-ENTMCNC: 33.6 G/DL (ref 28.4–34.8)
MCHC RBC AUTO-ENTMCNC: 34.8 G/DL (ref 28.4–34.8)
MCV RBC AUTO: 86.3 FL (ref 82.6–102.9)
MCV RBC AUTO: 89.3 FL (ref 82.6–102.9)
MONOCYTES # BLD: 2 % (ref 3–12)
MONOCYTES # BLD: 6 % (ref 3–12)
NRBC AUTOMATED: 0 PER 100 WBC
NRBC AUTOMATED: 0 PER 100 WBC
PDW BLD-RTO: 13.2 % (ref 11.8–14.4)
PDW BLD-RTO: 13.2 % (ref 11.8–14.4)
PLATELET # BLD: 215 K/UL (ref 138–453)
PLATELET # BLD: 279 K/UL (ref 138–453)
PLATELET ESTIMATE: ABNORMAL
PLATELET ESTIMATE: ABNORMAL
PMV BLD AUTO: 10.5 FL (ref 8.1–13.5)
PMV BLD AUTO: 10.8 FL (ref 8.1–13.5)
POTASSIUM SERPL-SCNC: 3.6 MMOL/L (ref 3.7–5.3)
POTASSIUM SERPL-SCNC: 3.8 MMOL/L (ref 3.7–5.3)
RBC # BLD: 4.4 M/UL (ref 3.95–5.11)
RBC # BLD: 4.73 M/UL (ref 3.95–5.11)
RBC # BLD: ABNORMAL 10*6/UL
RBC # BLD: ABNORMAL 10*6/UL
SARS-COV-2, RAPID: NOT DETECTED
SEG NEUTROPHILS: 66 % (ref 36–65)
SEG NEUTROPHILS: 90 % (ref 36–65)
SEGMENTED NEUTROPHILS ABSOLUTE COUNT: 9.12 K/UL (ref 1.5–8.1)
SEGMENTED NEUTROPHILS ABSOLUTE COUNT: 9.53 K/UL (ref 1.5–8.1)
SODIUM BLD-SCNC: 136 MMOL/L (ref 135–144)
SODIUM BLD-SCNC: 137 MMOL/L (ref 135–144)
SPECIMEN DESCRIPTION: NORMAL
SPECIMEN DESCRIPTION: NORMAL
WBC # BLD: 10.6 K/UL (ref 3.5–11.3)
WBC # BLD: 14 K/UL (ref 3.5–11.3)
WBC # BLD: ABNORMAL 10*3/UL
WBC # BLD: ABNORMAL 10*3/UL

## 2021-12-10 PROCEDURE — 6360000002 HC RX W HCPCS: Performed by: STUDENT IN AN ORGANIZED HEALTH CARE EDUCATION/TRAINING PROGRAM

## 2021-12-10 PROCEDURE — 7100000001 HC PACU RECOVERY - ADDTL 15 MIN: Performed by: OBSTETRICS & GYNECOLOGY

## 2021-12-10 PROCEDURE — 96375 TX/PRO/DX INJ NEW DRUG ADDON: CPT

## 2021-12-10 PROCEDURE — G0378 HOSPITAL OBSERVATION PER HR: HCPCS

## 2021-12-10 PROCEDURE — 87635 SARS-COV-2 COVID-19 AMP PRB: CPT

## 2021-12-10 PROCEDURE — 6370000000 HC RX 637 (ALT 250 FOR IP): Performed by: OBSTETRICS & GYNECOLOGY

## 2021-12-10 PROCEDURE — 88305 TISSUE EXAM BY PATHOLOGIST: CPT

## 2021-12-10 PROCEDURE — 0UT58ZZ RESECTION OF RIGHT FALLOPIAN TUBE, VIA NATURAL OR ARTIFICIAL OPENING ENDOSCOPIC: ICD-10-PCS | Performed by: OBSTETRICS & GYNECOLOGY

## 2021-12-10 PROCEDURE — 6360000002 HC RX W HCPCS: Performed by: NURSE ANESTHETIST, CERTIFIED REGISTERED

## 2021-12-10 PROCEDURE — 2580000003 HC RX 258: Performed by: HEALTH CARE PROVIDER

## 2021-12-10 PROCEDURE — 3700000000 HC ANESTHESIA ATTENDED CARE: Performed by: OBSTETRICS & GYNECOLOGY

## 2021-12-10 PROCEDURE — 2500000003 HC RX 250 WO HCPCS: Performed by: OBSTETRICS & GYNECOLOGY

## 2021-12-10 PROCEDURE — 3700000001 HC ADD 15 MINUTES (ANESTHESIA): Performed by: OBSTETRICS & GYNECOLOGY

## 2021-12-10 PROCEDURE — 2580000003 HC RX 258: Performed by: OBSTETRICS & GYNECOLOGY

## 2021-12-10 PROCEDURE — 99223 1ST HOSP IP/OBS HIGH 75: CPT | Performed by: OBSTETRICS & GYNECOLOGY

## 2021-12-10 PROCEDURE — 6370000000 HC RX 637 (ALT 250 FOR IP): Performed by: STUDENT IN AN ORGANIZED HEALTH CARE EDUCATION/TRAINING PROGRAM

## 2021-12-10 PROCEDURE — 6370000000 HC RX 637 (ALT 250 FOR IP): Performed by: ANESTHESIOLOGY

## 2021-12-10 PROCEDURE — 6360000002 HC RX W HCPCS: Performed by: HEALTH CARE PROVIDER

## 2021-12-10 PROCEDURE — 7100000000 HC PACU RECOVERY - FIRST 15 MIN: Performed by: OBSTETRICS & GYNECOLOGY

## 2021-12-10 PROCEDURE — 3600000004 HC SURGERY LEVEL 4 BASE: Performed by: OBSTETRICS & GYNECOLOGY

## 2021-12-10 PROCEDURE — 2500000003 HC RX 250 WO HCPCS: Performed by: NURSE ANESTHETIST, CERTIFIED REGISTERED

## 2021-12-10 PROCEDURE — 87660 TRICHOMONAS VAGIN DIR PROBE: CPT

## 2021-12-10 PROCEDURE — 80048 BASIC METABOLIC PNL TOTAL CA: CPT

## 2021-12-10 PROCEDURE — 1200000000 HC SEMI PRIVATE

## 2021-12-10 PROCEDURE — 76830 TRANSVAGINAL US NON-OB: CPT

## 2021-12-10 PROCEDURE — 99285 EMERGENCY DEPT VISIT HI MDM: CPT

## 2021-12-10 PROCEDURE — 84702 CHORIONIC GONADOTROPIN TEST: CPT

## 2021-12-10 PROCEDURE — C1760 CLOSURE DEV, VASC: HCPCS | Performed by: OBSTETRICS & GYNECOLOGY

## 2021-12-10 PROCEDURE — 87491 CHLMYD TRACH DNA AMP PROBE: CPT

## 2021-12-10 PROCEDURE — 86900 BLOOD TYPING SEROLOGIC ABO: CPT

## 2021-12-10 PROCEDURE — 87510 GARDNER VAG DNA DIR PROBE: CPT

## 2021-12-10 PROCEDURE — 6360000002 HC RX W HCPCS: Performed by: ANESTHESIOLOGY

## 2021-12-10 PROCEDURE — 85025 COMPLETE CBC W/AUTO DIFF WBC: CPT

## 2021-12-10 PROCEDURE — 96374 THER/PROPH/DIAG INJ IV PUSH: CPT

## 2021-12-10 PROCEDURE — 59120 TREAT ECTOPIC PREGNANCY: CPT | Performed by: OBSTETRICS & GYNECOLOGY

## 2021-12-10 PROCEDURE — 80051 ELECTROLYTE PANEL: CPT

## 2021-12-10 PROCEDURE — 36415 COLL VENOUS BLD VENIPUNCTURE: CPT

## 2021-12-10 PROCEDURE — 86850 RBC ANTIBODY SCREEN: CPT

## 2021-12-10 PROCEDURE — 2709999900 HC NON-CHARGEABLE SUPPLY: Performed by: OBSTETRICS & GYNECOLOGY

## 2021-12-10 PROCEDURE — 86901 BLOOD TYPING SEROLOGIC RH(D): CPT

## 2021-12-10 PROCEDURE — 96376 TX/PRO/DX INJ SAME DRUG ADON: CPT

## 2021-12-10 PROCEDURE — 96361 HYDRATE IV INFUSION ADD-ON: CPT

## 2021-12-10 PROCEDURE — 6370000000 HC RX 637 (ALT 250 FOR IP): Performed by: HEALTH CARE PROVIDER

## 2021-12-10 PROCEDURE — 2580000003 HC RX 258: Performed by: NURSE ANESTHETIST, CERTIFIED REGISTERED

## 2021-12-10 PROCEDURE — 87591 N.GONORRHOEAE DNA AMP PROB: CPT

## 2021-12-10 PROCEDURE — 10T28ZZ RESECTION OF PRODUCTS OF CONCEPTION, ECTOPIC, VIA NATURAL OR ARTIFICIAL OPENING ENDOSCOPIC: ICD-10-PCS | Performed by: OBSTETRICS & GYNECOLOGY

## 2021-12-10 PROCEDURE — 3600000014 HC SURGERY LEVEL 4 ADDTL 15MIN: Performed by: OBSTETRICS & GYNECOLOGY

## 2021-12-10 PROCEDURE — 87480 CANDIDA DNA DIR PROBE: CPT

## 2021-12-10 RX ORDER — LIDOCAINE HYDROCHLORIDE 10 MG/ML
INJECTION, SOLUTION EPIDURAL; INFILTRATION; INTRACAUDAL; PERINEURAL PRN
Status: DISCONTINUED | OUTPATIENT
Start: 2021-12-10 | End: 2021-12-10 | Stop reason: SDUPTHER

## 2021-12-10 RX ORDER — KETOROLAC TROMETHAMINE 30 MG/ML
INJECTION, SOLUTION INTRAMUSCULAR; INTRAVENOUS PRN
Status: DISCONTINUED | OUTPATIENT
Start: 2021-12-10 | End: 2021-12-10 | Stop reason: SDUPTHER

## 2021-12-10 RX ORDER — LABETALOL HYDROCHLORIDE 5 MG/ML
5 INJECTION, SOLUTION INTRAVENOUS EVERY 10 MIN PRN
Status: DISCONTINUED | OUTPATIENT
Start: 2021-12-10 | End: 2021-12-10 | Stop reason: HOSPADM

## 2021-12-10 RX ORDER — MIDAZOLAM HYDROCHLORIDE 1 MG/ML
INJECTION INTRAMUSCULAR; INTRAVENOUS PRN
Status: DISCONTINUED | OUTPATIENT
Start: 2021-12-10 | End: 2021-12-10 | Stop reason: SDUPTHER

## 2021-12-10 RX ORDER — FENTANYL CITRATE 50 UG/ML
25 INJECTION, SOLUTION INTRAMUSCULAR; INTRAVENOUS EVERY 5 MIN PRN
Status: DISCONTINUED | OUTPATIENT
Start: 2021-12-10 | End: 2021-12-10 | Stop reason: HOSPADM

## 2021-12-10 RX ORDER — IBUPROFEN 600 MG/1
600 TABLET ORAL EVERY 6 HOURS PRN
Qty: 30 TABLET | Refills: 0 | Status: SHIPPED | OUTPATIENT
Start: 2021-12-10 | End: 2021-12-10 | Stop reason: SDUPTHER

## 2021-12-10 RX ORDER — BUPIVACAINE HYDROCHLORIDE AND EPINEPHRINE 2.5; 5 MG/ML; UG/ML
INJECTION, SOLUTION INFILTRATION; PERINEURAL PRN
Status: DISCONTINUED | OUTPATIENT
Start: 2021-12-10 | End: 2021-12-10 | Stop reason: HOSPADM

## 2021-12-10 RX ORDER — ACETAMINOPHEN AND CODEINE PHOSPHATE 300; 30 MG/1; MG/1
1 TABLET ORAL EVERY 4 HOURS PRN
Qty: 12 TABLET | Refills: 0 | Status: SHIPPED | OUTPATIENT
Start: 2021-12-10 | End: 2021-12-12

## 2021-12-10 RX ORDER — 0.9 % SODIUM CHLORIDE 0.9 %
500 INTRAVENOUS SOLUTION INTRAVENOUS
Status: DISCONTINUED | OUTPATIENT
Start: 2021-12-10 | End: 2021-12-10 | Stop reason: HOSPADM

## 2021-12-10 RX ORDER — IBUPROFEN 600 MG/1
600 TABLET ORAL EVERY 6 HOURS PRN
Qty: 30 TABLET | Refills: 0 | Status: SHIPPED | OUTPATIENT
Start: 2021-12-10 | End: 2022-01-13 | Stop reason: ALTCHOICE

## 2021-12-10 RX ORDER — MORPHINE SULFATE 4 MG/ML
4 INJECTION, SOLUTION INTRAMUSCULAR; INTRAVENOUS ONCE
Status: COMPLETED | OUTPATIENT
Start: 2021-12-10 | End: 2021-12-10

## 2021-12-10 RX ORDER — ACETAMINOPHEN 500 MG
1000 TABLET ORAL ONCE
Status: COMPLETED | OUTPATIENT
Start: 2021-12-10 | End: 2021-12-10

## 2021-12-10 RX ORDER — IBUPROFEN 600 MG/1
600 TABLET ORAL EVERY 6 HOURS
Status: DISCONTINUED | OUTPATIENT
Start: 2021-12-10 | End: 2021-12-11 | Stop reason: HOSPADM

## 2021-12-10 RX ORDER — ONDANSETRON 2 MG/ML
4 INJECTION INTRAMUSCULAR; INTRAVENOUS ONCE
Status: COMPLETED | OUTPATIENT
Start: 2021-12-10 | End: 2021-12-10

## 2021-12-10 RX ORDER — DIPHENHYDRAMINE HCL 25 MG
25 TABLET ORAL EVERY 6 HOURS PRN
Status: DISCONTINUED | OUTPATIENT
Start: 2021-12-10 | End: 2021-12-11 | Stop reason: HOSPADM

## 2021-12-10 RX ORDER — ONDANSETRON 2 MG/ML
INJECTION INTRAMUSCULAR; INTRAVENOUS PRN
Status: DISCONTINUED | OUTPATIENT
Start: 2021-12-10 | End: 2021-12-10 | Stop reason: SDUPTHER

## 2021-12-10 RX ORDER — PROMETHAZINE HYDROCHLORIDE 25 MG/ML
6.25 INJECTION, SOLUTION INTRAMUSCULAR; INTRAVENOUS
Status: DISCONTINUED | OUTPATIENT
Start: 2021-12-10 | End: 2021-12-10 | Stop reason: HOSPADM

## 2021-12-10 RX ORDER — FENTANYL CITRATE 50 UG/ML
INJECTION, SOLUTION INTRAMUSCULAR; INTRAVENOUS PRN
Status: DISCONTINUED | OUTPATIENT
Start: 2021-12-10 | End: 2021-12-10 | Stop reason: SDUPTHER

## 2021-12-10 RX ORDER — PROPOFOL 10 MG/ML
INJECTION, EMULSION INTRAVENOUS PRN
Status: DISCONTINUED | OUTPATIENT
Start: 2021-12-10 | End: 2021-12-10 | Stop reason: SDUPTHER

## 2021-12-10 RX ORDER — ONDANSETRON 2 MG/ML
4 INJECTION INTRAMUSCULAR; INTRAVENOUS EVERY 6 HOURS PRN
Status: DISCONTINUED | OUTPATIENT
Start: 2021-12-10 | End: 2021-12-10

## 2021-12-10 RX ORDER — SODIUM CHLORIDE 9 MG/ML
1000 INJECTION, SOLUTION INTRAVENOUS CONTINUOUS
Status: ACTIVE | OUTPATIENT
Start: 2021-12-10 | End: 2021-12-10

## 2021-12-10 RX ORDER — DIPHENHYDRAMINE HYDROCHLORIDE 50 MG/ML
12.5 INJECTION INTRAMUSCULAR; INTRAVENOUS
Status: DISCONTINUED | OUTPATIENT
Start: 2021-12-10 | End: 2021-12-10

## 2021-12-10 RX ORDER — SODIUM CHLORIDE 0.9 % (FLUSH) 0.9 %
5-40 SYRINGE (ML) INJECTION PRN
Status: DISCONTINUED | OUTPATIENT
Start: 2021-12-10 | End: 2021-12-11 | Stop reason: HOSPADM

## 2021-12-10 RX ORDER — ONDANSETRON 4 MG/1
4 TABLET, ORALLY DISINTEGRATING ORAL EVERY 8 HOURS PRN
Qty: 12 TABLET | Refills: 0 | Status: SHIPPED | OUTPATIENT
Start: 2021-12-10 | End: 2022-01-13 | Stop reason: ALTCHOICE

## 2021-12-10 RX ORDER — MAGNESIUM HYDROXIDE 1200 MG/15ML
LIQUID ORAL CONTINUOUS PRN
Status: DISCONTINUED | OUTPATIENT
Start: 2021-12-10 | End: 2021-12-10 | Stop reason: HOSPADM

## 2021-12-10 RX ORDER — DOCUSATE SODIUM 100 MG/1
100 CAPSULE, LIQUID FILLED ORAL DAILY
Qty: 30 CAPSULE | Refills: 0 | Status: SHIPPED | OUTPATIENT
Start: 2021-12-10 | End: 2021-12-10 | Stop reason: SDUPTHER

## 2021-12-10 RX ORDER — HYDRALAZINE HYDROCHLORIDE 20 MG/ML
5 INJECTION INTRAMUSCULAR; INTRAVENOUS EVERY 10 MIN PRN
Status: DISCONTINUED | OUTPATIENT
Start: 2021-12-10 | End: 2021-12-10 | Stop reason: HOSPADM

## 2021-12-10 RX ORDER — SIMETHICONE 80 MG
80 TABLET,CHEWABLE ORAL EVERY 6 HOURS PRN
Status: DISCONTINUED | OUTPATIENT
Start: 2021-12-10 | End: 2021-12-11 | Stop reason: HOSPADM

## 2021-12-10 RX ORDER — ACETAMINOPHEN 325 MG/1
650 TABLET ORAL EVERY 4 HOURS
Status: DISCONTINUED | OUTPATIENT
Start: 2021-12-10 | End: 2021-12-10

## 2021-12-10 RX ORDER — FENTANYL CITRATE 50 UG/ML
50 INJECTION, SOLUTION INTRAMUSCULAR; INTRAVENOUS EVERY 5 MIN PRN
Status: COMPLETED | OUTPATIENT
Start: 2021-12-10 | End: 2021-12-10

## 2021-12-10 RX ORDER — ONDANSETRON 4 MG/1
4 TABLET, ORALLY DISINTEGRATING ORAL EVERY 8 HOURS PRN
Status: DISCONTINUED | OUTPATIENT
Start: 2021-12-10 | End: 2021-12-11 | Stop reason: HOSPADM

## 2021-12-10 RX ORDER — KETOROLAC TROMETHAMINE 30 MG/ML
30 INJECTION, SOLUTION INTRAMUSCULAR; INTRAVENOUS ONCE
Status: COMPLETED | OUTPATIENT
Start: 2021-12-10 | End: 2021-12-10

## 2021-12-10 RX ORDER — DEXAMETHASONE SODIUM PHOSPHATE 4 MG/ML
INJECTION, SOLUTION INTRA-ARTICULAR; INTRALESIONAL; INTRAMUSCULAR; INTRAVENOUS; SOFT TISSUE PRN
Status: DISCONTINUED | OUTPATIENT
Start: 2021-12-10 | End: 2021-12-10 | Stop reason: SDUPTHER

## 2021-12-10 RX ORDER — ROCURONIUM BROMIDE 10 MG/ML
INJECTION, SOLUTION INTRAVENOUS PRN
Status: DISCONTINUED | OUTPATIENT
Start: 2021-12-10 | End: 2021-12-10 | Stop reason: SDUPTHER

## 2021-12-10 RX ORDER — ACETAMINOPHEN 500 MG
1000 TABLET ORAL EVERY 6 HOURS PRN
Status: DISCONTINUED | OUTPATIENT
Start: 2021-12-10 | End: 2021-12-11 | Stop reason: HOSPADM

## 2021-12-10 RX ORDER — DOCUSATE SODIUM 100 MG/1
100 CAPSULE, LIQUID FILLED ORAL DAILY
Qty: 30 CAPSULE | Refills: 0 | Status: SHIPPED | OUTPATIENT
Start: 2021-12-10 | End: 2022-01-13 | Stop reason: ALTCHOICE

## 2021-12-10 RX ORDER — OXYCODONE HYDROCHLORIDE 5 MG/1
5 TABLET ORAL EVERY 4 HOURS PRN
Status: DISCONTINUED | OUTPATIENT
Start: 2021-12-10 | End: 2021-12-11 | Stop reason: HOSPADM

## 2021-12-10 RX ORDER — ACETAMINOPHEN AND CODEINE PHOSPHATE 300; 30 MG/1; MG/1
1 TABLET ORAL EVERY 4 HOURS PRN
Qty: 12 TABLET | Refills: 0 | Status: SHIPPED | OUTPATIENT
Start: 2021-12-10 | End: 2021-12-10 | Stop reason: SDUPTHER

## 2021-12-10 RX ORDER — FENTANYL CITRATE 50 UG/ML
50 INJECTION, SOLUTION INTRAMUSCULAR; INTRAVENOUS EVERY 5 MIN PRN
Status: DISCONTINUED | OUTPATIENT
Start: 2021-12-10 | End: 2021-12-10 | Stop reason: HOSPADM

## 2021-12-10 RX ORDER — ONDANSETRON 2 MG/ML
4 INJECTION INTRAMUSCULAR; INTRAVENOUS
Status: DISCONTINUED | OUTPATIENT
Start: 2021-12-10 | End: 2021-12-10

## 2021-12-10 RX ORDER — SIMETHICONE 80 MG
80 TABLET,CHEWABLE ORAL 4 TIMES DAILY PRN
Qty: 30 TABLET | Refills: 0 | Status: SHIPPED | OUTPATIENT
Start: 2021-12-10 | End: 2021-12-10 | Stop reason: SDUPTHER

## 2021-12-10 RX ORDER — OXYCODONE HYDROCHLORIDE 5 MG/1
10 TABLET ORAL EVERY 4 HOURS PRN
Status: DISCONTINUED | OUTPATIENT
Start: 2021-12-10 | End: 2021-12-11 | Stop reason: HOSPADM

## 2021-12-10 RX ORDER — SODIUM CHLORIDE, SODIUM LACTATE, POTASSIUM CHLORIDE, CALCIUM CHLORIDE 600; 310; 30; 20 MG/100ML; MG/100ML; MG/100ML; MG/100ML
INJECTION, SOLUTION INTRAVENOUS CONTINUOUS PRN
Status: DISCONTINUED | OUTPATIENT
Start: 2021-12-10 | End: 2021-12-10 | Stop reason: SDUPTHER

## 2021-12-10 RX ORDER — OXYCODONE HYDROCHLORIDE AND ACETAMINOPHEN 5; 325 MG/1; MG/1
1 TABLET ORAL EVERY 4 HOURS PRN
Status: DISCONTINUED | OUTPATIENT
Start: 2021-12-10 | End: 2021-12-10

## 2021-12-10 RX ORDER — SIMETHICONE 80 MG
80 TABLET,CHEWABLE ORAL 4 TIMES DAILY PRN
Qty: 30 TABLET | Refills: 0 | Status: SHIPPED | OUTPATIENT
Start: 2021-12-10 | End: 2022-01-13 | Stop reason: ALTCHOICE

## 2021-12-10 RX ORDER — NEOSTIGMINE METHYLSULFATE 5 MG/5 ML
SYRINGE (ML) INTRAVENOUS PRN
Status: DISCONTINUED | OUTPATIENT
Start: 2021-12-10 | End: 2021-12-10 | Stop reason: SDUPTHER

## 2021-12-10 RX ORDER — SODIUM CHLORIDE 0.9 % (FLUSH) 0.9 %
5-40 SYRINGE (ML) INJECTION EVERY 12 HOURS SCHEDULED
Status: DISCONTINUED | OUTPATIENT
Start: 2021-12-10 | End: 2021-12-11 | Stop reason: HOSPADM

## 2021-12-10 RX ORDER — POLYETHYLENE GLYCOL 3350 17 G/17G
17 POWDER, FOR SOLUTION ORAL DAILY
Status: DISCONTINUED | OUTPATIENT
Start: 2021-12-10 | End: 2021-12-11 | Stop reason: HOSPADM

## 2021-12-10 RX ORDER — SODIUM CHLORIDE 9 MG/ML
25 INJECTION, SOLUTION INTRAVENOUS PRN
Status: DISCONTINUED | OUTPATIENT
Start: 2021-12-10 | End: 2021-12-11 | Stop reason: HOSPADM

## 2021-12-10 RX ADMIN — IBUPROFEN 600 MG: 600 TABLET, FILM COATED ORAL at 22:54

## 2021-12-10 RX ADMIN — DEXAMETHASONE SODIUM PHOSPHATE 8 MG: 4 INJECTION, SOLUTION INTRAMUSCULAR; INTRAVENOUS at 10:40

## 2021-12-10 RX ADMIN — ACETAMINOPHEN 1000 MG: 500 TABLET ORAL at 04:31

## 2021-12-10 RX ADMIN — FENTANYL CITRATE 25 MCG: 50 INJECTION, SOLUTION INTRAMUSCULAR; INTRAVENOUS at 12:06

## 2021-12-10 RX ADMIN — ROCURONIUM BROMIDE 50 MG: 10 INJECTION INTRAVENOUS at 10:51

## 2021-12-10 RX ADMIN — FENTANYL CITRATE 25 MCG: 50 INJECTION, SOLUTION INTRAMUSCULAR; INTRAVENOUS at 12:02

## 2021-12-10 RX ADMIN — MIDAZOLAM HYDROCHLORIDE 2 MG: 1 INJECTION, SOLUTION INTRAMUSCULAR; INTRAVENOUS at 10:00

## 2021-12-10 RX ADMIN — SODIUM CHLORIDE, POTASSIUM CHLORIDE, SODIUM LACTATE AND CALCIUM CHLORIDE: 600; 310; 30; 20 INJECTION, SOLUTION INTRAVENOUS at 10:40

## 2021-12-10 RX ADMIN — SIMETHICONE 80 MG: 80 TABLET, CHEWABLE ORAL at 22:54

## 2021-12-10 RX ADMIN — SODIUM CHLORIDE, PRESERVATIVE FREE 10 ML: 5 INJECTION INTRAVENOUS at 21:16

## 2021-12-10 RX ADMIN — MORPHINE SULFATE 4 MG: 4 INJECTION INTRAVENOUS at 04:54

## 2021-12-10 RX ADMIN — MIDAZOLAM HYDROCHLORIDE 1 MG: 1 INJECTION, SOLUTION INTRAMUSCULAR; INTRAVENOUS at 11:01

## 2021-12-10 RX ADMIN — FENTANYL CITRATE 50 MCG: 50 INJECTION, SOLUTION INTRAMUSCULAR; INTRAVENOUS at 13:27

## 2021-12-10 RX ADMIN — LIDOCAINE HYDROCHLORIDE 5 ML: 10 INJECTION, SOLUTION EPIDURAL; INFILTRATION; INTRACAUDAL; PERINEURAL at 10:09

## 2021-12-10 RX ADMIN — OXYCODONE 10 MG: 5 TABLET ORAL at 22:01

## 2021-12-10 RX ADMIN — PROPOFOL 150 MG: 10 INJECTION, EMULSION INTRAVENOUS at 10:40

## 2021-12-10 RX ADMIN — OXYCODONE 10 MG: 5 TABLET ORAL at 18:09

## 2021-12-10 RX ADMIN — FENTANYL CITRATE 50 MCG: 50 INJECTION, SOLUTION INTRAMUSCULAR; INTRAVENOUS at 12:39

## 2021-12-10 RX ADMIN — FENTANYL CITRATE 50 MCG: 50 INJECTION, SOLUTION INTRAMUSCULAR; INTRAVENOUS at 10:01

## 2021-12-10 RX ADMIN — SODIUM CHLORIDE 1000 ML: 9 INJECTION, SOLUTION INTRAVENOUS at 04:30

## 2021-12-10 RX ADMIN — Medication 3 MG: at 11:53

## 2021-12-10 RX ADMIN — FENTANYL CITRATE 50 MCG: 50 INJECTION, SOLUTION INTRAMUSCULAR; INTRAVENOUS at 13:19

## 2021-12-10 RX ADMIN — MIDAZOLAM HYDROCHLORIDE 1 MG: 1 INJECTION, SOLUTION INTRAMUSCULAR; INTRAVENOUS at 10:32

## 2021-12-10 RX ADMIN — MORPHINE SULFATE 4 MG: 4 INJECTION INTRAVENOUS at 08:35

## 2021-12-10 RX ADMIN — KETOROLAC TROMETHAMINE 30 MG: 30 INJECTION, SOLUTION INTRAMUSCULAR; INTRAVENOUS at 04:31

## 2021-12-10 RX ADMIN — KETOROLAC TROMETHAMINE 30 MG: 30 INJECTION, SOLUTION INTRAMUSCULAR; INTRAVENOUS at 21:15

## 2021-12-10 RX ADMIN — FENTANYL CITRATE 50 MCG: 50 INJECTION, SOLUTION INTRAMUSCULAR; INTRAVENOUS at 13:14

## 2021-12-10 RX ADMIN — OXYCODONE HYDROCHLORIDE AND ACETAMINOPHEN 1 TABLET: 5; 325 TABLET ORAL at 14:22

## 2021-12-10 RX ADMIN — KETOROLAC TROMETHAMINE 30 MG: 30 INJECTION, SOLUTION INTRAMUSCULAR; INTRAVENOUS at 12:11

## 2021-12-10 RX ADMIN — ONDANSETRON 4 MG: 2 INJECTION, SOLUTION INTRAMUSCULAR; INTRAVENOUS at 11:52

## 2021-12-10 RX ADMIN — FENTANYL CITRATE 25 MCG: 50 INJECTION, SOLUTION INTRAMUSCULAR; INTRAVENOUS at 12:58

## 2021-12-10 RX ADMIN — IBUPROFEN 600 MG: 600 TABLET, FILM COATED ORAL at 16:12

## 2021-12-10 RX ADMIN — FENTANYL CITRATE 100 MCG: 50 INJECTION, SOLUTION INTRAMUSCULAR; INTRAVENOUS at 11:01

## 2021-12-10 RX ADMIN — ACETAMINOPHEN 1000 MG: 500 TABLET ORAL at 22:54

## 2021-12-10 RX ADMIN — ONDANSETRON 4 MG: 2 INJECTION, SOLUTION INTRAMUSCULAR; INTRAVENOUS at 05:56

## 2021-12-10 ASSESSMENT — PULMONARY FUNCTION TESTS
PIF_VALUE: 1
PIF_VALUE: 2
PIF_VALUE: 22
PIF_VALUE: 23
PIF_VALUE: 14
PIF_VALUE: 1
PIF_VALUE: 24
PIF_VALUE: 1
PIF_VALUE: 0
PIF_VALUE: 1
PIF_VALUE: 24
PIF_VALUE: 2
PIF_VALUE: 0
PIF_VALUE: 22
PIF_VALUE: 34
PIF_VALUE: 23
PIF_VALUE: 34
PIF_VALUE: 35
PIF_VALUE: 1
PIF_VALUE: 0
PIF_VALUE: 1
PIF_VALUE: 26
PIF_VALUE: 35
PIF_VALUE: 24
PIF_VALUE: 1
PIF_VALUE: 35
PIF_VALUE: 30
PIF_VALUE: 35
PIF_VALUE: 24
PIF_VALUE: 14
PIF_VALUE: 23
PIF_VALUE: 35
PIF_VALUE: 34
PIF_VALUE: 12
PIF_VALUE: 22
PIF_VALUE: 33
PIF_VALUE: 8
PIF_VALUE: 25
PIF_VALUE: 35
PIF_VALUE: 23
PIF_VALUE: 23
PIF_VALUE: 1
PIF_VALUE: 22
PIF_VALUE: 0
PIF_VALUE: 35
PIF_VALUE: 12
PIF_VALUE: 2
PIF_VALUE: 0
PIF_VALUE: 1
PIF_VALUE: 35
PIF_VALUE: 23
PIF_VALUE: 2
PIF_VALUE: 0
PIF_VALUE: 1
PIF_VALUE: 24
PIF_VALUE: 1
PIF_VALUE: 0
PIF_VALUE: 23
PIF_VALUE: 23
PIF_VALUE: 22
PIF_VALUE: 1
PIF_VALUE: 24
PIF_VALUE: 1
PIF_VALUE: 34
PIF_VALUE: 25
PIF_VALUE: 34
PIF_VALUE: 12
PIF_VALUE: 2
PIF_VALUE: 23
PIF_VALUE: 1
PIF_VALUE: 24
PIF_VALUE: 1
PIF_VALUE: 23
PIF_VALUE: 14
PIF_VALUE: 2
PIF_VALUE: 23
PIF_VALUE: 2
PIF_VALUE: 0
PIF_VALUE: 34
PIF_VALUE: 23
PIF_VALUE: 13
PIF_VALUE: 1
PIF_VALUE: 35
PIF_VALUE: 23
PIF_VALUE: 24
PIF_VALUE: 24
PIF_VALUE: 1
PIF_VALUE: 22
PIF_VALUE: 1
PIF_VALUE: 1
PIF_VALUE: 23
PIF_VALUE: 4
PIF_VALUE: 23
PIF_VALUE: 1
PIF_VALUE: 25
PIF_VALUE: 36
PIF_VALUE: 1
PIF_VALUE: 35
PIF_VALUE: 0
PIF_VALUE: 25
PIF_VALUE: 1
PIF_VALUE: 23
PIF_VALUE: 35
PIF_VALUE: 2
PIF_VALUE: 0
PIF_VALUE: 25
PIF_VALUE: 2
PIF_VALUE: 2
PIF_VALUE: 24
PIF_VALUE: 13
PIF_VALUE: 1
PIF_VALUE: 22
PIF_VALUE: 17
PIF_VALUE: 35
PIF_VALUE: 0
PIF_VALUE: 0
PIF_VALUE: 29
PIF_VALUE: 13
PIF_VALUE: 2
PIF_VALUE: 34
PIF_VALUE: 33

## 2021-12-10 ASSESSMENT — PAIN DESCRIPTION - DESCRIPTORS
DESCRIPTORS: SHARP
DESCRIPTORS: SHARP
DESCRIPTORS: CRAMPING;STABBING
DESCRIPTORS: SHARP
DESCRIPTORS: SHARP

## 2021-12-10 ASSESSMENT — PAIN DESCRIPTION - FREQUENCY
FREQUENCY: CONTINUOUS
FREQUENCY: INTERMITTENT
FREQUENCY: CONTINUOUS

## 2021-12-10 ASSESSMENT — PAIN - FUNCTIONAL ASSESSMENT: PAIN_FUNCTIONAL_ASSESSMENT: 0-10

## 2021-12-10 ASSESSMENT — ENCOUNTER SYMPTOMS
DIARRHEA: 0
SORE THROAT: 0
VOMITING: 0
NAUSEA: 0
SHORTNESS OF BREATH: 0
ABDOMINAL PAIN: 1
CONSTIPATION: 0

## 2021-12-10 ASSESSMENT — PAIN SCALES - GENERAL
PAINLEVEL_OUTOF10: 9
PAINLEVEL_OUTOF10: 7
PAINLEVEL_OUTOF10: 8
PAINLEVEL_OUTOF10: 7
PAINLEVEL_OUTOF10: 5
PAINLEVEL_OUTOF10: 8
PAINLEVEL_OUTOF10: 9
PAINLEVEL_OUTOF10: 9
PAINLEVEL_OUTOF10: 5
PAINLEVEL_OUTOF10: 9
PAINLEVEL_OUTOF10: 6
PAINLEVEL_OUTOF10: 7
PAINLEVEL_OUTOF10: 9
PAINLEVEL_OUTOF10: 7
PAINLEVEL_OUTOF10: 9
PAINLEVEL_OUTOF10: 8
PAINLEVEL_OUTOF10: 7
PAINLEVEL_OUTOF10: 9
PAINLEVEL_OUTOF10: 8

## 2021-12-10 ASSESSMENT — PAIN DESCRIPTION - PAIN TYPE
TYPE: SURGICAL PAIN
TYPE: ACUTE PAIN
TYPE: ACUTE PAIN
TYPE: SURGICAL PAIN

## 2021-12-10 ASSESSMENT — PAIN DESCRIPTION - LOCATION
LOCATION: ABDOMEN

## 2021-12-10 ASSESSMENT — PAIN DESCRIPTION - ONSET: ONSET: AWAKENED FROM SLEEP

## 2021-12-10 NOTE — H&P
OB/GYN History and Physical  [facility]     Patient Name: Sera Glass                               Patient : [id number]  Room/Bed:   Admission Date/Time: 12/10/2021  4:03 AM  Primary Care Physician: No primary care provider on file.        HPI: Sera Glass is a 23 y.o. female  presents to the ED w/ persistent abdominal pain 7 days post op suction dilation and curettage. She had an abnormally rising bHCG quant that mgiuel from  345>  927>  991. On 2421 she had an ultrasound that showed a small intrauterine gestational sac. With the inappropriately rising bHCG quant suggestive of a failed pregnancy patient elected surgical management. She had a suction D&C on 12/3. Path returned decidua and secretory endometrium, negative for trophoblastic tissue. Today returns with increasing pain the last 4 days, nausea, decreased appetite, fever at home. She reports before the surgery she had right sided pain and now that pain remains persistent. She describes it as someone wringing out a towel and it getting tighter and tighter.  10/10 at its worse, 6/10 at current.     REVIEW OF SYSTEMS:  Constitutional: + fever, + chills  HEENT: negative visual disturbances, negative headaches  Respiratory: negative dyspnea, negative cough  Cardiovascular: negative chest pain,  negative palpitations  Gastrointestinal: + abdominal pain, negative RUQ pain, negative N/V, negative diarrhea, negative constipation  Genitourinary: negative dysuria, negative vaginal discharge  Dermatological: negative rash  Hematologic: negative bruising  Immunologic/Lymphatic: negative recent illness, negative recent sick contact  Musculoskeletal: negative back pain, negative myalgias, negative arthralgias  Neurological:  negative dizziness, negative weakness  Behavior/Psych: negative depression, negative anxiety           OBSTETRICAL HISTORY:                     OB History    Para Term  AB Living   2 0 0 0 0 0   SAB IAB Ectopic Molar Multiple Live Births    0 0 0 0 0 0       # Outcome Date GA Lbr Zohaib/2nd Weight Sex Delivery Anes PTL Lv   2                      1                            PAST MEDICAL HISTORY:   has a past medical history of Gall stones, Kidney stone, and UTI (lower urinary tract infection).     PAST SURGICAL HISTORY:   has a past surgical history that includes Arm Surgery (Right); [place] tooth extraction; Cholecystectomy (2018); pr lap,cholecystectomy (N/A, 2018); Breast reduction surgery (2018); Cystocopy (2019); Cystoscopy (N/A, 2019); Dilation and curettage of uterus (2021); and Dilation and curettage of uterus (N/A, 12/3/2021).    ALLERGIES:        Allergies as of 12/10/2021 - Fully Reviewed 2021   Allergen Reaction Noted    Amoxicillin Hives 2015    Lactose   2015    Other   2021         MEDICATIONS:  Current Facility-Administered Medications   No current facility-administered medications for this encounter.             Current Outpatient Medications   Medication Sig Dispense Refill    metroNIDAZOLE (FLAGYL) 500 MG tablet Take 500 mg by mouth twice a week        ibuprofen (ADVIL;MOTRIN) 600 MG tablet Take 1 tablet by mouth every 6 hours as needed for Pain 30 tablet 0    ondansetron (ZOFRAN ODT) 4 MG disintegrating tablet Take 1 tablet by mouth every 8 hours as needed for Nausea or Vomiting 12 tablet 0    docusate sodium (COLACE) 100 MG capsule Take 1 capsule by mouth daily 30 capsule 0    acetaminophen (TYLENOL) 500 MG tablet Take 2 tablets by mouth every 6 hours as needed for Pain 120 tablet 0            FAMILY HISTORY:  family history is not on file.     SOCIAL HISTORY:   reports that she has never smoked. She has never used smokeless tobacco. She reports current alcohol use. She reports that she does not use drugs.   ________________________________________________________________________ VITALS:  Vitals:    12/10/21 0402 12/10/21 0412 12/10/21 0800   BP: 131/85 124/75 (!) 101/53   Pulse: 119 94 79   Resp: 22 20 16   Temp: 97.5 °F (36.4 °C) 98.3 °F (36.8 °C) 98.3 °F (36.8 °C)   TempSrc: Oral Oral Oral   SpO2: 99% 97% 100%   Weight:  185 lb (83.9 kg)    Height:  5' 4\" (1.626 m)                                                                                                                                   PHYSICAL EXAM:     General Appearance: Appears healthy. Alert; in no acute distress. Pleasant. Respiratory: Normal expansion. Clear to auscultation. No rales, rhonchi, or wheezing.   Cardiovascular: normal, regular rate and rhythm  Abdomen: soft, tender in RLQ, non-distended, no right upper quadrant tenderness and no CVA tenderness, no rebound, guarding, or rigidity, no peritoneal signs  Pelvic Exam:   Chaperone for Intimate Exam: Chaperone was present for entire exam, Chaperone Name: ED RN  External genitalia: General appearance; normal, Hair distribution; normal, Lesions absent  Urinary system: urethral meatus normal  Vaginal: normal mucosa, scant blood  Cervix: normal appearing cervix without discharge or lesions  Adnexa: normal adnexa in size, nontender and no masses, tender to palpation over right adnexa   Uterus: normal single, nontender  Musculoskeletal: no gross abnormalities  Extremities: non-tender BLE and non-edematous     LAB RESULTS:        Results for orders placed or performed during the hospital encounter of 12/10/21   CBC WITH AUTO DIFFERENTIAL   Result Value Ref Range     WBC 14.0 (H) 3.5 - 11.3 k/uL     RBC 4.73 3.95 - 5.11 m/uL     Hemoglobin 14.2 11.9 - 15.1 g/dL     Hematocrit 40.8 36.3 - 47.1 %     MCV 86.3 82.6 - 102.9 fL     MCH 30.0 25.2 - 33.5 pg     MCHC 34.8 28.4 - 34.8 g/dL     RDW 13.2 11.8 - 14.4 %     Platelets 098 067 - 021 k/uL     MPV 10.8 8.1 - 13.5 fL     NRBC Automated 0.0 0.0 per 100 WBC     Differential Type NOT REPORTED       Seg Neutrophils 66 (H) 36 - 65 %     Lymphocytes 27 24 - 43 %     Monocytes 6 3 - 12 %     Eosinophils % 1 1 - 4 %     Basophils 0 0 - 2 %     Immature Granulocytes 0 0 %     Segs Absolute 9.12 (H) 1.50 - 8.10 k/uL     Absolute Lymph # 3.79 (H) 1.10 - 3.70 k/uL     Absolute Mono # 0.88 0.10 - 1.20 k/uL     Absolute Eos # 0.11 0.00 - 0.44 k/uL     Basophils Absolute 0.06 0.00 - 0.20 k/uL     Absolute Immature Granulocyte 0.06 0.00 - 0.30 k/uL     WBC Morphology NOT REPORTED       RBC Morphology NOT REPORTED       Platelet Estimate NOT REPORTED     HCG, QUANTITATIVE, PREGNANCY   Result Value Ref Range     hCG Quant 727 (H) <5 IU/L   BASIC METABOLIC PANEL   Result Value Ref Range     Glucose 105 (H) 70 - 99 mg/dL     BUN 12 6 - 20 mg/dL     CREATININE 0.75 0.50 - 0.90 mg/dL     Bun/Cre Ratio NOT REPORTED 9 - 20     Calcium 9.4 8.6 - 10.4 mg/dL     Sodium 137 135 - 144 mmol/L     Potassium 3.6 (L) 3.7 - 5.3 mmol/L     Chloride 101 98 - 107 mmol/L     CO2 20 20 - 31 mmol/L     Anion Gap 16 9 - 17 mmol/L     GFR Non-African American >60 >60 mL/min     GFR African American >60 >60 mL/min     GFR Comment            GFR Staging NOT REPORTED           DIAGNOSTICS:    EXAMINATION:   PELVIC ULTRASOUND       12/10/2021       TECHNIQUE:   Transvaginal pelvic ultrasound was performed.       COMPARISON:   11/24/2021       HISTORY:   ORDERING SYSTEM PROVIDED HISTORY: concern for ectopic       Beta HCG of 727       FINDINGS:       Measurements:       Uterus: 9.8 x 5.0 x 6.8 cm       Endometrial stripe: 2 mm       Right Ovary:2.8 x 2.5 x 2.3 cm       Left Ovary: 2.7 x 1.9 x 1.2 cm           Ultrasound Findings:       Uterus: Uterus demonstrates normal myometrial echotexture.       Endometrial stripe: Endometrial stripe is within normal limits.       Right Ovary: Right ovary is within normal limits.  Medial to the right ovary   and lateral to the uterus is a heterogeneous mass measuring 3.7 cm.       Left Ovary:  Left ovary is within normal limits.       Free Fluid: Small amount of free fluid cul-de-sac.           Impression   Heterogeneous right adnexal mass situated between the ovary and the uterus as   measured above which is nonspecific and may represent ectopic pregnancy or   possible complex cyst.  Recommend OB gyn consultation and short-term   follow-up ultrasound to reassess.       Findings were discussed with Dr. Mira Parks at 8:20 am on 12/10/2021.          US OB LESS THAN 14 WEEKS SINGLE OR FIRST GESTATION     Result Date: 11/24/2021  EXAMINATION: TRANSABDOMINAL FIRST TRIMESTER OBSTETRIC PELVIC ULTRASOUND WITH COLOR DOPPLER FLOW; FIRST TRIMESTER OBSTETRIC ULTRASOUND 11/24/2021 TECHNIQUE: TRANSABDOMINAL PELVIC ULTRASOUND WITH COLOR DOPPLER FLOW; Transvaginal first trimester obstetric pelvic ultrasound was performed with color Doppler flow evaluation. COMPARISON: None HISTORY: ORDERING SYSTEM PROVIDED HISTORY: pregnant, pain, bleeding TECHNOLOGIST PROVIDED HISTORY: Pain and bleeding FINDINGS: Uterus: 9.0 x 4.6 x 5.9 cm Endometrium: 1.3 cm. Gestational Sac(s):  Tiny anechoic area within the endometrium measuring 0.29 cm, possibly gestational sac. No visualized IUP. Small anechoic area in the right uterus measuring 5 mm suggestive of benign cyst. Right ovary: 4.0 x 2.6 x 2.1 cm. Likely corpus luteum cyst measuring 1.6 cm. Left ovary: 1.9 x 1.51.7 cm Free fluid: Small amount of free fluid throughout the pelvis.      Possible small intrauterine gestational sac. Recommend short-term follow-up ultrasound to reassess.      US OB TRANSVAGINAL     Result Date: 11/24/2021  EXAMINATION: TRANSABDOMINAL FIRST TRIMESTER OBSTETRIC PELVIC ULTRASOUND WITH COLOR DOPPLER FLOW; FIRST TRIMESTER OBSTETRIC ULTRASOUND 11/24/2021 TECHNIQUE: TRANSABDOMINAL PELVIC ULTRASOUND WITH COLOR DOPPLER FLOW; Transvaginal first trimester obstetric pelvic ultrasound was performed with color Doppler flow evaluation.  COMPARISON: None HISTORY: ORDERING SYSTEM PROVIDED HISTORY: pregnant, pain, bleeding TECHNOLOGIST PROVIDED HISTORY: Pain and bleeding FINDINGS: Uterus: 9.0 x 4.6 x 5.9 cm Endometrium: 1.3 cm. Gestational Sac(s):  Tiny anechoic area within the endometrium measuring 0.29 cm, possibly gestational sac. No visualized IUP. Small anechoic area in the right uterus measuring 5 mm suggestive of benign cyst. Right ovary: 4.0 x 2.6 x 2.1 cm. Likely corpus luteum cyst measuring 1.6 cm. Left ovary: 1.9 x 1.51.7 cm Free fluid: Small amount of free fluid throughout the pelvis.      Possible small intrauterine gestational sac.   Recommend short-term follow-up ultrasound to reassess.         ASSESSMENT & PLAN:     Bijan Mcallister is a 23 y.o. female  with persistent RLQ pain after D&C on 12/3/21              - VSS              - BHCG trend  345>  927>  991 followed by suction D&C 12/3               - [facility] today 727              - CMP unremarkable              - Path from [facility]  returned decidua and secretory endometrium, negative for trophoblastic tissue.              - Hgb 14.2 today, small leukocytosis of 14 but afebrile              - Patient w/ persistent right sided pain since surgery, now w/ nausea and fevers at home              - Abdominal exam is not peritoneal              - TVUS today reveals heterogenous right adnexal mass situated between the ovary and the uterus measuring 3.7 cm   - Plan today for diagnostic laparascopy, removal of ectopic pregnancy, possible unilateral salpingectomy, possible unilateral oophorectomy, all other indicated procedures   - Informed consent obtained, consent signed, on chart     Hx SAB x1              - Patient reports uncomplicated        Hx TAB x1              - Surgical at 8 weeks per patient        Hx Pyelo       Hx kidney stones       Hx cholecystectomy              Patient Active Problem List     Diagnosis Date Noted    Missed       History of kidney stones 2019    Pyelonephritis 02/15/2019    Cholecystitis      E. coli UTI (urinary tract infection) 05/14/2018    Acute pyelonephritis 05/14/2018    Allergic reaction caused by a drug           Plan discussed with Dr. Claudio Deleon, who is agreeable.      Attending's Name: Dr. Laura Valdovinos DO  Ob/Gyn Resident  [phone]  12/10/2021, 8:47 AM

## 2021-12-10 NOTE — ED PROVIDER NOTES
[facility]  ED  Emergency Department Encounter  Emergency Medicine Resident     Pt Name: Hugo Ramos  MRN: [id number]  Susangfcheryl 1995  Date of evaluation: 12/10/21  PCP:  No primary care provider on file. CHIEF COMPLAINT       Chief Complaint   Patient presents with    Abdominal Pain     d&c 2 days ago, presents with worsening abd/pelvic pain       HISTORY OFPRESENT ILLNESS  (Location/Symptom, Timing/Onset, Context/Setting, Quality, Duration, Modifying Zelpha Hoots.)      Hugo Ramos is a 23 y.o. female who presents with increased right lower quadrant pain, vaginal bleeding, chills, nausea and vomiting. Patient is status post D&C on the third (7 days ago). Patient reports that she has had some gradually increasing right lower quadrant pain over the last 2 days. She woke up early this morning with severe right lower quadrant pain and some increased vaginal bleeding. Patient does report some intermittent nausea vomiting, as well as some intermittent fevers and chills. PAST MEDICAL / SURGICAL / SOCIAL / FAMILY HISTORY      has a past medical history of Gall stones, Kidney stone, and UTI (lower urinary tract infection). has a past surgical history that includes Arm Surgery (Right); Ismay tooth extraction; pr lap,cholecystectomy (N/A, 05/18/2018); Breast reduction surgery (07/25/2018); Cystoscopy (N/A, 09/04/2019); Dilation and curettage of uterus (N/A, 12/03/2021); and Abdomen surgery (12/10/2021).     Social History     Socioeconomic History    Marital status: Single     Spouse name: Not on file    Number of children: Not on file    Years of education: Not on file    Highest education level: Not on file   Occupational History    Not on file   Tobacco Use    Smoking status: Never Smoker    Smokeless tobacco: Never Used   Vaping Use    Vaping Use: Never used   Substance and Sexual Activity    Alcohol use: Yes     Comment: social drinker     Drug use: No    Sexual activity: Not on file   Other Topics Concern    Not on file   Social History Narrative    Not on file     Social Determinants of Health     Financial Resource Strain:     Difficulty of Paying Living Expenses: Not on file   Food Insecurity:     Worried About Running Out of Food in the Last Year: Not on file    Annabella of Food in the Last Year: Not on file   Transportation Needs:     Lack of Transportation (Medical): Not on file    Lack of Transportation (Non-Medical): Not on file   Physical Activity:     Days of Exercise per Week: Not on file    Minutes of Exercise per Session: Not on file   Stress:     Feeling of Stress : Not on file   Social Connections:     Frequency of Communication with Friends and Family: Not on file    Frequency of Social Gatherings with Friends and Family: Not on file    Attends [employer] Services: Not on file    Active Member of [address] [place] [address] Utilize Health or Organizations: Not on file    Attends Club or Organization Meetings: Not on file    Marital Status: Not on file   Intimate Partner Violence:     Fear of Current or Ex-Partner: Not on file    Emotionally Abused: Not on file    Physically Abused: Not on file    Sexually Abused: Not on file   Housing Stability:     Unable to Pay for Housing in the Last Year: Not on file    Number of [place] in the Last Year: Not on file    Unstable Housing in the Last Year: Not on file       History reviewed. No pertinent family history. Allergies:  Amoxicillin, Lactose, and Other    Home Medications:  Prior to Admission medications    Medication Sig Start Date End Date Taking? Authorizing Provider   ondansetron (ZOFRAN ODT) 4 MG disintegrating tablet Take 1 tablet by mouth every 8 hours as needed for Nausea or Vomiting 12/10/21  Yes Iliana Green DO   acetaminophen-codeine (TYLENOL/CODEINE #3) 300-30 MG per tablet Take 1 tablet by mouth every 4 hours as needed for Pain for up to 2 days. Intended supply: 3 days.  Take lowest dose possible to manage pain 12/10/21 12/12/21 Yes Teodora Sow, DO   ibuprofen (ADVIL;MOTRIN) 600 MG tablet Take 1 tablet by mouth every 6 hours as needed for Pain 12/10/21  Yes [employer], DO   docusate sodium (COLACE) 100 MG capsule Take 1 capsule by mouth daily 12/10/21  Yes [employer], DO   simethicone (MYLICON) 80 MG chewable tablet Take 1 tablet by mouth 4 times daily as needed for Flatulence 12/10/21  Yes [employer], DO   acetaminophen (TYLENOL) 500 MG tablet Take 2 tablets by mouth every 6 hours as needed for Pain 1/27/20  Yes Melvin Osorio MD   metroNIDAZOLE (FLAGYL) 500 MG tablet Take 500 mg by mouth twice a week 9/22/21   Historical Provider, MD       REVIEW OF SYSTEMS    (2-9 systems for level 4, 10 or more for level 5)      Review of Systems   Constitutional: Negative for chills and fever. HENT: Negative for ear pain, hearing loss and sore throat. Eyes: Negative for visual disturbance. Respiratory: Negative for shortness of breath. Cardiovascular: Negative for chest pain. Gastrointestinal: Positive for abdominal pain. Negative for constipation, diarrhea, nausea and vomiting. Genitourinary: Positive for vaginal bleeding. Negative for difficulty urinating and dysuria. Musculoskeletal: Negative for arthralgias and myalgias. Neurological: Negative for numbness. Psychiatric/Behavioral: Negative for agitation and confusion. PHYSICAL EXAM   (up to 7 for level 4, 8 or more for level 5)     INITIAL VITALS:    height is 5' 4\" (1.626 m) and weight is 185 lb (83.9 kg). Her oral temperature is 97.4 °F (36.3 °C). Her blood pressure is 118/78 and her pulse is 99. Her respiration is 16 and oxygen saturation is 98%. Physical Exam  Vitals and nursing note reviewed. Constitutional:       General: She is not in acute distress. Appearance: Normal appearance. She is well-developed. She is not ill-appearing or diaphoretic.    HENT:      Head: Normocephalic and atraumatic. Right Ear: External ear normal.      Left Ear: External ear normal.      Nose: Nose normal.      Mouth/Throat:      Mouth: Mucous membranes are moist.   Eyes:      Extraocular Movements: Extraocular movements intact. Conjunctiva/sclera: Conjunctivae normal.   Neck:      Trachea: No tracheal deviation. Cardiovascular:      Rate and Rhythm: Normal rate and regular rhythm. Pulmonary:      Effort: Pulmonary effort is normal. No respiratory distress. Abdominal:      General: Abdomen is flat. There is no distension. Tenderness: There is abdominal tenderness. Comments: RLQ TTP   Musculoskeletal:         General: No swelling, deformity or signs of injury. Normal range of motion. Cervical back: Normal range of motion and neck supple. Skin:     General: Skin is warm and dry. Coloration: Skin is not jaundiced. Findings: No bruising or lesion. Neurological:      General: No focal deficit present. Mental Status: She is alert and oriented to person, place, and time. Mental status is at baseline. Motor: No abnormal muscle tone. DIFFERENTIAL  DIAGNOSIS     PLAN (LABS / IMAGING / EKG):  Orders Placed This Encounter   Procedures    COVID-19, Rapid    VAGINITIS DNA PROBE    C.trachomatis N.gonorrhoeae DNA    US NON OB TRANSVAGINAL    CBC WITH AUTO DIFFERENTIAL    HCG, QUANTITATIVE, PREGNANCY    BASIC METABOLIC PANEL    Urinalysis Reflex to Culture    Surgical Pathology    CBC WITH AUTO DIFFERENTIAL    ELECTROLYTE PANEL    SURGICAL PATHOLOGY REPORT    CBC    ADULT DIET;  Regular    Notify physician for    Ambulate patient    Adv Diet as Tolerated (nurse communication)    Straight catheterize    Intake and output    Place intermittent pneumatic compression devices    Full code    Inpatient consult to Obstetrics / Gynecology    Contact isolation    Initiate Oxygen Therapy Protocol    TYPE AND SCREEN    Insert peripheral IV    Saline lock IV    PATIENT STATUS (DIRECT) Observation    PATIENT STATUS (DIRECT) Observation       MEDICATIONS ORDERED:  Orders Placed This Encounter   Medications    0.9 % sodium chloride infusion    ketorolac (TORADOL) injection 30 mg    acetaminophen (TYLENOL) tablet 1,000 mg    morphine injection 4 mg    ondansetron (ZOFRAN) injection 4 mg    morphine injection 4 mg    DISCONTD: ibuprofen (ADVIL;MOTRIN) 600 MG tablet     Sig: Take 1 tablet by mouth every 6 hours as needed for Pain     Dispense:  30 tablet     Refill:  0    ondansetron (ZOFRAN ODT) 4 MG disintegrating tablet     Sig: Take 1 tablet by mouth every 8 hours as needed for Nausea or Vomiting     Dispense:  12 tablet     Refill:  0    DISCONTD: docusate sodium (COLACE) 100 MG capsule     Sig: Take 1 capsule by mouth daily     Dispense:  30 capsule     Refill:  0    DISCONTD: acetaminophen-codeine (TYLENOL/CODEINE #3) 300-30 MG per tablet     Sig: Take 1 tablet by mouth every 4 hours as needed for Pain for up to 2 days. Intended supply: 3 days. Take lowest dose possible to manage pain     Dispense:  12 tablet     Refill:  0     Reduce doses taken as pain becomes manageable    DISCONTD: simethicone (MYLICON) 80 MG chewable tablet     Sig: Take 1 tablet by mouth 4 times daily as needed for Flatulence     Dispense:  30 tablet     Refill:  0    acetaminophen-codeine (TYLENOL/CODEINE #3) 300-30 MG per tablet     Sig: Take 1 tablet by mouth every 4 hours as needed for Pain for up to 2 days. Intended supply: 3 days.  Take lowest dose possible to manage pain     Dispense:  12 tablet     Refill:  0     Reduce doses taken as pain becomes manageable    ibuprofen (ADVIL;MOTRIN) 600 MG tablet     Sig: Take 1 tablet by mouth every 6 hours as needed for Pain     Dispense:  30 tablet     Refill:  0    docusate sodium (COLACE) 100 MG capsule     Sig: Take 1 capsule by mouth daily     Dispense:  30 capsule     Refill:  0    simethicone (MYLICON) 80 MG chewable tablet Sig: Take 1 tablet by mouth 4 times daily as needed for Flatulence     Dispense:  30 tablet     Refill:  0    DISCONTD: bupivacaine-EPINEPHrine (MARCAINE-w/EPINEPHRINE) 0.25% -[id number] injection    DISCONTD: fentaNYL (SUBLIMAZE) injection 25 mcg    DISCONTD: fentaNYL (SUBLIMAZE) injection 50 mcg    DISCONTD: fentaNYL (SUBLIMAZE) injection 25 mcg    fentaNYL (SUBLIMAZE) injection 50 mcg    DISCONTD: oxyCODONE-acetaminophen (PERCOCET) 5-325 MG per tablet 1 tablet    DISCONTD: ondansetron (ZOFRAN) injection 4 mg    DISCONTD: promethazine (PHENERGAN) injection 6.25 mg    DISCONTD: 0.9 % sodium chloride bolus    DISCONTD: diphenhydrAMINE (BENADRYL) injection 12.5 mg    DISCONTD: labetalol (NORMODYNE;TRANDATE) injection 5 mg    DISCONTD: hydrALAZINE (APRESOLINE) injection 5 mg    DISCONTD: sodium chloride 0.9 % irrigation    sodium chloride flush 0.9 % injection 5-40 mL    sodium chloride flush 0.9 % injection 5-40 mL    0.9 % sodium chloride infusion    DISCONTD: acetaminophen (TYLENOL) tablet 650 mg    ondansetron (ZOFRAN-ODT) disintegrating tablet 4 mg    DISCONTD: ondansetron (ZOFRAN) injection 4 mg    OR Linked Order Group     oxyCODONE (ROXICODONE) immediate release tablet 5 mg     oxyCODONE (ROXICODONE) immediate release tablet 10 mg    polyethylene glycol (GLYCOLAX) packet 17 g    ibuprofen (ADVIL;MOTRIN) tablet 600 mg    acetaminophen (TYLENOL) tablet 1,000 mg    DISCONTD: ondansetron (ZOFRAN) injection 4 mg    benzocaine-menthol (CEPACOL SORE THROAT) lozenge 1 lozenge    simethicone (MYLICON) chewable tablet 80 mg    diphenhydrAMINE (BENADRYL) tablet 25 mg    ketorolac (TORADOL) injection 30 mg       DDX: ectopic pregnancy, ovarian torsion, PID    Initial MDM/Plan: 23 y.o. female who presents with increased right lower quadrant abdominal pain, vaginal bleeding, nausea vomiting. She is status post D&C 7 days ago.   On initial examination she appears uncomfortable, but is afebrile, normotensive, not tachycardic. We will establish IV, get basic labs, hCG quantitative, UA, will consult OB/GYN to discuss further management. DIAGNOSTIC RESULTS / EMERGENCY DEPARTMENT COURSE / MDM     LABS:  Labs Reviewed   CBC WITH AUTO DIFFERENTIAL - Abnormal; Notable for the following components:       Result Value    WBC 14.0 (*)     Seg Neutrophils 66 (*)     Segs Absolute 9.12 (*)     Absolute Lymph # 3.79 (*)     All other components within normal limits   HCG, QUANTITATIVE, PREGNANCY - Abnormal; Notable for the following components:    hCG Quant 727 (*)     All other components within normal limits   BASIC METABOLIC PANEL - Abnormal; Notable for the following components:    Glucose 105 (*)     Potassium 3.6 (*)     All other components within normal limits   CBC WITH AUTO DIFFERENTIAL - Abnormal; Notable for the following components:    Seg Neutrophils 90 (*)     Lymphocytes 7 (*)     Monocytes 2 (*)     Eosinophils % 0 (*)     Immature Granulocytes 1 (*)     Segs Absolute 9.53 (*)     Absolute Lymph # 0.76 (*)     All other components within normal limits   COVID-19, RAPID   VAGINITIS DNA PROBE   C.TRACHOMATIS N.GONORRHOEAE DNA   ELECTROLYTE PANEL   URINE RT REFLEX TO CULTURE   SURGICAL PATHOLOGY   SURGICAL PATHOLOGY REPORT   CBC   TYPE AND SCREEN         RADIOLOGY:  US NON OB TRANSVAGINAL    Result Date: 12/10/2021  EXAMINATION: PELVIC ULTRASOUND 12/10/2021 TECHNIQUE: Transvaginal pelvic ultrasound was performed. COMPARISON: 11/24/2021 HISTORY: ORDERING SYSTEM PROVIDED HISTORY: concern for ectopic Beta HCG of 727 FINDINGS: Measurements: Uterus: 9.8 x 5.0 x 6.8 cm Endometrial stripe: 2 mm Right Ovary:2.8 x 2.5 x 2.3 cm Left Ovary: 2.7 x 1.9 x 1.2 cm Ultrasound Findings: Uterus: Uterus demonstrates normal myometrial echotexture. Endometrial stripe: Endometrial stripe is within normal limits. Right Ovary: Right ovary is within normal limits.   Medial to the right ovary and lateral to the uterus is a heterogeneous mass measuring 3.7 cm. Left Ovary:  Left ovary is within normal limits. Free Fluid: Small amount of free fluid cul-de-sac. Heterogeneous right adnexal mass situated between the ovary and the uterus as measured above which is nonspecific and may represent ectopic pregnancy or possible complex cyst.  Recommend OB gyn consultation and short-term follow-up ultrasound to reassess. Findings were discussed with Dr. Chandrakant Arora at 8:20 am on 12/10/2021. EMERGENCY DEPARTMENT COURSE:  ED Course as of 12/10/21 2242   Fri Dec 10, 2021   0604 But with OB. Concern for right-sided central dependency at this time. hCG quant 727. Patient currently stable, will call in ultrasound for stat transvaginal ultrasound. [JS]   [id number] Spoke with radiology, possible ectopic on ultrasound. OB notified. [JS]   [id number] Signed out to Dr. Haley Urbina. [JS]      ED Course User Index  [JS] Reece Cano,        PROCEDURES:  None    CONSULTS:  IP CONSULT TO OB GYN    CRITICAL CARE:  Please see attending note    FINAL IMPRESSION      1. Post-op pain    2. Generalized abdominal pain        DISPOSITION / PLAN     DISPOSITION Decision To Admit 12/10/2021 04:41:37 PM      PATIENTREFERRED TO:  [facility]  [address]  [facility] [id number] [phone]  Schedule an appointment as soon as possible for a visit in 2 weeks  Post-Op Follow Up      DISCHARGE MEDICATIONS:  Current Discharge Medication List      START taking these medications    Details   acetaminophen-codeine (TYLENOL/CODEINE #3) 300-30 MG per tablet Take 1 tablet by mouth every 4 hours as needed for Pain for up to 2 days. Intended supply: 3 days.  Take lowest dose possible to manage pain  Qty: 12 tablet, Refills: 0    Comments: Reduce doses taken as pain becomes manageable  Associated Diagnoses: Post-op pain      simethicone (MYLICON) 80 MG chewable tablet Take 1 tablet by mouth 4 times daily as needed for Flatulence  Qty: 30 tablet, Refills: 0             Marcus Harper DO  EmergencyMedicine Resident    (Please note that portions of this note were completed with a voice recognition program.  Efforts were made to edit the dictations but occasionally words are mis-transcribed.)     Geni Stoll DO  Resident  12/10/21 4542

## 2021-12-10 NOTE — ED NOTES
Pt had d and c on Friday December 3rd. Two days ago started experience shaking fever vomiting and abdominal pain. Not able to hold down fluids. Pt awoke today to bleeding and soaking through one pad. Per doctor was told to return to emergency room for evaluation.       Judie Gambino RN  12/10/21 [id number]

## 2021-12-10 NOTE — OP NOTE
used to create a pneumoperitoneum. The patient was then placed in trendelenberg position. Survey of the pelvis was then performed, revealing a right sided adnexal mass in the right fallopian tube c/w ectopic pregnancy. Right ovary was noted to have corpus luteum cyst and adherent clot. Moderate blood noted in the posterior cul de sac. A 5 mm port was then placed in the right lower quadrant under direct visualization. As was a third 11 mm port on the left lower quadrant. An atraumatic grasper was utilized to elevated the fimbria and the ligasure was utilized to systematically cauterize and cut the misosalpinx  the fallopian tube containing the ectopic. An endocatch bag was utilized to remove the right fallopian tube and ectopic pregnancy. The suction  was then utilized to evacuate the hemoperitoneum. The adherent clot on the right ovary was grasped with the atraumatic graspers and removed. The ovary was intact and had no evidence of abnormal pathology the simple cyst c/w the suspected corpus luteum was noted. A final survey of the pelvis was conducted, hemostasis was noted. All instruments were then removed. Pneumoperitoneum was evacuated. Fascia was closed utilizing the [employer] with 0 Vicryl. Skin was closed with 4-0 monocryl. Incisions were clean, dried and dressed in sterile fashion. All instruments were then removed from the vagina. Hemostasis was noted at the tenaculum site. Leroy catheter was removed. Dr. Dex Landin was present for the entire operation.     Findings: right fallopian tube with ectopic pregnancy,  Estimated Blood Loss: 160 ml  Drains:  None  Total IV Fluids: 1000ml  Urine output: 400ml clear urine   Specimens:  Right fallopian tube  Instrument and Sponge Count: Correct  Complications: none  Condition: stable, transfer to post anesthesia recovery    Luis Ahumada MD  Ob/Gyn Attending   12/10/2021, 3:59 PM

## 2021-12-10 NOTE — ED NOTES
The following labs were labeled with patient stickers & tubed to lab;    []Lavender   []On Ice  []Blue  []Green/ Yellow  []Green/ Black []On Ice  [x]Pink  []Red  []Yellow    [x]COVID-19 Swab [x]Rapid    []Urine Sample  []Pelvic Cultures    []Blood Cultures       Farida Saldaña LPN  47/74/98 6993

## 2021-12-10 NOTE — ED NOTES
Received report from [facility], Pt A&O x 4, on continuous monitor, does not appear in acute distress, RR even and unlabored, resting comfortably on stretcher with eyes closed and call light in reach. Writer will continue to monitor pt closely.        Jeison Pi, ANNN  52/35/88 3006

## 2021-12-10 NOTE — ED NOTES
PT. Has pain to her abd and pelvic that started two days ago post [facility]      Santos Duverney, RN  12/10/21 3117

## 2021-12-10 NOTE — ED PROVIDER NOTES
[facility]  Emergency Department  Emergency Medicine Resident Sign-out     Care of Stevie Gresham was assumed from Dr. Rocío Lau and is being seen for Abdominal Pain (d&c 2 days ago, presents with worsening abd/pelvic pain)  . The patient's initial evaluation and plan have been discussed with the prior provider who initially evaluated the patient. EMERGENCY DEPARTMENT COURSE / MEDICAL DECISION MAKING:       MEDICATIONS GIVEN:  Orders Placed This Encounter   Medications    0.9 % sodium chloride infusion    ketorolac (TORADOL) injection 30 mg    acetaminophen (TYLENOL) tablet 1,000 mg    morphine injection 4 mg    ondansetron (ZOFRAN) injection 4 mg    morphine injection 4 mg    DISCONTD: ibuprofen (ADVIL;MOTRIN) 600 MG tablet     Sig: Take 1 tablet by mouth every 6 hours as needed for Pain     Dispense:  30 tablet     Refill:  0    ondansetron (ZOFRAN ODT) 4 MG disintegrating tablet     Sig: Take 1 tablet by mouth every 8 hours as needed for Nausea or Vomiting     Dispense:  12 tablet     Refill:  0    DISCONTD: docusate sodium (COLACE) 100 MG capsule     Sig: Take 1 capsule by mouth daily     Dispense:  30 capsule     Refill:  0    DISCONTD: acetaminophen-codeine (TYLENOL/CODEINE #3) 300-30 MG per tablet     Sig: Take 1 tablet by mouth every 4 hours as needed for Pain for up to 2 days. Intended supply: 3 days. Take lowest dose possible to manage pain     Dispense:  12 tablet     Refill:  0     Reduce doses taken as pain becomes manageable    DISCONTD: simethicone (MYLICON) 80 MG chewable tablet     Sig: Take 1 tablet by mouth 4 times daily as needed for Flatulence     Dispense:  30 tablet     Refill:  0    acetaminophen-codeine (TYLENOL/CODEINE #3) 300-30 MG per tablet     Sig: Take 1 tablet by mouth every 4 hours as needed for Pain for up to 2 days. Intended supply: 3 days.  Take lowest dose possible to manage pain     Dispense:  12 tablet     Refill:  0     Reduce doses taken as pain becomes manageable    ibuprofen (ADVIL;MOTRIN) 600 MG tablet     Sig: Take 1 tablet by mouth every 6 hours as needed for Pain     Dispense:  30 tablet     Refill:  0    docusate sodium (COLACE) 100 MG capsule     Sig: Take 1 capsule by mouth daily     Dispense:  30 capsule     Refill:  0    simethicone (MYLICON) 80 MG chewable tablet     Sig: Take 1 tablet by mouth 4 times daily as needed for Flatulence     Dispense:  30 tablet     Refill:  0    bupivacaine-EPINEPHrine (MARCAINE-w/EPINEPHRINE) 0.25% -[id number] injection    fentaNYL (SUBLIMAZE) injection 25 mcg    fentaNYL (SUBLIMAZE) injection 50 mcg    fentaNYL (SUBLIMAZE) injection 25 mcg    fentaNYL (SUBLIMAZE) injection 50 mcg    oxyCODONE-acetaminophen (PERCOCET) 5-325 MG per tablet 1 tablet    ondansetron (ZOFRAN) injection 4 mg    promethazine (PHENERGAN) injection 6.25 mg    0.9 % sodium chloride bolus    diphenhydrAMINE (BENADRYL) injection 12.5 mg    labetalol (NORMODYNE;TRANDATE) injection 5 mg    hydrALAZINE (APRESOLINE) injection 5 mg    sodium chloride 0.9 % irrigation    ibuprofen (ADVIL;MOTRIN) tablet 600 mg       LABS / RADIOLOGY:     Labs Reviewed   CBC WITH AUTO DIFFERENTIAL - Abnormal; Notable for the following components:       Result Value    WBC 14.0 (*)     Seg Neutrophils 66 (*)     Segs Absolute 9.12 (*)     Absolute Lymph # 3.79 (*)     All other components within normal limits   HCG, QUANTITATIVE, PREGNANCY - Abnormal; Notable for the following components:    hCG Quant 727 (*)     All other components within normal limits   BASIC METABOLIC PANEL - Abnormal; Notable for the following components:    Glucose 105 (*)     Potassium 3.6 (*)     All other components within normal limits   CBC WITH AUTO DIFFERENTIAL - Abnormal; Notable for the following components:    Seg Neutrophils 90 (*)     Lymphocytes 7 (*)     Monocytes 2 (*)     Eosinophils % 0 (*)     Immature Granulocytes 1 (*)     Segs Absolute 9.53 (*)     Absolute Lymph # 0.76 (*)     All other components within normal limits   COVID-19, RAPID   VAGINITIS DNA PROBE   C.TRACHOMATIS N.GONORRHOEAE DNA   ELECTROLYTE PANEL   URINE RT REFLEX TO CULTURE   SURGICAL PATHOLOGY   SURGICAL PATHOLOGY REPORT   TYPE AND SCREEN       US OB LESS THAN 14 WEEKS SINGLE OR FIRST GESTATION    Result Date: 11/24/2021  EXAMINATION: TRANSABDOMINAL FIRST TRIMESTER OBSTETRIC PELVIC ULTRASOUND WITH COLOR DOPPLER FLOW; FIRST TRIMESTER OBSTETRIC ULTRASOUND 11/24/2021 TECHNIQUE: TRANSABDOMINAL PELVIC ULTRASOUND WITH COLOR DOPPLER FLOW; Transvaginal first trimester obstetric pelvic ultrasound was performed with color Doppler flow evaluation. COMPARISON: None HISTORY: ORDERING SYSTEM PROVIDED HISTORY: pregnant, pain, bleeding TECHNOLOGIST PROVIDED HISTORY: Pain and bleeding FINDINGS: Uterus: 9.0 x 4.6 x 5.9 cm Endometrium: 1.3 cm. Gestational Sac(s):  Tiny anechoic area within the endometrium measuring 0.29 cm, possibly gestational sac. No visualized IUP. Small anechoic area in the right uterus measuring 5 mm suggestive of benign cyst. Right ovary: 4.0 x 2.6 x 2.1 cm. Likely corpus luteum cyst measuring 1.6 cm. Left ovary: 1.9 x 1.51.7 cm Free fluid: Small amount of free fluid throughout the pelvis. Possible small intrauterine gestational sac. Recommend short-term follow-up ultrasound to reassess. US OB TRANSVAGINAL    Result Date: 11/24/2021  EXAMINATION: TRANSABDOMINAL FIRST TRIMESTER OBSTETRIC PELVIC ULTRASOUND WITH COLOR DOPPLER FLOW; FIRST TRIMESTER OBSTETRIC ULTRASOUND 11/24/2021 TECHNIQUE: TRANSABDOMINAL PELVIC ULTRASOUND WITH COLOR DOPPLER FLOW; Transvaginal first trimester obstetric pelvic ultrasound was performed with color Doppler flow evaluation. COMPARISON: None HISTORY: ORDERING SYSTEM PROVIDED HISTORY: pregnant, pain, bleeding TECHNOLOGIST PROVIDED HISTORY: Pain and bleeding FINDINGS: Uterus: 9.0 x 4.6 x 5.9 cm Endometrium: 1.3 cm.  Gestational Sac(s):  Tiny anechoic area within the endometrium measuring 0.29 cm, possibly gestational sac. No visualized IUP. Small anechoic area in the right uterus measuring 5 mm suggestive of benign cyst. Right ovary: 4.0 x 2.6 x 2.1 cm. Likely corpus luteum cyst measuring 1.6 cm. Left ovary: 1.9 x 1.51.7 cm Free fluid: Small amount of free fluid throughout the pelvis. Possible small intrauterine gestational sac. Recommend short-term follow-up ultrasound to reassess. US NON OB TRANSVAGINAL    Result Date: 12/10/2021  EXAMINATION: PELVIC ULTRASOUND 12/10/2021 TECHNIQUE: Transvaginal pelvic ultrasound was performed. COMPARISON: 2021 HISTORY: ORDERING SYSTEM PROVIDED HISTORY: concern for ectopic Beta HCG of 727 FINDINGS: Measurements: Uterus: 9.8 x 5.0 x 6.8 cm Endometrial stripe: 2 mm Right Ovary:2.8 x 2.5 x 2.3 cm Left Ovary: 2.7 x 1.9 x 1.2 cm Ultrasound Findings: Uterus: Uterus demonstrates normal myometrial echotexture. Endometrial stripe: Endometrial stripe is within normal limits. Right Ovary: Right ovary is within normal limits. Medial to the right ovary and lateral to the uterus is a heterogeneous mass measuring 3.7 cm. Left Ovary:  Left ovary is within normal limits. Free Fluid: Small amount of free fluid cul-de-sac. Heterogeneous right adnexal mass situated between the ovary and the uterus as measured above which is nonspecific and may represent ectopic pregnancy or possible complex cyst.  Recommend OB gyn consultation and short-term follow-up ultrasound to reassess. Findings were discussed with Dr. Madeline Grande at 8:20 am on 12/10/2021. RECENT VITALS:     Temp: 96.1 °F (35.6 °C),  Pulse: 78, Resp: 14, BP: 113/75, SpO2: 99 %    This patient is a 23 y.o. Female with patient worsening pelvic and abdominal pain. Patient had a recent D&C for a incomplete . Pain return the emerge department for the worsening of pain. Patient lab work otherwise unremarkable. Patient getting transvaginal ultrasound.   OB/GYN has been consulted stated that: Plan for surgery today for reevaluation given the transvaginal findings of possible ectopic pregnancy. ED Course as of 12/10/21 1641   Fri Dec 10, 2021   0604 But with OB. Concern for right-sided central dependency at this time. hCG quant 727. Patient currently stable, will call in ultrasound for stat transvaginal ultrasound. [JS]   [id number] Spoke with radiology, possible ectopic on ultrasound. OB notified. [JS]   [id number] Signed out to Dr. Misa Ball. [JS]      ED Course User Index  [JS] Dominique Mendoza,        OUTSTANDING TASKS / RECOMMENDATIONS:    1. OR with OB/GYN  2. Dispo per OB     FINAL IMPRESSION:     1. Post-op pain    2. Generalized abdominal pain        DISPOSITION:         DISPOSITION:  []  Discharge   []  Transfer -    [x]  Admission -     []  Against Medical Advice   []  Eloped   FOLLOW-UP: [address]  [address]  [phone]  Schedule an appointment as soon as possible for a visit in 2 weeks  Post-Op Follow Up     DISCHARGE MEDICATIONS: Current Discharge Medication List      START taking these medications    Details   acetaminophen-codeine (TYLENOL/CODEINE #3) 300-30 MG per tablet Take 1 tablet by mouth every 4 hours as needed for Pain for up to 2 days. Intended supply: 3 days.  Take lowest dose possible to manage pain  Qty: 12 tablet, Refills: 0    Comments: Reduce doses taken as pain becomes manageable  Associated Diagnoses: Post-op pain      simethicone (MYLICON) 80 MG chewable tablet Take 1 tablet by mouth 4 times daily as needed for Flatulence  Qty: 30 tablet, Refills: 0                 Roberto Pires MD  Emergency Medicine Resident  [id number] Fabian MD  Resident  12/10/21 7628

## 2021-12-10 NOTE — ANESTHESIA POSTPROCEDURE EVALUATION
POST- ANESTHESIA EVALUATION       Pt Name: Maylin Hirsch  MRN: [id number]  [place]: 1995  Date of evaluation: 12/10/2021  Time:  2:00 PM      /63   Pulse 65   Temp 96.1 °F (35.6 °C) (Temporal)   Resp 11   Ht 5' 4\" (1.626 m)   Wt 185 lb (83.9 kg)   LMP 11/23/2021 (Exact Date)   SpO2 99%   BMI 31.76 kg/m²      Consciousness Level  Awake  Cardiopulmonary Status  Stable  Pain Adequately Treated YES  Nausea / Vomiting  NO  Adequate Hydration  YES  Anesthesia Related Complications NONE      Electronically signed by Ailyn Brasher MD on 12/10/2021 at 2:00 PM       Department of Anesthesiology  Postprocedure Note    Patient: Maylin Hirsch  MRN: [id number]  YOB: 1995  Date of evaluation: 12/10/2021  Time:  2:00 PM     Procedure Summary     Date: 12/10/21 Room / Location: [address]    Anesthesia Start: 5019 Anesthesia Stop: 9740    Procedure: DIAGNOSTIC LAPAROSCOPY, UNILATERAL SALPINGECTOMY (Right ) Diagnosis: (ECTOPIC PREGNANCY)    Surgeons: Nithin Rogers MD Responsible Provider: Alexandro Chan MD    Anesthesia Type: general ASA Status: 1 - Emergent          Anesthesia Type: general    Linda Phase I: Linda Score: 8    Linda Phase II:      Last vitals: Reviewed and per EMR flowsheets.        Anesthesia Post Evaluation

## 2021-12-10 NOTE — ED NOTES
Assisted OBGYN doctor with pelvic exam. Swabs obtained and will send to lab when orders are placed.       Melody Osman RN  12/10/21 3765

## 2021-12-10 NOTE — ED PROVIDER NOTES
to feel ill but is nontoxic-appearing. Normocephalic atraumatic. Heart sounds are regular no murmurs rubs or gallops. Lungs clear auscultation. Abdomen is soft does have suprapubic tenderness, but no rebound or guarding. Alert and oriented x4 with no jaundice or rash on exposed skin    MDM/Plan:   Postop 7 days from [facility]  for missed  coming in with worsening lower abdominal pain. Is tachycardic, but afebrile. Is uncomfortable secondary to pain.   Will give analgesics, basic labs, OB consult      Critical Care: None     Brooklyn Hughes MD  Attending Emergency Physician         Brooklyn Hughes MD  12/10/21 2219

## 2021-12-10 NOTE — BRIEF OP NOTE
Brief Operative Note  Department of Obstetrics and Gynecology  [facility]     Patient: Bijan Mcallister   : 1995  MRN: [id number]       Acct: [de-identified]   Date of Procedure: 12/10/21     Pre-operative Diagnosis: 23 y.o. female  with suspected ruptured ectopic pregnancy   Post-operative Diagnosis: same right ectopic pregnancy, hemoperitoneum     Procedure: Diagnostic laparoscopy    Surgeon: Prasad Padilla MD,      Assistant(s):James Mai Schirmer MD   Anesthesia: general via     Findings:  Ectopic pregnancy in the right fallopian tube, moderate hemoperitoneum noted in the pelvis   Total IV fluids/Blood products:  1L ml crystalloid  Urine Output:  400 ml    Estimated blood loss: 160mL  Drains:  none  Specimens:  Right fallopian tube with ectopic pregnancy   Instrument and Sponge Count: Correct  Complications:  none  Condition:  stable, transferred to post anesthesia recovery    See full operative report for further details.     Gloria Magallanes MD  Attending Physician   12/10/2021, 3:53 PM

## 2021-12-10 NOTE — ANESTHESIA PRE PROCEDURE
Department of Anesthesiology  Preprocedure Note       Name:  Jeremy Pacheco   Age:  23 y.o.  :  1995                                          MRN:  [id number]         Date:  12/10/2021      Surgeon: Nasima Bruce):  Mercedes Canchola MD    Procedure: Procedure(s):  DIAGNOSTIC LAPAROSCOPY, UNILATERAL SALPINGECTOMY    Medications prior to admission:   Prior to Admission medications    Medication Sig Start Date End Date Taking? Authorizing Provider   metroNIDAZOLE (FLAGYL) 500 MG tablet Take 500 mg by mouth twice a week 21   Historical Provider, MD   ibuprofen (ADVIL;MOTRIN) 600 MG tablet Take 1 tablet by mouth every 6 hours as needed for Pain 12/3/21   Lindwood Becket, DO   ondansetron (ZOFRAN ODT) 4 MG disintegrating tablet Take 1 tablet by mouth every 8 hours as needed for Nausea or Vomiting 12/3/21   Kodywood Becket, DO   docusate sodium (COLACE) 100 MG capsule Take 1 capsule by mouth daily 12/3/21   [facility], DO   acetaminophen (TYLENOL) 500 MG tablet Take 2 tablets by mouth every 6 hours as needed for Pain 20   Melissa Reza MD       Current medications:    No current facility-administered medications for this encounter. Current Outpatient Medications   Medication Sig Dispense Refill    metroNIDAZOLE (FLAGYL) 500 MG tablet Take 500 mg by mouth twice a week      ibuprofen (ADVIL;MOTRIN) 600 MG tablet Take 1 tablet by mouth every 6 hours as needed for Pain 30 tablet 0    ondansetron (ZOFRAN ODT) 4 MG disintegrating tablet Take 1 tablet by mouth every 8 hours as needed for Nausea or Vomiting 12 tablet 0    docusate sodium (COLACE) 100 MG capsule Take 1 capsule by mouth daily 30 capsule 0    acetaminophen (TYLENOL) 500 MG tablet Take 2 tablets by mouth every 6 hours as needed for Pain 120 tablet 0       Allergies:     Allergies   Allergen Reactions    Amoxicillin Hives    Lactose      Can have milk in foods, but not by itself (I.e., glass of milk)    Other      Can have milk in foods, but not by itself (I.e., glass of milk)       Problem List:    Patient Active Problem List   Diagnosis Code    Allergic reaction caused by a drug T78.40XA    E. coli UTI (urinary tract infection) N39.0, B96.20    Acute pyelonephritis N10    Cholecystitis K81.9    Pyelonephritis N12    History of kidney stones Z87.442    Missed  O02.1       Past Medical History:        Diagnosis Date    Gall stones     Kidney stone     Patient denies    UTI (lower urinary tract infection)        Past Surgical History:        Procedure Laterality Date    ARM SURGERY Right     BREAST REDUCTION SURGERY  2018    CHOLECYSTECTOMY  2018    Laparoscopic    CYSTOSCOPY  2019    CYSTOSCOPY N/A 2019    CYSTOSCOPY RETROGRADE PYELOGRAM WITH  CYSTOGRAM, performed by Azael Young MD at [place] [address]  2021    DILATATION AND CURETTAGE SUCTION    DILATION AND CURETTAGE OF UTERUS N/A 12/3/2021    DILATATION AND CURETTAGE SUCTION performed by Anjum Morillo MD at [address] [place] [address] N/A 2018    CHOLECYSTECTOMY LAPAROSCOPIC performed by Leobardo Em MD at [address] [place] [address] History:    Social History     Tobacco Use    Smoking status: Never Smoker    Smokeless tobacco: Never Used   Substance Use Topics    Alcohol use: Yes     Comment: social drinker                                 Counseling given: Not Answered      Vital Signs (Current):   Vitals:    12/10/21 0402 12/10/21 0412 12/10/21 0800   BP: 131/85 124/75 (!) 101/53   Pulse: 119 94 79   Resp: 22 20 16   Temp: 97.5 °F (36.4 °C) 98.3 °F (36.8 °C) 98.3 °F (36.8 °C)   TempSrc: Oral Oral Oral   SpO2: 99% 97% 100%   Weight:  185 lb (83.9 kg)    Height:  5' 4\" (1.626 m)                                               BP Readings from Last 3 Encounters:   12/10/21 (!) 101/53   21 105/68   21 122/85       NPO Status: BMI:   Wt Readings from Last 3 Encounters:   12/10/21 185 lb (83.9 kg)   12/03/21 185 lb (83.9 kg)   11/29/21 185 lb (83.9 kg)     Body mass index is 31.76 kg/m². CBC:   Lab Results   Component Value Date    WBC 14.0 12/10/2021    RBC 4.73 12/10/2021    HGB 14.2 12/10/2021    HCT 40.8 12/10/2021    MCV 86.3 12/10/2021    RDW 13.2 12/10/2021     12/10/2021       CMP:   Lab Results   Component Value Date     12/10/2021    K 3.6 12/10/2021     12/10/2021    CO2 20 12/10/2021    BUN 12 12/10/2021    CREATININE 0.75 12/10/2021    GFRAA >60 12/10/2021    LABGLOM >60 12/10/2021    GLUCOSE 105 12/10/2021    PROT 6.7 11/24/2021    CALCIUM 9.4 12/10/2021    BILITOT 0.47 11/24/2021    ALKPHOS 48 11/24/2021    AST 20 11/24/2021    ALT 31 11/24/2021       POC Tests: No results for input(s): POCGLU, POCNA, POCK, POCCL, POCBUN, POCHEMO, POCHCT in the last 72 hours.     Coags:   Lab Results   Component Value Date    PROTIME 11.5 05/15/2018    INR 1.1 05/15/2018       HCG (If Applicable):   Lab Results   Component Value Date    PREGTESTUR POSITIVE (A) 11/24/2021    HCG NEGATIVE 01/27/2020    HCGQUANT 727 (H) 12/10/2021        ABGs: No results found for: PHART, PO2ART, UTF9KVN, ZOG5AJC, BEART, E1UXQFDU     Type & Screen (If Applicable):  No results found for: LABABO, LABRH    Drug/Infectious Status (If Applicable):  Lab Results   Component Value Date    HEPCAB NONREACTIVE 05/15/2018       COVID-19 Screening (If Applicable):   Lab Results   Component Value Date    COVID19 Not Detected 12/03/2021           Anesthesia Evaluation  Patient summary reviewed and Nursing notes reviewed no history of anesthetic complications:   Airway: Mallampati: I  TM distance: >3 FB   Neck ROM: full  Mouth opening: > = 3 FB Dental: normal exam         Pulmonary:Negative Pulmonary ROS and normal exam                               Cardiovascular:Negative CV ROS Neuro/Psych:   Negative Neuro/Psych ROS              GI/Hepatic/Renal: Neg GI/Hepatic/Renal ROS            Endo/Other: Negative Endo/Other ROS                    Abdominal:             Vascular: Other Findings:           Anesthesia Plan      general     ASA 1 - emergent       Induction: intravenous. MIPS: Postoperative opioids intended and Prophylactic antiemetics administered. Anesthetic plan and risks discussed with patient. Plan discussed with CRNA.                   Deonna Castellanos MD   12/10/2021

## 2021-12-10 NOTE — ED NOTES
Pt returned to room from ultrasound on stretcher via [place]. Pt A&O x 4, does not appear in acute distress, RR even and unlabored, resting comfortably on stretcher with eyes closed and call light in reach. Writer will continue to monitor pt closely.          Cristi Rodríguez LPN  61/96/02 5745

## 2021-12-10 NOTE — PROGRESS NOTES
I was asked by Dr. Efrain Candelario to assume the care of this patient who was in OR # 4 awaiting induction of anesthesia to undergo laparoscopy for treatment of ectopic pregnancy, due to Dr Efrain Candelario being called away due to an [facility] emergency. I reviewed the patient record and agree with the planned surgery. I explained to the patient that I would be replacing Dr Efrain Candelario and she agreed with me taking over and performing the surgery. Prior to positioning the patient on the OR table  entered the room and I will be assisting her now.

## 2021-12-10 NOTE — ED NOTES
The following labs labeled with pt sticker and tubed to lab:     [] Blue     [x] Lavender   [] on ice  [x] Green/yellow  [] Green/black [] on ice  [] Yellow  [] Red  [] Pink      [] COVID-19 swab    [] Rapid  [] PCR  [] Peds Viral Panel     [] Urine Sample  [] Pelvic Cultures  [] Blood Cultures            Jolene Dowd RN  12/10/21 2074

## 2021-12-11 VITALS
TEMPERATURE: 98.5 F | SYSTOLIC BLOOD PRESSURE: 102 MMHG | HEART RATE: 82 BPM | BODY MASS INDEX: 31.58 KG/M2 | HEIGHT: 64 IN | DIASTOLIC BLOOD PRESSURE: 60 MMHG | OXYGEN SATURATION: 98 % | WEIGHT: 185 LBS | RESPIRATION RATE: 14 BRPM

## 2021-12-11 LAB
ABSOLUTE EOS #: <0.03 K/UL (ref 0–0.44)
ABSOLUTE IMMATURE GRANULOCYTE: 0.09 K/UL (ref 0–0.3)
ABSOLUTE LYMPH #: 0.98 K/UL (ref 1.1–3.7)
ABSOLUTE MONO #: 1 K/UL (ref 0.1–1.2)
ALBUMIN SERPL-MCNC: 3.6 G/DL (ref 3.5–5.2)
ALBUMIN/GLOBULIN RATIO: 1.4 (ref 1–2.5)
ALP BLD-CCNC: 46 U/L (ref 35–104)
ALT SERPL-CCNC: 50 U/L (ref 5–33)
ANION GAP SERPL CALCULATED.3IONS-SCNC: 13 MMOL/L (ref 9–17)
AST SERPL-CCNC: 36 U/L
BASOPHILS # BLD: 0 % (ref 0–2)
BASOPHILS ABSOLUTE: 0.03 K/UL (ref 0–0.2)
BILIRUB SERPL-MCNC: 0.62 MG/DL (ref 0.3–1.2)
BUN BLDV-MCNC: 7 MG/DL (ref 6–20)
BUN/CREAT BLD: ABNORMAL (ref 9–20)
CALCIUM SERPL-MCNC: 8.7 MG/DL (ref 8.6–10.4)
CHLORIDE BLD-SCNC: 105 MMOL/L (ref 98–107)
CO2: 20 MMOL/L (ref 20–31)
CREAT SERPL-MCNC: 0.7 MG/DL (ref 0.5–0.9)
DIFFERENTIAL TYPE: ABNORMAL
EOSINOPHILS RELATIVE PERCENT: 0 % (ref 1–4)
GFR AFRICAN AMERICAN: >60 ML/MIN
GFR NON-AFRICAN AMERICAN: >60 ML/MIN
GFR SERPL CREATININE-BSD FRML MDRD: ABNORMAL ML/MIN/{1.73_M2}
GFR SERPL CREATININE-BSD FRML MDRD: ABNORMAL ML/MIN/{1.73_M2}
GLUCOSE BLD-MCNC: 142 MG/DL (ref 70–99)
HCT VFR BLD CALC: 36.1 % (ref 36.3–47.1)
HCT VFR BLD CALC: 38.2 % (ref 36.3–47.1)
HEMOGLOBIN: 11.9 G/DL (ref 11.9–15.1)
HEMOGLOBIN: 12.8 G/DL (ref 11.9–15.1)
IMMATURE GRANULOCYTES: 1 %
LYMPHOCYTES # BLD: 6 % (ref 24–43)
MCH RBC QN AUTO: 29.5 PG (ref 25.2–33.5)
MCH RBC QN AUTO: 29.6 PG (ref 25.2–33.5)
MCHC RBC AUTO-ENTMCNC: 33 G/DL (ref 28.4–34.8)
MCHC RBC AUTO-ENTMCNC: 33.5 G/DL (ref 28.4–34.8)
MCV RBC AUTO: 88.4 FL (ref 82.6–102.9)
MCV RBC AUTO: 89.6 FL (ref 82.6–102.9)
MONOCYTES # BLD: 6 % (ref 3–12)
NRBC AUTOMATED: 0 PER 100 WBC
NRBC AUTOMATED: 0 PER 100 WBC
PDW BLD-RTO: 13.3 % (ref 11.8–14.4)
PDW BLD-RTO: 13.5 % (ref 11.8–14.4)
PLATELET # BLD: 245 K/UL (ref 138–453)
PLATELET # BLD: 277 K/UL (ref 138–453)
PLATELET ESTIMATE: ABNORMAL
PMV BLD AUTO: 10.3 FL (ref 8.1–13.5)
PMV BLD AUTO: 10.5 FL (ref 8.1–13.5)
POTASSIUM SERPL-SCNC: 4.1 MMOL/L (ref 3.7–5.3)
RBC # BLD: 4.03 M/UL (ref 3.95–5.11)
RBC # BLD: 4.32 M/UL (ref 3.95–5.11)
RBC # BLD: ABNORMAL 10*6/UL
SEG NEUTROPHILS: 87 % (ref 36–65)
SEGMENTED NEUTROPHILS ABSOLUTE COUNT: 14.5 K/UL (ref 1.5–8.1)
SODIUM BLD-SCNC: 138 MMOL/L (ref 135–144)
TOTAL PROTEIN: 6.1 G/DL (ref 6.4–8.3)
WBC # BLD: 14.8 K/UL (ref 3.5–11.3)
WBC # BLD: 16.6 K/UL (ref 3.5–11.3)
WBC # BLD: ABNORMAL 10*3/UL

## 2021-12-11 PROCEDURE — G0378 HOSPITAL OBSERVATION PER HR: HCPCS

## 2021-12-11 PROCEDURE — 6370000000 HC RX 637 (ALT 250 FOR IP): Performed by: STUDENT IN AN ORGANIZED HEALTH CARE EDUCATION/TRAINING PROGRAM

## 2021-12-11 PROCEDURE — 80053 COMPREHEN METABOLIC PANEL: CPT

## 2021-12-11 PROCEDURE — 6370000000 HC RX 637 (ALT 250 FOR IP): Performed by: OBSTETRICS & GYNECOLOGY

## 2021-12-11 PROCEDURE — 2580000003 HC RX 258: Performed by: OBSTETRICS & GYNECOLOGY

## 2021-12-11 PROCEDURE — 85027 COMPLETE CBC AUTOMATED: CPT

## 2021-12-11 PROCEDURE — 36415 COLL VENOUS BLD VENIPUNCTURE: CPT

## 2021-12-11 PROCEDURE — 85025 COMPLETE CBC W/AUTO DIFF WBC: CPT

## 2021-12-11 RX ORDER — NITROFURANTOIN 25; 75 MG/1; MG/1
100 CAPSULE ORAL EVERY 12 HOURS SCHEDULED
Status: DISCONTINUED | OUTPATIENT
Start: 2021-12-11 | End: 2021-12-11 | Stop reason: HOSPADM

## 2021-12-11 RX ORDER — CYCLOBENZAPRINE HCL 10 MG
10 TABLET ORAL 3 TIMES DAILY PRN
Status: DISCONTINUED | OUTPATIENT
Start: 2021-12-11 | End: 2021-12-11 | Stop reason: HOSPADM

## 2021-12-11 RX ORDER — GABAPENTIN 300 MG/1
300 CAPSULE ORAL 2 TIMES DAILY PRN
Qty: 10 CAPSULE | Refills: 0 | Status: SHIPPED | OUTPATIENT
Start: 2021-12-11 | End: 2022-01-13 | Stop reason: ALTCHOICE

## 2021-12-11 RX ORDER — CYCLOBENZAPRINE HCL 10 MG
10 TABLET ORAL 3 TIMES DAILY PRN
Qty: 10 TABLET | Refills: 0 | Status: SHIPPED | OUTPATIENT
Start: 2021-12-11 | End: 2021-12-21

## 2021-12-11 RX ORDER — GABAPENTIN 600 MG/1
300 TABLET ORAL ONCE
Status: COMPLETED | OUTPATIENT
Start: 2021-12-11 | End: 2021-12-11

## 2021-12-11 RX ADMIN — GABAPENTIN 300 MG: 600 TABLET ORAL at 02:47

## 2021-12-11 RX ADMIN — OXYCODONE 5 MG: 5 TABLET ORAL at 15:44

## 2021-12-11 RX ADMIN — OXYCODONE 10 MG: 5 TABLET ORAL at 09:56

## 2021-12-11 RX ADMIN — IBUPROFEN 600 MG: 600 TABLET, FILM COATED ORAL at 09:55

## 2021-12-11 RX ADMIN — POLYETHYLENE GLYCOL 3350 17 G: 17 POWDER, FOR SOLUTION ORAL at 09:56

## 2021-12-11 RX ADMIN — CYCLOBENZAPRINE 10 MG: 10 TABLET, FILM COATED ORAL at 02:47

## 2021-12-11 RX ADMIN — NITROFURANTOIN MONOHYDRATE/MACROCRYSTALLINE 100 MG: 25; 75 CAPSULE ORAL at 09:55

## 2021-12-11 RX ADMIN — SODIUM CHLORIDE, PRESERVATIVE FREE 10 ML: 5 INJECTION INTRAVENOUS at 09:57

## 2021-12-11 RX ADMIN — CYCLOBENZAPRINE 10 MG: 10 TABLET, FILM COATED ORAL at 09:56

## 2021-12-11 ASSESSMENT — PAIN DESCRIPTION - PROGRESSION
CLINICAL_PROGRESSION: GRADUALLY IMPROVING

## 2021-12-11 ASSESSMENT — PAIN DESCRIPTION - FREQUENCY: FREQUENCY: CONTINUOUS

## 2021-12-11 ASSESSMENT — PAIN SCALES - GENERAL
PAINLEVEL_OUTOF10: 5
PAINLEVEL_OUTOF10: 6
PAINLEVEL_OUTOF10: 6

## 2021-12-11 ASSESSMENT — PAIN DESCRIPTION - PAIN TYPE: TYPE: ACUTE PAIN;SURGICAL PAIN

## 2021-12-11 ASSESSMENT — PAIN DESCRIPTION - LOCATION: LOCATION: ABDOMEN

## 2021-12-11 ASSESSMENT — PAIN DESCRIPTION - DESCRIPTORS: DESCRIPTORS: DISCOMFORT;TENDER

## 2021-12-11 NOTE — PROGRESS NOTES
Obstetric/Gynecology Resident Interval Note    Patient re-evaluated. Reports feeling much better. She is eating and drinking without nausea. She reports her pain is very controlled with her pain medication and reports flexeril helped her feel more comfortable. Abdomen soft, appropriate tenderness, incisions clean, dry, and intact with dermabond in place. Labs have been stable. The patient is ambulating and voiding spontaneously. Patient is currently being treated for a UTI and encourage her to finish her Rx. Vitals:    12/10/21 1700 12/10/21 2050 12/11/21 0251 12/11/21 0900   BP: 113/79 118/78  102/60   Pulse: 126 99  82   Resp: 17 16  14   Temp:  97.4 °F (36.3 °C) 97.9 °F (36.6 °C) 98.5 °F (36.9 °C)   TempSrc:  Oral Oral Oral   SpO2: 97% 98%     Weight:       Height:          Patient stable at this time for discharge with close outpatient follow up. Patient given precautions, all questions answered. Attending updated and in agreement with plan.     Mauri Gallo DO  OB/GYN Resident, PGY3  [facility]  12/11/2021, 1:02 PM

## 2021-12-11 NOTE — DISCHARGE SUMMARY
Gyn Discharge Summary  [facility]      Patient Name: Yaakov Live  Patient : 1995  Primary Care Physician: No primary care provider on file. Admit Date: 12/10/2021    Principal Diagnosis: Right sided ectopic pregnancy    Other Diagnosis:   Postoperative state [Z98.890]  Postoperative pain [G89.18]  Patient Active Problem List   Diagnosis    Allergic reaction caused by a drug    E. coli UTI (urinary tract infection)    Acute pyelonephritis    Cholecystitis    Pyelonephritis    History of kidney stones    Missed     Ectopic pregnancy    S/p lap right salpingectomy w/ evacuation of hemoperitoneum 12/10/21    Postoperative state    Post-op pain    Postoperative pain       Infection: Yes-currently being treated for previous UTI  Hospital Acquired: No    Surgical Operations & Procedures: Diagnostic laparoscopy, right salpingectomy, evacuation of hemoperitoneum    Consultations: Anesthesia    Pertinent Findings & Procedures:   Yaakov Live is a 23 y.o. female , admitted for surgical management of suspected right ectopic pregnancy. The patient presents with worsening right sided abdominal pain and has abnormally rising HCG and is s/p a suction D&C which demonstrated no trophoblastic tissue. There was high concern for ectopic pregnancy and decision was made to proceed with surgical management. She underwent diagnostic laparoscopy, right salpingectomy and evacuation of hemoperitoneum on 12/10/21. Hgb on admission of 14.2 and labs remained stable postoperatively. Post-op course normal, discharged home 21. Follow up in 2 weeks. Discharge/ instructions reviewed and questions answered.     Course of patient: normal    Discharge to: Home    Readmission planned: No    Recommendations on Discharge:     Medications:     Medication List      START taking these medications    acetaminophen-codeine 300-30 MG per tablet  Commonly known as: TYLENOL/CODEINE #3  Take 1 tablet by mouth every 4 hours as needed for Pain for up to 2 days. Intended supply: 3 days. Take lowest dose possible to manage pain     cyclobenzaprine 10 MG tablet  Commonly known as: FLEXERIL  Take 1 tablet by mouth 3 times daily as needed for Muscle spasms     gabapentin 300 MG capsule  Commonly known as: NEURONTIN  Take 1 capsule by mouth 2 times daily as needed (pain) for up to 7 days. Intended supply: 90 days     simethicone 80 MG chewable tablet  Commonly known as: MYLICON  Take 1 tablet by mouth 4 times daily as needed for Flatulence        CONTINUE taking these medications    acetaminophen 500 MG tablet  Commonly known as: TYLENOL  Take 2 tablets by mouth every 6 hours as needed for Pain     docusate sodium 100 MG capsule  Commonly known as: Colace  Take 1 capsule by mouth daily     ibuprofen 600 MG tablet  Commonly known as: ADVIL;MOTRIN  Take 1 tablet by mouth every 6 hours as needed for Pain     metroNIDAZOLE 500 MG tablet  Commonly known as: FLAGYL     ondansetron 4 MG disintegrating tablet  Commonly known as: Zofran ODT  Take 1 tablet by mouth every 8 hours as needed for Nausea or Vomiting           Where to Get Your Medications      You can get these medications from any pharmacy    Bring a paper prescription for each of these medications  · acetaminophen-codeine 300-30 MG per tablet  · cyclobenzaprine 10 MG tablet  · docusate sodium 100 MG capsule  · gabapentin 300 MG capsule  · ibuprofen 600 MG tablet  · ondansetron 4 MG disintegrating tablet  · simethicone 80 MG chewable tablet          Activity: pelvic rest x 6 weeks, no driving on narcotics, no lifting greater than 15 lbs  Diet: regular diet  Follow up: 2 weeks     Condition on discharge: good and stable   Discharge Date: 12/11/21    Comments:  Home care, Follow-up care, restrictions reviewed.     Chetan Yi DO  Ob/Gyn Resident  [facility]  12/11/2021, 1:27 PM

## 2021-12-11 NOTE — CARE COORDINATION
Case Management Initial Discharge Plan  [facility] [place] [place] with:patient to discuss discharge plans. Information verified: address, contacts, phone number, , insurance Yes  Insurance Provider: medical mutual/[employer]. Emergency Contact/Next of Kin name & number: Bettye Sorensen, mother at [phone]  Who are involved in patient's support system? Mother and father    PCP: No primary care provider on file. Date of last visit: Establishing PCP with Dr. David Lopez at [facility] on Dec. 17, 2021      Discharge Planning    Living Arrangements:  [address] has 2 [address]  5 stairs to climb to get into front door, 12stairs to climb to reach second floor  Location of bedroom/bathroom in home 2nd floor    Patient able to perform ADL's:Independent    Current Services (outpatient & in home) none  DME equipment: n/a  DME provider: n/a    Is patient receiving oral anticoagulation therapy? No    If indicated:   Physician managing anticoagulation treatment: na/  Where does patient obtain lab work for ATC treatment? n/a      Potential Assistance Needed:  N/A    Patient agreeable to home care: No  [place] of choice provided:  no    Prior SNF/Rehab Placement and Facility: none  Agreeable to SNF/Rehab: No  [place] of choice provided: no     Evaluation: no    Expected Discharge date:  21    Patient expects to be discharged to: If home: is the family and/or caregiver wiling & able to provide support at home? yes  Who will be providing this support? child    Follow Up Appointment: Best Day/ Time: Monday AM    Transportation provider: Mother  Transportation arrangements needed for discharge: No    Readmission Risk              Risk of Unplanned Readmission:  5             Does patient have a readmission risk score greater than 14?: No  If yes, follow-up appointment must be made within 7 days of discharge.      Goals of Care:       Educated pt on transitional options, provided freedom of choice and are agreeable with plan      Discharge Plan: Home independently with mother.            Electronically signed by Michael Melgoza RN on 12/11/21 at 2:31 PM EST

## 2021-12-11 NOTE — PROGRESS NOTES
Patient Name: Corey Catherine  Patient : 1995  Room/Bed: 5372/6556-46  Admission Date/Time: 12/10/2021  4:03 AM  MRN #: [id number]  [facility] #: [id number]      Date: 2021  Time: 1:58 AM    POD#: 1  Procedure Completed: Operative laparoscopy with right salpingectomy and evacuation of hemoperitoneum     Corey Catherine is a 23 y.o. female  s/p removal of ectopic pregnancy. She is still admitting to having generalized abdominal pain despite treatment with several various pain medications. Reports she just feels an overall cramp and cannot get comfortable. Her skin incisions feel like they are burning. She is able to ambulate. states it does feel difficult to pee. She is tolerating normal general diet without any N/V. she had a bowel movement yesterday. She does feel improvement in pain since her surgery. She denies chest pain, SOB, headaches and dizziness. PHYSICAL EXAM:  Vitals:    12/10/21 1500 12/10/21 1600 12/10/21 1700 12/10/21 2050   BP: 113/75 116/68 113/79 118/78   Pulse: 78 86 126 99   Resp: 14 17 17 16   Temp:    97.4 °F (36.3 °C)   TempSrc:    Oral   SpO2: 99%  97% 98%   Weight:       Height:         Intake/Output:   Last Shift: I/O last 3 completed shifts: In: 1154 [P.O.:640; I.V.:1200]  Out: 1060 [Urine:900; Blood:160]  Current Shift: No intake/output data recorded.     General appearance: no apparent distress, alert and cooperative  HEENT: head atraumatic, normocephalic, trachea midline, moist mucous membranes   Neurologic: alert, oriented, normal speech, no focal findings or movement disorder noted  Lungs: no increased work of breathing, good air exchange, clear to auscultation bilaterally, no crackles or wheezing  Heart: regular rate and rhythm   Abdomen: soft, non distended, bowel sounds appreciated in all 4 quadrants, appropriately tender around incisions, no guarding, no rebound tenderness, no rigidity, negative CVA tenderness bilaterally, patient is most tender around her Left port site and suprapubic region   Incision: clean, dry, intact, with dermabond over top, incisions look great with no bruising noted   Extremities:  no calf tenderness bilaterally, non-edematous bilaterally   Musculoskeletal: no gross abnormalities, range of motion appropriate for age   Psychiatric: mood appropriate, normal affect        Admission on 12/10/2021   Component Date Value Ref Range Status    WBC 12/10/2021 14.0* 3.5 - 11.3 k/uL Final    RBC 12/10/2021 4.73  3.95 - 5.11 m/uL Final    Hemoglobin 12/10/2021 14.2  11.9 - 15.1 g/dL Final    Hematocrit 12/10/2021 40.8  36.3 - 47.1 % Final    MCV 12/10/2021 86.3  82.6 - 102.9 fL Final    MCH 12/10/2021 30.0  25.2 - 33.5 pg Final    MCHC 12/10/2021 34.8  28.4 - 34.8 g/dL Final    RDW 12/10/2021 13.2  11.8 - 14.4 % Final    Platelets 62/37/5138 279  138 - 453 k/uL Final    MPV 12/10/2021 10.8  8.1 - 13.5 fL Final    NRBC Automated 12/10/2021 0.0  0.0 per 100 WBC Final    Differential Type 12/10/2021 NOT REPORTED   Final    Seg Neutrophils 12/10/2021 66* 36 - 65 % Final    Lymphocytes 12/10/2021 27  24 - 43 % Final    Monocytes 12/10/2021 6  3 - 12 % Final    Eosinophils % 12/10/2021 1  1 - 4 % Final    Basophils 12/10/2021 0  0 - 2 % Final    Immature Granulocytes 12/10/2021 0  0 % Final    Segs Absolute 12/10/2021 9.12* 1.50 - 8.10 k/uL Final    Absolute Lymph # 12/10/2021 3.79* 1.10 - 3.70 k/uL Final    Absolute Mono # 12/10/2021 0.88  0.10 - 1.20 k/uL Final    Absolute Eos # 12/10/2021 0.11  0.00 - 0.44 k/uL Final    Basophils Absolute 12/10/2021 0.06  0.00 - 0.20 k/uL Final    Absolute Immature Granulocyte 12/10/2021 0.06  0.00 - 0.30 k/uL Final    WBC Morphology 12/10/2021 NOT REPORTED   Final    RBC Morphology 12/10/2021 NOT REPORTED   Final    Platelet Estimate 73/94/0045 NOT REPORTED   Final    hCG Quant 12/10/2021 727* <5 IU/L Final    Comment:    Non-preg premeno   <=5  Postmeno           <=8  Male               <=3  If HCG results do not concur with clinical observations, additional testing to confirm   results is recommended. Elevated results not associated with pregnancy may be found in patients with other diseases   such as tumors of the germ cells (testis, ovaries, etc.), bladder, pancreas, stomach, lungs,   and liver.  SARS-CoV-2, Rapid 12/10/2021 Not Detected  Not Detected Final    Comment:       Rapid NAAT:  The specimen is NEGATIVE for SARS-CoV-2, the novel coronavirus associated with   COVID-19. The ID NOW COVID-19 assay is designed to detect the virus that causes COVID-19 in patients   with signs and symptoms of infection who are suspected of COVID-19. An individual without symptoms of COVID-19 and who is not shedding SARS-CoV-2 virus would   expect to have a negative (not detected) result in this assay. Negative results should be treated as presumptive and, if inconsistent with clinical signs   and symptoms or necessary for patient management,  should be tested with an alternative molecular assay. Negative results do not preclude   SARS-CoV-2 infection and   should not be used as the sole basis for patient management decisions.          Fact sheet for Healthcare Providers: Chelsey.marsha  Fact sheet for Patients: Chelsey.marsha          Methodology: Isothermal Nucleic Acid Amplification      WBC 12/10/2021 10.6  3.5 - 11.3 k/uL Final    RBC 12/10/2021 4.40  3.95 - 5.11 m/uL Final    Hemoglobin 12/10/2021 13.2  11.9 - 15.1 g/dL Final    Hematocrit 12/10/2021 39.3  36.3 - 47.1 % Final    MCV 12/10/2021 89.3  82.6 - 102.9 fL Final    MCH 12/10/2021 30.0  25.2 - 33.5 pg Final    MCHC 12/10/2021 33.6  28.4 - 34.8 g/dL Final    RDW 12/10/2021 13.2  11.8 - 14.4 % Final    Platelets 48/62/0847 215  138 - 453 k/uL Final    MPV 12/10/2021 10.5  8.1 - 13.5 fL Final    NRBC Automated 12/10/2021 0.0  0.0 per 100 WBC Final    Differential Type 12/10/2021 NOT REPORTED   Final    WBC Morphology 12/10/2021 NOT REPORTED   Final    RBC Morphology 12/10/2021 NOT REPORTED   Final    Platelet Estimate  NOT REPORTED   Final    Seg Neutrophils 12/10/2021 90* 36 - 65 % Final    Lymphocytes 12/10/2021 7* 24 - 43 % Final    Monocytes 12/10/2021 2* 3 - 12 % Final    Eosinophils % 12/10/2021 0* 1 - 4 % Final    Basophils 12/10/2021 0  0 - 2 % Final    Immature Granulocytes 12/10/2021 1* 0 % Final    Segs Absolute 12/10/2021 9.53* 1.50 - 8.10 k/uL Final    Absolute Lymph # 12/10/2021 0.76* 1.10 - 3.70 k/uL Final    Absolute Mono # 12/10/2021 0.17  0.10 - 1.20 k/uL Final    Absolute Eos # 12/10/2021 <0.03  0.00 - 0.44 k/uL Final    Basophils Absolute 12/10/2021 <0.03  0.00 - 0.20 k/uL Final    Absolute Immature Granulocyte 12/10/2021 0.06  0.00 - 0.30 k/uL Final    Sodium 12/10/2021 136  135 - 144 mmol/L Final    Potassium 12/10/2021 3.8  3.7 - 5.3 mmol/L Final    Chloride 12/10/2021 104  98 - 107 mmol/L Final    CO2 12/10/2021 20  20 - 31 mmol/L Final    Anion Gap 12/10/2021 12  9 - 17 mmol/L Final       Assessment/Plan:  Clotilde Abraham is a 23 y.o. female  POD #1 s/p Operative laparoscopy with right salpingectomy and evacuation of hemoperitoneum to remove ectopic pregnancy    - Doing well, vitals stable   - UOP adequate   - Encourage ambulation and use of incentive spirometer   - Due to patient still feeling uncomfortable will try flexeril and gabapentin at this time, RN to bring ice pack in to the room for incisional pain    - Labs: CBC/CMP ordered now, UA due to dysuria and Gc/C still pending    - Macrobid BID ordered since patient had three more days of her Abx to finish    - DVT Proph: SCDs   - Diet: general    - Path: right fallopian tube containing ectopic pregnancy    - Continue post-op care, likely be able to be discharged home later this morning once her pain is well controlled   - Please page OBGYN residents on call (Dr Danyel Carmichael,

## 2021-12-13 ENCOUNTER — TELEPHONE (OUTPATIENT)
Dept: OBGYN | Age: 26
End: 2021-12-13

## 2021-12-13 DIAGNOSIS — Z98.890 POST-OPERATIVE STATE: Primary | ICD-10-CM

## 2021-12-13 LAB
C TRACH DNA GENITAL QL NAA+PROBE: NEGATIVE
N. GONORRHOEAE DNA: NEGATIVE
SPECIMEN DESCRIPTION: NORMAL
SURGICAL PATHOLOGY REPORT: NORMAL

## 2021-12-15 NOTE — PROGRESS NOTES
Dr Lorie Davidson at bedside, patient relates she has no one to stay with her, doctor will admit to observation. No

## 2021-12-20 ENCOUNTER — TELEPHONE (OUTPATIENT)
Dept: FAMILY MEDICINE CLINIC | Age: 26
End: 2021-12-20

## 2021-12-22 ENCOUNTER — NURSE TRIAGE (OUTPATIENT)
Dept: OTHER | Facility: CLINIC | Age: 26
End: 2021-12-22

## 2021-12-22 ENCOUNTER — TELEPHONE (OUTPATIENT)
Dept: OBGYN | Age: 26
End: 2021-12-22

## 2021-12-29 ENCOUNTER — TELEPHONE (OUTPATIENT)
Dept: OBGYN | Age: 26
End: 2021-12-29

## 2022-01-04 NOTE — TELEPHONE ENCOUNTER
Patient informed that letter is ready to be picked up. She will wait until her post op appt on 1/13/22 to see doctor in person.

## 2022-01-13 ENCOUNTER — OFFICE VISIT (OUTPATIENT)
Dept: OBGYN | Age: 27
End: 2022-01-13

## 2022-01-13 VITALS
WEIGHT: 183.25 LBS | BODY MASS INDEX: 30.53 KG/M2 | HEART RATE: 91 BPM | SYSTOLIC BLOOD PRESSURE: 109 MMHG | HEIGHT: 65 IN | DIASTOLIC BLOOD PRESSURE: 72 MMHG

## 2022-01-13 DIAGNOSIS — Z48.89 ENCOUNTER FOR POSTOPERATIVE CARE: Primary | ICD-10-CM

## 2022-01-13 DIAGNOSIS — N94.6 DYSMENORRHEA: ICD-10-CM

## 2022-01-13 PROCEDURE — 99024 POSTOP FOLLOW-UP VISIT: CPT | Performed by: OBSTETRICS & GYNECOLOGY

## 2022-01-13 NOTE — PROGRESS NOTES
[facility]  2022  1:39 PM      [facility]  Procedure: diagnostic laparoscopy, right salpingectomy, evacuation of hemoperitoneum      Coreen Koyanagi is a 23 y.o. female       The patient was seen, she denies any complaints. She denied any shortness of breath, chest pain or dizziness. She denied any nausea, vomiting, or diarrhea. There is no fever, chills, or rigors. The patient denies any vaginal bleeding, discharge or odor. All of her pre-operative complaints are now resolved. Patient wanted to discuss using birth control for her dysmenorrhea. She was counseled on all forms of birth control (pills, injections, implants, IUDs, vaginal rings, patches). Patient was counseled on the risks/benefits of these and she would like to proceed with the Mirena IUD. Will get this scheduled. Blood pressure 109/72, pulse 91, height 5' 4.5\" (1.638 m), weight 183 lb 4 oz (83.1 kg), not currently breastfeeding. Abdominal Exam: soft non-tender. Good bowel sounds. No guarding, rebound or rigidity. No costal vertebral angle tenderness bilateral. No hernias    Incision: healing well, no drainage, no erythema, no hernia, no seroma, no swelling, well approximated    Extremities: No edema or calf pain noted bilaterally. Results for orders placed or performed during the hospital encounter of 12/10/21   COVID-19, Rapid    Specimen: Nasopharyngeal Swab   Result Value Ref Range    Specimen Description . NASOPHARYNGEAL SWAB     SARS-CoV-2, Rapid Not Detected Not Detected   VAGINITIS DNA PROBE    Specimen: Vaginal   Result Value Ref Range    Specimen Description . VAGINA     Special Requests NOT REPORTED     Direct Exam NEGATIVE for Candida sp.      Direct Exam NEGATIVE for Gardnerella vaginalis     Direct Exam NEGATIVE for Trichomonas vaginalis     Direct Exam       Method of testing is a DNA probe intended for detection and identification of Candida species, Gardnerella vaginalis, and Trichomonas vaginalis nucleic acid in vaginal fluid specimens from patients with symptoms of vaginitis/vaginosis. C.trachomatis N.gonorrhoeae DNA    Specimen: Cervix   Result Value Ref Range    Specimen Description . CERVIX     C. trachomatis DNA NEGATIVE NEGATIVE    N. gonorrhoeae DNA NEGATIVE NEGATIVE   CBC WITH AUTO DIFFERENTIAL   Result Value Ref Range    WBC 14.0 (H) 3.5 - 11.3 k/uL    RBC 4.73 3.95 - 5.11 m/uL    Hemoglobin 14.2 11.9 - 15.1 g/dL    Hematocrit 40.8 36.3 - 47.1 %    MCV 86.3 82.6 - 102.9 fL    MCH 30.0 25.2 - 33.5 pg    MCHC 34.8 28.4 - 34.8 g/dL    RDW 13.2 11.8 - 14.4 %    Platelets 551 631 - 655 k/uL    MPV 10.8 8.1 - 13.5 fL    NRBC Automated 0.0 0.0 per 100 WBC    Differential Type NOT REPORTED     Seg Neutrophils 66 (H) 36 - 65 %    Lymphocytes 27 24 - 43 %    Monocytes 6 3 - 12 %    Eosinophils % 1 1 - 4 %    Basophils 0 0 - 2 %    Immature Granulocytes 0 0 %    Segs Absolute 9.12 (H) 1.50 - 8.10 k/uL    Absolute Lymph # 3.79 (H) 1.10 - 3.70 k/uL    Absolute Mono # 0.88 0.10 - 1.20 k/uL    Absolute Eos # 0.11 0.00 - 0.44 k/uL    Basophils Absolute 0.06 0.00 - 0.20 k/uL    Absolute Immature Granulocyte 0.06 0.00 - 0.30 k/uL    WBC Morphology NOT REPORTED     RBC Morphology NOT REPORTED     Platelet Estimate NOT REPORTED    HCG, QUANTITATIVE, PREGNANCY   Result Value Ref Range    hCG Quant 727 (H) <5 IU/L   BASIC METABOLIC PANEL   Result Value Ref Range    Glucose 105 (H) 70 - 99 mg/dL    BUN 12 6 - 20 mg/dL    CREATININE 0.75 0.50 - 0.90 mg/dL    Bun/Cre Ratio NOT REPORTED 9 - 20    Calcium 9.4 8.6 - 10.4 mg/dL    Sodium 137 135 - 144 mmol/L    Potassium 3.6 (L) 3.7 - 5.3 mmol/L    Chloride 101 98 - 107 mmol/L    CO2 20 20 - 31 mmol/L    Anion Gap 16 9 - 17 mmol/L    GFR Non-African American >60 >60 mL/min    GFR African American >60 >60 mL/min    GFR Comment          GFR Staging NOT REPORTED    CBC WITH AUTO DIFFERENTIAL   Result Value Ref Range    WBC 10.6 3.5 - 11.3 k/uL    RBC 4.40 CONSULTATION       Patient Name: Nell Armenta [facility] Rec: [id number]  Path Number: [id number]    [address] [facility] PATHOLOGISTS [facility]  ANATOMIC PATHOLOGY  [address] [place] [address],  [address].   [place], [address]  [phone]  Fax: [phone]     CBC   Result Value Ref Range    WBC 14.8 (H) 3.5 - 11.3 k/uL    RBC 4.03 3.95 - 5.11 m/uL    Hemoglobin 11.9 11.9 - 15.1 g/dL    Hematocrit 36.1 (L) 36.3 - 47.1 %    MCV 89.6 82.6 - 102.9 fL    MCH 29.5 25.2 - 33.5 pg    MCHC 33.0 28.4 - 34.8 g/dL    RDW 13.5 11.8 - 14.4 %    Platelets 506 963 - 552 k/uL    MPV 10.5 8.1 - 13.5 fL    NRBC Automated 0.0 0.0 per 100 WBC   CBC WITH AUTO DIFFERENTIAL   Result Value Ref Range    WBC 16.6 (H) 3.5 - 11.3 k/uL    RBC 4.32 3.95 - 5.11 m/uL    Hemoglobin 12.8 11.9 - 15.1 g/dL    Hematocrit 38.2 36.3 - 47.1 %    MCV 88.4 82.6 - 102.9 fL    MCH 29.6 25.2 - 33.5 pg    MCHC 33.5 28.4 - 34.8 g/dL    RDW 13.3 11.8 - 14.4 %    Platelets 790 847 - 442 k/uL    MPV 10.3 8.1 - 13.5 fL    NRBC Automated 0.0 0.0 per 100 WBC    Differential Type NOT REPORTED     WBC Morphology NOT REPORTED     RBC Morphology NOT REPORTED     Platelet Estimate NOT REPORTED     Seg Neutrophils 87 (H) 36 - 65 %    Lymphocytes 6 (L) 24 - 43 %    Monocytes 6 3 - 12 %    Eosinophils % 0 (L) 1 - 4 %    Basophils 0 0 - 2 %    Immature Granulocytes 1 (H) 0 %    Segs Absolute 14.50 (H) 1.50 - 8.10 k/uL    Absolute Lymph # 0.98 (L) 1.10 - 3.70 k/uL    Absolute Mono # 1.00 0.10 - 1.20 k/uL    Absolute Eos # <0.03 0.00 - 0.44 k/uL    Basophils Absolute 0.03 0.00 - 0.20 k/uL    Absolute Immature Granulocyte 0.09 0.00 - 0.30 k/uL   COMPREHENSIVE METABOLIC PANEL   Result Value Ref Range    Glucose 142 (H) 70 - 99 mg/dL    BUN 7 6 - 20 mg/dL    CREATININE 0.70 0.50 - 0.90 mg/dL    Bun/Cre Ratio NOT REPORTED 9 - 20    Calcium 8.7 8.6 - 10.4 mg/dL    Sodium 138 135 - 144 mmol/L    Potassium 4.1 3.7 - 5.3 mmol/L    Chloride 105 98 - 107 mmol/L    CO2 20 20 - 31 mmol/L Anion Gap 13 9 - 17 mmol/L    Alkaline Phosphatase 46 35 - 104 U/L    ALT 50 (H) 5 - 33 U/L    AST 36 (H) <32 U/L    Total Bilirubin 0.62 0.3 - 1.2 mg/dL    Total Protein 6.1 (L) 6.4 - 8.3 g/dL    Albumin 3.6 3.5 - 5.2 g/dL    Albumin/Globulin Ratio 1.4 1.0 - 2.5    GFR Non-African American >60 >60 mL/min    GFR African American >60 >60 mL/min    GFR Comment          GFR Staging NOT REPORTED    TYPE AND SCREEN   Result Value Ref Range    Expiration Date 2021,2359     Arm Band Number [id number]     ABO/Rh A POSITIVE     Antibody Screen NEGATIVE            Assessment:      Diagnosis Orders   1. Encounter for postoperative care     2. Dysmenorrhea          Patient Active Problem List    Diagnosis Date Noted    Ectopic pregnancy 12/10/2021    S/p lap right salpingectomy w/ evacuation of hemoperitoneum 12/10/21 12/10/2021     2/2 ectopic pregnancy      Postoperative state 12/10/2021    Postoperative pain 12/10/2021    Post-op pain     Missed      History of kidney stones 2019    Pyelonephritis 02/15/2019    Cholecystitis     E. coli UTI (urinary tract infection) 2018    Acute pyelonephritis 2018    Allergic reaction caused by a drug           POD# 34   Procedure: diagnostic laparoscopy, right salpingectomy, evacuation of hemoperitoneum   Stable   Pathology reviewed and found to be benign. Yes    Plan:   Return 1-2 weeks, for IUD insertion. Patient's incisions are healing well - she has no pain.   Patient counseled to abstain for 2 weeks prior to IUD being inserted    [facility], DO

## 2022-01-21 ENCOUNTER — TELEPHONE (OUTPATIENT)
Dept: OBGYN | Age: 27
End: 2022-01-21

## 2022-01-21 NOTE — TELEPHONE ENCOUNTER
Patient requesting paperwork be re sent to her [employer] department, states they have not received them and she is now at risk for termination. Please fax to [phone] , or email to [employer]. please contact patient with any questions.

## 2022-01-24 RX ORDER — NORETHINDRONE ACETATE AND ETHINYL ESTRADIOL 1; 20 MG/1; UG/1
1 TABLET ORAL DAILY
COMMUNITY
Start: 2022-01-17 | End: 2022-03-23 | Stop reason: ALTCHOICE

## 2022-01-24 NOTE — TELEPHONE ENCOUNTER
Paperwork has been faxed again. Patient was informed and informed to check with [employer] in about 1hr to verify that they received it.

## 2022-01-27 ENCOUNTER — OFFICE VISIT (OUTPATIENT)
Dept: OBGYN | Age: 27
End: 2022-01-27
Payer: COMMERCIAL

## 2022-01-27 ENCOUNTER — HOSPITAL ENCOUNTER (OUTPATIENT)
Age: 27
Setting detail: SPECIMEN
Discharge: HOME | End: 2022-01-27
Payer: COMMERCIAL

## 2022-01-27 ENCOUNTER — HOSPITAL ENCOUNTER (OUTPATIENT)
Age: 27
Setting detail: SPECIMEN
Discharge: HOME | End: 2022-01-27

## 2022-01-27 VITALS
DIASTOLIC BLOOD PRESSURE: 80 MMHG | SYSTOLIC BLOOD PRESSURE: 118 MMHG | BODY MASS INDEX: 31.26 KG/M2 | WEIGHT: 185 LBS | HEART RATE: 84 BPM

## 2022-01-27 DIAGNOSIS — Z20.2 EXPOSURE TO STD: ICD-10-CM

## 2022-01-27 DIAGNOSIS — Z98.890 H/O LAPAROSCOPY: ICD-10-CM

## 2022-01-27 DIAGNOSIS — N94.6 DYSMENORRHEA: Primary | ICD-10-CM

## 2022-01-27 DIAGNOSIS — N89.8 VAGINAL DISCHARGE: ICD-10-CM

## 2022-01-27 PROCEDURE — G8427 DOCREV CUR MEDS BY ELIG CLIN: HCPCS | Performed by: STUDENT IN AN ORGANIZED HEALTH CARE EDUCATION/TRAINING PROGRAM

## 2022-01-27 PROCEDURE — 86803 HEPATITIS C AB TEST: CPT

## 2022-01-27 PROCEDURE — G8417 CALC BMI ABV UP PARAM F/U: HCPCS | Performed by: STUDENT IN AN ORGANIZED HEALTH CARE EDUCATION/TRAINING PROGRAM

## 2022-01-27 PROCEDURE — 87389 HIV-1 AG W/HIV-1&-2 AB AG IA: CPT

## 2022-01-27 PROCEDURE — 1036F TOBACCO NON-USER: CPT | Performed by: STUDENT IN AN ORGANIZED HEALTH CARE EDUCATION/TRAINING PROGRAM

## 2022-01-27 PROCEDURE — G8484 FLU IMMUNIZE NO ADMIN: HCPCS | Performed by: STUDENT IN AN ORGANIZED HEALTH CARE EDUCATION/TRAINING PROGRAM

## 2022-01-27 PROCEDURE — 87340 HEPATITIS B SURFACE AG IA: CPT

## 2022-01-27 PROCEDURE — 99213 OFFICE O/P EST LOW 20 MIN: CPT | Performed by: STUDENT IN AN ORGANIZED HEALTH CARE EDUCATION/TRAINING PROGRAM

## 2022-01-27 RX ORDER — NORETHINDRONE ACETATE AND ETHINYL ESTRADIOL 1MG-20(21)
1 KIT ORAL DAILY
Qty: 1 PACKET | Refills: 3 | Status: SHIPPED | OUTPATIENT
Start: 2022-01-27 | End: 2022-07-19 | Stop reason: SDUPTHER

## 2022-01-27 NOTE — PROGRESS NOTES
OB/GYN Problem Visit    [facility]  2022                       Primary Care Physician: KEMAR Domínguez - CNP    CC:   Chief Complaint   Patient presents with    Follow-up     6 week P/O laparoscopy, initiation of OCPs         HPI: Vanda Cardoza is a 23 y.o. female     The patient was seen and examined. She is here for re-initiation of OCPs due to her history of dysmenorrhea. Her incisions are healing well and she denies abdominal pain. She reports a history of painful periods that have been uncontrolled with NSAIDs. Previously she was on Junel and this worked well for controlling her symptoms. She also reports abnormal vaginal discharge. She reports it is thin and white with a fishy odor. It feels to her like BV, she has a history of recurrent BV in the past. She also would like STI testing because she and her partner broke up. Her bowel habits are regular. She denies any bloating. She denies dysuria. She denies urinary leaking. She denies vaginal discharge. She is sexually active with male partners. She uses condoms for contraception and is not desiring pregnancy.     REVIEW OF SYSTEMS:   Constitutional: negative fever, negative chills  HEENT: negative visual disturbances, negative headaches  Respiratory: negative dyspnea, negative cough  Cardiovascular: negative chest pain,  negative palpitations  Gastrointestinal: negative abdominal pain, negative RUQ pain, negative N/V, negative diarrhea, negative constipation  Genitourinary: negative dysuria, negative vaginal discharge  Dermatological: negative rash  Hematologic: negative bruising  Immunologic/Lymphatic: negative recent illness, negative recent sick contact  Musculoskeletal: negative back pain, negative myalgias, negative arthralgias  Neurological:  negative dizziness, negative weakness  Behavior/Psych: negative depression, negative anxiety  ________________________________________________________________________      OBSTETRICAL HISTORY:  OB History    Para Term  AB Living   3       1     SAB IAB Ectopic Molar Multiple Live Births       1            # Outcome Date GA Lbr Zohaib/2nd Weight Sex Delivery Anes PTL Lv   3 Ectopic 12/10/21           2             1                 PAST MEDICAL HISTORY:      Diagnosis Date    Gall stones     Kidney stone     Patient denies    UTI (lower urinary tract infection)        PAST SURGICAL HISTORY:                                                                    Procedure Laterality Date    ABDOMEN SURGERY  12/10/2021    DIAGNOSTIC LAPAROSCOPY, UNILATERAL SALPINGECTOMY RIGHT    ARM SURGERY Right     BREAST REDUCTION SURGERY  2018    CYSTOSCOPY N/A 2019    CYSTOSCOPY RETROGRADE PYELOGRAM WITH  CYSTOGRAM, performed by Susan Noriega MD at [address] [place] [address] N N/A 2021    DILATATION AND CURETTAGE SUCTION performed by Venson Duane, MD at [address] Right 12/10/2021    DIAGNOSTIC LAPAROSCOPY, UNILATERAL SALPINGECTOMY performed by Tomas Fuller MD at [facility] N/A 2018    CHOLECYSTECTOMY LAPAROSCOPIC performed by Mo Adam MD at [address] [place] [address] Dr:  Current Outpatient Medications   Medication Sig Dispense Refill    norethindrone-ethinyl estradiol (JUNEL FE 1/20) 1-20 MG-MCG per tablet Take 1 tablet by mouth daily 1 packet 3    acetaminophen (TYLENOL) 500 MG tablet Take 2 tablets by mouth every 6 hours as needed for Pain 120 tablet 0    JUNEL 1/20 1-20 MG-MCG per tablet Take 1 tablet by mouth daily (Patient not taking: Reported on 2022)       No current facility-administered medications for this visit.        ALLERGIES:  Allergies as of 2022 - Fully Reviewed 2022   Allergen Reaction Noted    Amoxicillin Hives 2015    Lactose  2015    Other  2021                                   VITALS:  Vitals: 22 1415   BP: 118/80   Pulse: 84   Weight: 185 lb (83.9 kg)                                                                                                                                                                         PHYSICAL EXAM:   General Appearance: Appears healthy. Alert; in no acute distress. Pleasant. Skin: Normal  Lymphatic: No cervical, superclavicular, axillary, or inguinal adenopathy. HEENT: normocephalic and atraumatic, Thyroid normal to inspection  Respiratory: clear to auscultation, no wheezes, rales or rhonchi, symmetric air entry  Cardiovascular: regular rate and rhythm, no murmurs rubs or gallops  Breast: exam declined  Abdomen: soft, non-tender, non-distended and no right upper quadrant tenderness  Incisions: C/D/I, healing well   Pelvic Exam:   External genitalia: General appearance; normal, Hair distribution; normal, Lesions absent  Urinary system: urethral meatus normal  Vaginal: normal mucosa, thin white discharge   Cervix: normal appearing cervix without discharge or lesions  Adnexa: normal adnexa in size, nontender and no masses  Uterus: normal single, nontender  Rectal Exam: deferred  Extremities: non-tender BLE and non-edematous  Musculoskeletal: no gross abnormalities  Psych: Alert and oriented, appropriate affect.           ASSESSMENT & PLAN:    Nghia Angelo is a 23 y.o. female   Dysmenorrhea    - Patient reports pain during menses not relieved with NSAIDs   - Junel 1-20 has worked well for patient in the past   - Patient counseled on options for dysmenorrhea    - Patient requested to restart her Junel 1-20, Rx ordered    - Plan to follow up in 3 months to assess control of symptoms    Abnormal Vaginal Discharge   - Patient reports abnormal thin white discharge that has a fishy odor   - Reports history of recurrent BV    - GC/C, vaginitis collected    - Will treat with Flagyl if positive and initiate suppressive therapy with twice weekly Metrogel x12 weeks Concern for STI    - Patient reports she and her boyfriend  briefly, is unsure if he was sexually active during that time    - Patient requested STI testing    - GC/C, vaginitis, HIV, Hep C, Hep B and Tpal ordered       Patient Active Problem List    Diagnosis Date Noted    Ectopic pregnancy 12/10/2021    S/p lap right salpingectomy w/ evacuation of hemoperitoneum 12/10/21 12/10/2021     2/2 ectopic pregnancy      Postoperative state 12/10/2021    Postoperative pain 12/10/2021    Post-op pain     Missed      History of kidney stones 2019    Pyelonephritis 02/15/2019    Cholecystitis     E. coli UTI (urinary tract infection) 2018    Acute pyelonephritis 2018    Allergic reaction caused by a drug        Return in about 3 months (around 2022) for Annual, pap smear, review OCP . Counseling Completed:  discussed need for repeat pap every 3 years, if negative. discussed birth control and barrier recommendations. discussed STD counseling and prevention. Tobacco & Secondary smoke risks discussed; with recommendation for cessation and avoidance. Routine health maintenance per patients PCP discussed. Patient was seen with total face to face time of 25 minutes. More than 50% of this visit was on counseling and education regarding her diagnose(s) as listed below and options. She was also counseled on her preventative health maintenance recommendations and follow-up. Diagnosis Orders   1. Dysmenorrhea  norethindrone-ethinyl estradiol (JUNEL FE 1/20) 1-20 MG-MCG per tablet   2. Vaginal discharge  Chlamydia Trachomatis & Neisseria gonorrhoeae (GC) by amplified detection    Vaginitis DNA Probe   3. Exposure to STD  Chlamydia Trachomatis & Neisseria gonorrhoeae (GC) by amplified detection    Vaginitis DNA Probe    HIV Screen    Hepatitis C Antibody    Hepatitis B Surface Antigen    Treponema Pallidum (Syphilis) Antibody   4.  S/p lap right salpingectomy w/ evacuation of hemoperitoneum 12/10/21         Bar Wiseman DO  Ob/Gyn Resident  [facility]  1/27/2022, 5:45 PM

## 2022-01-28 DIAGNOSIS — Z20.2 EXPOSURE TO STD: ICD-10-CM

## 2022-01-28 DIAGNOSIS — N89.8 VAGINAL DISCHARGE: ICD-10-CM

## 2022-01-28 LAB
CANDIDA SPECIES, DNA PROBE: NEGATIVE
GARDNERELLA VAGINALIS, DNA PROBE: POSITIVE
HEPATITIS B SURFACE ANTIGEN: NONREACTIVE
HEPATITIS C ANTIBODY: NONREACTIVE
SOURCE: ABNORMAL
TRICHOMONAS VAGINALIS DNA: NEGATIVE

## 2022-01-29 LAB — HIV AG/AB: NONREACTIVE

## 2022-01-31 DIAGNOSIS — B96.89 BV (BACTERIAL VAGINOSIS): Primary | ICD-10-CM

## 2022-01-31 DIAGNOSIS — N76.0 BV (BACTERIAL VAGINOSIS): Primary | ICD-10-CM

## 2022-01-31 RX ORDER — METRONIDAZOLE 7.5 MG/G
GEL VAGINAL
Qty: 70 G | Refills: 5 | Status: SHIPPED | OUTPATIENT
Start: 2022-01-31 | End: 2022-02-07

## 2022-01-31 RX ORDER — METRONIDAZOLE 500 MG/1
500 TABLET ORAL 2 TIMES DAILY
Qty: 14 TABLET | Refills: 0 | Status: SHIPPED | OUTPATIENT
Start: 2022-01-31 | End: 2022-02-07

## 2022-02-13 ENCOUNTER — HOSPITAL ENCOUNTER (EMERGENCY)
Age: 27
Discharge: HOME | End: 2022-02-13
Attending: EMERGENCY MEDICINE
Payer: COMMERCIAL

## 2022-02-13 VITALS
DIASTOLIC BLOOD PRESSURE: 71 MMHG | WEIGHT: 170 LBS | TEMPERATURE: 98.4 F | SYSTOLIC BLOOD PRESSURE: 132 MMHG | BODY MASS INDEX: 28.32 KG/M2 | OXYGEN SATURATION: 100 % | HEIGHT: 65 IN | HEART RATE: 70 BPM | RESPIRATION RATE: 16 BRPM

## 2022-02-13 DIAGNOSIS — H10.33 ACUTE CONJUNCTIVITIS OF BOTH EYES, UNSPECIFIED ACUTE CONJUNCTIVITIS TYPE: ICD-10-CM

## 2022-02-13 DIAGNOSIS — L24.3 IRRITANT CONTACT DERMATITIS DUE TO COSMETICS: Primary | ICD-10-CM

## 2022-02-13 PROCEDURE — 6360000002 HC RX W HCPCS: Performed by: EMERGENCY MEDICINE

## 2022-02-13 PROCEDURE — 99283 EMERGENCY DEPT VISIT LOW MDM: CPT

## 2022-02-13 PROCEDURE — 96372 THER/PROPH/DIAG INJ SC/IM: CPT

## 2022-02-13 RX ORDER — PREDNISONE 10 MG/1
TABLET ORAL
Qty: 30 TABLET | Refills: 0 | Status: SHIPPED | OUTPATIENT
Start: 2022-02-13 | End: 2022-03-23 | Stop reason: ALTCHOICE

## 2022-02-13 RX ORDER — SULFACETAMIDE SODIUM 100 MG/ML
2 SOLUTION/ DROPS OPHTHALMIC EVERY 4 HOURS
Qty: 10 ML | Refills: 0 | Status: SHIPPED | OUTPATIENT
Start: 2022-02-13 | End: 2022-03-23 | Stop reason: ALTCHOICE

## 2022-02-13 RX ORDER — METHYLPREDNISOLONE SODIUM SUCCINATE 125 MG/2ML
125 INJECTION, POWDER, LYOPHILIZED, FOR SOLUTION INTRAMUSCULAR; INTRAVENOUS ONCE
Status: COMPLETED | OUTPATIENT
Start: 2022-02-13 | End: 2022-02-13

## 2022-02-13 RX ADMIN — METHYLPREDNISOLONE SODIUM SUCCINATE 125 MG: 125 INJECTION, POWDER, FOR SOLUTION INTRAMUSCULAR; INTRAVENOUS at 14:58

## 2022-02-13 ASSESSMENT — PAIN DESCRIPTION - DESCRIPTORS: DESCRIPTORS: CONSTANT;BURNING;SHARP;SHOOTING

## 2022-02-13 ASSESSMENT — ENCOUNTER SYMPTOMS
SHORTNESS OF BREATH: 0
ABDOMINAL PAIN: 0
EYE DISCHARGE: 1
VOMITING: 0
EYE PAIN: 1
FACIAL SWELLING: 0
COUGH: 0
COLOR CHANGE: 0
EYE REDNESS: 1
CONSTIPATION: 0
DIARRHEA: 0

## 2022-02-13 ASSESSMENT — PAIN DESCRIPTION - LOCATION: LOCATION: EYE

## 2022-02-13 ASSESSMENT — VISUAL ACUITY
OS: 20/50
OU: 20/20
OD: 20/25

## 2022-02-13 ASSESSMENT — PAIN DESCRIPTION - FREQUENCY: FREQUENCY: CONTINUOUS

## 2022-02-13 ASSESSMENT — PAIN SCALES - GENERAL: PAINLEVEL_OUTOF10: 10

## 2022-02-13 ASSESSMENT — PAIN DESCRIPTION - ORIENTATION: ORIENTATION: LEFT;RIGHT

## 2022-02-13 NOTE — LETTER
[facility] ED  [address] [id number]  Phone: [phone]             February 13, 2022    Patient: Rayshawn Hernandez   YOB: 1995   Date of Visit: 2/13/2022       To Whom It May Concern:    Abdoul King was seen and treated in our emergency department on 2/13/2022. Please excuse her from work.        Sincerely,             Signature:__________________________________

## 2022-02-13 NOTE — ED PROVIDER NOTES
[address] [place] [address] ED  EMERGENCY DEPARTMENT ENCOUNTER      Pt Name: Nghia Angelo  MRN: [id number]  [place] 1995  Date of evaluation: 2/13/2022  Provider: Wendie Dumont MD    [address] [place] [address]       Chief Complaint   Patient presents with    Eye Problem     eyelashes applied yesterday an removed today    Conjunctivitis     bilat    Allergic Reaction     eyelids swollen         HISTORY OF PRESENT ILLNESS  (Location/Symptom, Timing/Onset, Context/Setting, Quality, Duration, Modifying Factors, Severity.)   Nghia Angelo is a 23 y.o. female who presents to the emergency department for bilateral eye irritation. She got her eyelashes done yesterday and shortly afterwards developed redness and swelling in her eyes became very red and irritated. It is continuous and she rated the pain as a 10. Nursing Notes were reviewed.     ALLERGIES     Amoxicillin, Lactose, and Other    CURRENT MEDICATIONS       Previous Medications    ACETAMINOPHEN (TYLENOL) 500 MG TABLET    Take 2 tablets by mouth every 6 hours as needed for Pain    JUNEL 1/20 1-20 MG-MCG PER TABLET    Take 1 tablet by mouth daily    NORETHINDRONE-ETHINYL ESTRADIOL (JUNEL FE 1/20) 1-20 MG-MCG PER TABLET    Take 1 tablet by mouth daily       PAST MEDICAL HISTORY         Diagnosis Date    Gall stones     Kidney stone     Patient denies    UTI (lower urinary tract infection)        SURGICAL HISTORY           Procedure Laterality Date    ABDOMEN SURGERY  12/10/2021    DIAGNOSTIC LAPAROSCOPY, UNILATERAL SALPINGECTOMY RIGHT    ARM SURGERY Right     BREAST REDUCTION SURGERY  07/25/2018    CYSTOSCOPY N/A 09/04/2019    CYSTOSCOPY RETROGRADE PYELOGRAM WITH  CYSTOGRAM, performed by Rosa Westfall MD at [address] [place] [address] N/A 12/03/2021    DILATATION AND CURETTAGE SUCTION performed by Soledad Lizarraga MD at [address] [place] [address] Right 12/10/2021    DIAGNOSTIC LAPAROSCOPY, UNILATERAL SALPINGECTOMY performed by Antonio Dahl, MD at [facility] N/A 05/18/2018    CHOLECYSTECTOMY LAPAROSCOPIC performed by Jeimy Preciado MD at [facility]     History reviewed. No pertinent family history. No family status information on file. SOCIAL HISTORY      reports that she has never smoked. She has never used smokeless tobacco. She reports current alcohol use. She reports that she does not use drugs. REVIEW OF SYSTEMS    (2-9 systems for level 4, 10 or more for level 5)     Review of Systems   Constitutional: Negative for chills, fatigue and fever. HENT: Negative for congestion, ear discharge and facial swelling. Eyes: Positive for pain, discharge and redness. Respiratory: Negative for cough and shortness of breath. Cardiovascular: Negative for chest pain. Gastrointestinal: Negative for abdominal pain, constipation, diarrhea and vomiting. Genitourinary: Negative for dysuria and hematuria. Musculoskeletal: Negative for arthralgias. Skin: Negative for color change and rash. Neurological: Negative for syncope, numbness and headaches. Hematological: Negative for adenopathy. Psychiatric/Behavioral: Negative for confusion. The patient is not nervous/anxious. Except as noted above the remainder of the review of systems was reviewed and negative. PHYSICAL EXAM    (up to 7 for level 4, 8 or more for level 5)     Vitals:    02/13/22 1400 02/13/22 1402   BP: 132/71    Pulse: 70    Resp: 16    Temp:  98.4 °F (36.9 °C)   TempSrc:  Oral   SpO2: 100%    Weight: 170 lb (77.1 kg)    Height: 5' 5\" (1.651 m)        Physical Exam  Vitals reviewed. Constitutional:       General: She is not in acute distress. Appearance: She is well-developed. She is not diaphoretic. HENT:      Head: Normocephalic and atraumatic. Eyes:      Comments: Both conjunctiva are injected. Both upper and both lower lids are erythematous and mildly swollen. Cardiovascular:      Rate and Rhythm: Normal rate and regular rhythm. Pulmonary:      Effort: Pulmonary effort is normal. No respiratory distress. Breath sounds: Normal breath sounds. No stridor. No wheezing or rales. Abdominal:      General: There is no distension. Palpations: Abdomen is soft. Tenderness: There is no abdominal tenderness. Musculoskeletal:         General: Normal range of motion. Cervical back: Neck supple. Lymphadenopathy:      Cervical: No cervical adenopathy. Skin:     General: Skin is warm and dry. Findings: No erythema or rash. Neurological:      Mental Status: She is alert and oriented to person, place, and time. Psychiatric:         Behavior: Behavior normal.             DIAGNOSTIC RESULTS     EKG: All EKG's are interpreted by the Emergency Department Physician who either signs or Co-signs this chart in the absence of a cardiologist.    Not indicated    RADIOLOGY:   Non-plain film images such as CT, Ultrasound and MRI are read by the radiologist. Plain radiographic images are visualized and preliminarily interpreted by the emergency physician with the below findings:    Not indicated    Interpretation per the Radiologist below, if available at the time of this note:        LABS:  Labs Reviewed - No data to display    All other labs were within normal range or not returned as of this dictation.     EMERGENCY DEPARTMENT COURSE and DIFFERENTIAL DIAGNOSIS/MDM:   Vitals:    Vitals:    02/13/22 1400 02/13/22 1402   BP: 132/71    Pulse: 70    Resp: 16    Temp:  98.4 °F (36.9 °C)   TempSrc:  Oral   SpO2: 100%    Weight: 170 lb (77.1 kg)    Height: 5' 5\" (1.651 m)        Orders Placed This Encounter   Medications    methylPREDNISolone sodium (SOLU-MEDROL) injection 125 mg    predniSONE (DELTASONE) 10 MG tablet     Sig: Take 4 by mouth daily for 3 days then  3 by mouth daily for 3 days then  2 by mouth daily for 3 days then  1 by mouth daily for 3 days Dispense:  30 tablet     Refill:  0    sulfacetamide (BLEPH-10) 10 % ophthalmic solution     Sig: Place 2 drops into both eyes every 4 hours     Dispense:  10 mL     Refill:  0       Medical Decision Making: My clinical impression is that she has a contact dermatitis and conjunctivitis. She was given IM Solu-Medrol and prescribed prednisone and Bleph-10. Treatment diagnosis and follow-up were discussed with the patient      CONSULTS:  None    PROCEDURES:  None    FINAL IMPRESSION      1. Irritant contact dermatitis due to cosmetics    2. Acute conjunctivitis of both eyes, unspecified acute conjunctivitis type          DISPOSITION/PLAN   DISPOSITION Decision To Discharge 02/13/2022 02:12:50 PM      PATIENT REFERRED TO:   KEMAR Nicole - CNP  [address] [address] 28      As needed    [facility] ED  [address] [facility]  [phone]    If symptoms worsen      DISCHARGE MEDICATIONS:     New Prescriptions    PREDNISONE (DELTASONE) 10 MG TABLET    Take 4 by mouth daily for 3 days then  3 by mouth daily for 3 days then  2 by mouth daily for 3 days then  1 by mouth daily for 3 days    SULFACETAMIDE (BLEPH-10) 10 % OPHTHALMIC SOLUTION    Place 2 drops into both eyes every 4 hours       The care is provided during an unprecedented national emergency due to the novel coronavirus, COVID-19.     (Please note that portions of this note were completed with a voice recognition program.  Efforts were made to edit the dictations but occasionally words are mis-transcribed.)    Scott Hogue MD  Attending Emergency Physician           Scott Hogue MD  02/13/22 [id number]

## 2022-02-27 ENCOUNTER — APPOINTMENT (OUTPATIENT)
Dept: ULTRASOUND IMAGING | Age: 27
End: 2022-02-27
Payer: COMMERCIAL

## 2022-02-27 ENCOUNTER — HOSPITAL ENCOUNTER (EMERGENCY)
Age: 27
Discharge: HOME | End: 2022-02-27
Attending: EMERGENCY MEDICINE
Payer: COMMERCIAL

## 2022-02-27 VITALS
SYSTOLIC BLOOD PRESSURE: 126 MMHG | TEMPERATURE: 98.3 F | DIASTOLIC BLOOD PRESSURE: 80 MMHG | HEIGHT: 65 IN | HEART RATE: 96 BPM | OXYGEN SATURATION: 100 % | BODY MASS INDEX: 28.29 KG/M2

## 2022-02-27 DIAGNOSIS — Z3A.01 LESS THAN 8 WEEKS GESTATION OF PREGNANCY: ICD-10-CM

## 2022-02-27 DIAGNOSIS — R10.32 LEFT LOWER QUADRANT ABDOMINAL PAIN: Primary | ICD-10-CM

## 2022-02-27 DIAGNOSIS — O23.41 URINARY TRACT INFECTION IN MOTHER DURING FIRST TRIMESTER OF PREGNANCY: ICD-10-CM

## 2022-02-27 LAB
-: ABNORMAL
ABSOLUTE EOS #: 0.07 K/UL (ref 0–0.44)
ABSOLUTE IMMATURE GRANULOCYTE: 0.03 K/UL (ref 0–0.3)
ABSOLUTE LYMPH #: 1.92 K/UL (ref 1.1–3.7)
ABSOLUTE MONO #: 0.63 K/UL (ref 0.1–1.2)
ANION GAP SERPL CALCULATED.3IONS-SCNC: 12 MMOL/L (ref 9–17)
BACTERIA: ABNORMAL
BASOPHILS # BLD: 1 % (ref 0–2)
BASOPHILS ABSOLUTE: 0.05 K/UL (ref 0–0.2)
BILIRUBIN URINE: NEGATIVE
BUN BLDV-MCNC: 9 MG/DL (ref 6–20)
CALCIUM SERPL-MCNC: 8.7 MG/DL (ref 8.6–10.4)
CASTS UA: ABNORMAL /LPF (ref 0–8)
CHLORIDE BLD-SCNC: 104 MMOL/L (ref 98–107)
CO2: 22 MMOL/L (ref 20–31)
COLOR: YELLOW
CREAT SERPL-MCNC: 0.69 MG/DL (ref 0.5–0.9)
EOSINOPHILS RELATIVE PERCENT: 1 % (ref 1–4)
EPITHELIAL CELLS UA: ABNORMAL /HPF (ref 0–5)
GFR AFRICAN AMERICAN: >60 ML/MIN
GFR NON-AFRICAN AMERICAN: >60 ML/MIN
GFR SERPL CREATININE-BSD FRML MDRD: NORMAL ML/MIN/{1.73_M2}
GLUCOSE BLD-MCNC: 83 MG/DL (ref 70–99)
GLUCOSE URINE: NEGATIVE
HCG QUANTITATIVE: ABNORMAL IU/L
HCT VFR BLD CALC: 40.1 % (ref 36.3–47.1)
HEMOGLOBIN: 13.4 G/DL (ref 11.9–15.1)
IMMATURE GRANULOCYTES: 0 %
KETONES, URINE: ABNORMAL
LEUKOCYTE ESTERASE, URINE: ABNORMAL
LYMPHOCYTES # BLD: 18 % (ref 24–43)
MCH RBC QN AUTO: 29.6 PG (ref 25.2–33.5)
MCHC RBC AUTO-ENTMCNC: 33.4 G/DL (ref 28.4–34.8)
MCV RBC AUTO: 88.5 FL (ref 82.6–102.9)
MONOCYTES # BLD: 6 % (ref 3–12)
NITRITE, URINE: NEGATIVE
NRBC AUTOMATED: 0 PER 100 WBC
PDW BLD-RTO: 13.2 % (ref 11.8–14.4)
PH UA: 6.5 (ref 5–8)
PLATELET # BLD: 217 K/UL (ref 138–453)
PMV BLD AUTO: 10.4 FL (ref 8.1–13.5)
POTASSIUM SERPL-SCNC: 4 MMOL/L (ref 3.7–5.3)
PROTEIN UA: NEGATIVE
RBC # BLD: 4.53 M/UL (ref 3.95–5.11)
RBC UA: ABNORMAL /HPF (ref 0–4)
SEG NEUTROPHILS: 74 % (ref 36–65)
SEGMENTED NEUTROPHILS ABSOLUTE COUNT: 7.99 K/UL (ref 1.5–8.1)
SODIUM BLD-SCNC: 138 MMOL/L (ref 135–144)
SPECIFIC GRAVITY UA: 1.02 (ref 1–1.03)
TURBIDITY: ABNORMAL
URINE HGB: NEGATIVE
UROBILINOGEN, URINE: ABNORMAL
WBC # BLD: 10.7 K/UL (ref 3.5–11.3)
WBC UA: ABNORMAL /HPF (ref 0–5)

## 2022-02-27 PROCEDURE — 87086 URINE CULTURE/COLONY COUNT: CPT

## 2022-02-27 PROCEDURE — 81001 URINALYSIS AUTO W/SCOPE: CPT

## 2022-02-27 PROCEDURE — 84702 CHORIONIC GONADOTROPIN TEST: CPT

## 2022-02-27 PROCEDURE — 80048 BASIC METABOLIC PNL TOTAL CA: CPT

## 2022-02-27 PROCEDURE — 99283 EMERGENCY DEPT VISIT LOW MDM: CPT

## 2022-02-27 PROCEDURE — 94761 N-INVAS EAR/PLS OXIMETRY MLT: CPT

## 2022-02-27 PROCEDURE — 76817 TRANSVAGINAL US OBSTETRIC: CPT

## 2022-02-27 PROCEDURE — 85025 COMPLETE CBC W/AUTO DIFF WBC: CPT

## 2022-02-27 RX ORDER — PRENATAL WITH FERROUS FUM AND FOLIC ACID 3080; 920; 120; 400; 22; 1.84; 3; 20; 10; 1; 12; 200; 27; 25; 2 [IU]/1; [IU]/1; MG/1; [IU]/1; MG/1; MG/1; MG/1; MG/1; MG/1; MG/1; UG/1; MG/1; MG/1; MG/1; MG/1
1 TABLET ORAL DAILY
Qty: 30 TABLET | Refills: 1 | Status: SHIPPED | OUTPATIENT
Start: 2022-02-27 | End: 2022-03-23 | Stop reason: ALTCHOICE

## 2022-02-27 RX ORDER — LANOLIN ALCOHOL/MO/W.PET/CERES
100 CREAM (GRAM) TOPICAL DAILY
Qty: 30 TABLET | Refills: 3 | Status: SHIPPED | OUTPATIENT
Start: 2022-02-27 | End: 2022-02-27 | Stop reason: SDUPTHER

## 2022-02-27 RX ORDER — CEPHALEXIN 500 MG/1
500 CAPSULE ORAL 2 TIMES DAILY
Qty: 10 CAPSULE | Refills: 0 | Status: SHIPPED | OUTPATIENT
Start: 2022-02-27 | End: 2022-03-04

## 2022-02-27 RX ORDER — LANOLIN ALCOHOL/MO/W.PET/CERES
100 CREAM (GRAM) TOPICAL DAILY
Qty: 30 TABLET | Refills: 3 | Status: SHIPPED | OUTPATIENT
Start: 2022-02-27 | End: 2022-03-23 | Stop reason: ALTCHOICE

## 2022-02-27 RX ORDER — PRENATAL WITH FERROUS FUM AND FOLIC ACID 3080; 920; 120; 400; 22; 1.84; 3; 20; 10; 1; 12; 200; 27; 25; 2 [IU]/1; [IU]/1; MG/1; [IU]/1; MG/1; MG/1; MG/1; MG/1; MG/1; MG/1; UG/1; MG/1; MG/1; MG/1; MG/1
1 TABLET ORAL DAILY
Qty: 30 TABLET | Refills: 1 | Status: SHIPPED | OUTPATIENT
Start: 2022-02-27 | End: 2022-02-27 | Stop reason: SDUPTHER

## 2022-02-27 ASSESSMENT — PAIN DESCRIPTION - ONSET: ONSET: ON-GOING

## 2022-02-27 ASSESSMENT — ENCOUNTER SYMPTOMS
CONSTIPATION: 0
TROUBLE SWALLOWING: 0
VOMITING: 1
EYES NEGATIVE: 1
ABDOMINAL DISTENTION: 0
SHORTNESS OF BREATH: 0
ABDOMINAL PAIN: 1
NAUSEA: 1
APNEA: 0
DIARRHEA: 0

## 2022-02-27 ASSESSMENT — PAIN - FUNCTIONAL ASSESSMENT: PAIN_FUNCTIONAL_ASSESSMENT: 0-10

## 2022-02-27 ASSESSMENT — PAIN DESCRIPTION - ORIENTATION: ORIENTATION: LEFT

## 2022-02-27 ASSESSMENT — PAIN SCALES - GENERAL: PAINLEVEL_OUTOF10: 6

## 2022-02-27 ASSESSMENT — PAIN DESCRIPTION - FREQUENCY: FREQUENCY: CONTINUOUS

## 2022-02-27 ASSESSMENT — PAIN DESCRIPTION - PROGRESSION: CLINICAL_PROGRESSION: GRADUALLY WORSENING

## 2022-02-27 ASSESSMENT — PAIN DESCRIPTION - LOCATION: LOCATION: ABDOMEN;OTHER (COMMENT)

## 2022-02-27 ASSESSMENT — PAIN DESCRIPTION - PAIN TYPE: TYPE: ACUTE PAIN

## 2022-02-27 ASSESSMENT — PAIN DESCRIPTION - DESCRIPTORS: DESCRIPTORS: CRAMPING;SHARP;SHOOTING

## 2022-02-27 NOTE — ED PROVIDER NOTES
[facility]  Emergency Department Encounter  Emergency Medicine Resident     Pt Name: Marcy Bean  NHR:[id number]  [place] 1995  Date of evaluation: 2/27/22  PCP:  [address] [place],9D       Chief Complaint   Patient presents with    Abdominal Pain       HISTORY OF PRESENT ILLNESS  (Location/Symptom, Timing/Onset, Context/Setting, Quality, Duration, ModifyingFactors, Severity.)      Marcy Bean is a 23 y.o. female with PMH of D&C for an ectopic pregnancy presents the emerge department for concerns of possible new ectopic pregnancy. Patient states that she has left lower quadrant abdominal pain that is sharp and stabbing in nature and radiating up towards her groin. Stated these are the exact same type of symptoms that she had when she had her previous ectopic pregnancy back in December which ended of her having an D&C and a right fallopian tube removal.  Patient states in addition to her stabbing pain that she is also been having nausea and vomiting and generalized body chills, patient denies fever or changes in her normal mentation. Patient also denies vaginal discharge stain that there is some scant blood but otherwise been having normal bowel and urinary frequency. PAST MEDICAL / SURGICAL / SOCIAL /FAMILY HISTORY      has a past medical history of Gall stones, Kidney stone, and UTI (lower urinary tract infection). No other pertinent PMH on review with patient/guardian. has a past surgical history that includes Arm Surgery (Right); [place] tooth extraction; pr lap,cholecystectomy (N/A, 05/18/2018); Breast reduction surgery (07/25/2018); Cystoscopy (N/A, 09/04/2019); Dilation and curettage of uterus (N/A, 12/03/2021); Abdomen surgery (12/10/2021); and laparoscopy (Right, 12/10/2021). No other pertinent PSH on review with patient/guardian.   Social History     Socioeconomic History    Marital status: Single     Spouse name: Not on file    Number of children: Not on file    Years of education: Not on file    Highest education level: Not on file   Occupational History    Not on file   Tobacco Use    Smoking status: Never Smoker    Smokeless tobacco: Never Used   Vaping Use    Vaping Use: Never used   Substance and Sexual Activity    Alcohol use: Yes     Comment: social drinker     Drug use: No    Sexual activity: Not on file   Other Topics Concern    Not on file   Social History Narrative    Not on file     Social Determinants of Health     Financial Resource Strain:     Difficulty of Paying Living Expenses: Not on file   Food Insecurity:     Worried About Running Out of Food in the Last Year: Not on file    Annabella of Food in the Last Year: Not on file   Transportation Needs:     Lack of Transportation (Medical): Not on file    Lack of Transportation (Non-Medical): Not on file   Physical Activity:     Days of Exercise per Week: Not on file    Minutes of Exercise per Session: Not on file   Stress:     Feeling of Stress : Not on file   Social Connections:     Frequency of Communication with Friends and Family: Not on file    Frequency of Social Gatherings with Friends and Family: Not on file    Attends Congregation Services: Not on file    Active Member of [address] [place] [address] [employer] or Organizations: Not on file    Attends Club or Organization Meetings: Not on file    Marital Status: Not on file   Intimate Partner Violence:     Fear of Current or Ex-Partner: Not on file    Emotionally Abused: Not on file    Physically Abused: Not on file    Sexually Abused: Not on file   Housing Stability:     Unable to Pay for Housing in the Last Year: Not on file    Number of [place] in the Last Year: Not on file    Unstable Housing in the Last Year: Not on file       I counseled the patient against using tobacco products. No family history on file. No other pertinent FamHx on review with patient/guardian.     Allergies:  Amoxicillin, Lactose, and Other    Home Medications:  Prior to Admission medications    Medication Sig Start Date End Date Taking? Authorizing Provider   cephALEXin (KEFLEX) 500 MG capsule Take 1 capsule by mouth 2 times daily for 5 days 2/27/22 3/4/22 Yes Mel Matta DO   doxyLAMINE succinate (GNP SLEEP AID) 25 MG tablet Take 1 tablet by mouth nightly as needed for Sleep (nausea in pregnancy) 2/27/22 3/29/22 Yes Soraya Fay DO   Prenatal Vit-Fe Fumarate-FA (PRENATAL VITAMIN) 27-1 MG TABS tablet Take 1 tablet by mouth daily 2/27/22  Yes Mel Matta DO   vitamin B-6 (PYRIDOXINE) 50 MG tablet Take 2 tablets by mouth daily Use at night with doxylamine for nausea 2/27/22  Yes Mel Matta DO   predniSONE (DELTASONE) 10 MG tablet Take 4 by mouth daily for 3 days then  3 by mouth daily for 3 days then  2 by mouth daily for 3 days then  1 by mouth daily for 3 days 2/13/22   Hedwig Schaumann, MD   sulfacetamide (BLEPH-10) 10 % ophthalmic solution Place 2 drops into both eyes every 4 hours 2/13/22   Hedwig Schaumann, MD   norethindrone-ethinyl estradiol (JUNEL FE 1/20) 1-20 MG-MCG per tablet Take 1 tablet by mouth daily 1/27/22   Yoana Deo DO   JUNEL 1/20 1-20 MG-MCG per tablet Take 1 tablet by mouth daily  Patient not taking: Reported on 1/27/2022 1/17/22   Historical Provider, MD   acetaminophen (TYLENOL) 500 MG tablet Take 2 tablets by mouth every 6 hours as needed for Pain 1/27/20   Mary Cardona MD       REVIEW OF SYSTEMS    (2-9 systems for level 4, 10 ormore for level 5)      Review of Systems   Constitutional: Negative for activity change, appetite change and fatigue. HENT: Negative for congestion, tinnitus and trouble swallowing. Eyes: Negative. Respiratory: Negative for apnea and shortness of breath. Cardiovascular: Negative for chest pain and palpitations. Gastrointestinal: Positive for abdominal pain, nausea and vomiting. Negative for abdominal distention, constipation and diarrhea.    Genitourinary: Positive for vaginal bleeding. Negative for dysuria, frequency, vaginal discharge and vaginal pain. Musculoskeletal: Positive for myalgias. Negative for arthralgias. Skin: Negative. Neurological: Negative. Psychiatric/Behavioral: Negative. PHYSICAL EXAM   (up to 7 for level 4, 8 or more for level 5)      INITIAL VITALS:   /80   Pulse 96   Temp 98.3 °F (36.8 °C) (Oral)   Ht 5' 5\" (1.651 m)   LMP  (Approximate)   SpO2 100%   BMI 28.29 kg/m²     Physical Exam  Constitutional:       General: She is not in acute distress. Appearance: She is well-developed. She is not ill-appearing. HENT:      Head: Normocephalic and atraumatic. Mouth/Throat:      Mouth: Mucous membranes are moist.      Pharynx: Oropharynx is clear. Eyes:      Extraocular Movements: Extraocular movements intact. Pupils: Pupils are equal, round, and reactive to light. Cardiovascular:      Rate and Rhythm: Normal rate and regular rhythm. Heart sounds: Normal heart sounds. Pulmonary:      Effort: Pulmonary effort is normal. No respiratory distress. Breath sounds: No wheezing or rales. Abdominal:      General: Abdomen is flat. Palpations: Abdomen is soft. Tenderness: There is abdominal tenderness in the left lower quadrant. There is no right CVA tenderness or left CVA tenderness. Negative signs include Rovsing's sign and McBurney's sign. Hernia: No hernia is present. Musculoskeletal:         General: Normal range of motion. Skin:     General: Skin is warm and dry. Capillary Refill: Capillary refill takes less than 2 seconds. Neurological:      General: No focal deficit present. Mental Status: She is alert. Mental status is at baseline. Psychiatric:         Mood and Affect: Mood normal.         Thought Content:  Thought content normal.         DIFFERENTIAL  DIAGNOSIS     DDX: Ectopic pregnancy, pyelonephritis, ovarian cyst, UTI    PLAN (LABS / IMAGING / EKG):  Orders NEGATIVE    Specific Gravity, UA 1.023 1.005 - 1.030    Urine Hgb NEGATIVE NEGATIVE    pH, UA 6.5 5.0 - 8.0    Protein, UA NEGATIVE NEGATIVE    Urobilinogen, Urine ELEVATED (A) Normal    Nitrite, Urine NEGATIVE NEGATIVE    Leukocyte Esterase, Urine MODERATE (A) NEGATIVE    -          WBC, UA 20 TO 50 0 - 5 /HPF    RBC, UA 2 TO 5 0 - 4 /HPF    Casts UA  0 - 8 /LPF     2 TO 5 HYALINE Reference range defined for non-centrifuged specimen.     Epithelial Cells UA 10 TO 20 0 - 5 /HPF    Bacteria, UA MODERATE (A) None   CBC with Auto Differential   Result Value Ref Range    WBC 10.7 3.5 - 11.3 k/uL    RBC 4.53 3.95 - 5.11 m/uL    Hemoglobin 13.4 11.9 - 15.1 g/dL    Hematocrit 40.1 36.3 - 47.1 %    MCV 88.5 82.6 - 102.9 fL    MCH 29.6 25.2 - 33.5 pg    MCHC 33.4 28.4 - 34.8 g/dL    RDW 13.2 11.8 - 14.4 %    Platelets 606 287 - 913 k/uL    MPV 10.4 8.1 - 13.5 fL    NRBC Automated 0.0 0.0 per 100 WBC    Seg Neutrophils 74 (H) 36 - 65 %    Lymphocytes 18 (L) 24 - 43 %    Monocytes 6 3 - 12 %    Eosinophils % 1 1 - 4 %    Basophils 1 0 - 2 %    Immature Granulocytes 0 0 %    Segs Absolute 7.99 1.50 - 8.10 k/uL    Absolute Lymph # 1.92 1.10 - 3.70 k/uL    Absolute Mono # 0.63 0.10 - 1.20 k/uL    Absolute Eos # 0.07 0.00 - 0.44 k/uL    Basophils Absolute 0.05 0.00 - 0.20 k/uL    Absolute Immature Granulocyte 0.03 0.00 - 0.30 k/uL   Basic Metabolic Panel w/ Reflex to MG   Result Value Ref Range    Glucose 83 70 - 99 mg/dL    BUN 9 6 - 20 mg/dL    CREATININE 0.69 0.50 - 0.90 mg/dL    Calcium 8.7 8.6 - 10.4 mg/dL    Sodium 138 135 - 144 mmol/L    Potassium 4.0 3.7 - 5.3 mmol/L    Chloride 104 98 - 107 mmol/L    CO2 22 20 - 31 mmol/L    Anion Gap 12 9 - 17 mmol/L    GFR Non-African American >60 >60 mL/min    GFR African American >60 >60 mL/min    GFR Comment         HCG, Quantitative, Pregnancy   Result Value Ref Range    hCG Quant 32,482 (H) <5 IU/L         IMPRESSION/MDM/ED COURSE:  23 y.o. female presented with left lower quadrant abdominal pain with radiation towards her stomach patient belly is soft and nontender, no peritoneal signs, no opposite side tenderness, McBurney's or flank pain. Concerns do include ectopic pregnancy given patient's recent history. Will perform bedside ultrasound for evaluation and also do a fast. Will get urinalysis and culture, CBC BMP and a quantitative hCG. If positive patient will need a formal ultrasound for rule out ectopic. ED Course as of 03/04/22 1016   Sun Feb 27, 2022   4418 Patient's results were discussed with her, stated that she will need to have a formal ultrasound for rule out of ectopic pregnancy versus intrauterine gestation. Patient understanding. Also distill the patient that the final disposition and work-up will be transferred to Dr. Foreign Baum   [ES]   8980 US OB TRANSVAGINAL  Measurements:     Estimated gestational age by current ultrasound: 5 weeks and 6 days     Estimated gestational by LMP/prior ultrasound: 6 weeks and 1 day     Estimated Due Date: Estimated due date using ultrasound criteria is 10/24/2022     IMPRESSION:  Single intrauterine gestational sac containing a yolk sac and a small viable  fetal pole of approximately 5 weeks and 6 days gestation.     Slightly complex cystic structure in the left adnexa, possibly a corpus  luteum. [JS]      ED Course User Index  [ES] Milla Folres MD  [JS] Latonya Posadas DO         RADIOLOGY:  US OB TRANSVAGINAL   Final Result   Single intrauterine gestational sac containing a yolk sac and a small viable   fetal pole of approximately 5 weeks and 6 days gestation. Slightly complex cystic structure in the left adnexa, possibly a corpus   luteum. EKG  None    All EKG's are interpreted by the Emergency Department Physician who either signs or Co-signs this chart in the absence of a cardiologist.      PROCEDURES:  None    CONSULTS:  None        FINAL IMPRESSION      1. Left lower quadrant abdominal pain    2.  Less than 8 weeks gestation of pregnancy    3.  Urinary tract infection in mother during first trimester of pregnancy          DISPOSITION / [facility]:  [facility]  [address]  [phone]    If symptoms worsen    Damian Bernard DO  [address]  [phone]    Schedule an appointment as soon as possible for a visit         DISCHARGE MEDICATIONS:  Discharge Medication List as of 2/27/2022 10:14 PM      START taking these medications    Details   cephALEXin (KEFLEX) 500 MG capsule Take 1 capsule by mouth 2 times daily for 5 days, Disp-10 capsule, R-0Print      doxyLAMINE succinate (GNP SLEEP AID) 25 MG tablet Take 1 tablet by mouth nightly as needed for Sleep (nausea in pregnancy), Disp-30 tablet, R-0Print      vitamin B-6 (PYRIDOXINE) 50 MG tablet Take 2 tablets by mouth daily Use at night with doxylamine for nausea, Disp-30 tablet, R-3Print      Prenatal Vit-Fe Fumarate-FA (PRENATAL VITAMIN) 27-1 MG TABS tablet Take 1 tablet by mouth daily, Disp-30 tablet, R-1Print             Dora Velez MD  PGY 2  Resident Physician Emergency Medicine  03/04/22 10:16 AM        (Please note that portions of this note were completed with a voice recognition program.Efforts were made to edit the dictations but occasionally words are mis-transcribed.)        Dora Velez MD  Resident  02/27/22 Donny Dan MD  Resident  03/04/22 1016

## 2022-02-27 NOTE — ED TRIAGE NOTES
Patient presented to the ED today with complaints of abdominal pain and pressure when urinating. Patient also complains of back pain. Patient states symptoms started 60 days ago.

## 2022-02-27 NOTE — ED PROVIDER NOTES
[facility]     Emergency Department     Faculty Attestation    I performed a history and physical examination of the patient and discussed management with the resident. I reviewed the residents note and agree with the documented findings and plan of care. Any areas of disagreement are noted on the chart. I was personally present for the key portions of any procedures. I have documented in the chart those procedures where I was not present during the key portions. I have reviewed the emergency nurses triage note. I agree with the chief complaint, past medical history, past surgical history, allergies, medications, social and family history as documented unless otherwise noted below. For Physician Assistant/ Nurse Practitioner cases/documentation I have personally evaluated this patient and have completed at least one if not all key elements of the E/M (history, physical exam, and MDM). Additional findings are as noted. This patient was evaluated in the Emergency Department for symptoms described in the history of present illness. He/she was evaluated in the context of the global COVID-19 pandemic, which necessitated consideration that the patient might be at risk for infection with the SARS-CoV-2 virus that causes COVID-19. Institutional protocols and algorithms that pertain to the evaluation of patients at risk for COVID-19 are in a state of rapid change based on information released by regulatory bodies including the CDC and federal and state organizations. These policies and algorithms were followed during the patient's care in the ED. Patient with abdominal pain vaginal spotting cramping. Patient's concern for ectopic pregnancy. Of note, patient had an ectopic in December 2021,  Right salpingectomy for ectopic pregnancy on 12/10/2021, Record review shows blood type a positive. States she has had intercourse unprotected since her surgery.   Bleeding is intermenstrual not typical for her normal menstrual period. Pain is localized left lower quadrant. Nausea but no vomiting. Also increased urinary hesitancy pressure history of UTIs and kidney infections in the past.  On exam however well-appearing nontoxic afebrile chatting with nurse and her daughter. Abdomen soft focal left lower tenderness mild no rebound no guarding no CVA tenderness.   Will check pregnancy test, bedside ultrasound for fast and pregnancy, if positive plan for ultrasound    Critical Care     none    Alex Spain MD, Cuco Javier  Attending Emergency  Physician             Alex Spain MD  02/27/22 1800

## 2022-02-28 NOTE — ED PROVIDER NOTES
[facility] ED  Emergency Department  Emergency Medicine Resident Sign-out     Care of Aaron Elliott was assumed from Dr. Miguel Sparrow and is being seen for Abdominal Pain  . The patient's initial evaluation and plan have been discussed with the prior provider who initially evaluated the patient. EMERGENCY DEPARTMENT COURSE / MEDICAL DECISION MAKING:       MEDICATIONS GIVEN:  No orders of the defined types were placed in this encounter. LABS / RADIOLOGY:     Labs Reviewed   URINALYSIS WITH MICROSCOPIC - Abnormal; Notable for the following components:       Result Value    Turbidity UA Cloudy (*)     Ketones, Urine TRACE (*)     Urobilinogen, Urine ELEVATED (*)     Leukocyte Esterase, Urine MODERATE (*)     Bacteria, UA MODERATE (*)     All other components within normal limits   CBC WITH AUTO DIFFERENTIAL - Abnormal; Notable for the following components:    Seg Neutrophils 74 (*)     Lymphocytes 18 (*)     All other components within normal limits   HCG, QUANTITATIVE, PREGNANCY - Abnormal; Notable for the following components:    hCG Quant 32,482 (*)     All other components within normal limits   CULTURE, URINE   BASIC METABOLIC PANEL W/ REFLEX TO MG FOR LOW K       US OB TRANSVAGINAL    (Results Pending)       RECENT VITALS:     Temp: 98.3 °F (36.8 °C),  Pulse: 96,  , BP: 126/80, SpO2: 100 %    This patient is a 23 y.o. Female with recent D&C for right-sided ectopic pregnancy with fallopian tube removal back in December 2021 presents to the emergency department for left lower quadrant abdominal pain with shooting pains up towards her abdomen and mild vaginal bleeding. Patient stated that this feels exactly like her previous symptoms when she had her ectopic pregnancy. Patient states that the pain is been ongoing for 2 days and becoming more frequent and worse. Patient states that she has had periods in between her recent D&C as well as unprotected sex with a partner.   Physical evaluation shows a nontoxic woman with a nontender or focal peritoneal abdomen. Left lower quadrant tenderness without guarding. Patient's quantitative is elevated in the 30,000s as well as a indeterminant bedside ultrasound for possible gestational sac with fetal pole. Patient undergoing a formal ectopic rule out ultrasound for possible ectopic versus intrauterine gestation. ED Course as of 02/28/22 0442   Rebecca Clemens Feb 27, 2022   4107 Patient's results were discussed with her, stated that she will need to have a formal ultrasound for rule out of ectopic pregnancy versus intrauterine gestation. Patient understanding. Also distill the patient that the final disposition and work-up will be transferred to Dr. Kieran Nieves   [ES]   6600 US OB TRANSVAGINAL  Measurements:     Estimated gestational age by current ultrasound: 5 weeks and 6 days     Estimated gestational by LMP/prior ultrasound: 6 weeks and 1 day     Estimated Due Date: Estimated due date using ultrasound criteria is 10/24/2022     IMPRESSION:  Single intrauterine gestational sac containing a yolk sac and a small viable  fetal pole of approximately 5 weeks and 6 days gestation. Slightly complex cystic structure in the left adnexa, possibly a corpus  luteum. [JS]      ED Course User Index  [ES] Kyra Bolden MD  [JS] Alvaro Hernández DO     patient updated on ultrasound findings. We discussed the importance of following up with OB as complex cyst be followed. Patient agreeable to plan at this time. Pain has not changed significantly since arrival.  Discharge home with medications for UTI in addition to pregnancy safe nausea medications and prenatals. OUTSTANDING TASKS / RECOMMENDATIONS:      F/u US  Treat UTI  Dispo pending US     FINAL IMPRESSION:     No diagnosis found.     DISPOSITION:       DISPOSITION:  [x]  Discharge   []  Transfer -    []  Admission -     []  Against Medical Advice   []  Eloped   FOLLOW-UP: No follow-up provider specified.    DISCHARGE MEDICATIONS: New Prescriptions    No medications on file          Kristel Greenwood DO  Emergency Medicine Resident  [address] [facility]       Kristel Greenwood, [address] [place] [address]  Resident  02/28/22 8501

## 2022-03-01 ENCOUNTER — CARE COORDINATION (OUTPATIENT)
Dept: OTHER | Facility: CLINIC | Age: 27
End: 2022-03-01

## 2022-03-01 LAB
CULTURE: NORMAL
SPECIMEN DESCRIPTION: NORMAL

## 2022-03-01 NOTE — CARE COORDINATION
ACM attempted to reach patient for introduction to Associate Care Management related to Maternity CM. HIPAA compliant message left requesting a return phone call. Will attempt to outreach patient again. RAQUEL Ortiz, RN  Associate Care Manager   Cell: [phone]  [email or web address]

## 2022-03-04 ENCOUNTER — CARE COORDINATION (OUTPATIENT)
Dept: OTHER | Facility: CLINIC | Age: 27
End: 2022-03-04

## 2022-03-04 NOTE — CARE COORDINATION
ACM attempted 2nd outreach to reach patient for introduction to Associate Care Management r/t Maternity CM and ED follow-up. HIPAA compliant message left requesting a return phone call at patients convenience. Unable to Reach Letter sent to patient via [employer]. Will continue to outreach patient. RAQUEL Enciso, RN  Associate Care Manager   Cell: [phone]  [email or web address]

## 2022-03-17 ENCOUNTER — CARE COORDINATION (OUTPATIENT)
Dept: OTHER | Facility: CLINIC | Age: 27
End: 2022-03-17

## 2022-03-17 NOTE — CARE COORDINATION
ACM outreached to patient r/t pregnancy and bleeding in early pregnancy with ED visit on 2/27/22. Patient did have a miscarriage and reports that she is doing okay and is processing this well emotionally and mentally. Patient is having her labs drawn every week until her beta hcg returns to a non-pregnant level. She does have some lingering pregnancy s/s but reports these are getting better every day. Patient does intend to try for pregnancy again in the next several months possibly. ACM and patient discussed the BWWB program for future. Patient verbalizes understanding and would like to decline CM at this time r/t no ongoing needs. ACM will sing off of care team at this time but will send patient a letter in [address] [place] [address] [phone] so that she can outreach with any future needs. No further outreach scheduled with this ACM, ACM will sign off care team at this time. Episode of Care resolved. Patient has this ACM's contact information if future needs arise. RAQUEL Lutz, RN  Associate Care Manager   Cell: [phone]  [email or web address]

## 2022-03-23 ENCOUNTER — OFFICE VISIT (OUTPATIENT)
Dept: FAMILY MEDICINE CLINIC | Age: 27
End: 2022-03-23
Payer: COMMERCIAL

## 2022-03-23 VITALS
HEIGHT: 64 IN | SYSTOLIC BLOOD PRESSURE: 118 MMHG | DIASTOLIC BLOOD PRESSURE: 76 MMHG | TEMPERATURE: 97.4 F | BODY MASS INDEX: 31.41 KG/M2 | OXYGEN SATURATION: 98 % | HEART RATE: 108 BPM | WEIGHT: 184 LBS

## 2022-03-23 DIAGNOSIS — H65.93 BILATERAL SEROUS OTITIS MEDIA, UNSPECIFIED CHRONICITY: ICD-10-CM

## 2022-03-23 DIAGNOSIS — F43.21 GRIEF ASSOCIATED WITH LOSS OF FETUS: Primary | ICD-10-CM

## 2022-03-23 DIAGNOSIS — E66.09 CLASS 1 OBESITY DUE TO EXCESS CALORIES WITHOUT SERIOUS COMORBIDITY WITH BODY MASS INDEX (BMI) OF 31.0 TO 31.9 IN ADULT: ICD-10-CM

## 2022-03-23 DIAGNOSIS — H69.83 DYSFUNCTION OF BOTH EUSTACHIAN TUBES: ICD-10-CM

## 2022-03-23 PROBLEM — B96.20 E. COLI UTI (URINARY TRACT INFECTION): Status: RESOLVED | Noted: 2018-05-14 | Resolved: 2022-03-23

## 2022-03-23 PROBLEM — N10 ACUTE PYELONEPHRITIS: Status: RESOLVED | Noted: 2018-05-14 | Resolved: 2022-03-23

## 2022-03-23 PROBLEM — N12 PYELONEPHRITIS: Status: RESOLVED | Noted: 2019-02-15 | Resolved: 2022-03-23

## 2022-03-23 PROBLEM — O00.90 ECTOPIC PREGNANCY: Status: RESOLVED | Noted: 2021-12-10 | Resolved: 2022-03-23

## 2022-03-23 PROBLEM — G89.18 POSTOPERATIVE PAIN: Status: RESOLVED | Noted: 2021-12-10 | Resolved: 2022-03-23

## 2022-03-23 PROBLEM — E66.811 CLASS 1 OBESITY DUE TO EXCESS CALORIES WITHOUT SERIOUS COMORBIDITY WITH BODY MASS INDEX (BMI) OF 31.0 TO 31.9 IN ADULT: Status: ACTIVE | Noted: 2022-03-23

## 2022-03-23 PROBLEM — N39.0 E. COLI UTI (URINARY TRACT INFECTION): Status: RESOLVED | Noted: 2018-05-14 | Resolved: 2022-03-23

## 2022-03-23 PROBLEM — Z98.890 H/O LAPAROSCOPY: Status: RESOLVED | Noted: 2021-12-10 | Resolved: 2022-03-23

## 2022-03-23 PROBLEM — Z98.890 POSTOPERATIVE STATE: Status: RESOLVED | Noted: 2021-12-10 | Resolved: 2022-03-23

## 2022-03-23 PROCEDURE — 99203 OFFICE O/P NEW LOW 30 MIN: CPT | Performed by: INTERNAL MEDICINE

## 2022-03-23 PROCEDURE — G8427 DOCREV CUR MEDS BY ELIG CLIN: HCPCS | Performed by: INTERNAL MEDICINE

## 2022-03-23 PROCEDURE — G8484 FLU IMMUNIZE NO ADMIN: HCPCS | Performed by: INTERNAL MEDICINE

## 2022-03-23 PROCEDURE — 1036F TOBACCO NON-USER: CPT | Performed by: INTERNAL MEDICINE

## 2022-03-23 PROCEDURE — G8417 CALC BMI ABV UP PARAM F/U: HCPCS | Performed by: INTERNAL MEDICINE

## 2022-03-23 RX ORDER — LORATADINE 10 MG/1
10 TABLET ORAL DAILY
Qty: 30 TABLET | Refills: 0 | Status: SHIPPED | OUTPATIENT
Start: 2022-03-23 | End: 2022-06-03 | Stop reason: ALTCHOICE

## 2022-03-23 SDOH — ECONOMIC STABILITY: FOOD INSECURITY: WITHIN THE PAST 12 MONTHS, YOU WORRIED THAT YOUR FOOD WOULD RUN OUT BEFORE YOU GOT MONEY TO BUY MORE.: NEVER TRUE

## 2022-03-23 SDOH — ECONOMIC STABILITY: FOOD INSECURITY: WITHIN THE PAST 12 MONTHS, THE FOOD YOU BOUGHT JUST DIDN'T LAST AND YOU DIDN'T HAVE MONEY TO GET MORE.: NEVER TRUE

## 2022-03-23 ASSESSMENT — VISUAL ACUITY: OU: 1

## 2022-03-23 ASSESSMENT — ENCOUNTER SYMPTOMS
CONSTIPATION: 0
VOMITING: 0
SHORTNESS OF BREATH: 0
CHEST TIGHTNESS: 0
ANAL BLEEDING: 0
CHOKING: 0
SORE THROAT: 0
NAUSEA: 0
WHEEZING: 0
DIARRHEA: 0
COUGH: 0
BLOOD IN STOOL: 0
ABDOMINAL PAIN: 0
RHINORRHEA: 0

## 2022-03-23 ASSESSMENT — SOCIAL DETERMINANTS OF HEALTH (SDOH): HOW HARD IS IT FOR YOU TO PAY FOR THE VERY BASICS LIKE FOOD, HOUSING, MEDICAL CARE, AND HEATING?: NOT VERY HARD

## 2022-03-23 ASSESSMENT — PATIENT HEALTH QUESTIONNAIRE - PHQ9
SUM OF ALL RESPONSES TO PHQ QUESTIONS 1-9: 0
4. FEELING TIRED OR HAVING LITTLE ENERGY: 0
3. TROUBLE FALLING OR STAYING ASLEEP: 0
7. TROUBLE CONCENTRATING ON THINGS, SUCH AS READING THE NEWSPAPER OR WATCHING TELEVISION: 0
2. FEELING DOWN, DEPRESSED OR HOPELESS: 0
5. POOR APPETITE OR OVEREATING: 0
9. THOUGHTS THAT YOU WOULD BE BETTER OFF DEAD, OR OF HURTING YOURSELF: 0
1. LITTLE INTEREST OR PLEASURE IN DOING THINGS: 0
SUM OF ALL RESPONSES TO PHQ QUESTIONS 1-9: 0
10. IF YOU CHECKED OFF ANY PROBLEMS, HOW DIFFICULT HAVE THESE PROBLEMS MADE IT FOR YOU TO DO YOUR WORK, TAKE CARE OF THINGS AT HOME, OR GET ALONG WITH OTHER PEOPLE: 0
SUM OF ALL RESPONSES TO PHQ QUESTIONS 1-9: 0
SUM OF ALL RESPONSES TO PHQ9 QUESTIONS 1 & 2: 0
SUM OF ALL RESPONSES TO PHQ QUESTIONS 1-9: 0
8. MOVING OR SPEAKING SO SLOWLY THAT OTHER PEOPLE COULD HAVE NOTICED. OR THE OPPOSITE, BEING SO FIGETY OR RESTLESS THAT YOU HAVE BEEN MOVING AROUND A LOT MORE THAN USUAL: 0
6. FEELING BAD ABOUT YOURSELF - OR THAT YOU ARE A FAILURE OR HAVE LET YOURSELF OR YOUR FAMILY DOWN: 0

## 2022-03-23 NOTE — PROGRESS NOTES
Pt is here to establish  She is having some issues both ears. She states feels plugged, sometimes pain and hearing problems. She did have a miscarriage a few months ago and since is unable to lose weight that she had gain at beginning of pregnancy.

## 2022-03-23 NOTE — PROGRESS NOTES
Subjective:       Patient ID:     Shabnam Archuleta is a 23 y.o. female who presents for   Chief Complaint   Patient presents with   Julio García Doctor       HPI:  Nursing note reviewed and discussed with patient. New patient, here to establish     Otalgia   There is pain in both ears. This is a new problem. The current episode started 1 to 4 weeks ago. The problem has been waxing and waning. There has been no fever. Associated symptoms include hearing loss. Pertinent negatives include no abdominal pain, coughing, diarrhea, ear discharge, headaches, neck pain, rash, rhinorrhea, sore throat or vomiting. She has tried NSAIDs for the symptoms. The treatment provided moderate relief. Had a miscarriage in December   - not G5 as noted in gyn notes. Eating six small meals a day now since a week after her surgery   Sleeping 8 hours or more each night   Exercising - cardio, lifting, walking her dog, everyday       Patient's medications, allergies, past medical, surgical, social and family histories were reviewed and updated as appropriate.     Past Medical History:   Diagnosis Date    Gall stones     Kidney stone     Patient denies    UTI (lower urinary tract infection)      Past Surgical History:   Procedure Laterality Date    ABDOMEN SURGERY  12/10/2021    DIAGNOSTIC LAPAROSCOPY, UNILATERAL SALPINGECTOMY RIGHT    ARM SURGERY Right     BREAST REDUCTION SURGERY  2018    CYSTOSCOPY N/A 2019    CYSTOSCOPY RETROGRADE PYELOGRAM WITH  CYSTOGRAM, performed by Anisha Jerome MD at [address] [place] [address] N/A 2021    DILATATION AND CURETTAGE SUCTION performed by Jared Carty MD at [address] Right 12/10/2021    DIAGNOSTIC LAPAROSCOPY, UNILATERAL SALPINGECTOMY performed by Leo Cochran MD at [facility] N/A 2018    CHOLECYSTECTOMY LAPAROSCOPIC performed by Rubina Lizarraga MD at [facility] Social History     Tobacco Use    Smoking status: Never Smoker    Smokeless tobacco: Never Used   Substance Use Topics    Alcohol use: Yes     Comment: social drinker       Patient Active Problem List   Diagnosis    Allergic reaction caused by a drug    E. coli UTI (urinary tract infection)    Acute pyelonephritis    Cholecystitis    Pyelonephritis    History of kidney stones    Missed     Ectopic pregnancy    S/p lap right salpingectomy w/ evacuation of hemoperitoneum 12/10/21    Postoperative state    Post-op pain    Postoperative pain    Dysmenorrhea         Prior to Visit Medications    Medication Sig Taking? Authorizing Provider   norethindrone-ethinyl estradiol (JUNEL FE 1/20) 1-20 MG-MCG per tablet Take 1 tablet by mouth daily Yes Dajuan Walker, DO     Review of Systems  Review of Systems   Constitutional: Negative for fatigue, fever and unexpected weight change. HENT: Positive for ear pain and hearing loss. Negative for ear discharge, rhinorrhea and sore throat. Respiratory: Negative for cough, choking, chest tightness, shortness of breath and wheezing. Cardiovascular: Negative for chest pain, palpitations and leg swelling. Gastrointestinal: Negative for abdominal pain, anal bleeding, blood in stool, constipation, diarrhea, nausea and vomiting. Endocrine: Negative. Musculoskeletal: Negative for joint swelling, myalgias and neck pain. Skin: Negative. Negative for rash. Neurological: Negative for dizziness and headaches. Psychiatric/Behavioral: Negative for sleep disturbance. All other systems reviewed and are negative.          Objective:       Physical Exam:  /76   Pulse 108   Temp 97.4 °F (36.3 °C) (Temporal)   Ht 5' 3.94\" (1.624 m)   Wt 184 lb (83.5 kg)   LMP 2022   SpO2 98%   BMI 31.65 kg/m²   Wt Readings from Last 3 Encounters:   22 184 lb (83.5 kg)   22 170 lb (77.1 kg)   22 185 lb (83.9 kg)         Physical Exam  Vitals and nursing note reviewed. Constitutional:       General: She is not in acute distress. Appearance: She is well-developed. She is not ill-appearing. HENT:      Right Ear: A middle ear effusion is present. Tympanic membrane is not injected, scarred, perforated, erythematous or bulging. Left Ear: A middle ear effusion is present. Tympanic membrane is not injected, scarred, perforated, erythematous or bulging. Eyes:      General: Lids are normal. Vision grossly intact. Cardiovascular:      Rate and Rhythm: Normal rate and regular rhythm. Heart sounds: Normal heart sounds, S1 normal and S2 normal. No murmur heard. No friction rub. No gallop. Pulmonary:      Effort: Pulmonary effort is normal. No respiratory distress. Breath sounds: Normal breath sounds. No wheezing. Abdominal:      General: Bowel sounds are normal.      Palpations: Abdomen is soft. There is no mass. Tenderness: There is no abdominal tenderness. There is no guarding. Musculoskeletal:         General: Normal range of motion. Skin:     General: Skin is warm and dry. Capillary Refill: Capillary refill takes less than 2 seconds. Neurological:      General: No focal deficit present. Mental Status: She is alert and oriented to person, place, and time.          Data Review  Admission on 02/27/2022, Discharged on 02/27/2022   Component Date Value    Color, UA 02/27/2022 Yellow     Turbidity UA 02/27/2022 Cloudy*    Glucose, Ur 02/27/2022 NEGATIVE     Bilirubin Urine 02/27/2022 NEGATIVE     Ketones, Urine 02/27/2022 TRACE*    Specific [place], UA 02/27/2022 1.023     Urine Hgb 02/27/2022 NEGATIVE     pH, UA 02/27/2022 6.5     Protein, UA 02/27/2022 NEGATIVE     Urobilinogen, Urine 02/27/2022 ELEVATED*    Nitrite, Urine 02/27/2022 NEGATIVE     Leukocyte Esterase, Urine 02/27/2022 MODERATE*    - 02/27/2022          WBC, UA 02/27/2022 20 TO 50     RBC, UA 02/27/2022 2 TO 5     Casts UA 02/27/2022 2 TO 5 HYALINE Reference range defined for non-centrifuged specimen.  Epithelial Cells UA 02/27/2022 10 TO 20     Bacteria, UA 02/27/2022 MODERATE*    Specimen Description 02/27/2022 . CLEAN CATCH URINE     Culture 02/27/2022 NO SIGNIFICANT GROWTH     WBC 02/27/2022 10.7     RBC 02/27/2022 4.53     Hemoglobin 02/27/2022 13.4     Hematocrit 02/27/2022 40.1     MCV 02/27/2022 88.5     MCH 02/27/2022 29.6     MCHC 02/27/2022 33.4     RDW 02/27/2022 13.2     Platelets 36/88/5449 217     MPV 02/27/2022 10.4     NRBC Automated 02/27/2022 0.0     Seg Neutrophils 02/27/2022 74*    Lymphocytes 02/27/2022 18*    Monocytes 02/27/2022 6     Eosinophils % 02/27/2022 1     Basophils 02/27/2022 1     Immature Granulocytes 02/27/2022 0     Segs Absolute 02/27/2022 7.99     Absolute Lymph # 02/27/2022 1.92     Absolute Mono # 02/27/2022 0.63     Absolute Eos # 02/27/2022 0.07     Basophils Absolute 02/27/2022 0.05     Absolute Immature Granul* 02/27/2022 0.03     Glucose 02/27/2022 83     BUN 02/27/2022 9     CREATININE 02/27/2022 0.69     Calcium 02/27/2022 8.7     Sodium 02/27/2022 138     Potassium 02/27/2022 4.0     Chloride 02/27/2022 104     CO2 02/27/2022 22     Anion Gap 02/27/2022 12     GFR Non- 02/27/2022 >60     GFR  02/27/2022 >60     GFR Comment 02/27/2022          hCG Quant 02/27/2022 32,482*   Hospital Outpatient Visit on 01/27/2022   Component Date Value    HIV Ag/Ab 01/27/2022 NONREACTIVE     Hepatitis C Ab 01/27/2022 NONREACTIVE     Hepatitis B Surface Ag 01/27/2022 NONREACTIVE    Hospital Outpatient Visit on 01/27/2022   Component Date Value    Specimen Description 01/27/2022 . CERVIX     C. trachomatis DNA 01/27/2022 NEGATIVE     N. gonorrhoeae DNA 01/27/2022 NEGATIVE     Source 01/27/2022 . VAGINAL SWAB     Trichomonas Vaginalis DNA 01/27/2022 NEGATIVE     GARDNERELLA VAGINALIS, D* 01/27/2022 POSITIVE*    CANDIDA SPECIES, DNA PRO* 01/27/2022 NEGATIVE      No results found for: CHOL, TRIG, HDL, LDLDIRECT       Assessment/Plan:      1. Grief associated with loss of fetus  - Delfino Stewart, PhD, Psychology, [place]    2. Class 1 obesity due to excess calories without serious comorbidity with body mass index (BMI) of 31.0 to 31.9 in adult  Food diary advised   Regular meals advised - can do regular meals TID or small meals every three hours - focus on whole foods, high quality fats and proteins. Avoid processed and sugary foods except as treats. Find different ways to celebrate and reward self instead of food. Avoid skipping meals. Sleep hygiene reviewed. 3. Dysfunction of both eustachian tubes  - loratadine (CLARITIN) 10 MG tablet; Take 1 tablet by mouth daily  Dispense: 30 tablet; Refill: 0    4. Bilateral serous otitis media, unspecified chronicity  - loratadine (CLARITIN) 10 MG tablet; Take 1 tablet by mouth daily  Dispense: 30 tablet;  Refill: 0           Health Maintenance Due   Topic Date Due    Pap smear  Never done       Electronically signed by Sabino Bautista MD on 3/23/2022 at 1:46 PM

## 2022-04-26 ENCOUNTER — OFFICE VISIT (OUTPATIENT)
Dept: FAMILY MEDICINE CLINIC | Age: 27
End: 2022-04-26
Payer: COMMERCIAL

## 2022-04-26 VITALS
DIASTOLIC BLOOD PRESSURE: 77 MMHG | HEART RATE: 79 BPM | OXYGEN SATURATION: 100 % | SYSTOLIC BLOOD PRESSURE: 111 MMHG | TEMPERATURE: 97.9 F

## 2022-04-26 DIAGNOSIS — J06.9 VIRAL URI: Primary | ICD-10-CM

## 2022-04-26 PROCEDURE — 99213 OFFICE O/P EST LOW 20 MIN: CPT

## 2022-04-26 RX ORDER — PREDNISONE 20 MG/1
40 TABLET ORAL DAILY
Qty: 10 TABLET | Refills: 0 | Status: SHIPPED | OUTPATIENT
Start: 2022-04-26 | End: 2022-05-05 | Stop reason: SDUPTHER

## 2022-04-26 RX ORDER — BROMPHENIRAMINE MALEATE, PSEUDOEPHEDRINE HYDROCHLORIDE, AND DEXTROMETHORPHAN HYDROBROMIDE 2; 30; 10 MG/5ML; MG/5ML; MG/5ML
10 SYRUP ORAL 4 TIMES DAILY PRN
Qty: 118 ML | Refills: 0 | Status: SHIPPED | OUTPATIENT
Start: 2022-04-26 | End: 2022-05-05 | Stop reason: SDUPTHER

## 2022-04-26 ASSESSMENT — ENCOUNTER SYMPTOMS
COUGH: 1
NAUSEA: 0
DIARRHEA: 0
HOARSE VOICE: 1
SHORTNESS OF BREATH: 1
SINUS COMPLAINT: 1
SINUS PRESSURE: 1
SORE THROAT: 1

## 2022-04-26 NOTE — PATIENT INSTRUCTIONS
Patient Education        Upper Respiratory Infection (Cold): Care Instructions  Your Care Instructions     An upper respiratory infection, or URI, is an infection of the nose, sinuses, or throat. URIs are spread by coughs, sneezes, and direct contact. The common cold is the most frequent kind of URI. The flu and sinus infections are otherkinds of URIs. Almost all URIs are caused by viruses. Antibiotics won't cure them. But you can treat most infections with home care. This may include drinking lots of fluids and taking over-the-counter pain medicine. You will probably feel better in 4to 10 days. The doctor has checked you carefully, but problems can develop later. If you notice any problems or new symptoms, get medical treatment right away. Follow-up care is a key part of your treatment and safety. Be sure to make and go to all appointments, and call your doctor if you are having problems. It's also a good idea to know your test results and keep alist of the medicines you take. How can you care for yourself at home?  To prevent dehydration, drink plenty of fluids. Choose water and other clear liquids until you feel better. If you have kidney, heart, or liver disease and have to limit fluids, talk with your doctor before you increase the amount of fluids you drink.  Take an over-the-counter pain medicine, such as acetaminophen (Tylenol), ibuprofen (Advil, Motrin), or naproxen (Aleve). Read and follow all instructions on the label.  Before you use cough and cold medicines, check the label. These medicines may not be safe for young children or for people with certain health problems.  Be careful when taking over-the-counter cold or flu medicines and Tylenol at the same time. Many of these medicines have acetaminophen, which is Tylenol. Read the labels to make sure that you are not taking more than the recommended dose. Too much acetaminophen (Tylenol) can be harmful.    Get plenty of rest.   Do not smoke or allow others to smoke around you. If you need help quitting, talk to your doctor about stop-smoking programs and medicines. These can increase your chances of quitting for good. When should you call for help? Call 911 anytime you think you may need emergency care. For example, call if:     You have severe trouble breathing. Call your doctor now or seek immediate medical care if:     You seem to be getting much sicker.      You have new or worse trouble breathing.      You have a new or higher fever.      You have a new rash. Watch closely for changes in your health, and be sure to contact your doctor if:     You have a new symptom, such as a sore throat, an earache, or sinus pain.      You cough more deeply or more often, especially if you notice more mucus or a change in the color of your mucus.      You do not get better as expected. Where can you learn more? Go to https://[employer]pe[employer]iceweb.[employer]ve[employer]. org and sign in to your [facility] account. Enter M649 in the [employer] box to learn more about \"Upper Respiratory Infection (Cold): Care Instructions. \"     If you do not have an account, please click on the \"Sign Up Now\" link. Current as of: July 6, 2021               Content Version: 13.2  © 2006-2022 Healthwise, Incorporated. Care instructions adapted under license by [facility] ([facility]). If you have questions about a medical condition or this instruction, always ask your healthcare professional. [place] [address] any warranty or liability for your use of this information.

## 2022-04-26 NOTE — LETTER
[facility]  [facility] [address] [address]  Phone: [phone]  Fax: [phone]    KEMAR Fernandez NP        April 26, 2022     Patient: Savanah Pascual   YOB: 1995   Date of Visit: 4/26/2022       To Whom It May Concern: It is my medical opinion that Silke Ramos may not return to work until covid results are back and negative. Covid testing sent to [facility] and [facility]. If you have any questions or concerns, please don't hesitate to call.     Sincerely,        KEMAR Fernandez NP

## 2022-04-26 NOTE — PROGRESS NOTES
[address] [facility] [address] [address]  Dept: [phone]  Dept Fax: [phone]    Katie Rdz is a 23 y.o. female who presents to the urgent care today for her medical conditions/complaints as notedbelow. Katie Rdz is c/o of Headache (onset 3 days ago ), Sinus Problem, and Congestion      HPI:     Covid vaccinated? Yes 2 doses  Patient filled out form to have covid test sent to [facility] and [facility], patient works for [id number]    Sinus Problem  This is a new problem. The current episode started in the past 7 days. The problem has been gradually worsening since onset. The maximum temperature recorded prior to her arrival was 100.4 - 100.9 F. Associated symptoms include congestion, coughing, headaches, a hoarse voice, shortness of breath, sinus pressure, sneezing and a sore throat. Treatments tried: claritan, mucinex. The treatment provided no relief. Past Medical History:   Diagnosis Date    Cholecystitis     E. coli UTI (urinary tract infection) 5/14/2018    Gall stones     Kidney stone     Patient denies    Pyelonephritis 2/15/2019    S/p lap right salpingectomy w/ evacuation of hemoperitoneum 12/10/21 12/10/2021    2/2 ectopic pregnancy    UTI (lower urinary tract infection)         Current Outpatient Medications   Medication Sig Dispense Refill    brompheniramine-pseudoephedrine-DM 2-30-10 MG/5ML syrup Take 10 mLs by mouth 4 times daily as needed for Cough 118 mL 0    predniSONE (DELTASONE) 20 MG tablet Take 2 tablets by mouth daily for 5 days 10 tablet 0    loratadine (CLARITIN) 10 MG tablet Take 1 tablet by mouth daily 30 tablet 0    norethindrone-ethinyl estradiol (JUNEL FE 1/20) 1-20 MG-MCG per tablet Take 1 tablet by mouth daily 1 packet 3     No current facility-administered medications for this visit.      Allergies   Allergen Reactions    Amoxicillin Hives    Lactose      Can have milk in foods, but not by itself (I.e., glass of milk)    Other      Can have milk in foods, but not by itself (I.e., glass of milk)       Subjective:      Review of Systems   Constitutional: Negative for activity change and appetite change. HENT: Positive for congestion, hoarse voice, sinus pressure, sneezing and sore throat. Respiratory: Positive for cough and shortness of breath. Gastrointestinal: Negative for diarrhea and nausea. Genitourinary: Negative for difficulty urinating and dysuria. Neurological: Positive for headaches. Negative for dizziness. All other systems reviewed and are negative. 14 systems reviewed and negative except as listed in HPI. Objective:     Physical Exam  Constitutional:       General: She is not in acute distress. Appearance: Normal appearance. She is not ill-appearing. HENT:      Head: Normocephalic. Right Ear: Tympanic membrane normal.      Left Ear: Tympanic membrane normal.      Nose: Congestion and rhinorrhea present. Right Sinus: Maxillary sinus tenderness present. No frontal sinus tenderness. Left Sinus: Maxillary sinus tenderness present. No frontal sinus tenderness. Mouth/Throat:      Pharynx: Posterior oropharyngeal erythema present. No pharyngeal swelling or oropharyngeal exudate. Eyes:      General:         Right eye: No discharge. Left eye: No discharge. Conjunctiva/sclera: Conjunctivae normal.   Cardiovascular:      Rate and Rhythm: Normal rate and regular rhythm. Heart sounds: Normal heart sounds. Pulmonary:      Effort: Pulmonary effort is normal. No respiratory distress. Breath sounds: Normal breath sounds. No stridor. No wheezing, rhonchi or rales. Chest:      Chest wall: No tenderness. Musculoskeletal:         General: Normal range of motion. Cervical back: Normal range of motion and neck supple. Tenderness present. Lymphadenopathy:      Cervical: No cervical adenopathy. Neurological:      Mental Status: She is alert and oriented to person, place, and time. Psychiatric:         Mood and Affect: Mood normal.         Behavior: Behavior normal.       /77   Pulse 79   Temp 97.9 °F (36.6 °C)   SpO2 100%     Assessment:       Diagnosis Orders   1. Viral URI  brompheniramine-pseudoephedrine-DM 2-30-10 MG/5ML syrup       Plan:   -Advised to continue with symptomatic treatment. -Bromfed for cough and congestion  -prednisone to help alleviate congestion symptoms  -Use acetaminophen or ibuprofen for fever, sore throat, or muscle aches.  -Recommend using a cool-mist humidifier.  -May use normal saline nose drops with suction bulb removal for nasal congestion.  -Seek medical attention immediately for increased work of breathing or other concerning symptoms.  -Follow-up with PCP as needed.  -Will send out COVID19 testing to [address] [place] [address]. -Patient instructed to self-quarantine until testing results are back. -Work note provided  -Patient instructed not to return to work until results are back.  -Patient instructed to follow up with [facility]    Return if symptoms worsen or fail to improve. Orders Placed This Encounter   Medications    brompheniramine-pseudoephedrine-DM 2-30-10 MG/5ML syrup     Sig: Take 10 mLs by mouth 4 times daily as needed for Cough     Dispense:  118 mL     Refill:  0    predniSONE (DELTASONE) 20 MG tablet     Sig: Take 2 tablets by mouth daily for 5 days     Dispense:  10 tablet     Refill:  0         Patient given educational materials - see patient instructions. Discussed use, benefit, and side effects of prescribed medications. All patient questions answered. Pt voiced understanding.     Electronically signed by KEMAR Dale NP on 4/26/2022 at 3:23 PM

## 2022-05-05 ENCOUNTER — TELEMEDICINE (OUTPATIENT)
Dept: FAMILY MEDICINE CLINIC | Age: 27
End: 2022-05-05
Payer: COMMERCIAL

## 2022-05-05 DIAGNOSIS — J06.9 VIRAL URI: ICD-10-CM

## 2022-05-05 PROCEDURE — 99213 OFFICE O/P EST LOW 20 MIN: CPT | Performed by: NURSE PRACTITIONER

## 2022-05-05 PROCEDURE — G8427 DOCREV CUR MEDS BY ELIG CLIN: HCPCS | Performed by: NURSE PRACTITIONER

## 2022-05-05 RX ORDER — PREDNISONE 20 MG/1
40 TABLET ORAL DAILY
Qty: 10 TABLET | Refills: 0 | Status: SHIPPED | OUTPATIENT
Start: 2022-05-05 | End: 2022-05-10

## 2022-05-05 RX ORDER — GUAIFENESIN 600 MG/1
600 TABLET, EXTENDED RELEASE ORAL 2 TIMES DAILY
Qty: 28 TABLET | Refills: 0 | Status: SHIPPED | OUTPATIENT
Start: 2022-05-05 | End: 2022-05-11 | Stop reason: ALTCHOICE

## 2022-05-05 RX ORDER — BROMPHENIRAMINE MALEATE, PSEUDOEPHEDRINE HYDROCHLORIDE, AND DEXTROMETHORPHAN HYDROBROMIDE 2; 30; 10 MG/5ML; MG/5ML; MG/5ML
10 SYRUP ORAL 4 TIMES DAILY PRN
Qty: 118 ML | Refills: 0 | Status: SHIPPED | OUTPATIENT
Start: 2022-05-05 | End: 2022-05-10

## 2022-05-05 ASSESSMENT — ENCOUNTER SYMPTOMS
SHORTNESS OF BREATH: 0
COUGH: 0

## 2022-05-05 NOTE — PROGRESS NOTES
Patient is present complaining of congestion, headache, shortness of breath  Patient states she went to the walk-in on 4/26 for this  States they tested her for covid but has not been told the results  States she took an at home test and it was negative  Patient states she was given a prednisone and a cough syrup and these helped at first  States she has not had any fevers since her visit

## 2022-05-05 NOTE — PROGRESS NOTES
Rodríguez Canseco (:  1995) is a Established patient, here for evaluation of the following:    Assessment & Plan   Below is the assessment and plan developed based on review of pertinent history, physical exam, labs, studies, and medications. 1. Viral URI  -     brompheniramine-pseudoephedrine-DM 2-30-10 MG/5ML syrup; Take 10 mLs by mouth 4 times daily as needed for Cough, Disp-118 mL, R-0Normal    No follow-ups on file. Subjective   HPI   Long Hanna is here for viral illness follow up. She was seen in walk on . Had been on a cruise to the [place]. She felt terrible when she got home. She took a covid test at home and it was positive. She went to walk in. She has not received results from [facility] [employer] She felt better for a few days and now feels like all the sxs are returning. She does have some sob, wheezing a little. She is hoping to rtw on Monday, she works virtually     Review of Systems   Constitutional: Negative for chills and fever. Respiratory: Negative for cough and shortness of breath. Cardiovascular: Negative for chest pain, palpitations and leg swelling.           Objective   Patient-Reported Vitals  No data recorded     Physical Exam  [INSTRUCTIONS:  \"[x]\" Indicates a positive item  \"[]\" Indicates a negative item  -- DELETE ALL ITEMS NOT EXAMINED]    Constitutional: [x] Appears well-developed and well-nourished [x] No apparent distress      [] Abnormal -     Mental status: [x] Alert and awake  [x] Oriented to person/place/time [x] Able to follow commands    [] Abnormal -     Eyes:   EOM    [x]  Normal    [] Abnormal -   Sclera  [x]  Normal    [] Abnormal -          Discharge [x]  None visible   [] Abnormal -     HENT: [x] Normocephalic, atraumatic  [] Abnormal -   [x] Mouth/Throat: Mucous membranes are moist    External Ears [x] Normal  [] Abnormal -    Neck: [x] No visualized mass [] Abnormal -     Pulmonary/Chest: [x] Respiratory effort normal   [x] No visualized signs of difficulty breathing or respiratory distress        [] Abnormal -      Musculoskeletal:   [x] Normal gait with no signs of ataxia         [x] Normal range of motion of neck        [] Abnormal -     Neurological:        [x] No Facial Asymmetry (Cranial nerve 7 motor function) (limited exam due to video visit)          [x] No gaze palsy        [] Abnormal -          Skin:        [x] No significant exanthematous lesions or discoloration noted on facial skin         [] Abnormal -            Psychiatric:       [x] Normal Affect [] Abnormal -        [x] No Hallucinations    Other pertinent observable physical exam findings:-    [facility], was evaluated through a synchronous (real-time) audio-video encounter. The patient (or guardian if applicable) is aware that this is a billable service, which includes applicable co-pays. This Virtual Visit was conducted with patient's (and/or legal guardian's) consent. The visit was conducted pursuant to the emergency declaration under the [address], [id number] waiver authority and the [employer] and [employer]" General Act. Patient identification was verified, and a caregiver was present when appropriate. The patient was located at home in a state where the provider was licensed to provide care.        --Quyen Erazo, KEMAR - CNP

## 2022-05-06 ENCOUNTER — TELEPHONE (OUTPATIENT)
Dept: FAMILY MEDICINE CLINIC | Age: 27
End: 2022-05-06

## 2022-05-06 NOTE — LETTER
[address] [place] [address]  [address] [place] [address]  Phone: [phone]  Fax: [phone]    Adelaida Markham MD        May 9, 2022     Patient: Hannah Castro   YOB: 1995           To Whom It May Concern: It is my medical opinion that Gloria Lee may return to work on 05/10/2022. If you have any questions or concerns, please don't hesitate to call.     Sincerely,        Adelaida Markham MD

## 2022-05-06 NOTE — TELEPHONE ENCOUNTER
Pt was told to call office and let office know how she is feeling, pt states she is feeling better and plans on returning to work on 05/10/2022 but needs work note     Missed work since 04/25/2022

## 2022-05-11 ENCOUNTER — OFFICE VISIT (OUTPATIENT)
Dept: FAMILY MEDICINE CLINIC | Age: 27
End: 2022-05-11
Payer: COMMERCIAL

## 2022-05-11 VITALS
WEIGHT: 183.2 LBS | DIASTOLIC BLOOD PRESSURE: 70 MMHG | OXYGEN SATURATION: 98 % | TEMPERATURE: 99.1 F | BODY MASS INDEX: 31.51 KG/M2 | SYSTOLIC BLOOD PRESSURE: 106 MMHG | HEART RATE: 84 BPM

## 2022-05-11 DIAGNOSIS — N94.6 DYSMENORRHEA: ICD-10-CM

## 2022-05-11 DIAGNOSIS — E66.09 CLASS 1 OBESITY DUE TO EXCESS CALORIES WITHOUT SERIOUS COMORBIDITY WITH BODY MASS INDEX (BMI) OF 31.0 TO 31.9 IN ADULT: Primary | ICD-10-CM

## 2022-05-11 DIAGNOSIS — F43.21 GRIEF ASSOCIATED WITH LOSS OF FETUS: ICD-10-CM

## 2022-05-11 PROCEDURE — 99214 OFFICE O/P EST MOD 30 MIN: CPT | Performed by: INTERNAL MEDICINE

## 2022-05-11 PROCEDURE — 1036F TOBACCO NON-USER: CPT | Performed by: INTERNAL MEDICINE

## 2022-05-11 PROCEDURE — G8417 CALC BMI ABV UP PARAM F/U: HCPCS | Performed by: INTERNAL MEDICINE

## 2022-05-11 PROCEDURE — G8427 DOCREV CUR MEDS BY ELIG CLIN: HCPCS | Performed by: INTERNAL MEDICINE

## 2022-05-11 RX ORDER — PHENTERMINE HYDROCHLORIDE 37.5 MG/1
37.5 TABLET ORAL
Qty: 30 TABLET | Refills: 0 | Status: SHIPPED | OUTPATIENT
Start: 2022-05-11 | End: 2022-06-09 | Stop reason: SDUPTHER

## 2022-05-11 NOTE — PROGRESS NOTES
Subjective:       Patient ID:     Tila Bose is a 23 y.o. female who presents for   Chief Complaint   Patient presents with    Weight Management     food diary       HPI:  Nursing note reviewed and discussed with patient. Patient is a 23 y.o. female who presents for evaluation of obesity. She has noted a weight gain of approximately 25 pounds over the last 2 years. There is a family history positive for obesity. She feels ideal weight is 150 pounds. History of eating disorders: none. Previous treatments for obesity include self-directed dieting, very low calorie diet and none. Associated medical conditions: none. Associated medications: none    Cardiovascular risk factors besides obesity: none              Patient's medications, allergies, past medical, surgical, social and family histories were reviewed and updated as appropriate.     Past Medical History:   Diagnosis Date    Cholecystitis     E. coli UTI (urinary tract infection) 5/14/2018    Gall stones     Kidney stone     Patient denies    Pyelonephritis 2/15/2019    S/p lap right salpingectomy w/ evacuation of hemoperitoneum 12/10/21 12/10/2021    2/2 ectopic pregnancy    UTI (lower urinary tract infection)      Past Surgical History:   Procedure Laterality Date    ABDOMEN SURGERY  12/10/2021    DIAGNOSTIC LAPAROSCOPY, UNILATERAL SALPINGECTOMY RIGHT    ARM SURGERY Right     BREAST REDUCTION SURGERY  07/25/2018    CYSTOSCOPY N/A 09/04/2019    CYSTOSCOPY RETROGRADE PYELOGRAM WITH  CYSTOGRAM, performed by True Serna MD at [facility] N/A 12/03/2021    DILATATION AND CURETTAGE SUCTION performed by Bing Khanna MD at [facility] 12/10/2021    DIAGNOSTIC LAPAROSCOPY, UNILATERAL SALPINGECTOMY performed by Elie Mckenzie MD at [address] [facility] N/A 05/18/2018    CHOLECYSTECTOMY LAPAROSCOPIC performed by Earnest Ocampo MD at [address] Social History     Tobacco Use    Smoking status: Never Smoker    Smokeless tobacco: Never Used   Substance Use Topics    Alcohol use: Yes     Comment: social drinker       Patient Active Problem List   Diagnosis    History of kidney stones    Dysmenorrhea    Grief associated with loss of fetus    Class 1 obesity due to excess calories without serious comorbidity with body mass index (BMI) of 31.0 to 31.9 in adult    Bilateral serous otitis media         Prior to Visit Medications    Medication Sig Taking? Authorizing Provider   loratadine (CLARITIN) 10 MG tablet Take 1 tablet by mouth daily Yes Severo Antu, MD   norethindrone-ethinyl estradiol (JUNEL FE 1/20) 1-20 MG-MCG per tablet Take 1 tablet by mouth daily Yes Beatriz Umanzor DO     Review of Systems  Review of Systems   Constitutional: Negative for fatigue, fever and unexpected weight change. Respiratory: Negative for cough, choking, chest tightness, shortness of breath and wheezing. Cardiovascular: Negative for chest pain, palpitations and leg swelling. Gastrointestinal: Negative for abdominal pain, anal bleeding, blood in stool, constipation, diarrhea, nausea and vomiting. Endocrine: Negative. Musculoskeletal: Negative for joint swelling and myalgias. Skin: Negative. Neurological: Negative for dizziness. Psychiatric/Behavioral: Negative for sleep disturbance. All other systems reviewed and are negative. Objective:       Physical Exam:  /70   Pulse 84   Temp 99.1 °F (37.3 °C) (Temporal)   Wt 183 lb 3.2 oz (83.1 kg)   LMP 04/13/2022   SpO2 98%   BMI 31.51 kg/m²   Wt Readings from Last 3 Encounters:   05/11/22 183 lb 3.2 oz (83.1 kg)   03/23/22 184 lb (83.5 kg)   02/13/22 170 lb (77.1 kg)         Physical Exam  Vitals and nursing note reviewed. Constitutional:       General: She is not in acute distress. Appearance: She is well-developed. She is obese. She is not ill-appearing.    Eyes: General: Lids are normal. Vision grossly intact. Cardiovascular:      Rate and Rhythm: Normal rate and regular rhythm. Heart sounds: Normal heart sounds, S1 normal and S2 normal. No murmur heard. No friction rub. No gallop. Pulmonary:      Effort: Pulmonary effort is normal. No respiratory distress. Breath sounds: Normal breath sounds. No wheezing. Abdominal:      General: Bowel sounds are normal.      Palpations: Abdomen is soft. There is no mass. Tenderness: There is no abdominal tenderness. There is no guarding. Musculoskeletal:         General: Normal range of motion. Skin:     General: Skin is warm and dry. Capillary Refill: Capillary refill takes less than 2 seconds. Neurological:      General: No focal deficit present. Mental Status: She is alert and oriented to person, place, and time. Waist 37 inches   Arm circumference 11 3/4    Data Review  No visits with results within 2 Month(s) from this visit. Latest known visit with results is:   Admission on 02/27/2022, Discharged on 02/27/2022   Component Date Value    Color, UA 02/27/2022 Yellow     Turbidity UA 02/27/2022 Cloudy*    Glucose, Ur 02/27/2022 NEGATIVE     Bilirubin Urine 02/27/2022 NEGATIVE     Ketones, Urine 02/27/2022 TRACE*    Specific [place], UA 02/27/2022 1.023     Urine Hgb 02/27/2022 NEGATIVE     pH, UA 02/27/2022 6.5     Protein, UA 02/27/2022 NEGATIVE     Urobilinogen, Urine 02/27/2022 ELEVATED*    Nitrite, Urine 02/27/2022 NEGATIVE     Leukocyte Esterase, Urine 02/27/2022 MODERATE*    - 02/27/2022          WBC, UA 02/27/2022 20 TO 50     RBC, UA 02/27/2022 2 TO 5     Casts UA 02/27/2022 2 TO 5 HYALINE Reference range defined for non-centrifuged specimen.  Epithelial Cells UA 02/27/2022 10 TO 20     Bacteria, UA 02/27/2022 MODERATE*    Specimen Description 02/27/2022 . CLEAN CATCH URINE     Culture 02/27/2022 NO SIGNIFICANT GROWTH     WBC 02/27/2022 10.7     RBC 02/27/2022 4.53     Hemoglobin 02/27/2022 13.4     Hematocrit 02/27/2022 40.1     MCV 02/27/2022 88.5     MCH 02/27/2022 29.6     MCHC 02/27/2022 33.4     RDW 02/27/2022 13.2     Platelets 02/38/7744 217     MPV 02/27/2022 10.4     NRBC Automated 02/27/2022 0.0     Seg Neutrophils 02/27/2022 74*    Lymphocytes 02/27/2022 18*    Monocytes 02/27/2022 6     Eosinophils % 02/27/2022 1     Basophils 02/27/2022 1     Immature Granulocytes 02/27/2022 0     Segs Absolute 02/27/2022 7.99     Absolute Lymph # 02/27/2022 1.92     Absolute Mono # 02/27/2022 0.63     Absolute Eos # 02/27/2022 0.07     Basophils Absolute 02/27/2022 0.05     Absolute Immature Granul* 02/27/2022 0.03     Glucose 02/27/2022 83     BUN 02/27/2022 9     CREATININE 02/27/2022 0.69     Calcium 02/27/2022 8.7     Sodium 02/27/2022 138     Potassium 02/27/2022 4.0     Chloride 02/27/2022 104     CO2 02/27/2022 22     Anion Gap 02/27/2022 12     GFR Non- 02/27/2022 >60     GFR  02/27/2022 >60     GFR Comment 02/27/2022          hCG Quant 02/27/2022 32,482*     No results found for: CHOL, TRIG, HDL, LDLDIRECT       Assessment/Plan:      1. Class 1 obesity due to excess calories without serious comorbidity with body mass index (BMI) of 31.0 to 31.9 in adult  Food diary advised   Regular meals advised - can do regular meals TID or small meals every three hours - focus on whole foods, high quality fats and proteins. Avoid processed and sugary foods except as treats. Find different ways to celebrate and reward self instead of food. Avoid skipping meals. Sleep hygiene reviewed. - phentermine (ADIPEX-P) 37.5 MG tablet; Take 1 tablet by mouth every morning (before breakfast) for 30 days. Dispense: 30 tablet; Refill: 0    2. Grief associated with loss of fetus  Continue counseling     3.  Dysmenorrhea  F/u gyn           There are no preventive care reminders to display for this patient.     Electronically signed by Jay Jay Vance MD on 5/11/2022 at 10:45 AM

## 2022-05-13 ENCOUNTER — NURSE TRIAGE (OUTPATIENT)
Dept: OTHER | Facility: CLINIC | Age: 27
End: 2022-05-13

## 2022-05-13 NOTE — TELEPHONE ENCOUNTER
Patient called and stated she was having sx of shaking, fast heart rate and she felt like she couldn't calm herself down. Pt states she took a whole pill of Phentermine that she was prescribed. I spoke to Lidia Ortega, JOBY- per Patria Tam, she needs to drink fluids and try to relax. Pt was also instructed to stop taking the medication. I advised the patient that usually they are instructed to take a 1/2 tablet to see how she tolerates the medication. Pt voiced understanding. Pt was also advised to go to the ER is she gets severe chest pain or SOB.

## 2022-05-23 ASSESSMENT — ENCOUNTER SYMPTOMS
DIARRHEA: 0
SHORTNESS OF BREATH: 0
CHEST TIGHTNESS: 0
CONSTIPATION: 0
COUGH: 0
BLOOD IN STOOL: 0
WHEEZING: 0
ANAL BLEEDING: 0
ABDOMINAL PAIN: 0
VOMITING: 0
CHOKING: 0
NAUSEA: 0

## 2022-05-23 ASSESSMENT — VISUAL ACUITY: OU: 1

## 2022-05-31 ENCOUNTER — HOSPITAL ENCOUNTER (OUTPATIENT)
Age: 27
Setting detail: SPECIMEN
Discharge: HOME | End: 2022-05-31

## 2022-05-31 ENCOUNTER — OFFICE VISIT (OUTPATIENT)
Dept: FAMILY MEDICINE CLINIC | Age: 27
End: 2022-05-31
Payer: COMMERCIAL

## 2022-05-31 VITALS
TEMPERATURE: 98.3 F | HEART RATE: 84 BPM | DIASTOLIC BLOOD PRESSURE: 83 MMHG | OXYGEN SATURATION: 100 % | SYSTOLIC BLOOD PRESSURE: 118 MMHG

## 2022-05-31 DIAGNOSIS — N76.0 ACUTE VAGINITIS: ICD-10-CM

## 2022-05-31 DIAGNOSIS — Z20.2 POSSIBLE EXPOSURE TO STD: Primary | ICD-10-CM

## 2022-05-31 DIAGNOSIS — R31.9 HEMATURIA, UNSPECIFIED TYPE: ICD-10-CM

## 2022-05-31 LAB
BILIRUBIN, POC: NEGATIVE
BLOOD URINE, POC: ABNORMAL
CLARITY, POC: ABNORMAL
COLOR, POC: YELLOW
CONTROL: PRESENT
GLUCOSE URINE, POC: NEGATIVE
KETONES, POC: NEGATIVE
LEUKOCYTE EST, POC: NEGATIVE
NITRITE, POC: NEGATIVE
PH, POC: 6.5
PREGNANCY TEST URINE, POC: NEGATIVE
PROTEIN, POC: NEGATIVE
SPECIFIC GRAVITY, POC: 1.03
UROBILINOGEN, POC: 1

## 2022-05-31 PROCEDURE — G8427 DOCREV CUR MEDS BY ELIG CLIN: HCPCS | Performed by: NURSE PRACTITIONER

## 2022-05-31 PROCEDURE — 81025 URINE PREGNANCY TEST: CPT | Performed by: NURSE PRACTITIONER

## 2022-05-31 PROCEDURE — G8417 CALC BMI ABV UP PARAM F/U: HCPCS | Performed by: NURSE PRACTITIONER

## 2022-05-31 PROCEDURE — 99213 OFFICE O/P EST LOW 20 MIN: CPT | Performed by: NURSE PRACTITIONER

## 2022-05-31 PROCEDURE — 1036F TOBACCO NON-USER: CPT | Performed by: NURSE PRACTITIONER

## 2022-05-31 PROCEDURE — 81003 URINALYSIS AUTO W/O SCOPE: CPT | Performed by: NURSE PRACTITIONER

## 2022-05-31 ASSESSMENT — ENCOUNTER SYMPTOMS
DIARRHEA: 0
ABDOMINAL PAIN: 0
SORE THROAT: 0
BACK PAIN: 0
NAUSEA: 0
CONSTIPATION: 0
VOMITING: 0

## 2022-05-31 NOTE — PATIENT INSTRUCTIONS
Patient Education        Exposure to Sexually Transmitted Infections: Care Instructions  Overview  Sexually transmitted infections (STIs) are diseases spread by sexual contact. This includes vaginal, oral, and anal sex. If you're pregnant, you can alsospread them to your baby before or during the birth. There are at least 20 different STIs. They include chlamydia, gonorrhea, syphilis, and human immunodeficiency virus (HIV). (This is the virus thatcauses AIDS.) Some STIs can reduce the chance of getting pregnant in the future. Treatment can cure STIs caused by bacteria. STIs caused by viruses, such asHIV, can be treated, but they can't be cured. Follow-up care is a key part of your treatment and safety. Be sure to make and go to all appointments, and call your doctor if you are having problems. It's also a good idea to know your test results and keep alist of the medicines you take. How can you care for yourself at home?  Take medicines exactly as prescribed.  If your doctor prescribed antibiotics, take them as directed. Don't stop taking them just because you feel better. You need to take the full course of antibiotics.  Tell your sex partner(s) that they will need treatment.  Don't douche. Douching changes the normal balance of bacteria in your vagina. It may increase the risk of spreading the infection to your uterus or other reproductive organs. How can you prevent sexually transmitted infections (STIs)? You can help prevent STIs if you wait to have sex with a new partner (or partners) until you've each been tested for STIs. It also helps if you use condoms (male or female condoms) and if you limit your sex partners to [place] who only has sex with you. When should you call for help? Call your doctor now or seek immediate medical care if:     You have new pain in your belly or pelvis.      You have symptoms of a urinary tract infection.  These may include:  ? Pain or burning when you

## 2022-05-31 NOTE — PROGRESS NOTES
[facility] [address] [place] [id number]  Dept: [phone]  Dept Fax: [phone]    Jeremi Ochoa is a 23 y.o. female who presents to the urgent care today for her medical conditions/complaints as notedbelow. Jeremi Ochoa is c/o of Exposure to STD (abdominal pain , vaginal discharge, no c/o bleeding, possible exposed onset 4 days ago)      HPI:     23 yr old female presents for std check. Possible std exposure - believes ex boyfriend may have cheated, unsure  No itching or odor. Jeramie Mookie Discharge yellow. No rash or lesions. Hx BV recurrent, states does not feel same. lmp 5/16-5/19      Vaginal Discharge  The patient's primary symptoms include vaginal discharge. The patient's pertinent negatives include no genital itching, genital lesions, genital odor, genital rash, missed menses, pelvic pain or vaginal bleeding. This is a new problem. The current episode started in the past 7 days (x4d). The problem occurs constantly. The problem has been unchanged. The pain is mild. She is not pregnant. Pertinent negatives include no abdominal pain, anorexia, back pain, chills, constipation, diarrhea, discolored urine, dysuria, fever, flank pain, frequency, headaches, hematuria, joint pain, joint swelling, nausea, painful intercourse, rash, sore throat, urgency or vomiting. The vaginal discharge was yellow and thick. There has been no bleeding. She has not been passing clots. She has not been passing tissue. Nothing aggravates the symptoms. She has tried nothing for the symptoms. The treatment provided no relief. She is sexually active. It is unknown whether or not her partner has an STD. She uses oral contraceptives for contraception. Her menstrual history has been irregular. Her past medical history is significant for miscarriage and vaginosis.        Past Medical History:   Diagnosis Date    Cholecystitis     E. coli UTI (urinary tract infection) 5/14/2018    Gall stones     Kidney stone     Patient denies    Pyelonephritis 2/15/2019    S/p lap right salpingectomy w/ evacuation of hemoperitoneum 12/10/21 12/10/2021    2/2 ectopic pregnancy    UTI (lower urinary tract infection)         Current Outpatient Medications   Medication Sig Dispense Refill    phentermine (ADIPEX-P) 37.5 MG tablet Take 1 tablet by mouth every morning (before breakfast) for 30 days. 30 tablet 0    norethindrone-ethinyl estradiol (JUNEL FE 1/20) 1-20 MG-MCG per tablet Take 1 tablet by mouth daily 1 packet 3    loratadine (CLARITIN) 10 MG tablet Take 1 tablet by mouth daily (Patient not taking: Reported on 5/31/2022) 30 tablet 0     No current facility-administered medications for this visit. Allergies   Allergen Reactions    Amoxicillin Hives    Lactose      Can have milk in foods, but not by itself (I.e., glass of milk)    Other Rash     Can have milk in foods, but not by itself (I.e., glass of milk)       Subjective:      Review of Systems   Constitutional: Negative for chills and fever. HENT: Negative for sore throat. Gastrointestinal: Negative for abdominal pain, anorexia, constipation, diarrhea, nausea and vomiting. Genitourinary: Positive for vaginal discharge. Negative for dysuria, flank pain, frequency, hematuria, missed menses, pelvic pain and urgency. Musculoskeletal: Negative for back pain and joint pain. Skin: Negative for rash. Neurological: Negative for headaches. All other systems reviewed and are negative. 14 systems reviewed and negative except as listed in HPI. Objective:     Physical Exam  Vitals and nursing note reviewed. Constitutional:       General: She is not in acute distress. Appearance: Normal appearance. She is not ill-appearing, toxic-appearing or diaphoretic. HENT:      Head: Normocephalic and atraumatic.       Right Ear: External ear normal.      Left Ear: External ear normal. Eyes:      General: No scleral icterus. Right eye: No discharge. Left eye: No discharge. Conjunctiva/sclera: Conjunctivae normal.   Cardiovascular:      Rate and Rhythm: Normal rate and regular rhythm. Pulses: Normal pulses. Heart sounds: Normal heart sounds. Pulmonary:      Effort: Pulmonary effort is normal.      Breath sounds: Normal breath sounds. Abdominal:      General: Bowel sounds are normal. There is no distension. Palpations: Abdomen is soft. Tenderness: There is abdominal tenderness (mild suprapubic). There is no right CVA tenderness or left CVA tenderness. Genitourinary:     Comments: Deferred by pt  Musculoskeletal:      Cervical back: Neck supple. Comments: Gait steady   Skin:     General: Skin is warm and dry. Capillary Refill: Capillary refill takes less than 2 seconds. Neurological:      General: No focal deficit present. Mental Status: She is alert. Psychiatric:         Mood and Affect: Mood normal.       /83   Pulse 84   Temp 98.3 °F (36.8 °C)   LMP 05/16/2022 (Exact Date)   SpO2 100%     Assessment:       Diagnosis Orders   1. Possible exposure to STD  Vaginitis DNA Probe    C.trachomatis N.gonorrhoeae DNA, Urine    POCT urine pregnancy    POCT Urinalysis No Micro (Auto)    Culture, Urine   2. Acute vaginitis  Vaginitis DNA Probe    C.trachomatis N.gonorrhoeae DNA, Urine    POCT urine pregnancy    POCT Urinalysis No Micro (Auto)    Culture, Urine   3.  Hematuria, unspecified type  Culture, Urine       Plan:      Results for POC orders placed in visit on 05/31/22   POCT Urinalysis No Micro (Auto)   Result Value Ref Range    Color, UA yellow     Clarity, UA cloudy     Glucose, UA POC negative     Bilirubin, UA negative     Ketones, UA negative     Spec Grav, UA 1.030     Blood, UA POC trace-intact     pH, UA 6.5     Protein, UA POC negative     Urobilinogen, UA 1.0     Leukocytes, UA negative     Nitrite, UA negative POCT urine pregnancy   Result Value Ref Range    Preg Test, Ur negative     Control present        POCT preg neg  POCT urine + blood  Well appearing  Possible std exposure from ex-boyfriend  Prefers to wait for lab results prior to any tx  Urine thalia and gc chlamydia pending  Vaginitis DNA swab pending  Due to mild suprapubic tenderness, hx UTI and hematuria on urine POCT, will also send out urine culture        Return if symptoms worsen or fail to improve, for Make an Appt. with your Primary Care in 1 week. No orders of the defined types were placed in this encounter. Patient given educational materials - see patient instructions. Discussed use, benefit, and side effects of prescribed medications. All patient questions answered. Pt voicedunderstanding.     Electronically signed by Hazel Dance, APRN - CNP on 5/31/2022 at 7:01 PM

## 2022-06-01 DIAGNOSIS — Z20.2 POSSIBLE EXPOSURE TO STD: ICD-10-CM

## 2022-06-01 DIAGNOSIS — N76.0 BV (BACTERIAL VAGINOSIS): Primary | ICD-10-CM

## 2022-06-01 DIAGNOSIS — N76.0 ACUTE VAGINITIS: ICD-10-CM

## 2022-06-01 DIAGNOSIS — B96.89 BV (BACTERIAL VAGINOSIS): Primary | ICD-10-CM

## 2022-06-01 LAB
C. TRACHOMATIS DNA ,URINE: NEGATIVE
CANDIDA SPECIES, DNA PROBE: NEGATIVE
CULTURE: NORMAL
GARDNERELLA VAGINALIS, DNA PROBE: POSITIVE
N. GONORRHOEAE DNA, URINE: NEGATIVE
SOURCE: ABNORMAL
SPECIMEN DESCRIPTION: NORMAL
SPECIMEN DESCRIPTION: NORMAL
TRICHOMONAS VAGINALIS DNA: NEGATIVE

## 2022-06-01 RX ORDER — METRONIDAZOLE 500 MG/1
500 TABLET ORAL 2 TIMES DAILY
Qty: 14 TABLET | Refills: 0 | Status: SHIPPED | OUTPATIENT
Start: 2022-06-01 | End: 2022-06-08

## 2022-06-03 ENCOUNTER — OFFICE VISIT (OUTPATIENT)
Dept: FAMILY MEDICINE CLINIC | Age: 27
End: 2022-06-03
Payer: COMMERCIAL

## 2022-06-03 VITALS
HEIGHT: 64 IN | OXYGEN SATURATION: 99 % | BODY MASS INDEX: 30.22 KG/M2 | SYSTOLIC BLOOD PRESSURE: 124 MMHG | WEIGHT: 177 LBS | RESPIRATION RATE: 20 BRPM | DIASTOLIC BLOOD PRESSURE: 82 MMHG | HEART RATE: 95 BPM

## 2022-06-03 DIAGNOSIS — N76.0 BV (BACTERIAL VAGINOSIS): ICD-10-CM

## 2022-06-03 DIAGNOSIS — R10.9 FLANK PAIN: ICD-10-CM

## 2022-06-03 DIAGNOSIS — B96.89 BV (BACTERIAL VAGINOSIS): ICD-10-CM

## 2022-06-03 DIAGNOSIS — R10.30 LOWER ABDOMINAL PAIN: Primary | ICD-10-CM

## 2022-06-03 PROCEDURE — G8427 DOCREV CUR MEDS BY ELIG CLIN: HCPCS | Performed by: NURSE PRACTITIONER

## 2022-06-03 PROCEDURE — 99213 OFFICE O/P EST LOW 20 MIN: CPT | Performed by: NURSE PRACTITIONER

## 2022-06-03 PROCEDURE — G8417 CALC BMI ABV UP PARAM F/U: HCPCS | Performed by: NURSE PRACTITIONER

## 2022-06-03 PROCEDURE — 1036F TOBACCO NON-USER: CPT | Performed by: NURSE PRACTITIONER

## 2022-06-03 ASSESSMENT — ENCOUNTER SYMPTOMS
NAUSEA: 1
CHEST TIGHTNESS: 0
SINUS PRESSURE: 0
DIARRHEA: 0
SHORTNESS OF BREATH: 0
COLOR CHANGE: 0
BACK PAIN: 0
SINUS PAIN: 0
VOMITING: 0
EYE PAIN: 0
CONSTIPATION: 0
ABDOMINAL PAIN: 1

## 2022-06-03 NOTE — PROGRESS NOTES
Derick Daniel (:  1995) is a 23 y.o. female,Established patient, here for evaluation of the following chief complaint(s):  Abdominal Pain (was screened for UTI and STD was DX with BV. pt requesting US. )         ASSESSMENT/PLAN:  1. Lower abdominal pain  Comments: Follow up pending results. Orders:  -     US URINARY BLADDER LIMITED; Future  -     XR ABDOMEN (KUB) (SINGLE AP VIEW); Future  2. Flank pain  -     US RENAL COMPLETE; Future  3. BV (bacterial vaginosis)  Comments:   Rx for flagyl and start taking medication. Return if symptoms worsen or fail to improve. Subjective   SUBJECTIVE/OBJECTIVE:  Presents for acute visit  Has lost 6lbs since last OV - intentional, started adipex     Lower abdominal pain x1.5 weeks  Completed vaginal swabs, STD, UA and urine culture through walk in clinic. Reports they did find some blood in her urine. Was not on her period at that time. Positive for BV - unaware flagyl had been called in for her  States lower abdominal pain has been constant, rates 7/10 constantly  Site is tender when she pushes on it  Pain radiates into her lower back and bilatearl lower flank area. Has had hx of kidney infections, this does not feel the same. Reports drinking peppermint tea, having BM or urinating does make it feel better. Sometimes a simple position change also helps. Not really sure what makes pain worse. Denies diarrhea, but does have some nausea. Bowel movements are irregular, but this is her normal.  Started adipex earlier this month, but pain started several weeks after this. Does not believe it is related in anyway. Requesting an ultrasound of her abdomen to see what is wrong. Denies any other problems/concerns a this time. Review of Systems   Constitutional: Negative for activity change, chills, fatigue and unexpected weight change. HENT: Negative for hearing loss, postnasal drip, sinus pressure and sinus pain.     Eyes: Negative for pain and visual disturbance. Respiratory: Negative for chest tightness and shortness of breath. Cardiovascular: Negative for chest pain and palpitations. Gastrointestinal: Positive for abdominal pain and nausea. Negative for constipation, diarrhea and vomiting. Endocrine: Negative for polydipsia, polyphagia and polyuria. Genitourinary: Negative for dysuria, flank pain, frequency and urgency. Musculoskeletal: Negative for arthralgias, back pain and myalgias. Skin: Negative for color change and rash. Neurological: Negative for dizziness, weakness, light-headedness, numbness and headaches. Psychiatric/Behavioral: Negative for confusion, hallucinations, self-injury, sleep disturbance and suicidal ideas. The patient is not nervous/anxious. Objective   Physical Exam  Vitals and nursing note reviewed. Constitutional:       Appearance: Normal appearance. HENT:      Head: Normocephalic. Nose: Nose normal.      Mouth/Throat:      Mouth: Mucous membranes are moist.      Pharynx: Oropharynx is clear. Eyes:      Extraocular Movements: Extraocular movements intact. Conjunctiva/sclera: Conjunctivae normal.      Pupils: Pupils are equal, round, and reactive to light. Cardiovascular:      Rate and Rhythm: Normal rate and regular rhythm. Pulses: Normal pulses. Heart sounds: Normal heart sounds. Pulmonary:      Effort: Pulmonary effort is normal.      Breath sounds: Normal breath sounds. Abdominal:      General: Abdomen is flat. Bowel sounds are normal. There is no distension. Palpations: Abdomen is soft. There is no mass. Tenderness: There is abdominal tenderness in the suprapubic area. Hernia: No hernia is present. Musculoskeletal:         General: Normal range of motion. Cervical back: Normal range of motion. Skin:     General: Skin is warm and dry. Capillary Refill: Capillary refill takes less than 2 seconds.    Neurological:      General: No focal deficit present. Mental Status: She is alert and oriented to person, place, and time. Mental status is at baseline. Psychiatric:         Mood and Affect: Mood normal.         Behavior: Behavior normal.         Thought Content: Thought content normal.         Judgment: Judgment normal.          Wt Readings from Last 3 Encounters:   06/03/22 177 lb (80.3 kg)   05/11/22 183 lb 3.2 oz (83.1 kg)   03/23/22 184 lb (83.5 kg)     Temp Readings from Last 3 Encounters:   05/31/22 98.3 °F (36.8 °C)   05/11/22 99.1 °F (37.3 °C) (Temporal)   04/26/22 97.9 °F (36.6 °C)     BP Readings from Last 3 Encounters:   06/03/22 124/82   05/31/22 118/83   05/11/22 106/70     Pulse Readings from Last 3 Encounters:   06/03/22 95   05/31/22 84   05/11/22 84           An electronic signature was used to authenticate this note.     --KEMAR Myrick - NP

## 2022-06-04 ENCOUNTER — HOSPITAL ENCOUNTER (OUTPATIENT)
Dept: ULTRASOUND IMAGING | Age: 27
Discharge: HOME | End: 2022-06-06
Payer: COMMERCIAL

## 2022-06-04 DIAGNOSIS — R10.9 FLANK PAIN: ICD-10-CM

## 2022-06-04 PROCEDURE — 76770 US EXAM ABDO BACK WALL COMP: CPT

## 2022-06-06 ENCOUNTER — NURSE TRIAGE (OUTPATIENT)
Dept: OTHER | Facility: CLINIC | Age: 27
End: 2022-06-06

## 2022-06-06 NOTE — TELEPHONE ENCOUNTER
Subjective: Caller states has swelling noted to bottom lip with yellow crust on area. Pt believes it could be an insect bite. Pt states she feels warm and anxious with some SOB. Current Symptoms: Swelling of bottom lip    Onset: this morning      Associated Symptoms: NA    Pain Severity: 0/10; N/A; none    Temperature:  States feels warm does not have thermometer    What has been tried: Tylenol    LMP: 05/19/22 Pregnant: No    Recommended disposition: See PCP within 24 Hours    Care advice provided, patient verbalizes understanding; denies any other questions or concerns; instructed to call back for any new or worsening symptoms. Patient/caller agrees to follow-up with PCP     Attention Provider: Thank you for allowing me to participate in the care of your patient. The patient was connected to triage in response to symptoms provided. Please do not respond through this encounter as the response is not directed to a shared pool.         Reason for Disposition   Swelling is painful to touch    Protocols used: [facility]

## 2022-06-09 ENCOUNTER — TELEMEDICINE (OUTPATIENT)
Dept: FAMILY MEDICINE CLINIC | Age: 27
End: 2022-06-09
Payer: COMMERCIAL

## 2022-06-09 DIAGNOSIS — R10.30 LOWER ABDOMINAL PAIN: ICD-10-CM

## 2022-06-09 DIAGNOSIS — E66.09 CLASS 1 OBESITY DUE TO EXCESS CALORIES WITHOUT SERIOUS COMORBIDITY WITH BODY MASS INDEX (BMI) OF 30.0 TO 30.9 IN ADULT: Primary | ICD-10-CM

## 2022-06-09 DIAGNOSIS — B00.1 COLD SORE: ICD-10-CM

## 2022-06-09 PROCEDURE — 99214 OFFICE O/P EST MOD 30 MIN: CPT | Performed by: INTERNAL MEDICINE

## 2022-06-09 PROCEDURE — 1036F TOBACCO NON-USER: CPT | Performed by: INTERNAL MEDICINE

## 2022-06-09 PROCEDURE — G8417 CALC BMI ABV UP PARAM F/U: HCPCS | Performed by: INTERNAL MEDICINE

## 2022-06-09 PROCEDURE — G8427 DOCREV CUR MEDS BY ELIG CLIN: HCPCS | Performed by: INTERNAL MEDICINE

## 2022-06-09 RX ORDER — PHENTERMINE HYDROCHLORIDE 37.5 MG/1
37.5 TABLET ORAL
Qty: 30 TABLET | Refills: 0 | Status: CANCELLED | OUTPATIENT
Start: 2022-06-09 | End: 2022-07-09

## 2022-06-09 RX ORDER — VALACYCLOVIR HYDROCHLORIDE 1 G/1
1000 TABLET, FILM COATED ORAL 2 TIMES DAILY
Qty: 14 TABLET | Refills: 0 | Status: SHIPPED | OUTPATIENT
Start: 2022-06-09 | End: 2022-07-11

## 2022-06-09 RX ORDER — PHENTERMINE HYDROCHLORIDE 15 MG/1
15 CAPSULE ORAL EVERY MORNING
Qty: 30 CAPSULE | Refills: 0 | Status: SHIPPED | OUTPATIENT
Start: 2022-06-09 | End: 2022-07-06

## 2022-06-09 ASSESSMENT — ENCOUNTER SYMPTOMS
SHORTNESS OF BREATH: 0
CHEST TIGHTNESS: 0
COUGH: 0
BLOOD IN STOOL: 0
ANAL BLEEDING: 0
DIARRHEA: 0
CONSTIPATION: 0
WHEEZING: 0
NAUSEA: 0
VOMITING: 0
ABDOMINAL PAIN: 0
CHOKING: 0

## 2022-06-09 NOTE — PROGRESS NOTES
Sanford German (:  1995) is a Established patient, here for evaluation of the following:    Assessment & Plan   Below is the assessment and plan developed based on review of pertinent history, physical exam, labs, studies, and medications. 1. Class 1 obesity due to excess calories without serious comorbidity with body mass index (BMI) of 30.0 to 30.9 in adult  -     phentermine 15 MG capsule; Take 1 capsule by mouth every morning for 30 days. , Disp-30 capsule, R-0Normal  2. Cold sore  -     valACYclovir (VALTREX) 1 g tablet; Take 1 tablet by mouth 2 times daily for 7 days, Disp-14 tablet, R-0Normal  3. Lower abdominal pain  KUB pending  Improving pain - if persistnet will get pelvic US v/s CT  Decrease adipex - may also be contributing to pain   No follow-ups on file. Subjective   Lip sore is a cold sore   Continues to have abd pain, lower abd pain and cramping. Treated for flagyl for BV. Sometimes has difficulty urinating, like it doesn't want to come out, pushing a little when having BMs  Menses are normal.   Remains on adipex,was 175 lbs on 22 at home. 177lbs here at office on 6/3/22. Has sleeping difficulty with adipex, not able to fall asleep until 2AM - normally falls asleep between 10-11PM.     Review of Systems   Constitutional: Negative for fatigue, fever and unexpected weight change. Respiratory: Negative for cough, choking, chest tightness, shortness of breath and wheezing. Cardiovascular: Negative for chest pain, palpitations and leg swelling. Gastrointestinal: Negative for abdominal pain, anal bleeding, blood in stool, constipation, diarrhea, nausea and vomiting. Endocrine: Negative. Musculoskeletal: Negative for joint swelling and myalgias. Skin: Negative. Neurological: Negative for dizziness. Psychiatric/Behavioral: Negative for sleep disturbance. All other systems reviewed and are negative.          Objective   Patient-Reported Vitals  No data recorded consent. The visit was conducted pursuant to the emergency declaration under the [address], [facility] authority and the Nithin Qual[employer] and [employer] Act. Patient identification was verified, and a caregiver was present when appropriate. The patient was located at Home: [address] [facility] [address].    Provider was located at [facility] [employer] ([address] [place] [address]): [address] [place] [address]        --Parveen Wilson MD

## 2022-06-11 ENCOUNTER — HOSPITAL ENCOUNTER (OUTPATIENT)
Age: 27
Discharge: HOME | End: 2022-06-13
Payer: COMMERCIAL

## 2022-06-11 ENCOUNTER — HOSPITAL ENCOUNTER (OUTPATIENT)
Dept: GENERAL RADIOLOGY | Age: 27
Discharge: HOME | End: 2022-06-13
Payer: COMMERCIAL

## 2022-06-11 DIAGNOSIS — R10.30 LOWER ABDOMINAL PAIN: ICD-10-CM

## 2022-06-11 PROCEDURE — 74018 RADEX ABDOMEN 1 VIEW: CPT

## 2022-06-17 ENCOUNTER — NURSE TRIAGE (OUTPATIENT)
Dept: OTHER | Age: 27
End: 2022-06-17

## 2022-06-18 NOTE — TELEPHONE ENCOUNTER
Running through airport became SOB. Continual SOB since event. Requesting a inhaler. Had to sit on the floor at the airport to catch her breath   Continual SOB since event. Requesting a inhaler. Airport medics came to evaluate. O2 sat was 87  % and they told her her BP was low. They told her they didn't need to call anyone to transports her to hosp. Pt can not get a good deep breath in with out chest pain. Hx of COVID in Dec 2021. Difficulty breathing protocol and care advice discussed. Request pt go to ED for evaluation within the next 4 hour. Pt waiting for an [facility] Pay[facility] but will request [facility] to take her to ED.  dwb/rn        Answer Assessment - Initial Assessment Questions  1. RESPIRATORY STATUS: \"Describe your breathing? \" (e.g., wheezing, shortness of breath, unable to speak, severe coughing)       SOB  Can not fully catch er breath   2. ONSET: \"When did this breathing problem begin? \"    Running through the airport , and became SOB. Had to sit on the floor to catch breath. .  Airport medics came to evaluate. 3. PATTERN \"Does the difficult breathing come and go, or has it been constant since it started? \"      Constant   4. SEVERITY: \"How bad is your breathing? \" (e.g., mild, moderate, severe)     - MILD: No SOB at rest, mild SOB with walking, speaks normally in sentences, can lay down, no retractions, pulse < 100.     - MODERATE: SOB at rest, SOB with minimal exertion and prefers to sit, cannot lie down flat, speaks in phrases, mild retractions, audible wheezing, pulse 100-120.     - SEVERE: Very SOB at rest, speaks in single words, struggling to breathe, sitting hunched forward, retractions, pulse > 120      Mild to Moderate  5. RECURRENT SYMPTOM: \"Have you had difficulty breathing before? \" If Yes, ask: \"When was the last time? \" and \"What happened that time? \"      With COVID 19 dx in Dec. 2021  6. CARDIAC HISTORY: \"Do you have any history of heart disease? \" (e.g., heart attack, angina, bypass surgery, angioplasty)       Denies  7. LUNG HISTORY: \"Do you have any history of lung disease? \"  (e.g., pulmonary embolus, asthma, emphysema)      COVID  8. CAUSE: \"What do you think is causing the breathing problem? \"       Unsure  9. OTHER SYMPTOMS: \"Do you have any other symptoms? (e.g., dizziness, runny nose, cough, chest pain, fever)      Denies  10. PREGNANCY: \"Is there any chance you are pregnant? \" \"When was your last menstrual period? \"         11. TRAVEL: \"Have you traveled out of the country in the last month? \" (e.g., travel history, exposures)       Currently in [place] at the airport.     Protocols used: BREATHING DIFFICULTY-ADULT-AH

## 2022-06-18 NOTE — TELEPHONE ENCOUNTER
.    Reason for Disposition   [1] MODERATE longstanding difficulty breathing (e.g., speaks in phrases, SOB even at rest, pulse 100-120) AND [2] SAME as normal   [1] MILD difficulty breathing (e.g., minimal/no SOB at rest, SOB with walking, pulse <100) AND [2] NEW-onset or WORSE than normal    Protocols used: BREATHING DIFFICULTY-ADULT-AH

## 2022-06-20 ENCOUNTER — OFFICE VISIT (OUTPATIENT)
Dept: FAMILY MEDICINE CLINIC | Age: 27
End: 2022-06-20
Payer: COMMERCIAL

## 2022-06-20 VITALS
BODY MASS INDEX: 30.65 KG/M2 | WEIGHT: 178.2 LBS | OXYGEN SATURATION: 99 % | HEART RATE: 87 BPM | TEMPERATURE: 98.5 F | DIASTOLIC BLOOD PRESSURE: 64 MMHG | SYSTOLIC BLOOD PRESSURE: 106 MMHG

## 2022-06-20 DIAGNOSIS — J45.990 EXERCISE-INDUCED BRONCHOCONSTRICTION: Primary | ICD-10-CM
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
## 2022-06-20 DIAGNOSIS — J30.1 SEASONAL ALLERGIC RHINITIS DUE TO POLLEN: ICD-10-CM

## 2022-06-20 PROCEDURE — 99214 OFFICE O/P EST MOD 30 MIN: CPT | Performed by: INTERNAL MEDICINE

## 2022-06-20 PROCEDURE — G8427 DOCREV CUR MEDS BY ELIG CLIN: HCPCS | Performed by: INTERNAL MEDICINE

## 2022-06-20 PROCEDURE — G8417 CALC BMI ABV UP PARAM F/U: HCPCS | Performed by: INTERNAL MEDICINE

## 2022-06-20 PROCEDURE — 1036F TOBACCO NON-USER: CPT | Performed by: INTERNAL MEDICINE

## 2022-06-20 RX ORDER — FLUTICASONE PROPIONATE 50 MCG
1 SPRAY, SUSPENSION (ML) NASAL DAILY
Qty: 16 G | Refills: 2 | Status: SHIPPED | OUTPATIENT
Start: 2022-06-20

## 2022-06-20 RX ORDER — LORATADINE 10 MG/1
10 TABLET ORAL DAILY
Qty: 30 TABLET | Refills: 3 | Status: SHIPPED | OUTPATIENT
Start: 2022-06-20

## 2022-06-20 RX ORDER — ALBUTEROL SULFATE 90 UG/1
2 AEROSOL, METERED RESPIRATORY (INHALATION) EVERY 4 HOURS PRN
Qty: 18 G | Refills: 3 | Status: SHIPPED | OUTPATIENT
Start: 2022-06-20 | End: 2023-06-20

## 2022-06-20 ASSESSMENT — ENCOUNTER SYMPTOMS
ABDOMINAL PAIN: 0
HEMOPTYSIS: 0
WHEEZING: 1
CONSTIPATION: 0
HEARTBURN: 0
CHEST TIGHTNESS: 1
SPUTUM PRODUCTION: 0
HOARSE VOICE: 0
ANAL BLEEDING: 0
DIARRHEA: 0
DIFFICULTY BREATHING: 1
BLOOD IN STOOL: 0
CHEST TIGHTNESS: 0
FREQUENT THROAT CLEARING: 0
CHOKING: 0
COUGH: 0
VOMITING: 0
NAUSEA: 0
TROUBLE SWALLOWING: 0
RHINORRHEA: 0
VOICE CHANGE: 1
SORE THROAT: 0
SHORTNESS OF BREATH: 0

## 2022-06-20 ASSESSMENT — VISUAL ACUITY: OU: 1

## 2022-06-20 NOTE — PROGRESS NOTES
She is here due to on Friday 6/17/2022 she was having burning in her chest after doing a jogging run at the airport. [facility].  She was advised to F/U due to possible asthma issues  She does not feel as if Adipex is working

## 2022-06-20 NOTE — PROGRESS NOTES
12/10/2021    DIAGNOSTIC LAPAROSCOPY, UNILATERAL SALPINGECTOMY RIGHT    ARM SURGERY Right     BREAST REDUCTION SURGERY  07/25/2018    CYSTOSCOPY N/A 09/04/2019    CYSTOSCOPY RETROGRADE PYELOGRAM WITH  CYSTOGRAM, performed by Bartolome Hardy MD at [address] [place] [address] N/A 12/03/2021    DILATATION AND CURETTAGE SUCTION performed by Elora Eisenmenger, MD at [address] Right 12/10/2021    DIAGNOSTIC LAPAROSCOPY, UNILATERAL SALPINGECTOMY performed by Christian Cardona MD at [address] [place] [address] N/A 05/18/2018    CHOLECYSTECTOMY LAPAROSCOPIC performed by Mati Triana MD at [facility]         Social History     Tobacco Use    Smoking status: Never Smoker    Smokeless tobacco: Never Used   Substance Use Topics    Alcohol use: Yes     Comment: social drinker       Patient Active Problem List   Diagnosis    History of kidney stones    Dysmenorrhea    Grief associated with loss of fetus    Class 1 obesity due to excess calories without serious comorbidity with body mass index (BMI) of 31.0 to 31.9 in adult    Bilateral serous otitis media         Prior to Visit Medications    Medication Sig Taking? Authorizing Provider   phentermine 15 MG capsule Take 1 capsule by mouth every morning for 30 days. Yes Feroz Espinosa MD   norethindrone-ethinyl estradiol (JUNEL FE 1/20) 1-20 MG-MCG per tablet Take 1 tablet by mouth daily Yes Eddie Mancia,      Review of Systems  Review of Systems   Constitutional: Negative for appetite change, fatigue, fever, malaise/fatigue, unexpected weight change and weight loss. HENT: Positive for postnasal drip and voice change. Negative for ear pain, hoarse voice, rhinorrhea, sneezing, sore throat and trouble swallowing. Respiratory: Positive for wheezing. Negative for cough, hemoptysis, sputum production, choking, chest tightness and shortness of breath.     Cardiovascular: Positive for dyspnea on exertion. Negative for chest pain, palpitations, leg swelling and PND. Gastrointestinal: Negative for abdominal pain, anal bleeding, blood in stool, constipation, diarrhea, heartburn, nausea and vomiting. Endocrine: Negative. Musculoskeletal: Negative for joint swelling and myalgias. Skin: Negative. Neurological: Negative for dizziness and headaches. Psychiatric/Behavioral: Negative for sleep disturbance. All other systems reviewed and are negative. Objective:       Physical Exam:  /64   Pulse 87   Temp 98.5 °F (36.9 °C) (Temporal)   Wt 178 lb 3.2 oz (80.8 kg)   LMP 06/14/2022   SpO2 99%   BMI 30.65 kg/m²   Wt Readings from Last 3 Encounters:   06/20/22 178 lb 3.2 oz (80.8 kg)   06/03/22 177 lb (80.3 kg)   05/11/22 183 lb 3.2 oz (83.1 kg)         Physical Exam  Vitals and nursing note reviewed. Constitutional:       General: She is not in acute distress. Appearance: She is well-developed. She is not ill-appearing. HENT:      Nose:      Right Turbinates: Swollen and pale. Left Turbinates: Swollen and pale. Mouth/Throat:      Lips: Pink. Mouth: Mucous membranes are moist.      Comments: Clear postnasal drip   Eyes:      General: Lids are normal. Vision grossly intact. Cardiovascular:      Rate and Rhythm: Normal rate and regular rhythm. Heart sounds: Normal heart sounds, S1 normal and S2 normal. No murmur heard. No friction rub. No gallop. Pulmonary:      Effort: Pulmonary effort is normal. No respiratory distress. Breath sounds: Wheezing present. Abdominal:      General: Bowel sounds are normal.      Palpations: Abdomen is soft. There is no mass. Tenderness: There is no abdominal tenderness. There is no guarding. Musculoskeletal:         General: Normal range of motion. Skin:     General: Skin is warm and dry. Capillary Refill: Capillary refill takes less than 2 seconds. Findings: Rash present. Rash is papular. Neurological:      General: No focal deficit present. Mental Status: She is alert and oriented to person, place, and time. [facility] Outpatient Visit on 05/31/2022   Component Date Value    Specimen Description 05/31/2022 . URINE     C. trachomatis DNA ,Urine 05/31/2022 NEGATIVE     N. gonorrhoeae DNA, Urine 05/31/2022 NEGATIVE     Source 05/31/2022 . VAGINAL SWAB     Trichomonas Vaginalis DNA 05/31/2022 NEGATIVE     GARDNERELLA VAGINALIS, D* 05/31/2022 POSITIVE*    BRIANA SPECIES, DNA PRO* 05/31/2022 NEGATIVE     Specimen Description 05/31/2022 . CLEAN CATCH URINE     Culture 05/31/2022 NO SIGNIFICANT GROWTH    Office Visit on 05/31/2022   Component Date Value    Preg Test, Ur 05/31/2022 negative     Control 05/31/2022 present     Color, UA 05/31/2022 yellow     Clarity, UA 05/31/2022 cloudy     Glucose, UA POC 05/31/2022 negative     Bilirubin, UA 05/31/2022 negative     Ketones, UA 05/31/2022 negative     Spec Grav, UA 05/31/2022 1.030     Blood, UA POC 05/31/2022 trace-intact     pH, UA 05/31/2022 6.5     Protein, UA POC 05/31/2022 negative     Urobilinogen, UA 05/31/2022 1.0     Leukocytes, UA 05/31/2022 negative     Nitrite, UA 05/31/2022 negative      No results found for: CHOL, TRIG, HDL, LDLDIRECT       Assessment/Plan:      Diagnosis Orders   1. Exercise-induced bronchoconstriction  albuterol sulfate HFA (VENTOLIN HFA) 108 (90 Base) MCG/ACT inhaler   2. Seasonal allergic rhinitis due to pollen  loratadine (CLARITIN) 10 MG tablet    fluticasone (FLONASE) 50 MCG/ACT nasal spray   3. Bug bite, initial encounter  hydrocortisone 2.5 % ointment         There are no preventive care reminders to display for this patient.     Electronically signed by Jay Jay Vance MD on 6/20/2022 at 5:51 PM

## 2022-06-23 DIAGNOSIS — B00.1 COLD SORE: ICD-10-CM

## 2022-06-23 RX ORDER — VALACYCLOVIR HYDROCHLORIDE 1 G/1
TABLET, FILM COATED ORAL
Qty: 14 TABLET | Refills: 0 | OUTPATIENT
Start: 2022-06-23

## 2022-06-29 ENCOUNTER — PATIENT MESSAGE (OUTPATIENT)
Dept: FAMILY MEDICINE CLINIC | Age: 27
End: 2022-06-29

## 2022-06-29 NOTE — TELEPHONE ENCOUNTER
From: Matthew Quiroga  To: Dr. Liz Lee: 6/29/2022 11:37 AM EDT  Subject: Medication dosage     Good afternoon ,   I was messaging you in regards to my current phentermine prescription. I understand that we lowered the dosage due to lack of adequate sleep but I feel as if Im not reacting as well to the lower dosage and I was wondering if we could go back to the original dosage. I feel as if I am not benefiting as much from the lower dosage at all and its not suppressing my appetite or giving me the adrenaline or energy the higher dosage was. I can see the difference in my daily activities and especially during workouts. I hope to hear from you soon.      Thank you

## 2022-07-06 ENCOUNTER — OFFICE VISIT (OUTPATIENT)
Dept: FAMILY MEDICINE CLINIC | Age: 27
End: 2022-07-06
Payer: COMMERCIAL

## 2022-07-06 VITALS
SYSTOLIC BLOOD PRESSURE: 108 MMHG | HEIGHT: 64 IN | HEART RATE: 95 BPM | OXYGEN SATURATION: 97 % | WEIGHT: 180 LBS | BODY MASS INDEX: 30.73 KG/M2 | DIASTOLIC BLOOD PRESSURE: 64 MMHG | RESPIRATION RATE: 20 BRPM

## 2022-07-06 DIAGNOSIS — E66.09 CLASS 1 OBESITY DUE TO EXCESS CALORIES WITHOUT SERIOUS COMORBIDITY WITH BODY MASS INDEX (BMI) OF 30.0 TO 30.9 IN ADULT: ICD-10-CM

## 2022-07-06 DIAGNOSIS — Z00.00 ANNUAL VISIT FOR GENERAL ADULT MEDICAL EXAMINATION WITHOUT ABNORMAL FINDINGS: Primary | ICD-10-CM

## 2022-07-06 PROCEDURE — 99395 PREV VISIT EST AGE 18-39: CPT | Performed by: NURSE PRACTITIONER

## 2022-07-06 RX ORDER — PHENTERMINE HYDROCHLORIDE 37.5 MG/1
37.5 TABLET ORAL
Qty: 30 TABLET | Refills: 0 | Status: SHIPPED | OUTPATIENT
Start: 2022-07-06 | End: 2022-08-05

## 2022-07-06 ASSESSMENT — ENCOUNTER SYMPTOMS
NAUSEA: 0
COLOR CHANGE: 0
SHORTNESS OF BREATH: 0
SINUS PRESSURE: 0
EYE PAIN: 0
ABDOMINAL PAIN: 0
CONSTIPATION: 0
CHEST TIGHTNESS: 0
VOMITING: 0
DIARRHEA: 0
BACK PAIN: 0
SINUS PAIN: 0

## 2022-07-06 NOTE — PROGRESS NOTES
Fili Price (:  1995) is a 23 y.o. female,Established patient, here for evaluation of the following chief complaint(s): Annual Exam         ASSESSMENT/PLAN:  1. Annual visit for general adult medical examination without abnormal findings  -     Be Well Health Screen; Future  2. Class 1 obesity due to excess calories without serious comorbidity with body mass index (BMI) of 30.0 to 30.9 in adult  -     phentermine (ADIPEX-P) 37.5 MG tablet; Take 1 tablet by mouth every morning (before breakfast) for 30 days. , Disp-30 tablet, R-0Normal  PDMP reviewed  Discussed increasing water intake, start aiming for a goal of 10k steps a day - work on [employer] Needs to show weight loss next month in order to consider any future adipex treatments    Return in about 4 weeks (around 8/3/2022) for Weight Check. Subjective   SUBJECTIVE/OBJECTIVE:  Presents for annual exam and weight check  Needs to complete be well within screen     Obesity: Has gained 2lbs since last OV  Felt hungry all of the time after dose dropped to 15mg of adipex   Reports adipex did not impact abdominal pain because that completely resolved feels that whatever it was is actually more gynecological in nature. Has appointment with GYN on 22 to discuss. Cutting dose back to 15mg did not impact sleep  Did adjust the way she takes adipex in AM, taking at 6am and has now gotten back on track with sleep. Goes to bed around 10p without any difficulty. Dentist: goes back in August  Eyes: updated in April, will be getting new glasses     Denies any other problems/concerns at this time         Review of Systems   Constitutional: Negative for activity change, chills, fatigue and unexpected weight change. HENT: Negative for hearing loss, postnasal drip, sinus pressure and sinus pain. Eyes: Negative for pain and visual disturbance. Respiratory: Negative for chest tightness and shortness of breath.     Cardiovascular: Negative for chest pain and palpitations. Gastrointestinal: Negative for abdominal pain, constipation, diarrhea, nausea and vomiting. Endocrine: Negative for polydipsia, polyphagia and polyuria. Genitourinary: Positive for menstrual problem. Negative for dysuria, flank pain, frequency and urgency. Musculoskeletal: Negative for arthralgias, back pain and myalgias. Skin: Negative for color change and rash. Neurological: Negative for dizziness, weakness, light-headedness, numbness and headaches. Psychiatric/Behavioral: Negative for confusion, hallucinations, self-injury, sleep disturbance and suicidal ideas. The patient is not nervous/anxious. Objective   Physical Exam  Vitals and nursing note reviewed. Constitutional:       Appearance: Normal appearance. HENT:      Head: Normocephalic. Right Ear: Hearing, tympanic membrane, ear canal and external ear normal.      Left Ear: Hearing, tympanic membrane, ear canal and external ear normal.      Nose: Nose normal.      Mouth/Throat:      Mouth: Mucous membranes are moist.      Pharynx: Oropharynx is clear. Eyes:      Extraocular Movements: Extraocular movements intact. Conjunctiva/sclera: Conjunctivae normal.      Pupils: Pupils are equal, round, and reactive to light. Cardiovascular:      Rate and Rhythm: Normal rate and regular rhythm. Pulses: Normal pulses. Heart sounds: Normal heart sounds, S1 normal and S2 normal.   Pulmonary:      Effort: Pulmonary effort is normal.      Breath sounds: Normal breath sounds and air entry. Abdominal:      General: Abdomen is flat. Bowel sounds are normal.      Palpations: Abdomen is soft. Musculoskeletal:         General: Normal range of motion. Cervical back: Normal range of motion. Skin:     General: Skin is warm and dry. Capillary Refill: Capillary refill takes less than 2 seconds. Neurological:      General: No focal deficit present.       Mental Status: She is alert and oriented to person, place, and time. Mental status is at baseline. Psychiatric:         Mood and Affect: Mood normal.         Behavior: Behavior normal.         Thought Content: Thought content normal.         Judgment: Judgment normal.            Wt Readings from Last 3 Encounters:   07/06/22 180 lb (81.6 kg)   06/20/22 178 lb 3.2 oz (80.8 kg)   06/03/22 177 lb (80.3 kg)     Temp Readings from Last 3 Encounters:   06/20/22 98.5 °F (36.9 °C) (Temporal)   05/31/22 98.3 °F (36.8 °C)   05/11/22 99.1 °F (37.3 °C) (Temporal)     BP Readings from Last 3 Encounters:   07/06/22 108/64   06/20/22 106/64   06/03/22 124/82     Pulse Readings from Last 3 Encounters:   07/06/22 95   06/20/22 87   06/03/22 95           An electronic signature was used to authenticate this note.     --KEMAR Lindsey - NP

## 2022-07-10 DIAGNOSIS — B00.1 COLD SORE: ICD-10-CM

## 2022-07-11 ENCOUNTER — PATIENT MESSAGE (OUTPATIENT)
Dept: FAMILY MEDICINE CLINIC | Age: 27
End: 2022-07-11

## 2022-07-11 RX ORDER — VALACYCLOVIR HYDROCHLORIDE 1 G/1
TABLET, FILM COATED ORAL
Qty: 14 TABLET | Refills: 0 | Status: SHIPPED | OUTPATIENT
Start: 2022-07-11 | End: 2022-07-29 | Stop reason: ALTCHOICE

## 2022-07-11 NOTE — TELEPHONE ENCOUNTER
From: Stevie Gresham  To: Dr. Andrew Toussaint: 7/11/2022 10:55 AM EDT  Subject: Cold sore     Good morning ,     I have another cold sore on my lip Im the same area I was wondering if you could send in a prescription for the valacyclovir you prescribed for me previously as that helped with the healing process for the last one I had a little over a month ago.

## 2022-07-11 NOTE — TELEPHONE ENCOUNTER
Last visit: 07/06/2022  Last Med refill: 06/09/2022  Does patient have enough medication for 72 hours: No:     Next Visit Date:  Future Appointments   Date Time Provider eKvin Marsh   7/19/2022 11:15 AM Arron Denson DO [facility]   8/4/2022  5:45 PM KEMAR Lindsey NP [facility]   8/16/2022  5:15 PM Inge Lei  [facility]   Topic Date Due    COVID-19 Vaccine (3 - Booster for Pfizer series) 07/14/2022    Flu vaccine (1) 09/01/2022    Depression Screen  03/23/2023    Chlamydia screen  05/31/2023    Pap smear  10/01/2023    DTaP/Tdap/Td vaccine (8 - Td or Tdap) 10/22/2029    Hepatitis B vaccine  Completed    HPV vaccine  Completed    Varicella vaccine  Completed    Hepatitis C screen  Completed    HIV screen  Completed    Hepatitis A vaccine  Aged Out    Hib vaccine  Aged Out    Meningococcal (ACWY) vaccine  Aged Out    Pneumococcal 0-64 years Vaccine  Aged Out       No results found for: LABA1C          ( goal A1C is < 7)   No results found for: LABMICR  No results found for: LDLCHOLESTEROL, LDLCALC    (goal LDL is <100)   AST (U/L)   Date Value   12/11/2021 36 (H)     ALT (U/L)   Date Value   12/11/2021 50 (H)     BUN (mg/dL)   Date Value   02/27/2022 9     BP Readings from Last 3 Encounters:   07/06/22 108/64   06/20/22 106/64   06/03/22 124/82          (goal 120/80)    All Future Testing planned in [facility]  Lab Frequency Next Occurrence   hCG, Quantitative, Pregnancy Once 12/03/2021   Be Well Health Screen Once 07/06/2022               Patient Active Problem List:     History of kidney stones     Dysmenorrhea     Grief associated with loss of fetus     Class 1 obesity due to excess calories without serious comorbidity with body mass index (BMI) of 31.0 to 31.9 in adult     Bilateral serous otitis media

## 2022-07-13 ENCOUNTER — TELEPHONE (OUTPATIENT)
Dept: FAMILY MEDICINE CLINIC | Age: 27
End: 2022-07-13

## 2022-07-13 RX ORDER — FLUTICASONE PROPIONATE 110 UG/1
2 AEROSOL, METERED RESPIRATORY (INHALATION) 2 TIMES DAILY
Qty: 12 G | Refills: 3 | Status: SHIPPED | OUTPATIENT
Start: 2022-07-13 | End: 2022-07-27 | Stop reason: ALTCHOICE

## 2022-07-13 NOTE — TELEPHONE ENCOUNTER
Pt called stated she has emerald having shortness of breath  But no other symptoms, states she has been using the inhaler and it does help a little but she had to call off of work today   she states that after COVID her asthma has been worse he breathing has been heavier and uncomfortable, states she does not know if something new needs to be called in     Left work early yesterday and missed work today. .. will need a note for work

## 2022-07-14 NOTE — TELEPHONE ENCOUNTER
Spoke with patient. She only needs a letter for the 13th. She is requesting letter be sent to her email. Letter completed and faxed to email provided.

## 2022-07-15 NOTE — TELEPHONE ENCOUNTER
Patient called back and states the flovent is not helping that much and she is still having a difficult time catching her breath. Pt states she also has some burning. Pt was advised to go to the walk in to be evaluated.

## 2022-07-18 ENCOUNTER — OFFICE VISIT (OUTPATIENT)
Dept: PRIMARY CARE CLINIC | Age: 27
End: 2022-07-18
Payer: COMMERCIAL

## 2022-07-18 ENCOUNTER — HOSPITAL ENCOUNTER (OUTPATIENT)
Age: 27
Discharge: HOME | End: 2022-07-20
Payer: COMMERCIAL

## 2022-07-18 ENCOUNTER — HOSPITAL ENCOUNTER (OUTPATIENT)
Dept: GENERAL RADIOLOGY | Age: 27
Discharge: HOME | End: 2022-07-20
Payer: COMMERCIAL

## 2022-07-18 VITALS — OXYGEN SATURATION: 98 % | SYSTOLIC BLOOD PRESSURE: 119 MMHG | DIASTOLIC BLOOD PRESSURE: 82 MMHG | HEART RATE: 75 BPM

## 2022-07-18 DIAGNOSIS — R06.09 DYSPNEA ON EXERTION: ICD-10-CM

## 2022-07-18 DIAGNOSIS — R06.09 DYSPNEA ON EXERTION: Primary | ICD-10-CM

## 2022-07-18 PROCEDURE — 71046 X-RAY EXAM CHEST 2 VIEWS: CPT

## 2022-07-18 PROCEDURE — 99213 OFFICE O/P EST LOW 20 MIN: CPT

## 2022-07-18 RX ORDER — PREDNISONE 20 MG/1
40 TABLET ORAL DAILY
Qty: 10 TABLET | Refills: 0 | Status: SHIPPED | OUTPATIENT
Start: 2022-07-18 | End: 2022-07-23

## 2022-07-18 RX ORDER — MONTELUKAST SODIUM 10 MG/1
10 TABLET ORAL DAILY
Qty: 30 TABLET | Refills: 0 | Status: SHIPPED | OUTPATIENT
Start: 2022-07-18 | End: 2022-08-08 | Stop reason: SDUPTHER

## 2022-07-18 ASSESSMENT — ENCOUNTER SYMPTOMS
DIFFICULTY BREATHING: 0
RHINORRHEA: 0
EYE PAIN: 0
ABDOMINAL PAIN: 0
CHEST TIGHTNESS: 1
HOARSE VOICE: 1
FREQUENT THROAT CLEARING: 1
CONSTIPATION: 0
TROUBLE SWALLOWING: 0
HEMOPTYSIS: 0
SHORTNESS OF BREATH: 1
WHEEZING: 0
COUGH: 0
EYE ITCHING: 0
SPUTUM PRODUCTION: 1
CHOKING: 0

## 2022-07-18 NOTE — PROGRESS NOTES
[facility] [place] [facility]  [address] [place] [address] [place] [address]  Dept: [phone]  Dept Fax: [phone]    Zak Gordon is a 23 y.o. female who presents to the urgent care today for her medical conditions/complaints as notedbelow. Zak Gordon is c/o of Asthma      HPI:     COVID vaccine? Yes, 2 doses  Patient saw her PCP and Flovent was added about a week ago. Patient reports that new medication has not helped    Asthma  She complains of chest tightness, frequent throat clearing, hoarse voice, shortness of breath and sputum production. There is no cough, difficulty breathing, hemoptysis or wheezing. This is a new problem. The current episode started in the past 7 days. The problem occurs constantly. The problem has been unchanged. Associated symptoms include dyspnea on exertion and malaise/fatigue. Pertinent negatives include no appetite change, chest pain, ear congestion, ear pain, fever, headaches, myalgias, nasal congestion, postnasal drip, rhinorrhea, sneezing, sweats or trouble swallowing. Her symptoms are aggravated by strenuous activity and pollen. Her symptoms are alleviated by beta-agonist and steroid inhaler. She reports minimal improvement on treatment. There are no known risk factors for lung disease. Her past medical history is significant for asthma. There is no history of bronchiectasis, bronchitis, COPD, emphysema or pneumonia.      Past Medical History:   Diagnosis Date    Asthma     as a child    Cholecystitis     E. coli UTI (urinary tract infection) 5/14/2018    Gall stones     Kidney stone     Patient denies    Pyelonephritis 2/15/2019    S/p lap right salpingectomy w/ evacuation of hemoperitoneum 12/10/21 12/10/2021    2/2 ectopic pregnancy    UTI (lower urinary tract infection)         Current Outpatient Medications   Medication Sig Dispense Refill    predniSONE (DELTASONE) 20 MG tablet Take 2 tablets by mouth in the morning for 5 days. 10 tablet 0    montelukast (SINGULAIR) 10 MG tablet Take 1 tablet by mouth in the morning. 30 tablet 0    fluticasone (FLOVENT HFA) 110 MCG/ACT inhaler Inhale 2 puffs into the lungs 2 times daily 12 g 3    valACYclovir (VALTREX) 1 g tablet TAKE ONE TABLET BY MOUTH TWICE A DAY FOR 7 DAYS 14 tablet 0    phentermine (ADIPEX-P) 37.5 MG tablet Take 1 tablet by mouth every morning (before breakfast) for 30 days. 30 tablet 0    albuterol sulfate HFA (VENTOLIN HFA) 108 (90 Base) MCG/ACT inhaler Inhale 2 puffs into the lungs every 4 hours as needed for Wheezing or Shortness of Breath 18 g 3    loratadine (CLARITIN) 10 MG tablet Take 1 tablet by mouth daily 30 tablet 3    fluticasone (FLONASE) 50 MCG/ACT nasal spray 1 spray by Nasal route daily 16 g 2    hydrocortisone 2.5 % ointment Apply topically 2 times daily. 100 g 1    norethindrone-ethinyl estradiol (JUNEL FE 1/20) 1-20 MG-MCG per tablet Take 1 tablet by mouth daily 1 packet 3     No current facility-administered medications for this visit. Allergies   Allergen Reactions    Amoxicillin Hives    Lactose      Can have milk in foods, but not by itself (I.e., glass of milk)    Other Rash     Can have milk in foods, but not by itself (I.e., glass of milk)       Subjective:      Review of Systems   Constitutional:  Positive for malaise/fatigue. Negative for appetite change and fever. HENT:  Positive for hoarse voice. Negative for ear pain, postnasal drip, rhinorrhea, sneezing and trouble swallowing. Eyes:  Negative for pain and itching. Respiratory:  Positive for sputum production, chest tightness and shortness of breath. Negative for cough, hemoptysis, choking and wheezing. Cardiovascular:  Positive for dyspnea on exertion. Negative for chest pain and palpitations. Gastrointestinal:  Negative for abdominal pain and constipation. Genitourinary:  Negative for difficulty urinating and dysuria. Musculoskeletal:  Negative for myalgias. Coordination normal.      Gait: Gait normal.   Psychiatric:         Mood and Affect: Mood normal.         Speech: Speech normal.         Behavior: Behavior normal.         Thought Content: Thought content normal.         Judgment: Judgment normal.      Comments: Patient talking in complete sentences without any SOB     /82   Pulse 75   SpO2 98%     Assessment:       Diagnosis Orders   1. Dyspnea on exertion  predniSONE (DELTASONE) 20 MG tablet    XR CHEST STANDARD (2 VW)          Plan:   -No signs of respiratory distress at this time, patient is well appearing  -Chest xray, will call with results and alter treatment if necessary  -prednisone to help alleviate symptoms and avoid an asthma exacerbation  -added Singulair for asthma  -Advised patient to follow up with PCP in one week  -Instructed to increase fluid intake.   -Suggested humidifier and mist therapy.  -Avoid irritants such as smoke. -May use over-the-counter expectorant such as Robitussin. -patient agreeable with treatment plan    Return in about 1 week (around 7/25/2022) for with PCP for asthma management. Orders Placed This Encounter   Medications    predniSONE (DELTASONE) 20 MG tablet     Sig: Take 2 tablets by mouth in the morning for 5 days. Dispense:  10 tablet     Refill:  0    montelukast (SINGULAIR) 10 MG tablet     Sig: Take 1 tablet by mouth in the morning. Dispense:  30 tablet     Refill:  0         Patient given educational materials - see patient instructions. Discussed use, benefit, and side effects of prescribed medications. All patient questions answered. Pt voiced understanding.     Electronically signed by KEMAR Melara NP on 7/18/2022 at 5:28 PM

## 2022-07-19 ENCOUNTER — OFFICE VISIT (OUTPATIENT)
Dept: OBGYN | Age: 27
End: 2022-07-19
Payer: COMMERCIAL

## 2022-07-19 ENCOUNTER — HOSPITAL ENCOUNTER (OUTPATIENT)
Age: 27
Setting detail: SPECIMEN
Discharge: HOME | End: 2022-07-19

## 2022-07-19 VITALS
HEART RATE: 89 BPM | HEIGHT: 65 IN | SYSTOLIC BLOOD PRESSURE: 113 MMHG | DIASTOLIC BLOOD PRESSURE: 75 MMHG | WEIGHT: 176.8 LBS | BODY MASS INDEX: 29.46 KG/M2

## 2022-07-19 DIAGNOSIS — N94.6 DYSMENORRHEA: ICD-10-CM

## 2022-07-19 DIAGNOSIS — N93.9 ABNORMAL UTERINE BLEEDING (AUB): ICD-10-CM

## 2022-07-19 DIAGNOSIS — N94.10 DYSPAREUNIA, FEMALE: ICD-10-CM

## 2022-07-19 DIAGNOSIS — N93.9 ABNORMAL UTERINE BLEEDING (AUB): Primary | ICD-10-CM

## 2022-07-19 DIAGNOSIS — N89.8 VAGINAL DISCHARGE: ICD-10-CM

## 2022-07-19 DIAGNOSIS — Z80.41 FAMILY HISTORY OF OVARIAN CANCER: ICD-10-CM

## 2022-07-19 PROCEDURE — 1036F TOBACCO NON-USER: CPT | Performed by: OBSTETRICS & GYNECOLOGY

## 2022-07-19 PROCEDURE — G8427 DOCREV CUR MEDS BY ELIG CLIN: HCPCS | Performed by: OBSTETRICS & GYNECOLOGY

## 2022-07-19 PROCEDURE — 99213 OFFICE O/P EST LOW 20 MIN: CPT | Performed by: OBSTETRICS & GYNECOLOGY

## 2022-07-19 PROCEDURE — G8417 CALC BMI ABV UP PARAM F/U: HCPCS | Performed by: OBSTETRICS & GYNECOLOGY

## 2022-07-19 PROCEDURE — 99211 OFF/OP EST MAY X REQ PHY/QHP: CPT | Performed by: OBSTETRICS & GYNECOLOGY

## 2022-07-19 RX ORDER — NORETHINDRONE ACETATE AND ETHINYL ESTRADIOL 1MG-20(21)
1 KIT ORAL DAILY
Qty: 1 PACKET | Refills: 3 | Status: SHIPPED | OUTPATIENT
Start: 2022-07-19 | End: 2022-10-10

## 2022-07-19 NOTE — PROGRESS NOTES
[facility]  2022    YOB: 1995          The patient was seen today. She is here regarding AUB and dysmenorrhea . Her bowels are regular and she is voiding without difficulty. HPI:  Roshni Beatty is a 23 y.o. female      Patient is reporting pelvic pain - this has become worse since her ectopic surgery in 2021. She also states she is having irregular spotting in between her periods. Pt is also reporting pain with intercourse and spotting after intercourse. Pt reports that the dyspareunia can be with or without penetration, it just depends on where she is in her cycle. Pt denies any dysuria/hematuria. Patient reports her periods are occurring every 30-31 days and they last 3-4 days. Her LMP started Tuesday last week and ended on Friday. Sometimes she is having spotting between periods. Pt is having pain with ovulation as well and it causes her nausea/vomiting. We discussed that she could have some adhesions from her ruptured ectopic pregnancy and the surgery itself that may be contributing to her pain. She could also have endometriosis (although it was not mentioned on the operative report from her laparoscopic surgery). Explained to patient that the first line therapy for endometriosis is some sort of birth control to help down regulate her body's own production of estrogen. Pt has been on OCPs before (Blaze Odonnell) with success and wishes to restart these. I also discussed a referral to pelvic floor therapy to help with the pelvic pain and dyspareunia, pt is amenable to this. Pt also reports she has family hx of ovarian cancer (her mother and her maternal grandmother). Will refer pt to genetic counselor for further testing. Counseling Hormonal Based Birth Control:      The patient was seen and counseled on all forms of birth control both male and female  reversible and non.  She is aware that hormonal based birth control may increase her risk of developing a blood clot which may increase her morbidity and or mortality. She was counseled on alternate non hormonal based contraception options. We discussed that smoking and any hormonal based contraception may increase the patients risks of developing these life threatening blood clots. All patients are encouraged to stop smoking at the time of contraceptive counseling. Cessation programs were reviewed. The patient was instructed to use barrier contraception for sexually transmitted disease prevention. The patient was also informed of antibiotics decreasing contraceptive efficacy and the need for barrier contraception from the onset of her antibiotic dosing and through a minimum of thirty days from antibiotic cessation. The life threatening side effect profile was reviewed in detail this includes but is not limited to shortness of breath, chest pain, severe or persistent headaches, or calf pain. If any of these occur the patient has been instructed to stop using her hormonal based contraception, notify the office, and go to the emergency department or call 911. The patient denied any personal history of blood clots in her leg, lung, or heart and denied any family history of stroke, TIA, sudden cardiac death < 36 y.o.,pulmonary embolism, or deep venous thrombosis.       OB History    Para Term  AB Living   3 0 0 0 1 0   SAB IAB Ectopic Molar Multiple Live Births   0 0 1 0 0 0      # Outcome Date GA Lbr Zohaib/2nd Weight Sex Delivery Anes PTL Lv   3 Ectopic 12/10/21           2             1                 Past Medical History:   Diagnosis Date    Asthma     as a child    Cholecystitis     E. coli UTI (urinary tract infection) 2018    Gall stones     Kidney stone     Patient denies    Pyelonephritis 2/15/2019    S/p lap right salpingectomy w/ evacuation of hemoperitoneum 12/10/21 12/10/2021    2/2 ectopic pregnancy    UTI (lower urinary tract infection)        Past Surgical History:   Procedure Laterality Date    ABDOMEN SURGERY  12/10/2021    DIAGNOSTIC LAPAROSCOPY, UNILATERAL SALPINGECTOMY RIGHT    ARM SURGERY Right     BREAST REDUCTION SURGERY  07/25/2018    CYSTOSCOPY N/A 09/04/2019    CYSTOSCOPY RETROGRADE PYELOGRAM WITH  CYSTOGRAM, performed by Taiwo Quiles MD at [address] [facility] N/A 12/03/2021    DILATATION AND CURETTAGE SUCTION performed by Annette Bar MD at [address] [facility] Right 12/10/2021    DIAGNOSTIC LAPAROSCOPY, UNILATERAL SALPINGECTOMY performed by Anaid Darden MD at [address] [facility] [address] N/A 05/18/2018    CHOLECYSTECTOMY LAPAROSCOPIC performed by Ruth Putnam MD at [address] [facility]         No family history on file. Social History     Socioeconomic History    Marital status: Single     Spouse name: Not on file    Number of children: Not on file    Years of education: Not on file    Highest education level: Not on file   Occupational History    Not on file   Tobacco Use    Smoking status: Never    Smokeless tobacco: Never   Vaping Use    Vaping Use: Never used   Substance and Sexual Activity    Alcohol use:  Yes     Alcohol/week: 2.0 standard drinks     Types: 1 Glasses of wine, 1 Shots of liquor per week     Comment: social drinker     Drug use: No    Sexual activity: Yes     Partners: Male   Other Topics Concern    Not on file   Social History Narrative    Not on file     Social Determinants of Health     Financial Resource Strain: Low Risk     Difficulty of Paying Living Expenses: Not very hard   Food Insecurity: No Food Insecurity    Worried About Running Out of Food in the Last Year: Never true    Ran Out of Food in the Last Year: Never true   Transportation Needs: Not on file   Physical Activity: Not on file   Stress: Not on file   Social Connections: Not on file   Intimate Partner Violence: Not on file   Housing Stability: Not on file         MEDICATIONS:  Current Outpatient Medications   Medication Sig Dispense Refill    norethindrone-ethinyl estradiol (JUNEL FE 1/20) 1-20 MG-MCG per tablet Take 1 tablet by mouth in the morning. 1 packet 3    predniSONE (DELTASONE) 20 MG tablet Take 2 tablets by mouth in the morning for 5 days. 10 tablet 0    montelukast (SINGULAIR) 10 MG tablet Take 1 tablet by mouth in the morning. 30 tablet 0    fluticasone (FLOVENT HFA) 110 MCG/ACT inhaler Inhale 2 puffs into the lungs 2 times daily 12 g 3    valACYclovir (VALTREX) 1 g tablet TAKE ONE TABLET BY MOUTH TWICE A DAY FOR 7 DAYS 14 tablet 0    phentermine (ADIPEX-P) 37.5 MG tablet Take 1 tablet by mouth every morning (before breakfast) for 30 days. 30 tablet 0    albuterol sulfate HFA (VENTOLIN HFA) 108 (90 Base) MCG/ACT inhaler Inhale 2 puffs into the lungs every 4 hours as needed for Wheezing or Shortness of Breath 18 g 3    loratadine (CLARITIN) 10 MG tablet Take 1 tablet by mouth daily 30 tablet 3    fluticasone (FLONASE) 50 MCG/ACT nasal spray 1 spray by Nasal route daily 16 g 2    hydrocortisone 2.5 % ointment Apply topically 2 times daily. 100 g 1     No current facility-administered medications for this visit. ALLERGIES:  Allergies as of 07/19/2022 - Fully Reviewed 07/19/2022   Allergen Reaction Noted    Amoxicillin Hives 05/17/2015    Lactose  05/17/2015    Other Rash 02/20/2020         REVIEW OF SYSTEMS:       A minimum of an eleven point review of systems was completed. Review Of Systems (11 point):  Constitutional: No fever, chills or malaise;  No weight change or fatigue  Head and Eyes: No vision, Headache, Dizziness or trauma in last 12 months  ENT ROS: No hearing, Tinnitis, sinus or taste problems  Hematological and Lymphatic ROS:No Lymphoma, Von Willebrand's, Hemophillia or Bleeding History  Psych ROS: No Depression, Homicidal thoughts,suicidal thoughts, or anxiety  Breast ROS: No prior breast abnormalities or lumps  Respiratory ROS: No SOB, Pneumoniae,Cough, or Pulmonary Embolism History  Cardiovascular ROS: No Chest Pain with Exertion, Palpitations, Syncope, Edema, Arrhythmia  Gastrointestinal ROS: No Indigestion, Heartburn, Nausea, vomiting, Diarrhea, Constipation,or Bowel Changes; No Bloody Stools or melena  Genito-Urinary ROS:+dyspareunia, +vaginal discharge on exam, +dysmenorrhea, +pelvic pain. No Dysuria, Hematuria or Nocturia. Musculoskeletal ROS: No Arthralgia, Arthritis,Gout,Osteoporosis or Rheumatism  Neurological ROS: No CVA, Migraines, Epilepsy, Seizure Hx, or Limb Weakness  Dermatological ROS: No Rash, Itching, Hives, Mole Changes or Cancer          Blood pressure 113/75, pulse 89, height 5' 5\" (1.651 m), weight 176 lb 12.8 oz (80.2 kg), not currently breastfeeding. Chaperone for Intimate Exam  Chaperone was offered and accepted as part of the rooming process. Chaperone: Elvis Rico MA         Abdomen: Soft non-tender; good bowel sounds. No guarding, rebound or rigidity. No CVA tenderness bilaterally. Extremities: No calf tenderness, DTR 2/4, and No edema bilaterally    Pelvic: External genitalia: normal general appearance  Urinary system: urethral meatus normal  Vaginal: normal mucosa without prolapse or lesions, normal rugae, and small amount of thin white discharge - vaginitis probe obtained  Cervix: normal appearance and GC prep obtained  Adnexa: normal bimanual exam  Uterus: normal single, nontender, anteverted, and mid-position  Pt did have pain with deep palpation during the pelvic exam, and she had a small pea-sized smooth nodule on her right vaginal wall near the cervix (likely a benign vaginal fibroid). She also had pain with palpation of her pelvic floor musculature R>L. Lab Results:  Results for orders placed or performed during the hospital encounter of 05/31/22   C.trachomatis N.gonorrhoeae DNA, Urine    Specimen: Urine   Result Value Ref Range    Specimen Description . URINE     C. trachomatis DNA ,Urine NEGATIVE NEGATIVE    N. gonorrhoeae DNA, Urine NEGATIVE NEGATIVE   Vaginitis DNA Probe    Specimen: Vaginal   Result Value Ref Range    Source . VAGINAL SWAB     Trichomonas Vaginalis DNA NEGATIVE NEGATIVE    GARDNERELLA VAGINALIS, DNA PROBE POSITIVE (A) NEGATIVE    CANDIDA SPECIES, DNA PROBE NEGATIVE NEGATIVE   Culture, Urine    Specimen: Urine, clean catch   Result Value Ref Range    Specimen Description . CLEAN CATCH URINE     Culture NO SIGNIFICANT GROWTH          Assessment:   Diagnosis Orders   1. Abnormal uterine bleeding (AUB)  norethindrone-ethinyl estradiol (JUNEL FE 1/20) 1-20 MG-MCG per tablet    Vaginitis DNA Probe    Chlamydia Trachomatis & Neisseria gonorrhoeae (GC) by amplified detection      2. Dysmenorrhea  norethindrone-ethinyl estradiol (JUNEL FE 1/20) 1-20 MG-MCG per tablet      3. Family history of ovarian cancer  [address] [place] [address], [facility]      4. Dyspareunia, female  Ambulatory referral to Physical Therapy      5. Vaginal discharge  Vaginitis DNA Probe    Chlamydia Trachomatis & Neisseria gonorrhoeae (GC) by amplified detection        Patient Active Problem List    Diagnosis Date Noted    Grief associated with loss of fetus 03/23/2022    Class 1 obesity due to excess calories without serious comorbidity with body mass index (BMI) of 31.0 to 31.9 in adult 03/23/2022    Bilateral serous otitis media 03/23/2022    Dysmenorrhea 01/27/2022 1/27/22: Initiated on Junel Fe 1-20      History of kidney stones 08/05/2019           PLAN:  Will refer pt to genetic counselor for further workup on family history of ovarian cancer    Will refer pt to pelvic floor therapy    Vaginal cultures collected - will treat if appropriate    Gave refill on Junel 1/20 and counseled pt on how to take the medication    Return in about 3 months (around 10/19/2022) for follow up on periods and pelvic pain. Repeat Annual every 1 year  Cervical Cytology Evaluation begins at 25years old.   If Negative Cytology, Follow-up

## 2022-07-20 LAB
C TRACH DNA GENITAL QL NAA+PROBE: NEGATIVE
CANDIDA SPECIES, DNA PROBE: NEGATIVE
GARDNERELLA VAGINALIS, DNA PROBE: POSITIVE
N. GONORRHOEAE DNA: NEGATIVE
SOURCE: ABNORMAL
SPECIMEN DESCRIPTION: NORMAL
TRICHOMONAS VAGINALIS DNA: NEGATIVE

## 2022-07-20 RX ORDER — METRONIDAZOLE 500 MG/1
500 TABLET ORAL 2 TIMES DAILY
Qty: 14 TABLET | Refills: 0 | Status: SHIPPED | OUTPATIENT
Start: 2022-07-20 | End: 2022-07-27

## 2022-07-27 ENCOUNTER — HOSPITAL ENCOUNTER (OUTPATIENT)
Age: 27
Setting detail: SPECIMEN
Discharge: HOME | End: 2022-07-27

## 2022-07-27 ENCOUNTER — OFFICE VISIT (OUTPATIENT)
Dept: FAMILY MEDICINE CLINIC | Age: 27
End: 2022-07-27
Payer: COMMERCIAL

## 2022-07-27 VITALS
HEART RATE: 88 BPM | DIASTOLIC BLOOD PRESSURE: 82 MMHG | RESPIRATION RATE: 14 BRPM | SYSTOLIC BLOOD PRESSURE: 122 MMHG | WEIGHT: 180 LBS | TEMPERATURE: 98.1 F | BODY MASS INDEX: 29.99 KG/M2 | HEIGHT: 65 IN | OXYGEN SATURATION: 100 %

## 2022-07-27 VITALS
OXYGEN SATURATION: 95 % | BODY MASS INDEX: 30.42 KG/M2 | HEIGHT: 65 IN | WEIGHT: 182.6 LBS | DIASTOLIC BLOOD PRESSURE: 74 MMHG | RESPIRATION RATE: 20 BRPM | SYSTOLIC BLOOD PRESSURE: 114 MMHG | HEART RATE: 83 BPM

## 2022-07-27 DIAGNOSIS — R10.2 PELVIC PAIN: ICD-10-CM

## 2022-07-27 DIAGNOSIS — R11.0 NAUSEA: ICD-10-CM

## 2022-07-27 DIAGNOSIS — J45.990 EXERCISE-INDUCED BRONCHOCONSTRICTION: Primary | ICD-10-CM

## 2022-07-27 DIAGNOSIS — Z00.00 ANNUAL VISIT FOR GENERAL ADULT MEDICAL EXAMINATION WITHOUT ABNORMAL FINDINGS: ICD-10-CM

## 2022-07-27 LAB
CHOLESTEROL/HDL RATIO: 2.3
CHOLESTEROL: 114 MG/DL
GLUCOSE BLD-MCNC: 81 MG/DL (ref 70–99)
HDLC SERPL-MCNC: 49 MG/DL
LDL CHOLESTEROL: 50 MG/DL (ref 0–130)
TRIGL SERPL-MCNC: 76 MG/DL

## 2022-07-27 PROCEDURE — 99214 OFFICE O/P EST MOD 30 MIN: CPT | Performed by: NURSE PRACTITIONER

## 2022-07-27 PROCEDURE — 99284 EMERGENCY DEPT VISIT MOD MDM: CPT

## 2022-07-27 PROCEDURE — 96365 THER/PROPH/DIAG IV INF INIT: CPT

## 2022-07-27 PROCEDURE — 96375 TX/PRO/DX INJ NEW DRUG ADDON: CPT

## 2022-07-27 RX ORDER — MELOXICAM 15 MG/1
15 TABLET ORAL DAILY
Qty: 30 TABLET | Refills: 3 | Status: SHIPPED
Start: 2022-07-27 | End: 2022-09-02

## 2022-07-27 RX ORDER — ONDANSETRON 4 MG/1
4 TABLET, FILM COATED ORAL DAILY PRN
Qty: 30 TABLET | Refills: 0 | Status: SHIPPED | OUTPATIENT
Start: 2022-07-27 | End: 2022-08-08 | Stop reason: ALTCHOICE

## 2022-07-27 RX ORDER — BUDESONIDE AND FORMOTEROL FUMARATE DIHYDRATE 160; 4.5 UG/1; UG/1
2 AEROSOL RESPIRATORY (INHALATION) 2 TIMES DAILY
Qty: 30.6 G | Refills: 1 | Status: SHIPPED | OUTPATIENT
Start: 2022-07-27

## 2022-07-27 ASSESSMENT — ENCOUNTER SYMPTOMS
EYE PAIN: 0
CHEST TIGHTNESS: 0
COLOR CHANGE: 0
WHEEZING: 0
SINUS PRESSURE: 0
SINUS PAIN: 0
BACK PAIN: 0
CONSTIPATION: 0
DIARRHEA: 0
SHORTNESS OF BREATH: 0
ABDOMINAL PAIN: 0
NAUSEA: 1
VOMITING: 0

## 2022-07-27 ASSESSMENT — PAIN DESCRIPTION - ORIENTATION: ORIENTATION: LOWER

## 2022-07-27 ASSESSMENT — PAIN DESCRIPTION - DESCRIPTORS: DESCRIPTORS: CRAMPING;SHOOTING

## 2022-07-27 ASSESSMENT — PAIN - FUNCTIONAL ASSESSMENT: PAIN_FUNCTIONAL_ASSESSMENT: 0-10

## 2022-07-27 ASSESSMENT — PAIN DESCRIPTION - LOCATION: LOCATION: ABDOMEN

## 2022-07-27 ASSESSMENT — PAIN SCALES - GENERAL: PAINLEVEL_OUTOF10: 7

## 2022-07-27 NOTE — ASSESSMENT & PLAN NOTE
Uncontrolled, changes made today: stop flovent, start symbicort, medication adherence emphasized and lifestyle modifications recommended. Follow up pending PFT results.

## 2022-07-27 NOTE — PROGRESS NOTES
Roshni Beatty (:  1995) is a 33 y.o. female,Established patient, here for evaluation of the following chief complaint(s):  Asthma ([facility]ent [facility]-prednisone, Singulair and a chest xray was completed. ) and Abdominal Pain (Pt states the abdominal pain is back. Pt states she took a pregnancy test and it was negative/)         ASSESSMENT/PLAN:  1. Exercise-induced bronchoconstriction  Assessment & Plan:   Uncontrolled, changes made today: stop flovent, start symbicort, medication adherence emphasized and lifestyle modifications recommended. Follow up pending PFT results. Orders:  -     Full PFT Study With Bronchodilator; Future  -     budesonide-formoterol (SYMBICORT) 160-4.5 MCG/ACT AERO; Inhale 2 puffs into the lungs in the morning and 2 puffs before bedtime. , Disp-30.6 g, R-1Normal  2. Pelvic pain   Uncontrolled. Rx for mobic with instruction for use, do not take additional NSAIDs while using this medication. Proceed with GYN work up. Declines referral to GI today. -     meloxicam (MOBIC) 15 MG tablet; Take 1 tablet by mouth in the morning., Disp-30 tablet, R-3Normal  3. Nausea  -     ondansetron (ZOFRAN) 4 MG tablet; Take 1 tablet by mouth daily as needed for Nausea or Vomiting, Disp-30 tablet, R-0Normal    Return for As scheduled 22.          Subjective   SUBJECTIVE/OBJECTIVE:  Presents for follow up of walk in clinic     Pelvic pain back, seems worse with ovulation cycle (started ovulating yesterday)  Pain can get so intense it makes her nauseous and sometimes vomits   Taking  OTC every 8 hours right now and using heating pad - not helping much with pain  Starts in left pelvic area and radiates into her left lower back   Currently being worked up by [facility] for endometriosis versus adhesions from previous ectopic surgical procedure  OC was switched to junel, she will be starting pelvic floor therapy this week  Previous OBGYN suspected she had endometriosis but she never had a full work up    Asthma: Went to [facility] walk in for worsening asthma symptoms on 07/18/22  Was given prednisone and started on singulair - does feel like prednisone helped quite a bit   Asthma symptoms made worse by allergies and exercise   Has never done PFT before  Does not feel like flovent is working as well for her right now     Denies any other problems/concerns at this time           Review of Systems   Constitutional:  Negative for activity change, chills, fatigue and unexpected weight change. HENT:  Negative for hearing loss, postnasal drip, sinus pressure and sinus pain. Eyes:  Negative for pain and visual disturbance. Respiratory:  Negative for chest tightness, shortness of breath and wheezing. Cardiovascular:  Negative for chest pain and palpitations. Gastrointestinal:  Positive for nausea. Negative for abdominal pain, constipation, diarrhea and vomiting. Endocrine: Negative for polydipsia, polyphagia and polyuria. Genitourinary:  Positive for menstrual problem and pelvic pain. Negative for dysuria, flank pain, frequency, urgency, vaginal discharge and vaginal pain. Musculoskeletal:  Negative for arthralgias, back pain and myalgias. Skin:  Negative for color change and rash. Neurological:  Negative for dizziness, weakness, light-headedness, numbness and headaches. Psychiatric/Behavioral:  Negative for confusion, hallucinations, self-injury, sleep disturbance and suicidal ideas. The patient is not nervous/anxious. Objective   Physical Exam  Vitals and nursing note reviewed. Constitutional:       General: She is not in acute distress. Appearance: Normal appearance. She is not ill-appearing. HENT:      Head: Normocephalic. Right Ear: Hearing normal.      Left Ear: Hearing normal.      Nose: Nose normal.      Mouth/Throat:      Mouth: Mucous membranes are moist.      Pharynx: Oropharynx is clear.    Eyes:      Extraocular Movements: Extraocular movements intact. Conjunctiva/sclera: Conjunctivae normal.      Pupils: Pupils are equal, round, and reactive to light. Cardiovascular:      Rate and Rhythm: Normal rate and regular rhythm. Pulses: Normal pulses. Heart sounds: Normal heart sounds. Pulmonary:      Effort: Pulmonary effort is normal.      Breath sounds: Normal breath sounds and air entry. No decreased breath sounds, wheezing, rhonchi or rales. Abdominal:      General: Abdomen is flat. Bowel sounds are normal.      Palpations: Abdomen is soft. Musculoskeletal:         General: Normal range of motion. Cervical back: Normal range of motion. Skin:     General: Skin is warm and dry. Capillary Refill: Capillary refill takes less than 2 seconds. Neurological:      General: No focal deficit present. Mental Status: She is alert and oriented to person, place, and time. Mental status is at baseline. Psychiatric:         Mood and Affect: Mood normal.         Behavior: Behavior normal.         Thought Content: Thought content normal.         Judgment: Judgment normal.              Wt Readings from Last 3 Encounters:   07/27/22 182 lb 9.6 oz (82.8 kg)   07/19/22 176 lb 12.8 oz (80.2 kg)   07/06/22 180 lb (81.6 kg)     Temp Readings from Last 3 Encounters:   06/20/22 98.5 °F (36.9 °C) (Temporal)   05/31/22 98.3 °F (36.8 °C)   05/11/22 99.1 °F (37.3 °C) (Temporal)     BP Readings from Last 3 Encounters:   07/27/22 114/74   07/19/22 113/75   07/18/22 119/82     Pulse Readings from Last 3 Encounters:   07/27/22 83   07/19/22 89   07/18/22 75       An electronic signature was used to authenticate this note.     --KEMAR Alexis NP

## 2022-07-28 ENCOUNTER — HOSPITAL ENCOUNTER (EMERGENCY)
Age: 27
Discharge: HOME | End: 2022-07-28
Attending: EMERGENCY MEDICINE
Payer: COMMERCIAL

## 2022-07-28 DIAGNOSIS — N39.0 URINARY TRACT INFECTION WITHOUT HEMATURIA, SITE UNSPECIFIED: Primary | ICD-10-CM

## 2022-07-28 DIAGNOSIS — B96.89 BV (BACTERIAL VAGINOSIS): ICD-10-CM

## 2022-07-28 DIAGNOSIS — N76.0 BV (BACTERIAL VAGINOSIS): ICD-10-CM

## 2022-07-28 LAB
-: ABNORMAL
ABSOLUTE EOS #: 0.1 K/UL (ref 0–0.44)
ABSOLUTE IMMATURE GRANULOCYTE: 0.04 K/UL (ref 0–0.3)
ABSOLUTE LYMPH #: 2.67 K/UL (ref 1.1–3.7)
ABSOLUTE MONO #: 0.64 K/UL (ref 0.1–1.2)
ALBUMIN SERPL-MCNC: 4.2 G/DL (ref 3.5–5.2)
ALBUMIN/GLOBULIN RATIO: 1.6 (ref 1–2.5)
ALP BLD-CCNC: 46 U/L (ref 35–104)
ALT SERPL-CCNC: 10 U/L (ref 5–33)
ANION GAP SERPL CALCULATED.3IONS-SCNC: 10 MMOL/L (ref 9–17)
AST SERPL-CCNC: 15 U/L
BACTERIA: ABNORMAL
BASOPHILS # BLD: 1 % (ref 0–2)
BASOPHILS ABSOLUTE: 0.07 K/UL (ref 0–0.2)
BILIRUB SERPL-MCNC: 0.31 MG/DL (ref 0.3–1.2)
BILIRUBIN URINE: NEGATIVE
BUN BLDV-MCNC: 6 MG/DL (ref 6–20)
C TRACH DNA GENITAL QL NAA+PROBE: NEGATIVE
CALCIUM SERPL-MCNC: 9.1 MG/DL (ref 8.6–10.4)
CANDIDA SPECIES, DNA PROBE: NEGATIVE
CASTS UA: ABNORMAL /LPF (ref 0–8)
CHLORIDE BLD-SCNC: 102 MMOL/L (ref 98–107)
CO2: 26 MMOL/L (ref 20–31)
COLOR: YELLOW
CREAT SERPL-MCNC: 0.77 MG/DL (ref 0.5–0.9)
EOSINOPHILS RELATIVE PERCENT: 1 % (ref 1–4)
EPITHELIAL CELLS UA: ABNORMAL /HPF (ref 0–5)
GARDNERELLA VAGINALIS, DNA PROBE: POSITIVE
GFR AFRICAN AMERICAN: >60 ML/MIN
GFR NON-AFRICAN AMERICAN: >60 ML/MIN
GFR SERPL CREATININE-BSD FRML MDRD: ABNORMAL ML/MIN/{1.73_M2}
GLUCOSE BLD-MCNC: 104 MG/DL (ref 70–99)
GLUCOSE URINE: NEGATIVE
HCG QUALITATIVE: NEGATIVE
HCT VFR BLD CALC: 41.2 % (ref 36.3–47.1)
HEMOGLOBIN: 14.4 G/DL (ref 11.9–15.1)
IMMATURE GRANULOCYTES: 0 %
KETONES, URINE: NEGATIVE
LEUKOCYTE ESTERASE, URINE: ABNORMAL
LYMPHOCYTES # BLD: 28 % (ref 24–43)
MCH RBC QN AUTO: 30.6 PG (ref 25.2–33.5)
MCHC RBC AUTO-ENTMCNC: 35 G/DL (ref 28.4–34.8)
MCV RBC AUTO: 87.7 FL (ref 82.6–102.9)
MONOCYTES # BLD: 7 % (ref 3–12)
N. GONORRHOEAE DNA: NEGATIVE
NITRITE, URINE: NEGATIVE
NRBC AUTOMATED: 0 PER 100 WBC
PDW BLD-RTO: 14.2 % (ref 11.8–14.4)
PH UA: 7.5 (ref 5–8)
PLATELET # BLD: 227 K/UL (ref 138–453)
PMV BLD AUTO: 10.7 FL (ref 8.1–13.5)
POTASSIUM SERPL-SCNC: 4 MMOL/L (ref 3.7–5.3)
PROTEIN UA: NEGATIVE
RBC # BLD: 4.7 M/UL (ref 3.95–5.11)
RBC UA: ABNORMAL /HPF (ref 0–4)
REASON FOR REJECTION: NORMAL
SEG NEUTROPHILS: 63 % (ref 36–65)
SEGMENTED NEUTROPHILS ABSOLUTE COUNT: 5.96 K/UL (ref 1.5–8.1)
SODIUM BLD-SCNC: 138 MMOL/L (ref 135–144)
SOURCE: ABNORMAL
SPECIFIC GRAVITY UA: 1.01 (ref 1–1.03)
SPECIMEN DESCRIPTION: NORMAL
TOTAL PROTEIN: 6.8 G/DL (ref 6.4–8.3)
TRICHOMONAS VAGINALIS DNA: NEGATIVE
TURBIDITY: ABNORMAL
URINE HGB: NEGATIVE
UROBILINOGEN, URINE: NORMAL
WBC # BLD: 9.5 K/UL (ref 3.5–11.3)
WBC UA: ABNORMAL /HPF (ref 0–5)
ZZ NTE CLEAN UP: ORDERED TEST: NORMAL
ZZ NTE WITH NAME CLEAN UP: SPECIMEN SOURCE: NORMAL

## 2022-07-28 PROCEDURE — 6370000000 HC RX 637 (ALT 250 FOR IP): Performed by: STUDENT IN AN ORGANIZED HEALTH CARE EDUCATION/TRAINING PROGRAM

## 2022-07-28 PROCEDURE — 6360000002 HC RX W HCPCS: Performed by: STUDENT IN AN ORGANIZED HEALTH CARE EDUCATION/TRAINING PROGRAM

## 2022-07-28 PROCEDURE — 87660 TRICHOMONAS VAGIN DIR PROBE: CPT

## 2022-07-28 PROCEDURE — 87491 CHLMYD TRACH DNA AMP PROBE: CPT

## 2022-07-28 PROCEDURE — 96375 TX/PRO/DX INJ NEW DRUG ADDON: CPT

## 2022-07-28 PROCEDURE — 87510 GARDNER VAG DNA DIR PROBE: CPT

## 2022-07-28 PROCEDURE — 85025 COMPLETE CBC W/AUTO DIFF WBC: CPT

## 2022-07-28 PROCEDURE — 84703 CHORIONIC GONADOTROPIN ASSAY: CPT

## 2022-07-28 PROCEDURE — 96365 THER/PROPH/DIAG IV INF INIT: CPT

## 2022-07-28 PROCEDURE — 2580000003 HC RX 258: Performed by: STUDENT IN AN ORGANIZED HEALTH CARE EDUCATION/TRAINING PROGRAM

## 2022-07-28 PROCEDURE — 87086 URINE CULTURE/COLONY COUNT: CPT

## 2022-07-28 PROCEDURE — 81001 URINALYSIS AUTO W/SCOPE: CPT

## 2022-07-28 PROCEDURE — 80053 COMPREHEN METABOLIC PANEL: CPT

## 2022-07-28 PROCEDURE — 87591 N.GONORRHOEAE DNA AMP PROB: CPT

## 2022-07-28 PROCEDURE — 87480 CANDIDA DNA DIR PROBE: CPT

## 2022-07-28 RX ORDER — METRONIDAZOLE 500 MG/1
500 TABLET ORAL ONCE
Status: COMPLETED | OUTPATIENT
Start: 2022-07-28 | End: 2022-07-28

## 2022-07-28 RX ORDER — CEPHALEXIN 500 MG/1
500 CAPSULE ORAL 2 TIMES DAILY
Qty: 13 CAPSULE | Refills: 0 | Status: SHIPPED | OUTPATIENT
Start: 2022-07-28 | End: 2022-08-04

## 2022-07-28 RX ORDER — METRONIDAZOLE 500 MG/1
500 TABLET ORAL 2 TIMES DAILY
Qty: 13 TABLET | Refills: 0 | Status: SHIPPED | OUTPATIENT
Start: 2022-07-28 | End: 2022-08-04

## 2022-07-28 RX ORDER — ONDANSETRON 2 MG/ML
4 INJECTION INTRAMUSCULAR; INTRAVENOUS ONCE
Status: COMPLETED | OUTPATIENT
Start: 2022-07-28 | End: 2022-07-28

## 2022-07-28 RX ADMIN — ONDANSETRON 4 MG: 2 INJECTION INTRAMUSCULAR; INTRAVENOUS at 01:07

## 2022-07-28 RX ADMIN — METRONIDAZOLE 500 MG: 500 TABLET ORAL at 03:16

## 2022-07-28 RX ADMIN — CEFTRIAXONE SODIUM 1000 MG: 1 INJECTION, POWDER, FOR SOLUTION INTRAMUSCULAR; INTRAVENOUS at 02:24

## 2022-07-28 ASSESSMENT — ENCOUNTER SYMPTOMS
DIARRHEA: 0
SHORTNESS OF BREATH: 1
ABDOMINAL PAIN: 1
BACK PAIN: 0
NAUSEA: 1
VOMITING: 0
RHINORRHEA: 0
COUGH: 0

## 2022-07-28 NOTE — DISCHARGE INSTRUCTIONS
You were seen in the ER for symptoms relating to a UTI. Workup also showed bacterial vaginosis. Please complete the entire course of antibiotics and follow up with your physician for re-evaluation. If you have any new or worsening symptoms, please return to the ED for reevaluation. Call today or tomorrow to [facility]  in 3 days. Take your medication as indicated, if you are given an antibiotic then make sure you get the prescription filled and take the antibiotics until finished. Drink plenty of fluids while taking the antibiotics. Avoid drinking alcohol while taking antibiotics. If you were given the medication pyridium - do not wear any contacts for the next week since this medication will turn your tears an orange color and will stain the contacts. Use ibuprofen or Tylenol (unless prescribed medications that have Tylenol in it) for pain. You can take over the counter Ibuprofen (advil) tablets (4 every 8 hours or 3 every 6 hours or 2 every 4 hours)    Nilam Kwon has some antibiotics for free; [facility] and [employer] has a 4 dollar prescription plan for some antibiotics. Return to the Emergency Department for fever > 101.5, inability to urinate, burning when you urinate, increase in the number of times or if you have an urgency to urinate, any other care or concern.

## 2022-07-28 NOTE — ED PROVIDER NOTES
[facility]  ED  Emergency Department Encounter  Emergency Medicine Resident     Pt Name: Irvin Morrison  MRN: [id number]  Susangfcheryl 1995  Date of evaluation: 7/28/22  PCP:  Alejandro Verduzco MD    CHIEF COMPLAINT       No chief complaint on file. HISTORY OFPRESENT ILLNESS  (Location/Symptom, Timing/Onset, Context/Setting, Quality, Duration, Modifying Factors,Severity.)      Irvin Morrison is a 33 y.o. female who presents with lower abdominal pain and left flank pain for the last 2 days. History of recurrent UTIs. She also has some bilateral low back pain. Patient has been evaluated by her PCP recently for shortness of breath after COVID with history of asthma. This continues, but she states it is no different from her baseline currently. She just started Symbicort today. Her abdominal pain is persistent, worse in the lower abdomen with pressure when urinating. She has dysuria without hematuria. Some urinary frequency as well. She denies any abnormal vaginal discharge or bleeding. She also took a pregnancy test at home that was negative. She has been nauseous but denies any vomiting. No diarrhea or constipation. She also has had some subjective fevers and chills. No other complaints    PAST MEDICAL / SURGICAL / SOCIAL / FAMILY HISTORY      has a past medical history of Asthma, Cholecystitis, E. coli UTI (urinary tract infection), Gall stones, Kidney stone, Pyelonephritis, S/p lap right salpingectomy w/ evacuation of hemoperitoneum 12/10/21, and UTI (lower urinary tract infection). has a past surgical history that includes Arm Surgery (Right); [place] tooth extraction; pr lap,cholecystectomy (N/A, 05/18/2018); Breast reduction surgery (07/25/2018); Cystoscopy (N/A, 09/04/2019); Dilation and curettage of uterus (N/A, 12/03/2021); Abdomen surgery (12/10/2021); and laparoscopy (Right, 12/10/2021).      Social History     Socioeconomic History    Marital status: Single Spouse name: Not on file    Number of children: Not on file    Years of education: Not on file    Highest education level: Not on file   Occupational History    Not on file   Tobacco Use    Smoking status: Never    Smokeless tobacco: Never   Vaping Use    Vaping Use: Never used   Substance and Sexual Activity    Alcohol use: Yes     Alcohol/week: 2.0 standard drinks     Types: 1 Glasses of wine, 1 Shots of liquor per week     Comment: social drinker     Drug use: No    Sexual activity: Yes     Partners: Male   Other Topics Concern    Not on file   Social History Narrative    Not on file     Social Determinants of Health     Financial Resource Strain: Low Risk     Difficulty of Paying Living Expenses: Not very hard   Food Insecurity: No Food Insecurity    Worried About Running Out of Food in the Last Year: Never true    Ran Out of Food in the Last Year: Never true   Transportation Needs: Not on file   Physical Activity: Not on file   Stress: Not on file   Social Connections: Not on file   Intimate Partner Violence: Not on file   Housing Stability: Not on file       No family history on file. Allergies:  Amoxicillin, Lactose, and Other    Home Medications:  Prior to Admission medications    Medication Sig Start Date End Date Taking? Authorizing Provider   metroNIDAZOLE (FLAGYL) 500 MG tablet Take 1 tablet by mouth in the morning and 1 tablet before bedtime. Do all this for 7 days. 7/28/22 8/4/22 Yes Mel Matta DO   cephALEXin (KEFLEX) 500 MG capsule Take 1 capsule by mouth in the morning and 1 capsule before bedtime. Do all this for 7 days. 7/28/22 8/4/22 Yes Mel Matta DO   meloxicam (MOBIC) 15 MG tablet Take 1 tablet by mouth in the morning. 7/27/22   KEMAR Munroe NP   budesonide-formoterol (SYMBICORT) 160-4.5 MCG/ACT AERO Inhale 2 puffs into the lungs in the morning and 2 puffs before bedtime.  7/27/22   KEMAR Munroe NP   ondansetron (ZOFRAN) 4 MG tablet Take 1 tablet by mouth daily as needed for Nausea or Vomiting 7/27/22   KEMAR Fulton - NP   norethindrone-ethinyl estradiol (JUNEL FE 1/20) 1-20 MG-MCG per tablet Take 1 tablet by mouth in the morning. 7/19/22   Will Soot DO   montelukast (SINGULAIR) 10 MG tablet Take 1 tablet by mouth in the morning. 7/18/22   KEMAR Mariano NP   valACYclovir (VALTREX) 1 g tablet TAKE ONE TABLET BY MOUTH TWICE A DAY FOR 7 DAYS 7/11/22   KEMAR Fulton - NP   phentermine (ADIPEX-P) 37.5 MG tablet Take 1 tablet by mouth every morning (before breakfast) for 30 days. 7/6/22 8/5/22  KEMAR Fulton NP   albuterol sulfate HFA (VENTOLIN HFA) 108 (90 Base) MCG/ACT inhaler Inhale 2 puffs into the lungs every 4 hours as needed for Wheezing or Shortness of Breath 6/20/22 6/20/23  Inge Rebollar MD   loratadine (CLARITIN) 10 MG tablet Take 1 tablet by mouth daily 6/20/22   Inge Rebollar MD   fluticasone Cori Hunter) 50 MCG/ACT nasal spray 1 spray by Nasal route daily 6/20/22   Inge Rebollar MD   hydrocortisone 2.5 % ointment Apply topically 2 times daily. 6/20/22   Inge Rebollar MD       REVIEW OFSYSTEMS    (2-9 systems for level 4, 10 or more for level 5)      Review of Systems   Constitutional:  Negative for chills and fever. HENT:  Negative for congestion and rhinorrhea. Eyes:  Negative for visual disturbance. Respiratory:  Positive for shortness of breath. Negative for cough. Cardiovascular:  Negative for chest pain. Gastrointestinal:  Positive for abdominal pain and nausea. Negative for diarrhea and vomiting. Genitourinary:  Positive for dysuria and flank pain. Negative for hematuria. Musculoskeletal:  Negative for back pain and neck pain. Skin:  Negative for rash. Neurological:  Negative for weakness, numbness and headaches.      PHYSICAL EXAM   (up to 7 for level 4, 8 or more forlevel 5)      INITIAL VITALS:   ED Triage Vitals [07/27/22 2314]   BP Temp Temp Source Heart Rate Resp Encounter   Medications    ondansetron (ZOFRAN) injection 4 mg    cefTRIAXone (ROCEPHIN) 1,000 mg in sodium chloride 0.9 % 50 mL IVPB mini-bag     Order Specific Question:   Antimicrobial Indications     Answer:   Urinary Tract Infection    metroNIDAZOLE (FLAGYL) tablet 500 mg     Order Specific Question:   Antimicrobial Indications     Answer: Other     Order Specific Question:   Other Abx Indication     Answer:   BV    metroNIDAZOLE (FLAGYL) 500 MG tablet     Sig: Take 1 tablet by mouth in the morning and 1 tablet before bedtime. Do all this for 7 days. Dispense:  13 tablet     Refill:  0    cephALEXin (KEFLEX) 500 MG capsule     Sig: Take 1 capsule by mouth in the morning and 1 capsule before bedtime. Do all this for 7 days. Dispense:  13 capsule     Refill:  0       DDX: UTI, pyelonephritis, ovarian cyst or torsion, viral illness, vaginitis, other    Initial MDM/Plan/ED COURSE:    33 y.o. female who presents with 2-day history of lower abdominal pain and left flank pain. Patient appears uncomfortable on exam but is otherwise in no acute distress. Vitals are stable and within normal limits. Heart and lung exam unremarkable. She has lower abdominal tenderness without rebound or guarding, also has left flank tenderness. No wheezes, rales, rhonchi in the lungs. Will obtain labs with urinalysis in addition to performing pelvic exam to evaluate for cause of patient's symptoms. Suspect UTI as the cause. ED Course as of 07/28/22 0308   Thu Jul 28, 2022   0230 hCG Qual: NEGATIVE [JS]   0230 UA with evidence of infection, moderate bacteria and  wbc   [JS]   0235 GARDNERELLA VAGINALIS, DNA PROBE(!): POSITIVE [JS]      ED Course User Index  [JS] Laurel Click, DO      Patient updated on work-up. Treated with ceftriaxone as initial dose of UTI antibiotics and Flagyl. Prescription sent to her pharmacy. Patient discharged in stable condition.      DIAGNOSTIC RESULTS / EMERGENCYDEPARTMENT COURSE / MDM     LABS:  Labs Reviewed   VAGINITIS DNA PROBE - Abnormal; Notable for the following components:       Result Value    GARDNERELLA VAGINALIS, DNA PROBE POSITIVE (*)     All other components within normal limits   COMPREHENSIVE METABOLIC PANEL - Abnormal; Notable for the following components:    Glucose 104 (*)     All other components within normal limits   URINALYSIS WITH REFLEX TO CULTURE - Abnormal; Notable for the following components:    Turbidity UA Cloudy (*)     Leukocyte Esterase, Urine LARGE (*)     All other components within normal limits   CBC WITH AUTO DIFFERENTIAL - Abnormal; Notable for the following components:    MCHC 35.0 (*)     All other components within normal limits   MICROSCOPIC URINALYSIS - Abnormal; Notable for the following components:    Bacteria, UA MODERATE (*)     All other components within normal limits   C.TRACHOMATIS N.GONORRHOEAE DNA   CULTURE, URINE   HCG, SERUM, QUALITATIVE   SPECIMEN REJECTION   PREVIOUS SPECIMEN           No results found. EKG      All EKG's are interpreted by the Emergency Department Physicianwho either signs or Co-signs this chart in the absence of a cardiologist.      PROCEDURES:  None    CONSULTS:  None    CRITICAL CARE:  Please see attending note    FINAL IMPRESSION      1. Urinary tract infection without hematuria, site unspecified    2. BV (bacterial vaginosis)          DISPOSITION / PLAN     DISPOSITION Decision To Discharge 07/28/2022 02:40:21 AM      PATIENT REFERRED TO:  Inge Mendez [facility], [id number] [facility] [id number]    Schedule an appointment as soon as possible for a visit in 2 days [facility]  [address] [place] [address]  [phone]    If symptoms worsen    DISCHARGE MEDICATIONS:  New Prescriptions    CEPHALEXIN (KEFLEX) 500 MG CAPSULE    Take 1 capsule by mouth in the morning and 1 capsule before bedtime. Do all this for 7 days.     METRONIDAZOLE (FLAGYL) 500 MG TABLET Take 1 tablet by mouth in the morning and 1 tablet before bedtime. Do all this for 7 days.        Sara Gilbert DO  Emergency Medicine Resident    (Please note that portions of this note were completed with a voice recognition program.Efforts were made to edit the dictations but occasionally words are mis-transcribed.)        Sara Gilbert DO  Resident  07/28/22 9903

## 2022-07-28 NOTE — ED PROVIDER NOTES
Faculty Sign-Out Attestation  Handoff taken on the following patient from prior Attending Physician: Talisha Lemus    I was available and discussed any additional care issues that arose and coordinated the management plans with the resident(s) caring for the patient during my duty period. Any areas of disagreement with residents documentation of care or procedures are noted on the chart. I was personally present for the key portions of any/all procedures during my duty period. I have documented in the chart those procedures where I was not present during the key portions.     Vaginitis / uti type process, swab pending,   Plan to discharge    South Cardoza DO  Attending Physician      South Cardoza,   07/28/22 0240    Treating bv / uti, hcg -,   Treated // Ochoa Roman,   07/28/22 4263

## 2022-07-28 NOTE — ED PROVIDER NOTES
[address] [facility]     Emergency Department     Faculty Attestation        I performed a history and physical examination of the patient and discussed management with the resident. I reviewed the residents note and agree with the documented findings and plan of care. Any areas of disagreement are noted on the chart. I was personally present for the key portions of any procedures. I have documented in the chart those procedures where I was not present during the key portions. I have reviewed the emergency nurses triage note. I agree with the chief complaint, past medical history, past surgical history, allergies, medications, social and family history as documented unless otherwise noted below. For Physician Assistant/ Nurse Practitioner cases/documentation I have personally evaluated this patient and have completed at least one if not all key elements of the E/M (history, physical exam, and MDM). Additional findings are as noted. Vital Signs: BP: 122/82  Heart Rate: 88  Resp: 14  Temp: 98.1 °F (36.7 °C) SpO2: 100 %  PCP:  ILEANA PRADO MD    Pertinent Comments:     Patient is a 60-year-old female for the last 2 days or so has been having lower abdominal discomfort somewhat to the left side associated with urinary symptoms and some low back pain on the left as well. Subjective fevers and chills at home. Urinary frequency and urgency associated as well. No vomiting or diarrhea associated. Assessment/plan: Patient with urinary symptoms and potential vaginal discharge as well and will obtain pelvic examination by resident as well as urinalysis and pregnancy test.   Reevaluate after. Of note, patient is allergic to amoxicillin but can take Keflex normally and has had this several times in the past    Critical Care  None    This patient was evaluated in the Emergency Department for symptoms described in the history of present illness.  He/she was evaluated in the context of the global COVID-19 pandemic, which necessitated consideration that the patient might be at risk for infection with the SARS-CoV-2 virus that causes COVID-19. Institutional protocols and algorithms that pertain to the evaluation of patients at risk for COVID-19 are in a state of rapid change based on information released by regulatory bodies including the CDC and federal and state organizations. These policies and algorithms were followed during the patient's care in the ED. (Please note that portions of this note were completed with a voice recognition program. Efforts were made to edit the dictations but occasionally words are mis-transcribed.  Whenever words are used in this note in any gender, they shall be construed as though they were used in the gender appropriate to the circumstances; and whenever words are used in this note in the singular or plural form, they shall be construed as though they were used in the form appropriate to the circumstances.)    MD Chaya Diaz  Attending Emergency Medicine Physician           Darius Ordonez MD  07/28/22 0041       Darius Ordonez MD  07/28/22 8021

## 2022-07-29 ENCOUNTER — CARE COORDINATION (OUTPATIENT)
Dept: OTHER | Facility: CLINIC | Age: 27
End: 2022-07-29

## 2022-07-29 LAB
CULTURE: NORMAL
SPECIMEN DESCRIPTION: NORMAL

## 2022-07-29 RX ORDER — IBUPROFEN 200 MG
400 TABLET ORAL EVERY 6 HOURS PRN
COMMUNITY
End: 2022-08-30

## 2022-07-29 RX ORDER — ACETAMINOPHEN 500 MG
1000 TABLET ORAL EVERY 6 HOURS PRN
COMMUNITY

## 2022-07-29 NOTE — CARE COORDINATION
[facility] ED Follow Up Call    2022    Patient: Kelby Watkins Patient : 1995   MRN: [id number]  Reason for Admission: UTI, Bacterial Vaginosis  Discharge Date: 2022        Care Transitions ED Follow Up    Care Transitions Interventions  Schedule Follow Up Appointment with Physician: Jennifer Boogie you have any ongoing symptoms?: Yes   Onset of Patient-reported symptoms: Yesterday   Patient-reported symptoms: Abdominal Pain, Nausea, Fever, Pain   Do you have a copy of your discharge instructions?: Yes   Do you understand what to report and when to return?: Yes   Are you following your discharge instructions?: Yes   Do you have all of your prescriptions and are they filled?: Yes   Have you scheduled your follow up appointment?: No   Were you discharged with any Home Care or Post Acute Services or do you currently have any active services?: No         Do you have any needs or concerns that I can assist you with?: No   Identified Barriers: Stress          Needs to be reviewed by the provider   Additional needs identified to be addressed with provider Yes  Patient declined ACM reaching out to PCP office regarding ongoing symptoms. States she plans to go to ED or UC for eval. ACM discussed importance of calling NAL prior to ED visit and directed patient where to locate phone number. Ambulatory Care Manager [facility]) contacted the patient by telephone to perform post ED visit assessment. Call within 2 business days of discharge: Yes. Verified name and  with patient as identifiers. Patient reports the following symptoms: worsening pain to abdomen and back- now rating pain \"8- to 8 1/2\"; fever; nausea with eating. States she is planning to return to ED for eval. States she started antibiotics and has taken 1 dose of each med.  ACM suggested option of calling PCP office to get appt today; patient declined and states difficult to get appt with PCP and most likely would not be able to get appt on a Friday afternoon last minute. hospitals she made PCP office aware of current UTI; no follow up scheduled yet. [facility] suggested patient go to  for eval. hospitals UC and ED are beside each other and will most likely go to ED due to being worked up for endometriosis by gyn and worried worsening pain related to this. [facility] discussed importance of calling NAL prior to going to ED and offered to provide number to patient. Patient  has [facility] card with her and sees the phone number. hospitals she has been drinking more than usual today; unable to eat much due to nausea.  has tried PRN Zofran without relief.  has tried alternating Tylenol and Ibuprofen with very little to no relief in pain. Also reports has had migraine headache to left head behind eyes into her temples. hospitals Tylenol and Ibuprofen did not relieve headache pain.  unable to sleep last night- finally fell asleep around 6:30 this morning for a short period. hospitals has next follow up with gyn in 3 months. Gyn sent her for pelvic floor therapy- first appt scheduled today but patient had to call and cancel due to was told she would be charged $60 for appt. hospitals she is planning to call secondary insurance ([employer]) to see if they would cover any of the therapy cost. Agreeable to follow up calls from [facility]. Interventions:    Counseling and education provided at today's visit on:   Red Flag symptoms to report  Self monitoring compliance  Symptom management  Diet education/compliance  Medication compliance  Provider follow up appointment compliance  Reinforced Discharge instructions  Advanced Care Planning education    Medication reconciliation was performed with patient, who verbalizes understanding of administration of home medications. Advised obtaining a 90-day supply of all daily and as-needed medications.       Goals        Conditions and Symptoms      I will schedule office visits, as directed by my provider. I will keep my appointment or reschedule if I have to cancel. I will notify my provider of any barriers to my plan of care. I will notify my provider of any symptoms that indicate a worsening of my condition. Barriers: none  Plan for overcoming my barriers: N/A  Confidence: 7/10  Anticipated Goal Completion Date: 8/10/2022    7/29/2022: Patient states she has talked to PCP office to notify of UTI; no follow up scheduled yet. ACM offered to call PCP office today to notify of ongoing symptoms and see about getting appt scheduled- patient declined. ACM discussed red flags and NAL. Reinforced resources available to patient including: PCP  Benefits related nurse triage line  Urgent care clinics  MyChart Messaging. ACM encouraged outreach to Nurse Access Line and/or ACM for assessment and intervention guidance as needed. ACM encouraged patient to contact 911 for life threatening emergencies and PCP office 24/7 for non life-threatening symptoms. Reminded patient of alternatives to ED such as urgent care, walk in clinics and e-visits as available. Reviewed proper ED utilization using Right Care, Right Place, Right Time Flyer. Discussed follow-up appointments. If no appointment was previously scheduled, appointment scheduling offered: Yes, declined  Is follow up appointment scheduled within 7 days of discharge? No  [facility] follow up appointment(s):   Future Appointments   Date Time Provider Kevin Marsh   8/16/2022  5:15 PM MD WOODY Watson [facility]   9/29/2022  8:00 AM Harshil [facility]     [facility] follow up appointment(s): Will follow up with Gyn in 3 months    Plan:  Continue weekly outreaches to provide telephonic support, education and resources as needed.    Discuss / follow up on:   Red Flag symptoms to report  Symptom management  Utilization of appropriate level of care  Goal Progress  Discuss Plan of Care  Assess for Ongoing Needs    Patient verbalized understanding and is agreeable. Sylvia Markham, MSN RN  Associate Care Manager  Phone: [phone]  Email: [email or web address]

## 2022-08-01 ENCOUNTER — CARE COORDINATION (OUTPATIENT)
Dept: OTHER | Facility: CLINIC | Age: 27
End: 2022-08-01

## 2022-08-03 ENCOUNTER — HOSPITAL ENCOUNTER (OUTPATIENT)
Dept: PULMONOLOGY | Age: 27
Discharge: HOME | End: 2022-08-03
Payer: COMMERCIAL

## 2022-08-03 DIAGNOSIS — J45.990 EXERCISE-INDUCED BRONCHOCONSTRICTION: ICD-10-CM

## 2022-08-03 PROCEDURE — 94060 EVALUATION OF WHEEZING: CPT

## 2022-08-03 PROCEDURE — 94729 DIFFUSING CAPACITY: CPT

## 2022-08-03 PROCEDURE — 94726 PLETHYSMOGRAPHY LUNG VOLUMES: CPT

## 2022-08-03 PROCEDURE — 94640 AIRWAY INHALATION TREATMENT: CPT

## 2022-08-03 PROCEDURE — 94664 DEMO&/EVAL PT USE INHALER: CPT

## 2022-08-03 NOTE — CARE COORDINATION
Associate Care Manager called patient for CC follow up regarding symptoms and follow up appts. No answer; HIPAA compliant voicemail message left with request for return call at patient's convenience. Will continue to follow. ISRRAEL Paul RN  Associate Care Manager  Phone: [phone]  Email: [email or web address]

## 2022-08-05 ENCOUNTER — CARE COORDINATION (OUTPATIENT)
Dept: OTHER | Facility: CLINIC | Age: 27
End: 2022-08-05

## 2022-08-05 NOTE — CARE COORDINATION
ACM called patient for CC follow up; no answer. HIPAA compliant voicemail message left with request for return call at patient's convenience. ACM also sent lost to follow up letter via [address] Will continue to follow. ISRRAEL Ceballos RN  Associate Care Manager  Phone: [phone]  Email: [email or web address]

## 2022-08-08 ENCOUNTER — OFFICE VISIT (OUTPATIENT)
Dept: FAMILY MEDICINE CLINIC | Age: 27
End: 2022-08-08
Payer: COMMERCIAL

## 2022-08-08 VITALS
DIASTOLIC BLOOD PRESSURE: 68 MMHG | BODY MASS INDEX: 29.91 KG/M2 | HEART RATE: 80 BPM | SYSTOLIC BLOOD PRESSURE: 104 MMHG | WEIGHT: 177 LBS | TEMPERATURE: 97.8 F | OXYGEN SATURATION: 97 %

## 2022-08-08 DIAGNOSIS — J45.40 MODERATE PERSISTENT ASTHMA WITHOUT COMPLICATION: Primary | ICD-10-CM

## 2022-08-08 PROCEDURE — G8427 DOCREV CUR MEDS BY ELIG CLIN: HCPCS | Performed by: INTERNAL MEDICINE

## 2022-08-08 PROCEDURE — 1036F TOBACCO NON-USER: CPT | Performed by: INTERNAL MEDICINE

## 2022-08-08 PROCEDURE — G8417 CALC BMI ABV UP PARAM F/U: HCPCS | Performed by: INTERNAL MEDICINE

## 2022-08-08 PROCEDURE — 99213 OFFICE O/P EST LOW 20 MIN: CPT | Performed by: INTERNAL MEDICINE

## 2022-08-08 RX ORDER — MONTELUKAST SODIUM 10 MG/1
10 TABLET ORAL DAILY
Qty: 90 TABLET | Refills: 1 | Status: SHIPPED | OUTPATIENT
Start: 2022-08-08

## 2022-08-08 ASSESSMENT — ENCOUNTER SYMPTOMS
SHORTNESS OF BREATH: 0
ABDOMINAL PAIN: 0
ANAL BLEEDING: 0
BLOOD IN STOOL: 0
VOMITING: 0
WHEEZING: 0
DIARRHEA: 0
CONSTIPATION: 0
NAUSEA: 0
CHEST TIGHTNESS: 0
CHOKING: 0
COUGH: 0

## 2022-08-08 ASSESSMENT — VISUAL ACUITY: OU: 1

## 2022-08-08 NOTE — LETTER
[address]  [address]  [facility] [address]  Phone: [phone]  Fax: [phone]    Oumou Parisi MD        August 8, 2022     Patient: Armen Corona   YOB: 1995   Date of Visit: 8/8/2022       To Whom It May Concern: It is my medical opinion that Bry Rodriguez may return to full duty immediately with no restrictions. If you have any questions or concerns, please don't hesitate to call.     Sincerely,        Oumou Parisi MD

## 2022-08-08 NOTE — PROGRESS NOTES
[facility]  [facility] [address]  [address] [facility] [address]  Dept: [phone]  Dept Fax: [phone]      Kirk Ashford is a 33 y.o. female who presents today for hermedical conditions/complaints as noted below. Kirk Ashford is c/o of Breathing Problem and Letter for School/Work (To return to work)        Assessment/Plan:     1. Moderate persistent asthma without complication  -     montelukast (SINGULAIR) 10 MG tablet; Take 1 tablet by mouth in the morning., Disp-90 tablet, R-1Normal  Continue symbicort and PRN albuterol - continue for six months then consider weaning         No follow-ups on file. HPI     Asthma:  Current treatment includes beta agonist inhalers, combination beta agonists/steroid inhalers, leukotriene antagonists. Using preventive medication(s) consistently: yes. Residual symptoms: none. Patient denies any other symptoms. She requires her rescue inhaler 1 time(s) per week. Denies nighttime cough or dyspnea.          BP Readings from Last 3 Encounters:   08/08/22 104/68   07/27/22 122/82   07/27/22 114/74              Past Medical History:   Diagnosis Date    Asthma     as a child    Cholecystitis     E. coli UTI (urinary tract infection) 5/14/2018    Gall stones     Kidney stone     Patient denies    Pyelonephritis 2/15/2019    S/p lap right salpingectomy w/ evacuation of hemoperitoneum 12/10/21 12/10/2021    2/2 ectopic pregnancy    UTI (lower urinary tract infection)       Past Surgical History:   Procedure Laterality Date    ABDOMEN SURGERY  12/10/2021    DIAGNOSTIC LAPAROSCOPY, UNILATERAL SALPINGECTOMY RIGHT    ARM SURGERY Right     BREAST REDUCTION SURGERY  07/25/2018    CYSTOSCOPY N/A 09/04/2019    CYSTOSCOPY RETROGRADE PYELOGRAM WITH  CYSTOGRAM, performed by Estevan Daugherty MD at [address] [place] [address] N/A 12/03/2021    DILATATION AND CURETTAGE SUCTION performed by Lyly Olguin MD at [address] [place] [address] 12/10/2021    DIAGNOSTIC LAPAROSCOPY, UNILATERAL SALPINGECTOMY performed by Kennyth Bernheim, MD at [address] N/A 05/18/2018    CHOLECYSTECTOMY LAPAROSCOPIC performed by Esequiel Rivers MD at [address] [facility]         No family history on file. Social History     Tobacco Use    Smoking status: Never    Smokeless tobacco: Never   Substance Use Topics    Alcohol use: Yes     Alcohol/week: 2.0 standard drinks     Types: 1 Glasses of wine, 1 Shots of liquor per week     Comment: social drinker         Allergies   Allergen Reactions    Amoxicillin Hives    Lactose      Can have milk in foods, but not by itself (I.e., glass of milk)    Other Rash     Can have milk in foods, but not by itself (I.e., glass of milk)     Prior to Visit Medications    Medication Sig Taking? Authorizing Provider   acetaminophen (TYLENOL) 500 MG tablet Take 1,000 mg by mouth every 6 hours as needed for Pain Yes Historical Provider, MD   ibuprofen (ADVIL;MOTRIN) 200 MG tablet Take 400 mg by mouth every 6 hours as needed for Pain Yes Historical Provider, MD   meloxicam (MOBIC) 15 MG tablet Take 1 tablet by mouth in the morning. Yes KEMAR Cheung NP   budesonide-formoterol (SYMBICORT) 160-4.5 MCG/ACT AERO Inhale 2 puffs into the lungs in the morning and 2 puffs before bedtime. Yes KEMAR Cheung NP   norethindrone-ethinyl estradiol (JUNEL FE 1/20) 1-20 MG-MCG per tablet Take 1 tablet by mouth in the morning. Yes Iraida Soto DO   montelukast (SINGULAIR) 10 MG tablet Take 1 tablet by mouth in the morning.  Yes KEMAR Vargas NP   albuterol sulfate HFA (VENTOLIN HFA) 108 (90 Base) MCG/ACT inhaler Inhale 2 puffs into the lungs every 4 hours as needed for Wheezing or Shortness of Breath Yes Inge Soriano MD   loratadine (CLARITIN) 10 MG tablet Take 1 tablet by mouth daily Yes Inge Soriano MD   fluticasone (FLONASE) 50 MCG/ACT nasal spray 1 spray by Nasal route daily Yes Inge Quiles MD   hydrocortisone 2.5 % ointment Apply topically 2 times daily. Patient taking differently: Apply topically 2 times daily. Using PRN Yes Kiah Godoy MD       Review of Systems     Review of Systems   Constitutional:  Negative for fatigue, fever and unexpected weight change. Respiratory:  Negative for cough, choking, chest tightness, shortness of breath and wheezing. Cardiovascular:  Negative for chest pain, palpitations and leg swelling. Gastrointestinal:  Negative for abdominal pain, anal bleeding, blood in stool, constipation, diarrhea, nausea and vomiting. Endocrine: Negative. Musculoskeletal:  Negative for joint swelling and myalgias. Skin: Negative. Neurological:  Negative for dizziness. Psychiatric/Behavioral:  Negative for sleep disturbance. All other systems reviewed and are negative. Objective     /68   Pulse 80   Temp 97.8 °F (36.6 °C) (Temporal)   Wt 177 lb (80.3 kg)   LMP 07/12/2022   SpO2 97%   BMI 29.91 kg/m²   Physical Exam  Vitals and nursing note reviewed. Constitutional:       General: She is not in acute distress. Appearance: She is well-developed. She is not ill-appearing. Eyes:      General: Lids are normal. Vision grossly intact. Cardiovascular:      Rate and Rhythm: Normal rate and regular rhythm. Heart sounds: Normal heart sounds, S1 normal and S2 normal. No murmur heard. No friction rub. No gallop. Pulmonary:      Effort: Pulmonary effort is normal. No respiratory distress. Breath sounds: Normal breath sounds. No wheezing. Abdominal:      General: Bowel sounds are normal.      Palpations: Abdomen is soft. There is no mass. Tenderness: There is no abdominal tenderness. There is no guarding. Musculoskeletal:         General: Normal range of motion. Skin:     General: Skin is warm and dry. Capillary Refill: Capillary refill takes less than 2 seconds.    Neurological:      General:

## 2022-08-10 ENCOUNTER — TELEPHONE (OUTPATIENT)
Dept: FAMILY MEDICINE CLINIC | Age: 27
End: 2022-08-10

## 2022-08-12 ENCOUNTER — CARE COORDINATION (OUTPATIENT)
Dept: OTHER | Facility: CLINIC | Age: 27
End: 2022-08-12

## 2022-08-12 NOTE — CARE COORDINATION
ACM called patient for CC follow up; no answer. HIPAA compliant voicemail message left with request for return call at patient's convenience. ACM also sent final lost to follow up letter via [address] ACM signing off due to lost to follow up unless patient returns the call. ISRRAEL Alvarez RN  Associate Care Manager  Phone: [phone]  Email: [email or web address]

## 2022-08-15 DIAGNOSIS — J45.40 MODERATE PERSISTENT ASTHMA WITHOUT COMPLICATION: ICD-10-CM

## 2022-08-15 RX ORDER — MONTELUKAST SODIUM 10 MG/1
TABLET ORAL
Qty: 30 TABLET | OUTPATIENT
Start: 2022-08-15

## 2022-08-23 NOTE — PROCEDURES
[address]                  [facility] [address]                               PULMONARY FUNCTION    PATIENT NAME: Gerardo Cavazos                    :        1995  MED REC NO:   [id number]                             ROOM:  ACCOUNT NO:   [de-identified]                           ADMIT DATE: 2022  PROVIDER:     Danielle Reed MD    DATE OF PROCEDURE:  2022    Spirometry shows FVC is 3.80, 114% predicted. FEV1 is 3.25, 113%  predicted. Both are within normal limits. FEV1/FVC ratio is normal.   Post bronchodilator, there is no significant response to bronchodilator  to suggest bronchospasticity or airway reversibility. Lung volume shows  the residual volume is 1.39, 104% predicted. Total lung capacity is  4.98, 97% predicted, which is within normal limits. Diffusion capacity  is 27.99, 122% predicted within normal limits. IMPRESSION:  This pulmonary function test shows normal spirometry,  normal lung volume and diffusion capacity. Normal pulmonary function  test.  Clinical correlation is recommended.         Leighann Melgoza MD    D: 2022 14:21:41       T: 2022 14:23:54     [email or web address]  Job#: [id number]     Doc#: [id number]    CC:

## 2022-08-30 ENCOUNTER — APPOINTMENT (OUTPATIENT)
Dept: GENERAL RADIOLOGY | Age: 27
DRG: 872 | End: 2022-08-30
Payer: COMMERCIAL

## 2022-08-30 ENCOUNTER — HOSPITAL ENCOUNTER (EMERGENCY)
Age: 27
Discharge: HOME | DRG: 872 | End: 2022-08-30
Attending: EMERGENCY MEDICINE
Payer: COMMERCIAL

## 2022-08-30 VITALS
DIASTOLIC BLOOD PRESSURE: 78 MMHG | RESPIRATION RATE: 11 BRPM | SYSTOLIC BLOOD PRESSURE: 91 MMHG | HEART RATE: 98 BPM | OXYGEN SATURATION: 95 % | TEMPERATURE: 98.2 F

## 2022-08-30 DIAGNOSIS — N12 PYELONEPHRITIS: Primary | ICD-10-CM

## 2022-08-30 LAB
ABSOLUTE EOS #: <0.03 K/UL (ref 0–0.44)
ABSOLUTE IMMATURE GRANULOCYTE: 0.05 K/UL (ref 0–0.3)
ABSOLUTE LYMPH #: 0.78 K/UL (ref 1.1–3.7)
ABSOLUTE MONO #: 0.69 K/UL (ref 0.1–1.2)
ANION GAP SERPL CALCULATED.3IONS-SCNC: 12 MMOL/L (ref 9–17)
BACTERIA: ABNORMAL
BASOPHILS # BLD: 0 % (ref 0–2)
BASOPHILS ABSOLUTE: 0.04 K/UL (ref 0–0.2)
BILIRUBIN URINE: NEGATIVE
BUN BLDV-MCNC: 8 MG/DL (ref 6–20)
CALCIUM SERPL-MCNC: 9 MG/DL (ref 8.6–10.4)
CHLORIDE BLD-SCNC: 100 MMOL/L (ref 98–107)
CO2: 22 MMOL/L (ref 20–31)
COLOR: YELLOW
CREAT SERPL-MCNC: 0.85 MG/DL (ref 0.5–0.9)
EOSINOPHILS RELATIVE PERCENT: 0 % (ref 1–4)
EPITHELIAL CELLS UA: ABNORMAL /HPF (ref 0–5)
GFR AFRICAN AMERICAN: >60 ML/MIN
GFR NON-AFRICAN AMERICAN: >60 ML/MIN
GFR SERPL CREATININE-BSD FRML MDRD: ABNORMAL ML/MIN/{1.73_M2}
GLUCOSE BLD-MCNC: 100 MG/DL (ref 70–99)
GLUCOSE URINE: NEGATIVE
HCG(URINE) PREGNANCY TEST: NEGATIVE
HCT VFR BLD CALC: 44.7 % (ref 36.3–47.1)
HEMOGLOBIN: 15.8 G/DL (ref 11.9–15.1)
IMMATURE GRANULOCYTES: 0 %
KETONES, URINE: ABNORMAL
LEUKOCYTE ESTERASE, URINE: ABNORMAL
LYMPHOCYTES # BLD: 6 % (ref 24–43)
MCH RBC QN AUTO: 30.3 PG (ref 25.2–33.5)
MCHC RBC AUTO-ENTMCNC: 35.3 G/DL (ref 28.4–34.8)
MCV RBC AUTO: 85.8 FL (ref 82.6–102.9)
MONOCYTES # BLD: 5 % (ref 3–12)
MUCUS: ABNORMAL
NITRITE, URINE: NEGATIVE
NRBC AUTOMATED: 0 PER 100 WBC
PDW BLD-RTO: 12.9 % (ref 11.8–14.4)
PH UA: 8 (ref 5–8)
PLATELET # BLD: 205 K/UL (ref 138–453)
PMV BLD AUTO: 11.1 FL (ref 8.1–13.5)
POTASSIUM SERPL-SCNC: 3.5 MMOL/L (ref 3.7–5.3)
PROTEIN UA: ABNORMAL
RBC # BLD: 5.21 M/UL (ref 3.95–5.11)
RBC UA: ABNORMAL /HPF (ref 0–2)
S PYO AG THROAT QL: POSITIVE
SARS-COV-2, RAPID: NOT DETECTED
SEG NEUTROPHILS: 89 % (ref 36–65)
SEGMENTED NEUTROPHILS ABSOLUTE COUNT: 11.2 K/UL (ref 1.5–8.1)
SODIUM BLD-SCNC: 134 MMOL/L (ref 135–144)
SOURCE: ABNORMAL
SPECIFIC GRAVITY UA: 1.02 (ref 1–1.03)
SPECIMEN DESCRIPTION: NORMAL
TURBIDITY: ABNORMAL
URINE HGB: ABNORMAL
UROBILINOGEN, URINE: NORMAL
WBC # BLD: 12.8 K/UL (ref 3.5–11.3)
WBC UA: ABNORMAL /HPF (ref 0–5)

## 2022-08-30 PROCEDURE — 80048 BASIC METABOLIC PNL TOTAL CA: CPT

## 2022-08-30 PROCEDURE — 96375 TX/PRO/DX INJ NEW DRUG ADDON: CPT

## 2022-08-30 PROCEDURE — 99285 EMERGENCY DEPT VISIT HI MDM: CPT

## 2022-08-30 PROCEDURE — 6360000002 HC RX W HCPCS

## 2022-08-30 PROCEDURE — 81001 URINALYSIS AUTO W/SCOPE: CPT

## 2022-08-30 PROCEDURE — 96361 HYDRATE IV INFUSION ADD-ON: CPT

## 2022-08-30 PROCEDURE — 2580000003 HC RX 258

## 2022-08-30 PROCEDURE — 87880 STREP A ASSAY W/OPTIC: CPT

## 2022-08-30 PROCEDURE — 93005 ELECTROCARDIOGRAM TRACING: CPT

## 2022-08-30 PROCEDURE — 71046 X-RAY EXAM CHEST 2 VIEWS: CPT

## 2022-08-30 PROCEDURE — 87086 URINE CULTURE/COLONY COUNT: CPT

## 2022-08-30 PROCEDURE — 87635 SARS-COV-2 COVID-19 AMP PRB: CPT

## 2022-08-30 PROCEDURE — 87186 SC STD MICRODIL/AGAR DIL: CPT

## 2022-08-30 PROCEDURE — 6370000000 HC RX 637 (ALT 250 FOR IP)

## 2022-08-30 PROCEDURE — 85025 COMPLETE CBC W/AUTO DIFF WBC: CPT

## 2022-08-30 PROCEDURE — 96374 THER/PROPH/DIAG INJ IV PUSH: CPT

## 2022-08-30 PROCEDURE — 81025 URINE PREGNANCY TEST: CPT

## 2022-08-30 PROCEDURE — 87077 CULTURE AEROBIC IDENTIFY: CPT

## 2022-08-30 RX ORDER — CEPHALEXIN 500 MG/1
500 CAPSULE ORAL 3 TIMES DAILY
Qty: 30 CAPSULE | Refills: 0 | Status: ON HOLD | OUTPATIENT
Start: 2022-08-30 | End: 2022-09-02 | Stop reason: HOSPADM

## 2022-08-30 RX ORDER — 0.9 % SODIUM CHLORIDE 0.9 %
500 INTRAVENOUS SOLUTION INTRAVENOUS ONCE
Status: COMPLETED | OUTPATIENT
Start: 2022-08-30 | End: 2022-08-30

## 2022-08-30 RX ORDER — CEPHALEXIN 250 MG/1
500 CAPSULE ORAL ONCE
Status: COMPLETED | OUTPATIENT
Start: 2022-08-30 | End: 2022-08-30

## 2022-08-30 RX ORDER — ACETAMINOPHEN 325 MG/1
650 TABLET ORAL ONCE
Status: COMPLETED | OUTPATIENT
Start: 2022-08-30 | End: 2022-08-30

## 2022-08-30 RX ORDER — ONDANSETRON 4 MG/1
4 TABLET, FILM COATED ORAL ONCE
Status: COMPLETED | OUTPATIENT
Start: 2022-08-30 | End: 2022-08-30

## 2022-08-30 RX ORDER — ONDANSETRON 2 MG/ML
4 INJECTION INTRAMUSCULAR; INTRAVENOUS ONCE
Status: COMPLETED | OUTPATIENT
Start: 2022-08-30 | End: 2022-08-30

## 2022-08-30 RX ORDER — KETOROLAC TROMETHAMINE 30 MG/ML
30 INJECTION, SOLUTION INTRAMUSCULAR; INTRAVENOUS ONCE
Status: COMPLETED | OUTPATIENT
Start: 2022-08-30 | End: 2022-08-30

## 2022-08-30 RX ORDER — ONDANSETRON 4 MG/1
4 TABLET, ORALLY DISINTEGRATING ORAL EVERY 8 HOURS PRN
Qty: 15 TABLET | Refills: 0 | Status: ON HOLD | OUTPATIENT
Start: 2022-08-30 | End: 2022-09-02 | Stop reason: HOSPADM

## 2022-08-30 RX ORDER — IBUPROFEN 600 MG/1
600 TABLET ORAL 3 TIMES DAILY PRN
Qty: 30 TABLET | Refills: 0 | Status: SHIPPED | OUTPATIENT
Start: 2022-08-30 | End: 2022-10-19

## 2022-08-30 RX ADMIN — CEPHALEXIN 500 MG: 250 CAPSULE ORAL at 18:24

## 2022-08-30 RX ADMIN — ACETAMINOPHEN 650 MG: 325 TABLET ORAL at 17:31

## 2022-08-30 RX ADMIN — ONDANSETRON HYDROCHLORIDE 4 MG: 4 TABLET, FILM COATED ORAL at 21:10

## 2022-08-30 RX ADMIN — SODIUM CHLORIDE 500 ML: 9 INJECTION, SOLUTION INTRAVENOUS at 15:21

## 2022-08-30 RX ADMIN — KETOROLAC TROMETHAMINE 30 MG: 30 INJECTION, SOLUTION INTRAMUSCULAR; INTRAVENOUS at 18:24

## 2022-08-30 RX ADMIN — ONDANSETRON 4 MG: 2 INJECTION INTRAMUSCULAR; INTRAVENOUS at 15:21

## 2022-08-30 RX ADMIN — SODIUM CHLORIDE 500 ML: 9 INJECTION, SOLUTION INTRAVENOUS at 18:25

## 2022-08-30 ASSESSMENT — PAIN DESCRIPTION - ORIENTATION
ORIENTATION: MID;RIGHT;LEFT
ORIENTATION: LEFT;RIGHT;MID

## 2022-08-30 ASSESSMENT — ENCOUNTER SYMPTOMS
VOMITING: 1
SHORTNESS OF BREATH: 0
CHEST TIGHTNESS: 1
NAUSEA: 1
DIARRHEA: 0
COUGH: 0
SORE THROAT: 1

## 2022-08-30 ASSESSMENT — PAIN SCALES - GENERAL
PAINLEVEL_OUTOF10: 9
PAINLEVEL_OUTOF10: 5
PAINLEVEL_OUTOF10: 7
PAINLEVEL_OUTOF10: 8
PAINLEVEL_OUTOF10: 7

## 2022-08-30 ASSESSMENT — PAIN DESCRIPTION - LOCATION
LOCATION: ABDOMEN;BACK
LOCATION: THROAT
LOCATION: ABDOMEN;BACK

## 2022-08-30 ASSESSMENT — PAIN DESCRIPTION - DESCRIPTORS
DESCRIPTORS: THROBBING;SPASM
DESCRIPTORS: THROBBING;STABBING
DESCRIPTORS: DISCOMFORT;SHARP;DULL

## 2022-08-30 NOTE — ED PROVIDER NOTES
[facility]  ED  Emergency Department Encounter  Emergency Medicine Resident     Pt Name: Liat Ballard  MRN: [id number]  [place] 1995  Date of evaluation: 8/30/22  PCP:  Paolo Summers MD    [address] [place] [address]       Chief Complaint   Patient presents with    Fever    Pharyngitis    Nausea    Emesis       HISTORY OFPRESENT ILLNESS  (Location/Symptom, Timing/Onset, Context/Setting, Quality, Duration, Modifying [place].)      Liat Balladr is a 33 y.o. female past medical history significant for asthma , recurrent UTIs and multiple prior episodes of pyelonephritis who presents with urinary urgency, frequency and dysuria accompanied with severe lower abdominal pain and flank pain that has been going on for the past 2 days. She states being seen in the emergency room about a month ago for similar complaints and was discharged on a course of 7-day antibiotic which she completed. She also endorses having sore throat, fever/chills, and nausea and 2 episodes of vomiting that has been going on for the past couple days. She also endorses chest discomfort and difficulty breathing that she attributes to the pain. PAST MEDICAL / SURGICAL / SOCIAL / FAMILY HISTORY      has a past medical history of Asthma, Cholecystitis, E. coli UTI (urinary tract infection), Gall stones, Kidney stone, Pyelonephritis, S/p lap right salpingectomy w/ evacuation of hemoperitoneum 12/10/21, and UTI (lower urinary tract infection). has a past surgical history that includes Arm Surgery (Right); [place] tooth extraction; pr lap,cholecystectomy (N/A, 05/18/2018); Breast reduction surgery (07/25/2018); Cystoscopy (N/A, 09/04/2019); Dilation and curettage of uterus (N/A, 12/03/2021); Abdomen surgery (12/10/2021); and laparoscopy (Right, 12/10/2021).     Social History     Socioeconomic History    Marital status: Single     Spouse name: Not on file    Number of children: Not on file    Years of education: Not on file    Highest education level: Not on file   Occupational History    Not on file   Tobacco Use    Smoking status: Never    Smokeless tobacco: Never   Vaping Use    Vaping Use: Never used   Substance and Sexual Activity    Alcohol use: Yes     Alcohol/week: 2.0 standard drinks     Types: 1 Glasses of wine, 1 Shots of liquor per week     Comment: social drinker     Drug use: No    Sexual activity: Yes     Partners: Male   Other Topics Concern    Not on file   Social History Narrative    Not on file     Social Determinants of Health     Financial Resource Strain: Low Risk     Difficulty of Paying Living Expenses: Not very hard   Food Insecurity: No Food Insecurity    Worried About Running Out of Food in the Last Year: Never true    Ran Out of Food in the Last Year: Never true   Transportation Needs: Not on file   Physical Activity: Not on file   Stress: Not on file   Social Connections: Not on file   Intimate Partner Violence: Not on file   Housing Stability: Not on file       History reviewed. No pertinent family history. Allergies:  Amoxicillin, Lactose, and Other    Home Medications:  Prior to Admission medications    Medication Sig Start Date End Date Taking? Authorizing Provider   cephALEXin (KEFLEX) 500 MG capsule Take 1 capsule by mouth 3 times daily for 10 days 8/30/22 9/9/22 Yes Monica Schaefer MD   ondansetron (ZOFRAN ODT) 4 MG disintegrating tablet Take 1 tablet by mouth every 8 hours as needed for Nausea or Vomiting 8/30/22 9/4/22 Yes Monica Schaefer MD   ibuprofen (ADVIL;MOTRIN) 600 MG tablet Take 1 tablet by mouth 3 times daily as needed for Pain 8/30/22 9/9/22 Yes Monica Schaefer MD   montelukast (SINGULAIR) 10 MG tablet Take 1 tablet by mouth in the morning.  8/8/22   Inge Fernandez MD   acetaminophen (TYLENOL) 500 MG tablet Take 1,000 mg by mouth every 6 hours as needed for Pain    Historical Provider, MD   meloxicam (MOBIC) 15 MG tablet Take 1 tablet by mouth in the morning. 7/27/22 Thien Fonseca, APRN - NP   budesonide-formoterol [facility]) 160-4.5 MCG/ACT AERO Inhale 2 puffs into the lungs in the morning and 2 puffs before bedtime. 7/27/22   Thien Fonseca, APRN - NP   norethindrone-ethinyl estradiol (JUNEL FE 1/20) 1-20 MG-MCG per tablet Take 1 tablet by mouth in the morning. 7/19/22   Isis Soto DO   albuterol sulfate HFA (VENTOLIN HFA) 108 (90 Base) MCG/ACT inhaler Inhale 2 puffs into the lungs every 4 hours as needed for Wheezing or Shortness of Breath 6/20/22 6/20/23  Inge Cornelius MD   loratadine (CLARITIN) 10 MG tablet Take 1 tablet by mouth daily 6/20/22   Inge Cornelius MD   fluticasone [facility]) 50 MCG/ACT nasal spray 1 spray by Nasal route daily 6/20/22   Inge Cornelius MD   hydrocortisone 2.5 % ointment Apply topically 2 times daily. Patient taking differently: Apply topically 2 times daily. Using PRN 6/20/22   Inge Cornelius MD       REVIEW OF SYSTEMS    (2-9 systems for level 4, 10 or more for level 5)      Review of Systems   Constitutional:  Positive for chills, fatigue and fever. HENT:  Positive for sore throat. Respiratory:  Positive for chest tightness. Negative for cough and shortness of breath. Cardiovascular:  Negative for chest pain. Gastrointestinal:  Positive for nausea and vomiting. Negative for diarrhea. Genitourinary:  Positive for dysuria, flank pain and urgency. Negative for frequency, vaginal discharge and vaginal pain. Skin:  Negative for rash and wound. Neurological:  Positive for headaches. Negative for dizziness, weakness, light-headedness and numbness. Psychiatric/Behavioral:  Negative for confusion and hallucinations. PHYSICAL EXAM   (up to 7 for level 4, 8 or more for level 5)     INITIAL VITALS:    oral temperature is 98.2 °F (36.8 °C). Her blood pressure is 91/78 and her pulse is 98. Her respiration is 11 and oxygen saturation is 95%. Physical Exam  Vitals reviewed.    Constitutional: Appearance: She is well-developed. HENT:      Head: Normocephalic and atraumatic. Nose: No congestion or rhinorrhea. Mouth/Throat:      Mouth: Mucous membranes are dry. No oral lesions. Pharynx: Uvula midline. Posterior oropharyngeal erythema present. No oropharyngeal exudate or uvula swelling. Tonsils: Tonsillar exudate present. No tonsillar abscesses. Cardiovascular:      Rate and Rhythm: Regular rhythm. Tachycardia present. Heart sounds: No murmur heard. No gallop. Pulmonary:      Effort: Pulmonary effort is normal. No respiratory distress. Breath sounds: No wheezing. Abdominal:      General: Bowel sounds are normal. There is no distension. Palpations: Abdomen is soft. Tenderness: There is right CVA tenderness and left CVA tenderness. Comments: Suprapubic tenderness noted on physical exam.   Skin:     General: Skin is warm. Findings: No erythema or rash. Neurological:      General: No focal deficit present. Mental Status: She is alert and oriented to person, place, and time. Psychiatric:         Mood and Affect: Mood normal.         Behavior: Behavior normal.       DIFFERENTIAL  DIAGNOSIS     PLAN (LABS / IMAGING / EKG):  Orders Placed This Encounter   Procedures    Culture, Urine    COVID-19, Rapid    STREP SCREEN GROUP A THROAT    XR CHEST (2 VW)    Urinalysis with Microscopic    PREGNANCY, URINE    CBC with Auto Differential    BMP    EKG 12 Lead       MEDICATIONS ORDERED:  Orders Placed This Encounter   Medications    0.9 % sodium chloride bolus    ondansetron (ZOFRAN) injection 4 mg    acetaminophen (TYLENOL) tablet 650 mg    0.9 % sodium chloride bolus    ketorolac (TORADOL) injection 30 mg    cephALEXin (KEFLEX) capsule 500 mg     Order Specific Question:   Antimicrobial Indications     Answer:   Urinary Tract Infection     Order Specific Question:   UTI duration of therapy     Answer:    Other    cephALEXin (KEFLEX) 500 MG capsule Sig: Take 1 capsule by mouth 3 times daily for 10 days     Dispense:  30 capsule     Refill:  0    ondansetron (ZOFRAN ODT) 4 MG disintegrating tablet     Sig: Take 1 tablet by mouth every 8 hours as needed for Nausea or Vomiting     Dispense:  15 tablet     Refill:  0    ibuprofen (ADVIL;MOTRIN) 600 MG tablet     Sig: Take 1 tablet by mouth 3 times daily as needed for Pain     Dispense:  30 tablet     Refill:  0    ondansetron (ZOFRAN) tablet 4 mg       DDX: UTI, pyelonephritis, strep throat, COVID    Initial MDM/Plan: Matthew Quiroga is a 33 y.o. female past medical history significant for asthma , recurrent UTIs and multiple prior episodes of pyelonephritis who presents with urinary urgency, frequency and dysuria accompanied with severe lower abdominal pain and flank pain that has been going on for the past 2 days. She endorses being seen in the emergency room about a month ago for similar complaints and was discharged on a course of 7-day antibiotic which she completed. She also endorses having sore throat, fever/chills, and nausea and 2 episodes of vomiting that has been going on for the past couple days. She also endorses chest discomfort and difficulty breathing that she attributes to the pain. UA positive, highly likely that the patient has pyelonephritis. She received first dose of Keflex while in ED we will plan to discharge home with Keflex 500, 3 times daily for 10 days. Keflex will also cover strep throat. She received Zofran for nausea Tylenol and one-time dose of Toradol for pain. Will discharge patient on 5-day supply of Zofran and Tylenol. Chest x-ray, EKG unremarkable. Rapid COVID test-negative. Advised patient to return to ED in case of persistent or worsening symptoms.     DIAGNOSTIC RESULTS / EMERGENCY DEPARTMENT COURSE / MDM     LABS:  Labs Reviewed   STREP SCREEN GROUP A THROAT - Abnormal; Notable for the following components:       Result Value    Strep A Ag POSITIVE (*)     All other components within normal limits   URINALYSIS WITH MICROSCOPIC - Abnormal; Notable for the following components:    Turbidity UA Cloudy (*)     Ketones, Urine MODERATE (*)     Urine Hgb MODERATE (*)     Protein, UA 1+ (*)     Leukocyte Esterase, Urine MODERATE (*)     Bacteria, UA MANY (*)     Mucus, UA 3+ (*)     All other components within normal limits   CBC WITH AUTO DIFFERENTIAL - Abnormal; Notable for the following components:    WBC 12.8 (*)     RBC 5.21 (*)     Hemoglobin 15.8 (*)     MCHC 35.3 (*)     Seg Neutrophils 89 (*)     Lymphocytes 6 (*)     Eosinophils % 0 (*)     Segs Absolute 11.20 (*)     Absolute Lymph # 0.78 (*)     All other components within normal limits   BASIC METABOLIC PANEL - Abnormal; Notable for the following components:    Glucose 100 (*)     Sodium 134 (*)     Potassium 3.5 (*)     All other components within normal limits   COVID-19, RAPID   CULTURE, URINE   PREGNANCY, URINE         RADIOLOGY:  XR CHEST (2 VW)    Result Date: 8/30/2022  EXAMINATION: TWO XRAY VIEWS OF THE CHEST 8/30/2022 3:33 pm COMPARISON: PA and lateral chest July 18, 2022. CT of the abdomen and pelvis May 15, 2018. HISTORY: ORDERING SYSTEM PROVIDED HISTORY: pleuritic chest pain TECHNOLOGIST PROVIDED HISTORY: pleuritic chest pain FINDINGS: Heart is normal in size. No evidence of pneumothorax, pleural effusion, infiltrate, or abnormal lung mass. Osseous structures are unremarkable in appearance. Central pulmonary vascularity appears normal.     Unremarkable PA and lateral chest.  No evidence of acute cardiopulmonary process. EKG  Sinus tachycardia  Otherwise normal ECG  No previous ECGs available    All EKG's are interpreted by the Emergency Department Physician who either signs or Co-signs this chart in the absence of a cardiologist.    PROCEDURES:  None    CONSULTS:  None    CRITICAL CARE:  Please see attending note    FINAL IMPRESSION      1.  Pyelonephritis      DISPOSITION / PLAN     DISPOSITION Decision To Discharge 08/30/2022 08:42:37 PM    PATIENTREFERRED TO:  Inge Lees, [address] [place] [id number] [phone]    Schedule an appointment as soon as possible for a visit in 1 week  If symptoms worsen    DISCHARGE MEDICATIONS:  Discharge Medication List as of 8/30/2022  8:48 PM        START taking these medications    Details   cephALEXin (KEFLEX) 500 MG capsule Take 1 capsule by mouth 3 times daily for 10 days, Disp-30 capsule, R-0Print      ondansetron (ZOFRAN ODT) 4 MG disintegrating tablet Take 1 tablet by mouth every 8 hours as needed for Nausea or Vomiting, Disp-15 tablet, R-0Print             Gypsy Johnson MD  EmergencyMedicine Resident    (Please note that portions of this note were completed with a voice recognition program.  Efforts were made to edit the dictations but occasionally words are mis-transcribed.)      Gypsy Johnson MD  Resident  08/30/22 [id number]

## 2022-08-30 NOTE — ED NOTES
The following labs were labeled with appropriate pt sticker and tubed to lab:     [] Blue     [] Sara Rocks   [] on ice  [] Green/yellow  [] Green/black [] on ice  [] Juliane Sniff  [] on ice  [] Yellow  [] Red  [] Pink  [] ABG  [] VBG    [] COVID-19 swab    [] Rapid  [] PCR  [] Flu swab  [] Peds Viral Panel     [x] Urine Sample  [] Pelvic Cultures  [] Blood Cultures  [] X 2  [] STREP Cultures         Ludy Sinha RN  08/30/22 [id number]

## 2022-08-30 NOTE — ED NOTES
The following labs were labeled with appropriate pt sticker and tubed to lab:     [] Blue     [] Arlis Gaunt   [] on ice  [] Green/yellow  [] Green/black [] on ice  [] [employer]  [] on ice  [] Yellow  [] Red  [] Pink  [] ABG  [] VBG    [x] COVID-19 swab    [x] Rapid  [] PCR  [] Flu swab  [] Peds Viral Panel     [] Urine Sample  [] Pelvic Cultures  [] Blood Cultures  [] X 2  [x] STREP Cultures         Griffin Amaro RN  08/30/22 [id number]

## 2022-08-30 NOTE — ED NOTES
The following labs were labeled with appropriate pt sticker and tubed to lab:     [x] Blue     [x] Lavender   [] on ice  [x] Green/yellow  [x] Green/black [x] on ice  [] Tretha Shark  [] on ice  [] Yellow  [x] Red  [] Pink  [] ABG  [] VBG    [] COVID-19 swab    [] Rapid  [] PCR  [] Flu swab  [] Peds Viral Panel     [] Urine Sample  [] Pelvic Cultures  [] Blood Cultures  [] X 2  [] STREP Cultures         Hanny Call RN  08/30/22 [id number]

## 2022-08-30 NOTE — ED PROVIDER NOTES
[facility]     Emergency Department     Faculty Note/ Attestation      Pt Name: Roshni Beatty                                       MRN: [id number]  [place] 1995  Date of evaluation: 8/30/2022    Patients PCP:    Salomon Welch MD      Attestation  I performed a history and physical examination of the patient and discussed management with the resident. I reviewed the residents note and agree with the documented findings and plan of care. Any areas of disagreement are noted on the chart. I was personally present for the key portions of any procedures. I have documented in the chart those procedures where I was not present during the key portions. I have reviewed the emergency nurses triage note. I agree with the chief complaint, past medical history, past surgical history, allergies, medications, social and family history as documented unless otherwise noted below. For Physician Assistant/ Nurse Practitioner cases/documentation I have personally evaluated this patient and have completed at least one if not all key elements of the E/M (history, physical exam, and MDM). Additional findings are as noted.       Initial Screens:        [place] Coma Scale  Eye Opening: Spontaneous  Best Verbal Response: Oriented  Best Motor Response: Obeys commands  [place] Coma Scale Score: 15    Vitals:    Vitals:    08/30/22 1217 08/30/22 1218   Pulse: (!) 109    Resp: 18    Temp: 98.2 °F (36.8 °C)    TempSrc: Oral    SpO2:  100%       CHIEF COMPLAINT       Chief Complaint   Patient presents with    Fever    Pharyngitis    Nausea    Emesis             DIAGNOSTIC RESULTS             RADIOLOGY:   No orders to display         LABS:  Labs Reviewed - No data to display      EMERGENCY DEPARTMENT COURSE:     -------------------------   , Temp: 98.2 °F (36.8 °C), Heart Rate: (!) 109, Resp: 18      Comments    F/c, , dysuria as well as ST  CVA TTP and suprapubic discomfort  Prior UTI/pyelo  Also with CP, SOB - has this intermittently in the past    UA, labs, fluids, COVID,     She was COVID-negative but strep positive, waiting on urine sample and will treat urine if needed, otherwise will discharge with antibiotics.   Signout to Dr. Jyoti Jenkins      (Please note that portions of this note were completed with a voice recognition program.  Efforts were made to edit the dictations but occasionally words are mis-transcribed.)      Orly Caballero MD,, MD  Attending Emergency Physician         Orly Caballero MD  08/30/22 5039

## 2022-08-30 NOTE — ED PROVIDER NOTES
[facility] ED  Emergency Department  Faculty Sign-Out Addendum     Care of Fabiola Alicea was assumed from previous attending and is being seen for Fever, Pharyngitis, Nausea, and Emesis  . The patient's initial evaluation and plan have been discussed with the prior provider who initially evaluated the patient. Handoff taken on the following patient from prior Attending Physician:    [address]    I was available and discussed any additional care issues that arose and coordinated the management plans with the resident(s) caring for the patient during my duty period. Any areas of disagreement with residents documentation of care or procedures are noted on the chart. I was personally present for the key portions of any/all procedures during my duty period. I have documented in the chart those procedures where I was not present during the key portions.       EMERGENCY DEPARTMENT COURSE / MEDICAL DECISION MAKING:       MEDICATIONS GIVEN:  Orders Placed This Encounter   Medications    0.9 % sodium chloride bolus    ondansetron (ZOFRAN) injection 4 mg       LABS / RADIOLOGY:     Labs Reviewed   STREP SCREEN GROUP A THROAT - Abnormal; Notable for the following components:       Result Value    Strep A Ag POSITIVE (*)     All other components within normal limits   CBC WITH AUTO DIFFERENTIAL - Abnormal; Notable for the following components:    WBC 12.8 (*)     RBC 5.21 (*)     Hemoglobin 15.8 (*)     MCHC 35.3 (*)     Seg Neutrophils 89 (*)     Lymphocytes 6 (*)     Eosinophils % 0 (*)     Segs Absolute 11.20 (*)     Absolute Lymph # 0.78 (*)     All other components within normal limits   BASIC METABOLIC PANEL - Abnormal; Notable for the following components:    Glucose 100 (*)     Sodium 134 (*)     Potassium 3.5 (*)     All other components within normal limits   COVID-19, RAPID   CULTURE, URINE   URINALYSIS WITH MICROSCOPIC   PREGNANCY, URINE       XR CHEST (2 VW)    Result Date: 8/30/2022  EXAMINATION: TWO XRAY VIEWS OF THE CHEST 8/30/2022 3:33 pm COMPARISON: PA and lateral chest July 18, 2022. CT of the abdomen and pelvis May 15, 2018. HISTORY: ORDERING SYSTEM PROVIDED HISTORY: pleuritic chest pain TECHNOLOGIST PROVIDED HISTORY: pleuritic chest pain FINDINGS: Heart is normal in size. No evidence of pneumothorax, pleural effusion, infiltrate, or abnormal lung mass. Osseous structures are unremarkable in appearance. Central pulmonary vascularity appears normal.     Unremarkable PA and lateral chest.  No evidence of acute cardiopulmonary process. RECENT VITALS:     Temp: 98.2 °F (36.8 °C),  Heart Rate: 91, Resp: 11, BP: 122/79, SpO2: 98 %    This patient is a 33 y.o. Female with fever chills sore throat flank pain. Strep positive. Awaiting final results of urine testing. Antibiotics. Anticipate discharge.     OUTSTANDING TASKS / RECOMMENDATIONS:    Follow-up on lab results      Kaleen Koyanagi, MD, Alberto Finder  Attending Emergency Physician  [address]  ED       Anderson Liriano MD  08/30/22 [id number]

## 2022-08-30 NOTE — ED TRIAGE NOTES
Vomited around 0600 this morning, last time she also voided  Pt reports her fevers x 2 days at home 100.7 has been taking Tylenol for the fevers, last time she took Tylenol was 0630 this morning     Also reports unable to hold fluids and food down for the past 2 days     Reports no vaginal discharge,   Does report some delayed breathing, feels like a lot of pressure middle of her chest   Hx of asthma since age 14   does have bilateral flank, lower back pain

## 2022-08-30 NOTE — Clinical Note
Serge Gale was seen and treated in our emergency department on 8/30/2022. She may return to work on 09/01/2022. If you have any questions or concerns, please don't hesitate to call.       Pamela Gonzalez MD

## 2022-08-31 ENCOUNTER — APPOINTMENT (OUTPATIENT)
Dept: CT IMAGING | Age: 27
DRG: 872 | End: 2022-08-31
Payer: COMMERCIAL

## 2022-08-31 ENCOUNTER — APPOINTMENT (OUTPATIENT)
Dept: GENERAL RADIOLOGY | Age: 27
DRG: 872 | End: 2022-08-31
Payer: COMMERCIAL

## 2022-08-31 ENCOUNTER — HOSPITAL ENCOUNTER (INPATIENT)
Age: 27
LOS: 2 days | Discharge: HOME | DRG: 872 | End: 2022-09-02
Attending: EMERGENCY MEDICINE
Payer: COMMERCIAL

## 2022-08-31 DIAGNOSIS — N39.0 SEPSIS DUE TO GRAM-NEGATIVE UTI (HCC): ICD-10-CM

## 2022-08-31 DIAGNOSIS — A41.50 SEPSIS DUE TO GRAM-NEGATIVE UTI (HCC): ICD-10-CM

## 2022-08-31 DIAGNOSIS — A41.9 SEPSIS, DUE TO UNSPECIFIED ORGANISM, UNSPECIFIED WHETHER ACUTE ORGAN DYSFUNCTION PRESENT (HCC): Primary | ICD-10-CM

## 2022-08-31 PROBLEM — E87.6 HYPOKALEMIA: Status: ACTIVE | Noted: 2022-08-31

## 2022-08-31 PROBLEM — A49.9 BACTERIAL UTI: Status: ACTIVE | Noted: 2019-08-05

## 2022-08-31 PROBLEM — N12 PYELONEPHRITIS: Status: ACTIVE | Noted: 2022-08-31

## 2022-08-31 LAB
ABSOLUTE EOS #: <0.03 K/UL (ref 0–0.44)
ABSOLUTE IMMATURE GRANULOCYTE: 0.05 K/UL (ref 0–0.3)
ABSOLUTE LYMPH #: 0.93 K/UL (ref 1.1–3.7)
ABSOLUTE MONO #: 0.82 K/UL (ref 0.1–1.2)
ALBUMIN SERPL-MCNC: 3.9 G/DL (ref 3.5–5.2)
ALBUMIN/GLOBULIN RATIO: 1.3 (ref 1–2.5)
ALP BLD-CCNC: 60 U/L (ref 35–104)
ALT SERPL-CCNC: 46 U/L (ref 5–33)
ANION GAP SERPL CALCULATED.3IONS-SCNC: 12 MMOL/L (ref 9–17)
AST SERPL-CCNC: 61 U/L
BACTERIA: ABNORMAL
BASOPHILS # BLD: 0 % (ref 0–2)
BASOPHILS ABSOLUTE: 0.05 K/UL (ref 0–0.2)
BILIRUB SERPL-MCNC: 0.95 MG/DL (ref 0.3–1.2)
BILIRUBIN URINE: ABNORMAL
BUN BLDV-MCNC: 8 MG/DL (ref 6–20)
CALCIUM SERPL-MCNC: 8.4 MG/DL (ref 8.6–10.4)
CASTS UA: ABNORMAL /LPF (ref 0–8)
CHLORIDE BLD-SCNC: 103 MMOL/L (ref 98–107)
CO2: 23 MMOL/L (ref 20–31)
COLOR: ABNORMAL
CREAT SERPL-MCNC: 0.96 MG/DL (ref 0.5–0.9)
CULTURE: NO GROWTH
EKG ATRIAL RATE: 101 BPM
EKG P AXIS: 39 DEGREES
EKG P-R INTERVAL: 138 MS
EKG Q-T INTERVAL: 320 MS
EKG QRS DURATION: 76 MS
EKG QTC CALCULATION (BAZETT): 414 MS
EKG R AXIS: 39 DEGREES
EKG T AXIS: 24 DEGREES
EKG VENTRICULAR RATE: 101 BPM
EOSINOPHILS RELATIVE PERCENT: 0 % (ref 1–4)
EPITHELIAL CELLS UA: ABNORMAL /HPF (ref 0–5)
GFR AFRICAN AMERICAN: >60 ML/MIN
GFR NON-AFRICAN AMERICAN: >60 ML/MIN
GFR SERPL CREATININE-BSD FRML MDRD: ABNORMAL ML/MIN/{1.73_M2}
GLUCOSE BLD-MCNC: 115 MG/DL (ref 70–99)
GLUCOSE URINE: NEGATIVE
HCG QUALITATIVE: NEGATIVE
HCT VFR BLD CALC: 39.6 % (ref 36.3–47.1)
HEMOGLOBIN: 14.1 G/DL (ref 11.9–15.1)
IMMATURE GRANULOCYTES: 0 %
INR BLD: 1.1
KETONES, URINE: ABNORMAL
LACTIC ACID, SEPSIS WHOLE BLOOD: 1 MMOL/L (ref 0.5–1.9)
LACTIC ACID, SEPSIS WHOLE BLOOD: 2.5 MMOL/L (ref 0.5–1.9)
LEUKOCYTE ESTERASE, URINE: ABNORMAL
LYMPHOCYTES # BLD: 6 % (ref 24–43)
MCH RBC QN AUTO: 30.3 PG (ref 25.2–33.5)
MCHC RBC AUTO-ENTMCNC: 35.6 G/DL (ref 28.4–34.8)
MCV RBC AUTO: 85.2 FL (ref 82.6–102.9)
MONOCYTES # BLD: 6 % (ref 3–12)
NITRITE, URINE: NEGATIVE
NRBC AUTOMATED: 0 PER 100 WBC
PDW BLD-RTO: 13.1 % (ref 11.8–14.4)
PH UA: 7.5 (ref 5–8)
PLATELET # BLD: 185 K/UL (ref 138–453)
PMV BLD AUTO: 10.8 FL (ref 8.1–13.5)
POTASSIUM SERPL-SCNC: 3.3 MMOL/L (ref 3.7–5.3)
POTASSIUM SERPL-SCNC: 3.3 MMOL/L (ref 3.7–5.3)
PROTEIN UA: ABNORMAL
PROTHROMBIN TIME: 11.7 SEC (ref 9.1–12.3)
RBC # BLD: 4.65 M/UL (ref 3.95–5.11)
RBC UA: ABNORMAL /HPF (ref 0–4)
SEG NEUTROPHILS: 88 % (ref 36–65)
SEGMENTED NEUTROPHILS ABSOLUTE COUNT: 12.71 K/UL (ref 1.5–8.1)
SODIUM BLD-SCNC: 138 MMOL/L (ref 135–144)
SPECIFIC GRAVITY UA: 1.02 (ref 1–1.03)
SPECIMEN DESCRIPTION: NORMAL
TOTAL PROTEIN: 6.9 G/DL (ref 6.4–8.3)
TURBIDITY: ABNORMAL
URINE HGB: ABNORMAL
UROBILINOGEN, URINE: ABNORMAL
WBC # BLD: 14.6 K/UL (ref 3.5–11.3)
WBC UA: ABNORMAL /HPF (ref 0–5)

## 2022-08-31 PROCEDURE — 6370000000 HC RX 637 (ALT 250 FOR IP): Performed by: STUDENT IN AN ORGANIZED HEALTH CARE EDUCATION/TRAINING PROGRAM

## 2022-08-31 PROCEDURE — 85025 COMPLETE CBC W/AUTO DIFF WBC: CPT

## 2022-08-31 PROCEDURE — 84703 CHORIONIC GONADOTROPIN ASSAY: CPT

## 2022-08-31 PROCEDURE — 94761 N-INVAS EAR/PLS OXIMETRY MLT: CPT

## 2022-08-31 PROCEDURE — 6370000000 HC RX 637 (ALT 250 FOR IP): Performed by: INTERNAL MEDICINE

## 2022-08-31 PROCEDURE — 2580000003 HC RX 258: Performed by: STUDENT IN AN ORGANIZED HEALTH CARE EDUCATION/TRAINING PROGRAM

## 2022-08-31 PROCEDURE — 1200000000 HC SEMI PRIVATE

## 2022-08-31 PROCEDURE — 94760 N-INVAS EAR/PLS OXIMETRY 1: CPT

## 2022-08-31 PROCEDURE — 96375 TX/PRO/DX INJ NEW DRUG ADDON: CPT

## 2022-08-31 PROCEDURE — 99285 EMERGENCY DEPT VISIT HI MDM: CPT

## 2022-08-31 PROCEDURE — 94640 AIRWAY INHALATION TREATMENT: CPT

## 2022-08-31 PROCEDURE — 99223 1ST HOSP IP/OBS HIGH 75: CPT | Performed by: INTERNAL MEDICINE

## 2022-08-31 PROCEDURE — 84132 ASSAY OF SERUM POTASSIUM: CPT

## 2022-08-31 PROCEDURE — 81001 URINALYSIS AUTO W/SCOPE: CPT

## 2022-08-31 PROCEDURE — 93005 ELECTROCARDIOGRAM TRACING: CPT | Performed by: STUDENT IN AN ORGANIZED HEALTH CARE EDUCATION/TRAINING PROGRAM

## 2022-08-31 PROCEDURE — 6360000002 HC RX W HCPCS: Performed by: STUDENT IN AN ORGANIZED HEALTH CARE EDUCATION/TRAINING PROGRAM

## 2022-08-31 PROCEDURE — 96374 THER/PROPH/DIAG INJ IV PUSH: CPT

## 2022-08-31 PROCEDURE — 6370000000 HC RX 637 (ALT 250 FOR IP): Performed by: NURSE PRACTITIONER

## 2022-08-31 PROCEDURE — 74176 CT ABD & PELVIS W/O CONTRAST: CPT

## 2022-08-31 PROCEDURE — 36415 COLL VENOUS BLD VENIPUNCTURE: CPT

## 2022-08-31 PROCEDURE — 2580000003 HC RX 258: Performed by: NURSE PRACTITIONER

## 2022-08-31 PROCEDURE — 83605 ASSAY OF LACTIC ACID: CPT

## 2022-08-31 PROCEDURE — 70360 X-RAY EXAM OF NECK: CPT

## 2022-08-31 PROCEDURE — 87086 URINE CULTURE/COLONY COUNT: CPT

## 2022-08-31 PROCEDURE — 80053 COMPREHEN METABOLIC PANEL: CPT

## 2022-08-31 PROCEDURE — 85610 PROTHROMBIN TIME: CPT

## 2022-08-31 PROCEDURE — 87040 BLOOD CULTURE FOR BACTERIA: CPT

## 2022-08-31 RX ORDER — POTASSIUM CHLORIDE 20 MEQ/1
40 TABLET, EXTENDED RELEASE ORAL PRN
Status: DISCONTINUED | OUTPATIENT
Start: 2022-08-31 | End: 2022-09-02 | Stop reason: HOSPADM

## 2022-08-31 RX ORDER — 0.9 % SODIUM CHLORIDE 0.9 %
30 INTRAVENOUS SOLUTION INTRAVENOUS ONCE
Status: COMPLETED | OUTPATIENT
Start: 2022-08-31 | End: 2022-08-31

## 2022-08-31 RX ORDER — SODIUM CHLORIDE 0.9 % (FLUSH) 0.9 %
5-40 SYRINGE (ML) INJECTION EVERY 12 HOURS SCHEDULED
Status: DISCONTINUED | OUTPATIENT
Start: 2022-08-31 | End: 2022-09-02 | Stop reason: HOSPADM

## 2022-08-31 RX ORDER — MAGNESIUM SULFATE 1 G/100ML
1000 INJECTION INTRAVENOUS PRN
Status: DISCONTINUED | OUTPATIENT
Start: 2022-08-31 | End: 2022-09-02 | Stop reason: HOSPADM

## 2022-08-31 RX ORDER — BUDESONIDE AND FORMOTEROL FUMARATE DIHYDRATE 160; 4.5 UG/1; UG/1
2 AEROSOL RESPIRATORY (INHALATION) 2 TIMES DAILY
Status: DISCONTINUED | OUTPATIENT
Start: 2022-08-31 | End: 2022-09-02 | Stop reason: HOSPADM

## 2022-08-31 RX ORDER — ONDANSETRON 2 MG/ML
4 INJECTION INTRAMUSCULAR; INTRAVENOUS ONCE
Status: COMPLETED | OUTPATIENT
Start: 2022-08-31 | End: 2022-08-31

## 2022-08-31 RX ORDER — ONDANSETRON 4 MG/1
4 TABLET, ORALLY DISINTEGRATING ORAL EVERY 8 HOURS PRN
Status: DISCONTINUED | OUTPATIENT
Start: 2022-08-31 | End: 2022-09-02 | Stop reason: HOSPADM

## 2022-08-31 RX ORDER — ACETAMINOPHEN 325 MG/1
650 TABLET ORAL ONCE
Status: COMPLETED | OUTPATIENT
Start: 2022-08-31 | End: 2022-08-31

## 2022-08-31 RX ORDER — ACETAMINOPHEN 325 MG/1
650 TABLET ORAL EVERY 6 HOURS PRN
Status: DISCONTINUED | OUTPATIENT
Start: 2022-08-31 | End: 2022-09-02 | Stop reason: HOSPADM

## 2022-08-31 RX ORDER — POTASSIUM CHLORIDE 7.45 MG/ML
10 INJECTION INTRAVENOUS PRN
Status: DISCONTINUED | OUTPATIENT
Start: 2022-08-31 | End: 2022-09-02 | Stop reason: HOSPADM

## 2022-08-31 RX ORDER — ENOXAPARIN SODIUM 100 MG/ML
40 INJECTION SUBCUTANEOUS DAILY
Status: DISCONTINUED | OUTPATIENT
Start: 2022-08-31 | End: 2022-09-02 | Stop reason: HOSPADM

## 2022-08-31 RX ORDER — POLYETHYLENE GLYCOL 3350 17 G/17G
17 POWDER, FOR SOLUTION ORAL DAILY PRN
Status: DISCONTINUED | OUTPATIENT
Start: 2022-08-31 | End: 2022-09-02 | Stop reason: HOSPADM

## 2022-08-31 RX ORDER — TRAMADOL HYDROCHLORIDE 50 MG/1
50 TABLET ORAL EVERY 6 HOURS PRN
Status: DISCONTINUED | OUTPATIENT
Start: 2022-08-31 | End: 2022-09-02 | Stop reason: HOSPADM

## 2022-08-31 RX ORDER — SODIUM CHLORIDE 9 MG/ML
INJECTION, SOLUTION INTRAVENOUS CONTINUOUS
Status: DISCONTINUED | OUTPATIENT
Start: 2022-08-31 | End: 2022-09-01

## 2022-08-31 RX ORDER — MONTELUKAST SODIUM 10 MG/1
10 TABLET ORAL DAILY
Status: DISCONTINUED | OUTPATIENT
Start: 2022-08-31 | End: 2022-09-02 | Stop reason: HOSPADM

## 2022-08-31 RX ORDER — ONDANSETRON 2 MG/ML
4 INJECTION INTRAMUSCULAR; INTRAVENOUS EVERY 6 HOURS PRN
Status: DISCONTINUED | OUTPATIENT
Start: 2022-08-31 | End: 2022-09-02 | Stop reason: HOSPADM

## 2022-08-31 RX ORDER — SODIUM CHLORIDE 9 MG/ML
INJECTION, SOLUTION INTRAVENOUS PRN
Status: DISCONTINUED | OUTPATIENT
Start: 2022-08-31 | End: 2022-09-02 | Stop reason: HOSPADM

## 2022-08-31 RX ORDER — ACETAMINOPHEN 650 MG/1
650 SUPPOSITORY RECTAL EVERY 6 HOURS PRN
Status: DISCONTINUED | OUTPATIENT
Start: 2022-08-31 | End: 2022-09-02 | Stop reason: HOSPADM

## 2022-08-31 RX ORDER — SODIUM CHLORIDE 0.9 % (FLUSH) 0.9 %
5-40 SYRINGE (ML) INJECTION PRN
Status: DISCONTINUED | OUTPATIENT
Start: 2022-08-31 | End: 2022-09-02 | Stop reason: HOSPADM

## 2022-08-31 RX ADMIN — ONDANSETRON 4 MG: 4 TABLET, ORALLY DISINTEGRATING ORAL at 16:59

## 2022-08-31 RX ADMIN — SODIUM CHLORIDE, PRESERVATIVE FREE 10 ML: 5 INJECTION INTRAVENOUS at 13:31

## 2022-08-31 RX ADMIN — BENZOCAINE AND MENTHOL 1 LOZENGE: 15; 3.6 LOZENGE ORAL at 19:32

## 2022-08-31 RX ADMIN — BUDESONIDE AND FORMOTEROL FUMARATE DIHYDRATE 2 PUFF: 160; 4.5 AEROSOL RESPIRATORY (INHALATION) at 08:21

## 2022-08-31 RX ADMIN — BENZOCAINE AND MENTHOL 1 LOZENGE: 15; 3.6 LOZENGE ORAL at 08:36

## 2022-08-31 RX ADMIN — TRAMADOL HYDROCHLORIDE 50 MG: 50 TABLET, COATED ORAL at 16:59

## 2022-08-31 RX ADMIN — POTASSIUM CHLORIDE 40 MEQ: 1500 TABLET, EXTENDED RELEASE ORAL at 21:53

## 2022-08-31 RX ADMIN — ACETAMINOPHEN 650 MG: 325 TABLET ORAL at 03:55

## 2022-08-31 RX ADMIN — ONDANSETRON 4 MG: 2 INJECTION INTRAMUSCULAR; INTRAVENOUS at 03:46

## 2022-08-31 RX ADMIN — MONTELUKAST SODIUM 10 MG: 10 TABLET, FILM COATED ORAL at 08:36

## 2022-08-31 RX ADMIN — BUDESONIDE AND FORMOTEROL FUMARATE DIHYDRATE 2 PUFF: 160; 4.5 AEROSOL RESPIRATORY (INHALATION) at 19:49

## 2022-08-31 RX ADMIN — ACETAMINOPHEN 650 MG: 325 TABLET ORAL at 19:32

## 2022-08-31 RX ADMIN — SODIUM CHLORIDE: 9 INJECTION, SOLUTION INTRAVENOUS at 06:06

## 2022-08-31 RX ADMIN — SODIUM CHLORIDE: 9 INJECTION, SOLUTION INTRAVENOUS at 14:01

## 2022-08-31 RX ADMIN — CEFTRIAXONE SODIUM 1000 MG: 1 INJECTION, POWDER, FOR SOLUTION INTRAMUSCULAR; INTRAVENOUS at 03:55

## 2022-08-31 RX ADMIN — SODIUM CHLORIDE 2313 ML: 9 INJECTION, SOLUTION INTRAVENOUS at 03:43

## 2022-08-31 RX ADMIN — TRAMADOL HYDROCHLORIDE 50 MG: 50 TABLET, COATED ORAL at 08:36

## 2022-08-31 ASSESSMENT — PAIN DESCRIPTION - ORIENTATION
ORIENTATION: RIGHT;LEFT;MID
ORIENTATION: RIGHT;LEFT;MID

## 2022-08-31 ASSESSMENT — ENCOUNTER SYMPTOMS
DIARRHEA: 0
ABDOMINAL PAIN: 1
SHORTNESS OF BREATH: 0
VOMITING: 0
NAUSEA: 1

## 2022-08-31 ASSESSMENT — PAIN SCALES - GENERAL
PAINLEVEL_OUTOF10: 7
PAINLEVEL_OUTOF10: 7
PAINLEVEL_OUTOF10: 6
PAINLEVEL_OUTOF10: 6

## 2022-08-31 ASSESSMENT — PAIN DESCRIPTION - LOCATION
LOCATION: BACK;FLANK
LOCATION: BACK;FLANK
LOCATION: ABDOMEN;FLANK
LOCATION: ABDOMEN

## 2022-08-31 ASSESSMENT — PAIN DESCRIPTION - DESCRIPTORS: DESCRIPTORS: ACHING

## 2022-08-31 NOTE — CARE COORDINATION
08/31/22 0907   Service Assessment   Patient Orientation Alert and Oriented   Cognition Alert   History Provided By Patient   Primary [address]  Family Members;Parent   PCP Verified by CM Yes   Last Visit to PCP Within last 3 months   Prior Functional Level Independent in ADLs/IADLs   Current Functional Level Independent in ADLs/IADLs   Can patient return to prior living arrangement Yes   Ability to make needs known: Good   Family able to assist with home care needs: Yes   Would you like for me to discuss the discharge plan with any other family members/significant others, and if so, who? Yes   Financial Resources Medicaid   Social/Functional History   Lives With Alone   Type of Home Apartment   Home Layout One level   Home Access Stairs to enter with rails   Entrance Stairs - Number of Steps 30   Entrance Stairs - Rails Left   Bathroom Shower/Tub Tub/Shower unit   Bathroom Toilet Standard   ADL Assistance Independent   Homemaking Assistance Independent   Homemaking Responsibilities Yes   Ambulation Assistance Independent   Transfer Assistance Independent   Active  Yes   Mode of Transportation Car   Occupation Full time employment   Discharge [facility] Prior To Admission None   Potential Assistance Needed N/A   DME Ordered? No   Potential Assistance Purchasing Medications No   Type of Home Care Services None   Patient expects to be discharged to: Apartment   One/Two Story Residence One story   History of falls?  0   Services At/After Discharge   Mode of Transport at Discharge Self   Confirm Follow Up Transport Self

## 2022-08-31 NOTE — H&P
[facility]  Office: [address], DO, Sana Oro, DO, Samira Hinton, DO, Raquel Zahra Ghotra, DO, Zuleima Townsend MD, Ivana Crooks MD, Mikael Bazzi MD, Phan Up MD,  Cadence Wynne MD, Nellie De La Paz MD, Beverly Arias, DO, Rody Vargas MD,  Kade Sauceda MD, Bria Quintero MD, Shoaib Ibanez DO, Miesha Abdi MD, Severiano Border, MD, Mag Garzon MD, Jana Mcfarland MD, Tamra Phillips MD, Tomás Orozco MD, Mi Rosado, DO, Edelmira Luna MD, Mirtha Kay MD, Jon Venegas, CNP,  Charity Chou, CNP, Miriam Hoskins, CNP, Abigail Webb, CNP, Karena Greenwood, PA-C, Froilan Stone, DNP, Sima Michelle, CNP, Karen Savage, CNP, Chavez Pradhan, CNP, Gareth Davison, CNP, Suzie Long, CNP, Elie Resendez, CNS, Shraddha Molina, DNP, Claudell Belfast, CNP, Marie Hearing, CNP, Yoshi Roman, CNP         [facility]    HISTORY AND PHYSICAL EXAMINATION            Date:   8/31/2022  Patient name:  Jeremy Pacheco  Date of admission:  8/31/2022  3:08 AM  MRN:   [id number]  Account:  [de-identified]  YOB: 1995  PCP:    Rita Soto MD  Room:   [facility]-  Code Status:    Full Code    Chief Complaint:     Chief Complaint   Patient presents with    Other     Hx of pyelonephritis; seen earlier today. difficulty urinating     Pyelonephritis       History Obtained From:     patient    History of Present Illness:     Jeremy Pacheco is a 33 y.o.  /  female who presents with Other (Hx of pyelonephritis; seen earlier today. difficulty urinating ) and Pyelonephritis   and is admitted to the hospital for the management of Sepsis due to gram-negative UTI ([facility] [address] This is a 25-year-old female that presents with a complaint of left-sided flank pain, dysuria and frequency was found to have a urinary tract infection.   She was evaluated in the emergency room on the morning of the 30th and diagnosed with pyelonephritis and prescribed Keflex and discharged home. She returned several hours later with worsening flank pain despite treatment with oral antibiotics. She continued to have dysuria and pyuria as well as complaint of sore throat. She otherwise denied fevers or chills, hematuria or other associated symptoms. She reports a history of recurrent urinary tract infections with prior episodes of pyelonephritis. She has had prior urology work-up due to recurrent urinary tract infections with cystoscopy with Dr. Cheryll Severe. Past Medical History:     Past Medical History:   Diagnosis Date    Asthma     as a child    Cholecystitis     E. coli UTI (urinary tract infection) 5/14/2018    Gall stones     Kidney stone     Patient denies    Pyelonephritis 2/15/2019    S/p lap right salpingectomy w/ evacuation of hemoperitoneum 12/10/21 12/10/2021    2/2 ectopic pregnancy    UTI (lower urinary tract infection)         Past Surgical History:     Past Surgical History:   Procedure Laterality Date    ABDOMEN SURGERY  12/10/2021    DIAGNOSTIC LAPAROSCOPY, UNILATERAL SALPINGECTOMY RIGHT    ARM SURGERY Right     BREAST REDUCTION SURGERY  07/25/2018    CYSTOSCOPY N/A 09/04/2019    CYSTOSCOPY RETROGRADE PYELOGRAM WITH  CYSTOGRAM, performed by Gaviota Mendoza MD at [address] [place] [address] N/A 12/03/2021    DILATATION AND CURETTAGE SUCTION performed by Huyen Skinner MD at [address] Right 12/10/2021    DIAGNOSTIC LAPAROSCOPY, UNILATERAL SALPINGECTOMY performed by Jarod Cohn MD at [facility] N/A 05/18/2018    CHOLECYSTECTOMY LAPAROSCOPIC performed by Christiana Galicia MD at [facility]          Medications Prior to Admission:     Prior to Admission medications    Medication Sig Start Date End Date Taking?  Authorizing Provider   cephALEXin (KEFLEX) 500 MG capsule Take 1 capsule by mouth 3 times daily for 10 days 8/30/22 Prostate Cancer Maternal Grandfather        Review of Systems:     Positive and Negative as described in HPI. CONSTITUTIONAL:  negative for fevers, chills, sweats, fatigue, weight loss  HEENT:  negative for vision, hearing changes, runny nose, throat pain  RESPIRATORY:  negative for shortness of breath, cough, congestion, wheezing  CARDIOVASCULAR:  negative for chest pain, palpitations  GASTROINTESTINAL:  negative for nausea, vomiting, diarrhea, constipation, change in bowel habits, abdominal pain   GENITOURINARY: Positive for left flank pain, difficulty of urination, burning with urination, frequency   INTEGUMENT:  negative for rash, skin lesions, easy bruising   HEMATOLOGIC/LYMPHATIC:  negative for swelling/edema   ALLERGIC/IMMUNOLOGIC:  negative for urticaria , itching  ENDOCRINE:  negative increase in drinking, increase in urination, hot or cold intolerance  MUSCULOSKELETAL:  negative joint pains, muscle aches, swelling of joints  NEUROLOGICAL:  negative for headaches, dizziness, lightheadedness, numbness, pain, tingling extremities  BEHAVIOR/PSYCH:  negative for depression, anxiety    Physical Exam:   /78   Pulse (!) 105   Temp 99.3 °F (37.4 °C) (Oral)   Resp 20   SpO2 99%   Temp (24hrs), Av.5 °F (37.5 °C), Min:98.6 °F (37 °C), Max:101.2 °F (38.4 °C)    No results for input(s): POCGLU in the last 72 hours.   No intake or output data in the 24 hours ending 22 1446    General Appearance: alert, well appearing, and in no acute distress  Mental status: oriented to person, place, and time  Head: normocephalic, atraumatic  Eye: no icterus, redness, pupils equal and reactive, extraocular eye movements intact, conjunctiva clear  Ear: normal external ear, no discharge, hearing intact  Nose: no drainage noted  Mouth: mucous membranes moist  Neck: supple, no carotid bruits, thyroid not palpable  Lungs: Bilateral equal air entry, clear to ausculation, no wheezing, rales or rhonchi, normal effort  Cardiovascular: normal rate, regular rhythm, no murmur, gallop, rub  Abdomen: Soft, nontender, nondistended, normal bowel sounds, no hepatomegaly or splenomegaly  Neurologic: There are no new focal motor or sensory deficits, normal muscle tone and bulk, no abnormal sensation, normal speech, cranial nerves II through XII grossly intact  Skin: No gross lesions, rashes, bruising or bleeding on exposed skin area  Extremities: peripheral pulses palpable, no pedal edema or calf pain with palpation  Psych: normal affect    Investigations:      Laboratory Testing:  Recent Results (from the past 24 hour(s))   CBC with Auto Differential    Collection Time: 08/30/22  3:13 PM   Result Value Ref Range    WBC 12.8 (H) 3.5 - 11.3 k/uL    RBC 5.21 (H) 3.95 - 5.11 m/uL    Hemoglobin 15.8 (H) 11.9 - 15.1 g/dL    Hematocrit 44.7 36.3 - 47.1 %    MCV 85.8 82.6 - 102.9 fL    MCH 30.3 25.2 - 33.5 pg    MCHC 35.3 (H) 28.4 - 34.8 g/dL    RDW 12.9 11.8 - 14.4 %    Platelets 506 221 - 476 k/uL    MPV 11.1 8.1 - 13.5 fL    NRBC Automated 0.0 0.0 per 100 WBC    Seg Neutrophils 89 (H) 36 - 65 %    Lymphocytes 6 (L) 24 - 43 %    Monocytes 5 3 - 12 %    Eosinophils % 0 (L) 1 - 4 %    Basophils 0 0 - 2 %    Immature Granulocytes 0 0 %    Segs Absolute 11.20 (H) 1.50 - 8.10 k/uL    Absolute Lymph # 0.78 (L) 1.10 - 3.70 k/uL    Absolute Mono # 0.69 0.10 - 1.20 k/uL    Absolute Eos # <0.03 0.00 - 0.44 k/uL    Basophils Absolute 0.04 0.00 - 0.20 k/uL    Absolute Immature Granulocyte 0.05 0.00 - 0.30 k/uL   BMP    Collection Time: 08/30/22  3:13 PM   Result Value Ref Range    Glucose 100 (H) 70 - 99 mg/dL    BUN 8 6 - 20 mg/dL    Creatinine 0.85 0.50 - 0.90 mg/dL    Calcium 9.0 8.6 - 10.4 mg/dL    Sodium 134 (L) 135 - 144 mmol/L    Potassium 3.5 (L) 3.7 - 5.3 mmol/L    Chloride 100 98 - 107 mmol/L    CO2 22 20 - 31 mmol/L    Anion Gap 12 9 - 17 mmol/L    GFR Non-African American >60 >60 mL/min    GFR African American >60 >60 mL/min    GFR 82.6 - 102.9 fL    MCH 30.3 25.2 - 33.5 pg    MCHC 35.6 (H) 28.4 - 34.8 g/dL    RDW 13.1 11.8 - 14.4 %    Platelets 377 088 - 483 k/uL    MPV 10.8 8.1 - 13.5 fL    NRBC Automated 0.0 0.0 per 100 WBC    Seg Neutrophils 88 (H) 36 - 65 %    Lymphocytes 6 (L) 24 - 43 %    Monocytes 6 3 - 12 %    Eosinophils % 0 (L) 1 - 4 %    Basophils 0 0 - 2 %    Immature Granulocytes 0 0 %    Segs Absolute 12.71 (H) 1.50 - 8.10 k/uL    Absolute Lymph # 0.93 (L) 1.10 - 3.70 k/uL    Absolute Mono # 0.82 0.10 - 1.20 k/uL    Absolute Eos # <0.03 0.00 - 0.44 k/uL    Basophils Absolute 0.05 0.00 - 0.20 k/uL    Absolute Immature Granulocyte 0.05 0.00 - 0.30 k/uL   Comprehensive Metabolic Panel    Collection Time: 08/31/22  3:37 AM   Result Value Ref Range    Glucose 115 (H) 70 - 99 mg/dL    BUN 8 6 - 20 mg/dL    Creatinine 0.96 (H) 0.50 - 0.90 mg/dL    Calcium 8.4 (L) 8.6 - 10.4 mg/dL    Sodium 138 135 - 144 mmol/L    Potassium 3.3 (L) 3.7 - 5.3 mmol/L    Chloride 103 98 - 107 mmol/L    CO2 23 20 - 31 mmol/L    Anion Gap 12 9 - 17 mmol/L    Alkaline Phosphatase 60 35 - 104 U/L    ALT 46 (H) 5 - 33 U/L    AST 61 (H) <32 U/L    Total Bilirubin 0.95 0.3 - 1.2 mg/dL    Total Protein 6.9 6.4 - 8.3 g/dL    Albumin 3.9 3.5 - 5.2 g/dL    Albumin/Globulin Ratio 1.3 1.0 - 2.5    GFR Non-African American >60 >60 mL/min    GFR African American >60 >60 mL/min    GFR Comment         Lactate, Sepsis    Collection Time: 08/31/22  3:37 AM   Result Value Ref Range    Lactic Acid, Sepsis, Whole Blood 2.5 (H) 0.5 - 1.9 mmol/L   Protime-INR    Collection Time: 08/31/22  3:37 AM   Result Value Ref Range    Protime 11.7 9.1 - 12.3 sec    INR 1.1    HCG Qualitative, Serum    Collection Time: 08/31/22  3:37 AM   Result Value Ref Range    hCG Qual NEGATIVE NEGATIVE   Culture, Blood 1    Collection Time: 08/31/22  3:39 AM    Specimen: Blood   Result Value Ref Range    Specimen Description . BLOOD     Special Requests RIGHT AC 1ML     Culture NO GROWTH <24 HRS Culture, Blood 1    Collection Time: 08/31/22  3:40 AM    Specimen: Blood   Result Value Ref Range    Specimen Description . BLOOD     Special Requests LEFT FOREARM 11ML     Culture NO GROWTH <24 HRS    Urinalysis with Microscopic    Collection Time: 08/31/22  4:57 AM   Result Value Ref Range    Color, UA Dark Yellow (A) Yellow    Turbidity UA Cloudy (A) Clear    Glucose, Ur NEGATIVE NEGATIVE    Bilirubin Urine NEGATIVE  Verified by ictotest. (A) NEGATIVE    Ketones, Urine SMALL (A) NEGATIVE    Specific Gravity, UA 1.025 1.005 - 1.030    Urine Hgb MODERATE (A) NEGATIVE    pH, UA 7.5 5.0 - 8.0    Protein, UA NEGATIVE  Verified by sulfosalicylic acid test. (A) NEGATIVE    Urobilinogen, Urine ELEVATED (A) Normal    Nitrite, Urine NEGATIVE NEGATIVE    Leukocyte Esterase, Urine LARGE (A) NEGATIVE    WBC, UA TOO NUMEROUS TO COUNT 0 - 5 /HPF    RBC, UA 20 TO 50 0 - 4 /HPF    Casts UA  0 - 8 /LPF     0 TO 2 HYALINE Reference range defined for non-centrifuged specimen. Epithelial Cells UA 2 TO 5 0 - 5 /HPF    Bacteria, UA FEW (A) None   Lactate, Sepsis    Collection Time: 08/31/22  5:44 AM   Result Value Ref Range    Lactic Acid, Sepsis, Whole Blood 1.0 0.5 - 1.9 mmol/L       Imaging/Diagnostics:  CT ABDOMEN PELVIS WO CONTRAST Additional Contrast? None    Result Date: 8/31/2022  1. No renal calculi, ureteral calculi or significant hydroureteronephrosis. No perinephric stranding. 2. Bladder wall thickening is seen with perivesicular inflammatory change and free fluid in the pelvis which may be related to acute cystitis. XR NECK SOFT TISSUE    Result Date: 8/31/2022  No acute findings. XR CHEST (2 VW)    Result Date: 8/30/2022  Unremarkable PA and lateral chest.  No evidence of acute cardiopulmonary process.        Assessment :      Hospital Problems             Last Modified POA    * (Principal) Sepsis due to gram-negative UTI ([facility] [address] 8/31/2022 Yes    Moderate persistent asthma without complication 1/07/0873 Yes Pyelonephritis 8/31/2022 Yes    Hypokalemia 8/31/2022 Yes       Plan:     Patient status inpatient in the Med/Surge    Inpatient admission  Continue IV Rocephin, await culture results. Culture from initial ER visit is growing gram-negative rods. Trend labs  IV hydration  Continue home inhalers/aerosol protocol  Supplement potassium  GI DVT prophylaxis  Activity as tolerated  Potassium and magnesium replacement scales ordered  See orders for details    Consultations:   IP CONSULT TO HOSPITALIST     Patient is admitted as inpatient status because of co-morbidities listed above, severity of signs and symptoms as outlined, requirement for current medical therapies and most importantly because of direct risk to patient if care not provided in a hospital setting. Expected length of stay > 48 hours.     Sahara Gao DO  8/31/2022  2:46 PM    Copy sent to Dr. Robbie Mendoza MD

## 2022-08-31 NOTE — ED PROVIDER NOTES
[facility] ED  Emergency Department Encounter  Emergency Medicine Resident     Pt Afua Liu  MRN: [id number]  [place] 1995  Date of evaluation: 8/31/22  PCP:  Elizabeth Lomeli MD      [address]       Chief Complaint   Patient presents with    Other     Hx of pyelonephritis; seen earlier today. difficulty urinating     Pyelonephritis       HISTORY OF PRESENT ILLNESS  (Location/Symptom, Timing/Onset, Context/Setting, Quality, Duration, Modifying Factors, Severity.)      Matthew Quiroga is a 33 y.o. female with PMH including recurrent UTIs and multiple previous episodes of pyelonephritis who re-presents to the emergency department early this morning after being seen yesterday afternoon with fever/chills, dysuria, CVA tenderness to palpation and suprapubic discomfort, sore throat (Strep positive, COVID-19 negative). During that emergency department visit UA positive and she was diagnosed clinically with pyelonephritis where she received Keflex in the ED and was discharged home with Keflex 500 mg 3 times daily for 10 days. Patient directly left the emergency room and went home to sleep. She woke her significant other up after she began to experience worsening fevers, chills, and generalized fatigue and malaise. She is now having extreme suprapubic pain and left-sided CVA tenderness. PAST MEDICAL / SURGICAL / SOCIAL / FAMILY HISTORY      has a past medical history of Asthma, Cholecystitis, E. coli UTI (urinary tract infection), Gall stones, Kidney stone, Pyelonephritis, S/p lap right salpingectomy w/ evacuation of hemoperitoneum 12/10/21, and UTI (lower urinary tract infection). has a past surgical history that includes Arm Surgery (Right); Beat[place] tooth extraction; pr lap,cholecystectomy (N/A, 05/18/2018); Breast reduction surgery (07/25/2018); Cystoscopy (N/A, 09/04/2019); Dilation and curettage of uterus (N/A, 12/03/2021);  Abdomen surgery (12/10/2021); and laparoscopy (Right, 12/10/2021). Social History     Socioeconomic History    Marital status: Single     Spouse name: Not on file    Number of children: Not on file    Years of education: Not on file    Highest education level: Not on file   Occupational History    Not on file   Tobacco Use    Smoking status: Never    Smokeless tobacco: Never   Vaping Use    Vaping Use: Never used   Substance and Sexual Activity    Alcohol use: Yes     Alcohol/week: 2.0 standard drinks     Types: 1 Glasses of wine, 1 Shots of liquor per week     Comment: social drinker     Drug use: No    Sexual activity: Yes     Partners: Male   Other Topics Concern    Not on file   Social History Narrative    Not on file     Social Determinants of Health     Financial Resource Strain: Low Risk     Difficulty of Paying Living Expenses: Not very hard   Food Insecurity: No Food Insecurity    Worried About Running Out of Food in the Last Year: Never true    Ran Out of Food in the Last Year: Never true   Transportation Needs: Not on file   Physical Activity: Not on file   Stress: Not on file   Social Connections: Not on file   Intimate Partner Violence: Not on file   Housing Stability: Not on file       No family history on file. Allergies:  Amoxicillin, Lactose, and Other    Home Medications:  Prior to Admission medications    Medication Sig Start Date End Date Taking? Authorizing Provider   cephALEXin (KEFLEX) 500 MG capsule Take 1 capsule by mouth 3 times daily for 10 days 8/30/22 9/9/22  Gypsy Johnson MD   ondansetron (ZOFRAN ODT) 4 MG disintegrating tablet Take 1 tablet by mouth every 8 hours as needed for Nausea or Vomiting 8/30/22 9/4/22  Gypsy Johnson MD   ibuprofen (ADVIL;MOTRIN) 600 MG tablet Take 1 tablet by mouth 3 times daily as needed for Pain 8/30/22 9/9/22  Gypsy Johnson MD   montelukast (SINGULAIR) 10 MG tablet Take 1 tablet by mouth in the morning.  8/8/22   Inge Lees MD   acetaminophen (TYLENOL) 500 MG tablet Take 1,000 mg by mouth every 6 hours as needed for Pain    Historical Provider, MD   meloxicam (MOBIC) 15 MG tablet Take 1 tablet by mouth in the morning. 7/27/22   KEMAR Pantoja NP   budesonide-formoterol (SYMBICORT) 160-4.5 MCG/ACT AERO Inhale 2 puffs into the lungs in the morning and 2 puffs before bedtime. 7/27/22   KEMAR Pantoja NP   norethindrone-ethinyl estradiol (JUNEL FE 1/20) 1-20 MG-MCG per tablet Take 1 tablet by mouth in the morning. 7/19/22   Ted Soto DO   albuterol sulfate HFA (VENTOLIN HFA) 108 (90 Base) MCG/ACT inhaler Inhale 2 puffs into the lungs every 4 hours as needed for Wheezing or Shortness of Breath 6/20/22 6/20/23  Inge Ewing MD   loratadine (CLARITIN) 10 MG tablet Take 1 tablet by mouth daily 6/20/22   Inge Ewing MD   fluticasone [facility]) 50 MCG/ACT nasal spray 1 spray by Nasal route daily 6/20/22   Inge Ewing MD   hydrocortisone 2.5 % ointment Apply topically 2 times daily. Patient taking differently: Apply topically 2 times daily. Using PRN 6/20/22   Inge Ewing MD       REVIEW OF SYSTEMS    (2-9 systems for level 4, 10 or more for level 5)      Review of Systems   Constitutional:  Positive for activity change, chills, diaphoresis, fatigue and fever. HENT:  Negative for congestion. Respiratory:  Negative for shortness of breath. Cardiovascular:  Negative for chest pain. Gastrointestinal:  Positive for abdominal pain and nausea. Negative for diarrhea and vomiting. Genitourinary:  Positive for dysuria and urgency. Musculoskeletal:  Positive for myalgias. Skin:  Positive for pallor. Neurological:  Positive for weakness and headaches. Psychiatric/Behavioral:  The patient is not nervous/anxious.       PHYSICAL EXAM   (up to 7 for level 4, 8 or more for level 5)      INITIAL VITALS:   /81   Pulse (!) 116   Temp 98.9 °F (37.2 °C) (Oral)   Resp 25   SpO2 100%     Physical Exam  Constitutional: General: She is in acute distress. Appearance: She is ill-appearing, toxic-appearing and diaphoretic. HENT:      Head: Normocephalic and atraumatic. Nose: Nose normal.      Mouth/Throat:      Mouth: Mucous membranes are moist.      Pharynx: Oropharynx is clear. Eyes:      General: No scleral icterus. Cardiovascular:      Rate and Rhythm: Regular rhythm. Tachycardia present. Pulses: Normal pulses. Heart sounds: Normal heart sounds. No murmur heard. Pulmonary:      Effort: Pulmonary effort is normal.      Breath sounds: Normal breath sounds. Abdominal:      General: Abdomen is flat. There is no distension. Palpations: Abdomen is soft. Tenderness: There is abdominal tenderness. There is left CVA tenderness and guarding. Musculoskeletal:         General: No tenderness or signs of injury. Cervical back: Neck supple. Right lower leg: No edema. Left lower leg: No edema. Skin:     General: Skin is warm. Coloration: Skin is pale. Findings: No rash. Neurological:      Mental Status: She is alert and oriented to person, place, and time. Mental status is at baseline. Psychiatric:         Mood and Affect: Mood normal.         Behavior: Behavior normal.         Judgment: Judgment normal.       DIFFERENTIAL  DIAGNOSIS     PLAN (LABS / IMAGING / EKG):  Orders Placed This Encounter   Procedures    Culture, Blood 1    Culture, Blood 1    Culture, Urine    Culture, Blood 2    XR NECK SOFT TISSUE    CT ABDOMEN PELVIS WO CONTRAST Additional Contrast? None    CBC with Auto Differential    Comprehensive Metabolic Panel    Lactate, Sepsis    Protime-INR    Basic Metabolic Panel w/ Reflex to MG    Protime-INR    Urinalysis with Microscopic    HCG Qualitative, Serum    ADULT DIET;  Regular    Vital signs per unit routine    Notify physician    Up with assistance    Ambulate patient    Daily weights    Intake and output    Telemetry monitoring - 72 hour duration    Full Code    Inpatient consult to Hospitalist    Initiate Oxygen Therapy Protocol    Pulse Oximetry Spot Check    EKG 12 Lead    ADMIT TO INPATIENT       MEDICATIONS ORDERED:  Orders Placed This Encounter   Medications    0.9 % sodium chloride bolus    ondansetron (ZOFRAN) injection 4 mg    cefTRIAXone (ROCEPHIN) 1,000 mg in sodium chloride 0.9 % 50 mL IVPB mini-bag     Order Specific Question:   Antimicrobial Indications     Answer:   Urinary Tract Infection    acetaminophen (TYLENOL) tablet 650 mg    cefTRIAXone (ROCEPHIN) 1,000 mg in sodium chloride 0.9 % 50 mL IVPB mini-bag     Order Specific Question:   Antimicrobial Indications     Answer:   Urinary Tract Infection     Order Specific Question:   UTI duration of therapy     Answer:   3 days    budesonide-formoterol (SYMBICORT) 160-4.5 MCG/ACT inhaler 2 puff    montelukast (SINGULAIR) tablet 10 mg    0.9 % sodium chloride infusion    sodium chloride flush 0.9 % injection 5-40 mL    sodium chloride flush 0.9 % injection 5-40 mL    0.9 % sodium chloride infusion    enoxaparin (LOVENOX) injection 40 mg     Order Specific Question:   Indication of Use     Answer:   Prophylaxis-DVT/PE    OR Linked Order Group     ondansetron (ZOFRAN-ODT) disintegrating tablet 4 mg     ondansetron (ZOFRAN) injection 4 mg    OR Linked Order Group     acetaminophen (TYLENOL) tablet 650 mg     acetaminophen (TYLENOL) suppository 650 mg    polyethylene glycol (GLYCOLAX) packet 17 g       DDX: Sepsis secondary to pyelonephritis, strep throat, nephrolithiasis    DIAGNOSTIC RESULTS / EMERGENCY DEPARTMENT COURSE / MDM   LAB RESULTS:  Results for orders placed or performed during the hospital encounter of 08/31/22   Culture, Blood 1    Specimen: Blood   Result Value Ref Range    Specimen Description . BLOOD     Special Requests LEFT FOREARM 11ML     Culture NO GROWTH <24 HRS    Culture, Blood 1    Specimen: Blood   Result Value Ref Range    Specimen Description . BLOOD     Special Requests RIGHT AC 1ML     Culture NO GROWTH <24 HRS    CBC with Auto Differential   Result Value Ref Range    WBC 14.6 (H) 3.5 - 11.3 k/uL    RBC 4.65 3.95 - 5.11 m/uL    Hemoglobin 14.1 11.9 - 15.1 g/dL    Hematocrit 39.6 36.3 - 47.1 %    MCV 85.2 82.6 - 102.9 fL    MCH 30.3 25.2 - 33.5 pg    MCHC 35.6 (H) 28.4 - 34.8 g/dL    RDW 13.1 11.8 - 14.4 %    Platelets 013 326 - 976 k/uL    MPV 10.8 8.1 - 13.5 fL    NRBC Automated 0.0 0.0 per 100 WBC    Seg Neutrophils 88 (H) 36 - 65 %    Lymphocytes 6 (L) 24 - 43 %    Monocytes 6 3 - 12 %    Eosinophils % 0 (L) 1 - 4 %    Basophils 0 0 - 2 %    Immature Granulocytes 0 0 %    Segs Absolute 12.71 (H) 1.50 - 8.10 k/uL    Absolute Lymph # 0.93 (L) 1.10 - 3.70 k/uL    Absolute Mono # 0.82 0.10 - 1.20 k/uL    Absolute Eos # <0.03 0.00 - 0.44 k/uL    Basophils Absolute 0.05 0.00 - 0.20 k/uL    Absolute Immature Granulocyte 0.05 0.00 - 0.30 k/uL   Comprehensive Metabolic Panel   Result Value Ref Range    Glucose 115 (H) 70 - 99 mg/dL    BUN 8 6 - 20 mg/dL    Creatinine 0.96 (H) 0.50 - 0.90 mg/dL    Calcium 8.4 (L) 8.6 - 10.4 mg/dL    Sodium 138 135 - 144 mmol/L    Potassium 3.3 (L) 3.7 - 5.3 mmol/L    Chloride 103 98 - 107 mmol/L    CO2 23 20 - 31 mmol/L    Anion Gap 12 9 - 17 mmol/L    Alkaline Phosphatase 60 35 - 104 U/L    ALT 46 (H) 5 - 33 U/L    AST 61 (H) <32 U/L    Total Bilirubin 0.95 0.3 - 1.2 mg/dL    Total Protein 6.9 6.4 - 8.3 g/dL    Albumin 3.9 3.5 - 5.2 g/dL    Albumin/Globulin Ratio 1.3 1.0 - 2.5    GFR Non-African American >60 >60 mL/min    GFR African American >60 >60 mL/min    GFR Comment         Lactate, Sepsis   Result Value Ref Range    Lactic Acid, Sepsis, Whole Blood 2.5 (H) 0.5 - 1.9 mmol/L   Lactate, Sepsis   Result Value Ref Range    Lactic Acid, Sepsis, Whole Blood 1.0 0.5 - 1.9 mmol/L   Protime-INR   Result Value Ref Range    Protime 11.7 9.1 - 12.3 sec    INR 1.1    Urinalysis with Microscopic   Result Value Ref Range    Color, UA Dark Yellow (A) Yellow    Turbidity UA Cloudy (A) Clear    Glucose, Ur NEGATIVE NEGATIVE    Bilirubin Urine NEGATIVE  Verified by ictotest. (A) NEGATIVE    Ketones, Urine SMALL (A) NEGATIVE    Specific Gravity, UA 1.025 1.005 - 1.030    Urine Hgb MODERATE (A) NEGATIVE    pH, UA 7.5 5.0 - 8.0    Protein, UA NEGATIVE  Verified by sulfosalicylic acid test. (A) NEGATIVE    Urobilinogen, Urine ELEVATED (A) Normal    Nitrite, Urine NEGATIVE NEGATIVE    Leukocyte Esterase, Urine LARGE (A) NEGATIVE    WBC, UA TOO NUMEROUS TO COUNT 0 - 5 /HPF    RBC, UA 20 TO 50 0 - 4 /HPF    Casts UA  0 - 8 /LPF     0 TO 2 HYALINE Reference range defined for non-centrifuged specimen. Epithelial Cells UA 2 TO 5 0 - 5 /HPF    Bacteria, UA FEW (A) None   HCG Qualitative, Serum   Result Value Ref Range    hCG Qual NEGATIVE NEGATIVE       IMPRESSION: lactic acid 2.5, leukocytosis    RADIOLOGY:  CT ABDOMEN PELVIS WO CONTRAST Additional Contrast? None   Final Result   1. No renal calculi, ureteral calculi or significant hydroureteronephrosis. No perinephric stranding. 2. Bladder wall thickening is seen with perivesicular inflammatory change and   free fluid in the pelvis which may be related to acute cystitis. XR NECK SOFT TISSUE   Final Result   No acute findings.              EKG  EKG Interpretation    Interpreted by emergency department physician    Rhythm: sinus tachycardia  Rate: tachycardic  Axis: normal  Ectopy: none  Conduction: normal  ST Segments: normal  T Waves: no acute change  Q Waves: none    Clinical Impression: sinus tachycardia    Hidalgo Belkys, DO      All EKG's are interpreted by the Emergency Department Physician who either signs or Co-signs this chart in the absence of a cardiologist.    EMERGENCY DEPARTMENT COURSE:      ED Course as of 08/31/22 0630   Wed Aug 31, 2022   651 28-year-old female with PMH including recurrent UTIs and multiple previous episodes of pyelonephritis who re-presents to the emergency department after being discharged with pyelonephritis several hours earlier. Patient's condition worsened when she was home and she began to develop worsening fever, chills, rigors, left-sided CVA tenderness, suprapubic pain, and generalized fatigue/malaise. [GC]   [id number] Patient is septic on arrival with fever, tachycardia, probable infection. Sepsis work-up including blood cultures x2, lactic acid, CMP, CBC with differential, PT/INR, urine culture, EKG all pending [GC]   0354 2 peripheral IVs obtained. Sepsis NS bolus 30 mL/kg ordered. Lactic acid 2.5. Trending lactic acid [GC]   0400 Intermed agreed admit the patient. Will obtain CT A/P without contrast to rule out renal stone. Given Strep throat, obtaining soft tissue neck xray. [GC]   0401 Leukocytosis with WBC of 14.6 [GC]   0629 IV Rocephin 1 g given within 3 hours of identifying sepsis [GC]      ED Course User Index  [GC] Kanu Campbell,    Sepsis Times and Checklist  Vital Signs: BP: 114/81  Heart Rate: (!) 116  Resp: 25  Temp: 98.9 °F (37.2 °C) SpO2: 100 %  SIRS (>2) Non Pregnant       Temp > 38.3C or < 36C   HR > 90   RR > 20   WBC > 12 or < 4 or >10% bands   SIRS (>2) and confirmed or suspected source of infection = Sepsis  Is Sepsis due to likely bacterial infection?: Yes    Sepsis Identified:  Date: 8/31/2022   Time: 04:06  Sepsis Orders:   CBC(required): Yes   CMP: Yes   PT/PTT: Yes   Blood Cultures x2(required): Yes   Urinalysis and Urine Culture: Yes    Lactate(required): Yes   Antibiotics Given (within 3 hours of sepsis identification, after blood cultures):  Yes    (If unable to obtain IV access for IV antibiotics within 3 hours of identification of sepsis, IM antibiotics is acceptable.)              If lactate >2.0 MUST repeat within 6 hours    If elevated, is elevated lactate from a likely infectious source?: Yes.       IV Fluid Bolus:  Is lactate > 4.0:  No  If lactate >  4.0 OR hypotension (MAP<65 mmHg,BP<90 or SBP<85 pregnancy 20 weeks to 3 days  postpartum) (with 2 BP readings) 30 ml/kg crystalloid MUST be ordered. If 30 ml/kg crystalloid not ordered the following must be documented in the medical record:  Administration of 30 mL/kg of crystalloid fluids would be detrimental or harmful for the patient;   AND that the patient has one of the following conditions, OR that a portion of the crystalloid fluid volume was administered as colloids (if a portion consisted of colloids, there must be an order and documentation that colloids were started or noted as given);  the volume of crystalloid fluids in place of 30 mL/kg the patient was to receive is 2,313 mL, (Order for this amount of fluid must be placed)     Fluids must be initiated within 3 hours of sepsis identification. These fluids must have a rate > 125 ml/hr. Is the patient Morbidly Obese (BMI > 30): No  IV Fluids given prior to arrival can be used in this calculation but the following information must be documented:  Start time: 0330, Type of fluid normal saline, Volume of fluid 2,313 mL, and Rate (Duration or End time) over 1 hour. Does the patient or patient advocate refuse the entire 30 ml/kg IV fluid bolus? No      No notes of EC Admission Criteria type on file. PROCEDURES:  N/a    CONSULTS:  IP CONSULT TO HOSPITALIST    CRITICAL CARE:    ED Course as of 08/31/22 0630   Wed Aug 31, 2022   [age]-year-old female with PMH including recurrent UTIs and multiple previous episodes of pyelonephritis who re-presents to the emergency department after being discharged with pyelonephritis several hours earlier. Patient's condition worsened when she was home and she began to develop worsening fever, chills, rigors, left-sided CVA tenderness, suprapubic pain, and generalized fatigue/malaise. [GC]   [id number] Patient is septic on arrival with fever, tachycardia, probable infection.   Sepsis work-up including blood cultures x2, lactic acid, CMP, CBC with differential, PT/INR, urine culture, EKG all pending [GC]   0354 2 peripheral IVs obtained. Sepsis NS bolus 30 mL/kg ordered. Lactic acid 2.5. Trending lactic acid [GC]   0400 Intermed agreed admit the patient. Will obtain CT A/P without contrast to rule out renal stone. Given Strep throat, obtaining soft tissue neck xray. [GC]   0401 Leukocytosis with WBC of 14.6 [GC]   0629 IV Rocephin 1 g given within 3 hours of identifying sepsis [GC]      ED Course User Index  [GC] Ginger Roblero DO       FINAL IMPRESSION      1. Sepsis, due to unspecified organism, unspecified whether acute organ dysfunction present [facility])          DISPOSITION / [address] Admitted 08/31/2022 04:00:04 AM      PATIENT REFERRED TO:  No follow-up provider specified.     DISCHARGE MEDICATIONS:  New Prescriptions    No medications on file       Ginger Roblero DO  Emergency Medicine Resident    (Please note that portions of thisnote were completed with a voice recognition program.  Efforts were made to edit the dictations but occasionally words are mis-transcribed.)       Ginger Roblero DO  Resident  08/31/22 Kevin Palumbo DO  Resident  08/31/22 Kevin Palumbo DO  Resident  08/31/22 [id number]

## 2022-08-31 NOTE — PLAN OF CARE
Problem: Discharge Planning  Goal: Discharge to home or other facility with appropriate resources  Outcome: Progressing  Flowsheets (Taken 8/31/2022 1616)  Discharge to home or other facility with appropriate resources: Arrange for needed discharge resources and transportation as appropriate     Problem: Pain  Goal: Verbalizes/displays adequate comfort level or baseline comfort level  Outcome: Progressing

## 2022-08-31 NOTE — ED PROVIDER NOTES
for separately reportable procedures. [address]rain, DO  08/31/22 [address], DO  08/31/22 0340       Mariangel Baeza, DO  08/31/22 0354  Sepsis Times and Checklist  Vital Signs: BP: 114/81  Heart Rate: (!) 116  Resp: 25  Temp: (!) 101.2 °F (38.4 °C) SpO2: 100 %  SIRS (>2) Non Pregnant       Temp > 38.3C or < 36C   HR > 90   RR > 20   WBC > 12 or < 4 or >10% bands  SIRS (>2) Pregnant 20 weeks until 3 days postpartum   Temp > 38C or <36C   HR >110   RR > 24   WBC >15 or < 4 or >10% bands   SIRS (>2) and confirmed or suspected source of infection = Sepsis  Is Sepsis due to likely bacterial infection?: Yes  If not, then sepsis is due to likely  bacteria  etiology. Sepsis Identified:  Date: 8/31/22   Time: 0255  Sepsis Orders:   CBC(required): Yes   CMP: Yes   PT/PTT: Yes   Blood Cultures x2(required): Yes   Urinalysis and Urine Culture: Yes   Lactate(required): Yes   Antibiotics Given (within 3 hours of sepsis identification, after blood cultures):  Yes    (If unable to obtain IV access for IV antibiotics within 3 hours of identification of sepsis, IM antibiotics is acceptable.)              If lactate >2.0 MUST repeat within 6 hours    If elevated, is elevated lactate from a likely infectious source?: Yes. IV Fluid Bolus:  Is lactate > 4.0:  No  If lactate >  4.0 OR hypotension (MAP<65 mmHg,BP<90 or SBP<85 pregnancy 20 weeks to 3 days  postpartum) (with 2 BP readings) 30 ml/kg crystalloid MUST be ordered. If 30 ml/kg crystalloid not ordered the following must be documented in the medical record:  Administration of 30 mL/kg of crystalloid fluids would be detrimental or harmful for the patient; Fluids must be initiated within 3 hours of sepsis identification. These fluids must have a rate > 125 ml/hr.     Is the patient Morbidly Obese (BMI > 30): No  IV Fluids given prior to arrival can be used in this calculation but the following information must be

## 2022-09-01 LAB
ABSOLUTE EOS #: 0.14 K/UL (ref 0–0.44)
ABSOLUTE IMMATURE GRANULOCYTE: 0.05 K/UL (ref 0–0.3)
ABSOLUTE LYMPH #: 1.62 K/UL (ref 1.1–3.7)
ABSOLUTE MONO #: 0.95 K/UL (ref 0.1–1.2)
ANION GAP SERPL CALCULATED.3IONS-SCNC: 8 MMOL/L (ref 9–17)
BASOPHILS # BLD: 0 % (ref 0–2)
BASOPHILS ABSOLUTE: 0.04 K/UL (ref 0–0.2)
BUN BLDV-MCNC: 3 MG/DL (ref 6–20)
CALCIUM SERPL-MCNC: 7.4 MG/DL (ref 8.6–10.4)
CHLORIDE BLD-SCNC: 110 MMOL/L (ref 98–107)
CO2: 22 MMOL/L (ref 20–31)
CREAT SERPL-MCNC: 0.67 MG/DL (ref 0.5–0.9)
CULTURE: ABNORMAL
EKG ATRIAL RATE: 103 BPM
EKG P AXIS: 25 DEGREES
EKG P-R INTERVAL: 152 MS
EKG Q-T INTERVAL: 322 MS
EKG QRS DURATION: 78 MS
EKG QTC CALCULATION (BAZETT): 421 MS
EKG R AXIS: 42 DEGREES
EKG T AXIS: 33 DEGREES
EKG VENTRICULAR RATE: 103 BPM
EOSINOPHILS RELATIVE PERCENT: 1 % (ref 1–4)
GFR AFRICAN AMERICAN: >60 ML/MIN
GFR NON-AFRICAN AMERICAN: >60 ML/MIN
GFR SERPL CREATININE-BSD FRML MDRD: ABNORMAL ML/MIN/{1.73_M2}
GLUCOSE BLD-MCNC: 100 MG/DL (ref 70–99)
HCT VFR BLD CALC: 37.2 % (ref 36.3–47.1)
HEMOGLOBIN: 11.6 G/DL (ref 11.9–15.1)
IMMATURE GRANULOCYTES: 0 %
INR BLD: 1.1
LYMPHOCYTES # BLD: 14 % (ref 24–43)
MAGNESIUM: 1.8 MG/DL (ref 1.6–2.6)
MCH RBC QN AUTO: 29.2 PG (ref 25.2–33.5)
MCHC RBC AUTO-ENTMCNC: 31.2 G/DL (ref 28.4–34.8)
MCV RBC AUTO: 93.7 FL (ref 82.6–102.9)
MONOCYTES # BLD: 8 % (ref 3–12)
NRBC AUTOMATED: 0 PER 100 WBC
PDW BLD-RTO: 13.5 % (ref 11.8–14.4)
PLATELET # BLD: 146 K/UL (ref 138–453)
PMV BLD AUTO: 11.3 FL (ref 8.1–13.5)
POTASSIUM SERPL-SCNC: 3.7 MMOL/L (ref 3.7–5.3)
PROTHROMBIN TIME: 11.2 SEC (ref 9.1–12.3)
RBC # BLD: 3.97 M/UL (ref 3.95–5.11)
SEG NEUTROPHILS: 76 % (ref 36–65)
SEGMENTED NEUTROPHILS ABSOLUTE COUNT: 8.77 K/UL (ref 1.5–8.1)
SODIUM BLD-SCNC: 140 MMOL/L (ref 135–144)
SPECIMEN DESCRIPTION: ABNORMAL
WBC # BLD: 11.6 K/UL (ref 3.5–11.3)

## 2022-09-01 PROCEDURE — 99232 SBSQ HOSP IP/OBS MODERATE 35: CPT | Performed by: INTERNAL MEDICINE

## 2022-09-01 PROCEDURE — 83735 ASSAY OF MAGNESIUM: CPT

## 2022-09-01 PROCEDURE — 1200000000 HC SEMI PRIVATE

## 2022-09-01 PROCEDURE — 85025 COMPLETE CBC W/AUTO DIFF WBC: CPT

## 2022-09-01 PROCEDURE — 94640 AIRWAY INHALATION TREATMENT: CPT

## 2022-09-01 PROCEDURE — 6360000002 HC RX W HCPCS: Performed by: NURSE PRACTITIONER

## 2022-09-01 PROCEDURE — 85610 PROTHROMBIN TIME: CPT

## 2022-09-01 PROCEDURE — 6370000000 HC RX 637 (ALT 250 FOR IP): Performed by: INTERNAL MEDICINE

## 2022-09-01 PROCEDURE — 80048 BASIC METABOLIC PNL TOTAL CA: CPT

## 2022-09-01 PROCEDURE — 36415 COLL VENOUS BLD VENIPUNCTURE: CPT

## 2022-09-01 PROCEDURE — 6370000000 HC RX 637 (ALT 250 FOR IP): Performed by: NURSE PRACTITIONER

## 2022-09-01 PROCEDURE — 2580000003 HC RX 258: Performed by: NURSE PRACTITIONER

## 2022-09-01 RX ADMIN — ONDANSETRON 4 MG: 2 INJECTION INTRAMUSCULAR; INTRAVENOUS at 08:34

## 2022-09-01 RX ADMIN — TRAMADOL HYDROCHLORIDE 50 MG: 50 TABLET, COATED ORAL at 19:12

## 2022-09-01 RX ADMIN — BUDESONIDE AND FORMOTEROL FUMARATE DIHYDRATE 2 PUFF: 160; 4.5 AEROSOL RESPIRATORY (INHALATION) at 21:37

## 2022-09-01 RX ADMIN — MONTELUKAST SODIUM 10 MG: 10 TABLET, FILM COATED ORAL at 08:30

## 2022-09-01 RX ADMIN — TRAMADOL HYDROCHLORIDE 50 MG: 50 TABLET, COATED ORAL at 08:34

## 2022-09-01 RX ADMIN — ENOXAPARIN SODIUM 40 MG: 100 INJECTION SUBCUTANEOUS at 08:30

## 2022-09-01 RX ADMIN — SODIUM CHLORIDE, PRESERVATIVE FREE 10 ML: 5 INJECTION INTRAVENOUS at 08:22

## 2022-09-01 RX ADMIN — CEFTRIAXONE SODIUM 1000 MG: 1 INJECTION, POWDER, FOR SOLUTION INTRAMUSCULAR; INTRAVENOUS at 04:21

## 2022-09-01 RX ADMIN — ACETAMINOPHEN 650 MG: 325 TABLET ORAL at 04:32

## 2022-09-01 RX ADMIN — SODIUM CHLORIDE, PRESERVATIVE FREE 5 ML: 5 INJECTION INTRAVENOUS at 22:26

## 2022-09-01 RX ADMIN — ONDANSETRON 4 MG: 2 INJECTION INTRAMUSCULAR; INTRAVENOUS at 19:12

## 2022-09-01 RX ADMIN — SODIUM CHLORIDE: 9 INJECTION, SOLUTION INTRAVENOUS at 01:02

## 2022-09-01 ASSESSMENT — PAIN DESCRIPTION - LOCATION
LOCATION: ABDOMEN
LOCATION: FLANK;ABDOMEN

## 2022-09-01 ASSESSMENT — PAIN DESCRIPTION - DESCRIPTORS: DESCRIPTORS: ACHING

## 2022-09-01 ASSESSMENT — PAIN SCALES - GENERAL
PAINLEVEL_OUTOF10: 7
PAINLEVEL_OUTOF10: 6
PAINLEVEL_OUTOF10: 7

## 2022-09-01 ASSESSMENT — PAIN DESCRIPTION - ORIENTATION: ORIENTATION: LOWER

## 2022-09-01 NOTE — PROGRESS NOTES
[facility]  Office: [address], DO, Landon Pole, DO, Rene Bo, DO, Yan Us Blood, DO, Jose Angel Wilson MD, Rere Resendez MD, Damien Breaux MD, Aruna Cook MD,  Laura Hoover MD, Jane Magallanes MD, Ishan Graf, DO, Amor Garcia MD,  Dmitry Charles MD, Catherine Ortiz MD, Juan C Chavez DO, Igor Gurrola MD, Minerva Chaudhry MD, Richard White MD, Rodríguez Abbott MD, Raulito Todd MD, Rachel Bennett MD, Jameson Price DO, Adriano Guaman MD, Jessy Wilson MD, Leyla Munoz CNP,  Alondra Nazario, CNP, Beatrice Lovell, CNP, Ignacio Louie, CNP, Fannie Pride PA-C, Katja Lizarraga, DNP, Allen Mathis, CNP, Gamaliel Leonardo, CNP, Martita Aiken, CNP, Josiah Yu, CNP, Ihsan Martin, CNP, Nuria Michelle, CNS, Abbey Womack, DNP, Dawit Jerome, CNP, Toro Sher, [facility], Laisha Raza, [address] [place] [address]    Progress Note    9/1/2022    7:34 AM    Name:   Zak Gordon  MRN:     [id number]     Acct:      [de-identified]   Room:   [address] [place] [address] Day:  1  Admit Date:  8/31/2022  3:08 AM    PCP:   Gracy Pleitez MD  Code Status:  Full Code    Subjective:     C/C:   Chief Complaint   Patient presents with    Other     Hx of pyelonephritis; seen earlier today. difficulty urinating     Pyelonephritis     Interval History Status: First time seeing pt. Pt laying in bed, continues to have flank pain and dysuria  T-max overnight 100.2  She still feels nauseated and requiring Zofran      Brief History: This is a 25-year-old female who initially presented to our hospital with complaints of left-sided flank pain, dysuria and increased frequency. She was found to have urinary tract infection was initially discharged on Keflex however return to the hospital due to worsening pain and nausea along with continued dysuria.   Patient has a history of recurrent UTIs for which she follows with urology on an outpatient basis. She sees [facility] and was supposed to have cystoscopy. Urine culture did Grow E.coli     Review of Systems:     Constitutional:  negative for chills, fevers, sweats  Respiratory:  negative for cough, dyspnea on exertion, shortness of breath, wheezing  Cardiovascular:  negative for chest pain, chest pressure/discomfort, lower extremity edema, palpitations  Gastrointestinal:  negative for abdominal pain, constipation, diarrhea, nausea, vomiting  Neurological:  negative for dizziness, headache  : admits to pain with urination and flank pain     Medications: Allergies: Allergies   Allergen Reactions    Amoxicillin Hives    Lactose      Can have milk in foods, but not by itself (I.e., glass of milk)    Other Rash     Can have milk in foods, but not by itself (I.e., glass of milk)       Current Meds:   Scheduled Meds:    budesonide-formoterol  2 puff Inhalation BID    montelukast  10 mg Oral Daily    sodium chloride flush  5-40 mL IntraVENous 2 times per day    enoxaparin  40 mg SubCUTAneous Daily    cefTRIAXone (ROCEPHIN) IV  1,000 mg IntraVENous Q24H     Continuous Infusions:    sodium chloride 125 mL/hr at 09/01/22 0426    sodium chloride       PRN Meds: sodium chloride flush, sodium chloride, ondansetron **OR** ondansetron, acetaminophen **OR** acetaminophen, polyethylene glycol, benzocaine-menthol, traMADol, potassium chloride **OR** potassium alternative oral replacement **OR** potassium chloride, magnesium sulfate    Data:     Past Medical History:   has a past medical history of Asthma, Cholecystitis, E. coli UTI (urinary tract infection), Gall stones, Kidney stone, Pyelonephritis, S/p lap right salpingectomy w/ evacuation of hemoperitoneum 12/10/21, and UTI (lower urinary tract infection). Social History:   reports that she has never smoked. She has never used smokeless tobacco. She reports current alcohol use of about 2.0 standard drinks per week.  She reports that she does not use drugs. Family History:   Family History   Problem Relation Age of Onset    Ovarian Cancer Maternal Grandmother     Prostate Cancer Maternal Grandfather        Vitals:  BP 92/65   Pulse 98   Temp 98.8 °F (37.1 °C) (Oral)   Resp 18   Ht 5' 5\" (1.651 m)   Wt 175 lb 8 oz (79.6 kg)   SpO2 97%   BMI 29.20 kg/m²   Temp (24hrs), Av.2 °F (37.3 °C), Min:98.6 °F (37 °C), Max:100.2 °F (37.9 °C)    No results for input(s): POCGLU in the last 72 hours. I/O (24Hr): Intake/Output Summary (Last 24 hours) at 2022 0734  Last data filed at 2022 0426  Gross per 24 hour   Intake 2784.54 ml   Output --   Net 2784.54 ml       Labs:  Hematology:  Recent Labs     22  1513 08/31/22  0337   WBC 12.8* 14.6*   RBC 5.21* 4.65   HGB 15.8* 14.1   HCT 44.7 39.6   MCV 85.8 85.2   MCH 30.3 30.3   MCHC 35.3* 35.6*   RDW 12.9 13.1    185   MPV 11.1 10.8   INR  --  1.1     Chemistry:  Recent Labs     22  1513 22   * 138  --    K 3.5* 3.3* 3.3*    103  --    CO2 22 23  --    GLUCOSE 100* 115*  --    BUN 8 8  --    CREATININE 0.85 0.96*  --    ANIONGAP 12 12  --    LABGLOM >60 >60  --    GFRAA >60 >60  --    CALCIUM 9.0 8.4*  --      Recent Labs     22   PROT 6.9   LABALBU 3.9   AST 61*   ALT 46*   ALKPHOS 60   BILITOT 0.95     ABG:No results found for: POCPH, PHART, PH, POCPCO2, FYN5ABL, PCO2, POCPO2, PO2ART, PO2, POCHCO3, NBF5YWJ, HCO3, NBEA, PBEA, BEART, BE, THGBART, THB, CLZ2HZE, MGYO1STK, S7THZWZS, O2SAT, FIO2  Lab Results   Component Value Date/Time    SPECIAL LEFT FOREARM 11ML 2022 03:40 AM     Lab Results   Component Value Date/Time    CULTURE NO GROWTH 1 DAY 2022 03:40 AM       Radiology:  CT ABDOMEN PELVIS WO CONTRAST Additional Contrast? None    Result Date: 2022  1. No renal calculi, ureteral calculi or significant hydroureteronephrosis. No perinephric stranding.  2. Bladder wall thickening is seen with perivesicular inflammatory change and free fluid in the pelvis which may be related to acute cystitis. XR NECK SOFT TISSUE    Result Date: 8/31/2022  No acute findings. XR CHEST (2 VW)    Result Date: 8/30/2022  Unremarkable PA and lateral chest.  No evidence of acute cardiopulmonary process. Physical Examination:        General appearance:  alert, cooperative and no distress  Mental Status:  oriented to person, place and time and normal affect  Lungs:  clear to auscultation bilaterally, normal effort  Heart:  regular rate and rhythm, no murmur  Abdomen:  soft, nontender, nondistended, normal bowel sounds, no masses, hepatomegaly, splenomegaly  Extremities:  no edema, redness, tenderness in the calves  Skin:  no gross lesions, rashes, induration    Assessment:        Hospital Problems             Last Modified POA    * (Principal) Sepsis due to gram-negative UTI ([facility] [address] 8/31/2022 Yes    Moderate persistent asthma without complication 5/27/8532 Yes    Pyelonephritis 8/31/2022 Yes    Hypokalemia 8/31/2022 Yes       Plan:        Sepsis 2/2 gram negative complicated UTI: SIRS: (Tmax: 38.4, HR:126, WBC:14.6). CT abdomen pelvis negative for abscess, hydroureteronephrosis. Continue IV rocephin pending final culture data and sensitivities. Patient febrile overnight, needs continued hospitalization. If pain not improved/remains hospitalized, will repeat CT in 3 to 4 days to look for abscess. Stop IVF, she is eating, drinking and +2.5 liters. Acute hypokalemia: Recheck potassium  Overweight BMI 29: recommend weight loss and lifestyle modification  Moderate persistent asthma without complication : continue home Symbicort and Singulair. DVT ppx with Lovenox  Labs, imaging, ECG reviewed. PTOT    Dispo: plan for d/c once afebrile for 24 hours, pain improved and tolerating diet.    Andi Najera DO  9/1/2022  7:34 AM

## 2022-09-01 NOTE — PLAN OF CARE
Problem: Discharge Planning  Goal: Discharge to home or other facility with appropriate resources  8/31/2022 2046 by Lluvia Roberts RN  Outcome: Progressing  8/31/2022 1818 by Verner Crete, RN  Outcome: Progressing  Flowsheets (Taken 8/31/2022 1616)  Discharge to home or other facility with appropriate resources: Arrange for needed discharge resources and transportation as appropriate     Problem: Pain  Goal: Verbalizes/displays adequate comfort level or baseline comfort level  8/31/2022 2046 by Lluvia Roberts RN  Outcome: Progressing  8/31/2022 1818 by Verner Crete, RN  Outcome: Progressing

## 2022-09-02 VITALS
SYSTOLIC BLOOD PRESSURE: 94 MMHG | WEIGHT: 179.45 LBS | HEIGHT: 65 IN | DIASTOLIC BLOOD PRESSURE: 62 MMHG | OXYGEN SATURATION: 98 % | RESPIRATION RATE: 16 BRPM | HEART RATE: 79 BPM | BODY MASS INDEX: 29.9 KG/M2 | TEMPERATURE: 97.9 F

## 2022-09-02 LAB
ABSOLUTE EOS #: 0.15 K/UL (ref 0–0.44)
ABSOLUTE IMMATURE GRANULOCYTE: <0.03 K/UL (ref 0–0.3)
ABSOLUTE LYMPH #: 2.11 K/UL (ref 1.1–3.7)
ABSOLUTE MONO #: 0.67 K/UL (ref 0.1–1.2)
ANION GAP SERPL CALCULATED.3IONS-SCNC: 8 MMOL/L (ref 9–17)
BASOPHILS # BLD: 1 % (ref 0–2)
BASOPHILS ABSOLUTE: 0.04 K/UL (ref 0–0.2)
BUN BLDV-MCNC: 2 MG/DL (ref 6–20)
CALCIUM SERPL-MCNC: 8.2 MG/DL (ref 8.6–10.4)
CHLORIDE BLD-SCNC: 108 MMOL/L (ref 98–107)
CO2: 22 MMOL/L (ref 20–31)
CREAT SERPL-MCNC: 0.65 MG/DL (ref 0.5–0.9)
EKG ATRIAL RATE: 84 BPM
EKG P AXIS: 14 DEGREES
EKG P-R INTERVAL: 174 MS
EKG Q-T INTERVAL: 380 MS
EKG QRS DURATION: 80 MS
EKG QTC CALCULATION (BAZETT): 449 MS
EKG R AXIS: 37 DEGREES
EKG T AXIS: 21 DEGREES
EKG VENTRICULAR RATE: 84 BPM
EOSINOPHILS RELATIVE PERCENT: 2 % (ref 1–4)
GFR AFRICAN AMERICAN: >60 ML/MIN
GFR NON-AFRICAN AMERICAN: >60 ML/MIN
GFR SERPL CREATININE-BSD FRML MDRD: ABNORMAL ML/MIN/{1.73_M2}
GLUCOSE BLD-MCNC: 80 MG/DL (ref 70–99)
HCT VFR BLD CALC: 35.3 % (ref 36.3–47.1)
HEMOGLOBIN: 12.5 G/DL (ref 11.9–15.1)
IMMATURE GRANULOCYTES: 0 %
LYMPHOCYTES # BLD: 29 % (ref 24–43)
MCH RBC QN AUTO: 30.6 PG (ref 25.2–33.5)
MCHC RBC AUTO-ENTMCNC: 35.4 G/DL (ref 28.4–34.8)
MCV RBC AUTO: 86.3 FL (ref 82.6–102.9)
MONOCYTES # BLD: 9 % (ref 3–12)
NRBC AUTOMATED: 0 PER 100 WBC
PDW BLD-RTO: 13.2 % (ref 11.8–14.4)
PLATELET # BLD: 157 K/UL (ref 138–453)
PMV BLD AUTO: 10.7 FL (ref 8.1–13.5)
POTASSIUM SERPL-SCNC: 3.9 MMOL/L (ref 3.7–5.3)
RBC # BLD: 4.09 M/UL (ref 3.95–5.11)
SEG NEUTROPHILS: 60 % (ref 36–65)
SEGMENTED NEUTROPHILS ABSOLUTE COUNT: 4.38 K/UL (ref 1.5–8.1)
SODIUM BLD-SCNC: 138 MMOL/L (ref 135–144)
WBC # BLD: 7.4 K/UL (ref 3.5–11.3)

## 2022-09-02 PROCEDURE — 6360000002 HC RX W HCPCS: Performed by: NURSE PRACTITIONER

## 2022-09-02 PROCEDURE — 93005 ELECTROCARDIOGRAM TRACING: CPT | Performed by: NURSE PRACTITIONER

## 2022-09-02 PROCEDURE — 2500000003 HC RX 250 WO HCPCS: Performed by: INTERNAL MEDICINE

## 2022-09-02 PROCEDURE — 36415 COLL VENOUS BLD VENIPUNCTURE: CPT

## 2022-09-02 PROCEDURE — 80048 BASIC METABOLIC PNL TOTAL CA: CPT

## 2022-09-02 PROCEDURE — 6370000000 HC RX 637 (ALT 250 FOR IP): Performed by: INTERNAL MEDICINE

## 2022-09-02 PROCEDURE — 94760 N-INVAS EAR/PLS OXIMETRY 1: CPT

## 2022-09-02 PROCEDURE — 94640 AIRWAY INHALATION TREATMENT: CPT

## 2022-09-02 PROCEDURE — 6370000000 HC RX 637 (ALT 250 FOR IP): Performed by: NURSE PRACTITIONER

## 2022-09-02 PROCEDURE — 6360000002 HC RX W HCPCS: Performed by: INTERNAL MEDICINE

## 2022-09-02 PROCEDURE — 2580000003 HC RX 258: Performed by: NURSE PRACTITIONER

## 2022-09-02 PROCEDURE — 85025 COMPLETE CBC W/AUTO DIFF WBC: CPT

## 2022-09-02 PROCEDURE — 99239 HOSP IP/OBS DSCHRG MGMT >30: CPT | Performed by: INTERNAL MEDICINE

## 2022-09-02 RX ORDER — METOCLOPRAMIDE HYDROCHLORIDE 5 MG/ML
10 INJECTION INTRAMUSCULAR; INTRAVENOUS ONCE
Status: COMPLETED | OUTPATIENT
Start: 2022-09-02 | End: 2022-09-02

## 2022-09-02 RX ORDER — CEFADROXIL 1000 MG/1
1 TABLET ORAL 2 TIMES DAILY
Qty: 18 TABLET | Refills: 0 | Status: SHIPPED | OUTPATIENT
Start: 2022-09-02 | End: 2022-10-13 | Stop reason: SDUPTHER

## 2022-09-02 RX ORDER — TRAMADOL HYDROCHLORIDE 50 MG/1
50 TABLET ORAL EVERY 6 HOURS PRN
Qty: 12 TABLET | Refills: 0 | Status: SHIPPED | OUTPATIENT
Start: 2022-09-02 | End: 2022-09-05

## 2022-09-02 RX ORDER — SODIUM CHLORIDE 9 MG/ML
INJECTION, SOLUTION INTRAVENOUS CONTINUOUS
Status: ACTIVE | OUTPATIENT
Start: 2022-09-02 | End: 2022-09-02

## 2022-09-02 RX ORDER — METOCLOPRAMIDE 5 MG/1
5 TABLET ORAL 3 TIMES DAILY
Qty: 9 TABLET | Refills: 0 | Status: SHIPPED | OUTPATIENT
Start: 2022-09-02 | End: 2022-09-08 | Stop reason: ALTCHOICE

## 2022-09-02 RX ADMIN — TRAMADOL HYDROCHLORIDE 50 MG: 50 TABLET, COATED ORAL at 01:31

## 2022-09-02 RX ADMIN — SODIUM CHLORIDE 1000 ML: 9 INJECTION, SOLUTION INTRAVENOUS at 00:51

## 2022-09-02 RX ADMIN — CEFTRIAXONE SODIUM 1000 MG: 10 INJECTION, POWDER, FOR SOLUTION INTRAVENOUS at 03:59

## 2022-09-02 RX ADMIN — METOCLOPRAMIDE 10 MG: 5 INJECTION, SOLUTION INTRAMUSCULAR; INTRAVENOUS at 00:52

## 2022-09-02 RX ADMIN — TRAMADOL HYDROCHLORIDE 50 MG: 50 TABLET, COATED ORAL at 09:26

## 2022-09-02 RX ADMIN — BUDESONIDE AND FORMOTEROL FUMARATE DIHYDRATE 2 PUFF: 160; 4.5 AEROSOL RESPIRATORY (INHALATION) at 08:50

## 2022-09-02 RX ADMIN — MONTELUKAST SODIUM 10 MG: 10 TABLET, FILM COATED ORAL at 09:26

## 2022-09-02 RX ADMIN — ENOXAPARIN SODIUM 40 MG: 100 INJECTION SUBCUTANEOUS at 09:26

## 2022-09-02 ASSESSMENT — PAIN DESCRIPTION - DESCRIPTORS: DESCRIPTORS: ACHING

## 2022-09-02 ASSESSMENT — PAIN SCALES - GENERAL
PAINLEVEL_OUTOF10: 7

## 2022-09-02 ASSESSMENT — PAIN DESCRIPTION - ORIENTATION: ORIENTATION: LEFT

## 2022-09-02 ASSESSMENT — PAIN - FUNCTIONAL ASSESSMENT: PAIN_FUNCTIONAL_ASSESSMENT: ACTIVITIES ARE NOT PREVENTED

## 2022-09-02 ASSESSMENT — PAIN DESCRIPTION - LOCATION: LOCATION: FLANK;ABDOMEN

## 2022-09-02 NOTE — PLAN OF CARE
Problem: Discharge Planning  Goal: Discharge to home or other facility with appropriate resources  9/2/2022 1024 by Spencer Diehl RN  Outcome: Progressing  9/2/2022 0723 by Spencer Diehl RN  Outcome: Progressing  9/2/2022 0458 by Shirlene Robertson RN  Outcome: Progressing     Problem: Pain  Goal: Verbalizes/displays adequate comfort level or baseline comfort level  9/2/2022 1024 by Spencer Diehl RN  Outcome: Progressing  9/2/2022 0723 by Spencer Diehl RN  Outcome: Progressing  9/2/2022 0458 by Shirlene Robertson RN  Outcome: Progressing     Problem: Safety - Adult  Goal: Free from fall injury  9/2/2022 1024 by Spencer Diehl RN  Outcome: Progressing  9/2/2022 0723 by Spencer Diehl RN  Outcome: Progressing  9/2/2022 0458 by Shirlene Robertson RN  Outcome: Progressing     Problem: ABCDS Injury Assessment  Goal: Absence of physical injury  9/2/2022 1024 by Spencer Diehl RN  Outcome: Progressing  9/2/2022 0723 by Spencer Diehl RN  Outcome: Progressing  9/2/2022 0458 by Shirlene Robertson RN  Outcome: Progressing     Problem: Infection - Adult  Goal: Absence of infection at discharge  9/2/2022 1024 by Spencer Diehl RN  Outcome: Progressing  9/2/2022 0723 by Spencer Diehl RN  Outcome: Progressing  9/2/2022 0458 by Shirlene Robertson RN  Outcome: Progressing  Goal: Absence of fever/infection during anticipated neutropenic period  9/2/2022 1024 by Spencer Diehl RN  Outcome: Progressing  9/2/2022 0723 by Spencer Diehl RN  Outcome: Progressing  9/2/2022 0458 by Shirlene Robertson RN  Outcome: Progressing     Problem: Genitourinary - Adult  Goal: Absence of urinary retention  9/2/2022 1024 by Spencer Diehl RN  Outcome: Progressing  9/2/2022 0723 by Spencer Diehl RN  Outcome: Progressing  9/2/2022 0458 by Shirlene Robertson RN  Outcome: Progressing

## 2022-09-02 NOTE — PLAN OF CARE
Problem: Discharge Planning  Goal: Discharge to home or other facility with appropriate resources  9/2/2022 1507 by Lillian Hodgson RN  Outcome: Adequate for Discharge  9/2/2022 1027 by Lillian Hodgson RN  Outcome: Progressing  9/2/2022 1024 by Lillian Hodgson RN  Outcome: Progressing  9/2/2022 0723 by Lillian Hodgson RN  Outcome: Progressing  9/2/2022 0458 by Henry Duval RN  Outcome: Progressing     Problem: Pain  Goal: Verbalizes/displays adequate comfort level or baseline comfort level  9/2/2022 1507 by Lillian Hodgson RN  Outcome: Adequate for Discharge  9/2/2022 1027 by Lillian Hodgson RN  Outcome: Progressing  9/2/2022 1024 by Lillian Hodgson RN  Outcome: Progressing  9/2/2022 0723 by Lillian Hodgson RN  Outcome: Progressing  9/2/2022 0458 by Henry Duval RN  Outcome: Progressing     Problem: Safety - Adult  Goal: Free from fall injury  9/2/2022 1507 by Lillian Hodgson RN  Outcome: Adequate for Discharge  9/2/2022 1027 by Lillina Hodgson RN  Outcome: Progressing  9/2/2022 1024 by Lillian Hodgson RN  Outcome: Progressing  9/2/2022 0723 by Lillian Hodgson RN  Outcome: Progressing  9/2/2022 0458 by Henry Duval RN  Outcome: Progressing     Problem: ABCDS Injury Assessment  Goal: Absence of physical injury  9/2/2022 1507 by Lillian Hodgson RN  Outcome: Adequate for Discharge  9/2/2022 1027 by Lillian Hodgson RN  Outcome: Progressing  9/2/2022 1024 by Lillian Hodgson RN  Outcome: Progressing  9/2/2022 0723 by Lillian Hodgson RN  Outcome: Progressing  9/2/2022 0458 by Henry Duval RN  Outcome: Progressing     Problem: Infection - Adult  Goal: Absence of infection at discharge  9/2/2022 1507 by Lillian Hodgson RN  Outcome: Adequate for Discharge  9/2/2022 1027 by Lillian Hodgson RN  Outcome: Progressing  9/2/2022 1024 by Lillian Hodgson RN  Outcome: Progressing  9/2/2022 0723 by Lillian Hodgson RN  Outcome: Progressing  9/2/2022 0458 by Henry Duval RN  Outcome: Progressing  Goal: Absence of fever/infection during anticipated neutropenic period  9/2/2022 1507 by Loulou Zimmer RN  Outcome: Adequate for Discharge  9/2/2022 1027 by Loulou Zimmer RN  Outcome: Progressing  9/2/2022 1024 by Loulou Zimmer RN  Outcome: Progressing  9/2/2022 0723 by Loulou Zimmer RN  Outcome: Progressing  9/2/2022 0458 by Ava Hanna RN  Outcome: Progressing     Problem: Genitourinary - Adult  Goal: Absence of urinary retention  9/2/2022 1507 by Loulou Zimmer RN  Outcome: Adequate for Discharge  9/2/2022 1027 by Loulou Zimmer RN  Outcome: Progressing  9/2/2022 1024 by Loulou Zimmer RN  Outcome: Progressing  9/2/2022 0723 by Loulou Zimmer RN  Outcome: Progressing  9/2/2022 0458 by Ava Hanna RN  Outcome: Progressing

## 2022-09-02 NOTE — PROGRESS NOTES
[facility]  Office: [address], DO, Lyric Ck, DO, Serena Melinda, DO, Sirisha Ryan Ghotra, DO, Elisabeth Landin MD, Alejandra Coreas MD, Romel Tuttle MD, Jairon Farmer MD,  Amy Carrillo MD, Kandy Salinas MD, Ruba Perez, DO, Josephine Goldstein MD,  Tasia Banda MD, Renate Martinez MD, Donnie Rea DO, Nicholas Leone MD, Andre Thrasher MD, Skyla Sneed MD, John Tran MD, Jaren Wilson MD, Sendy Riddle MD, Kitty Hartley DO, Gabriel Wilson MD, Rosy Schirmer, MD, Liz Roblero, CNP,  Armani Murillo, CNP, Nicole Polanco, CNP, Myles Renae, CNP, Laura Almaraz PA-C, Ibeth Walters [facility], Kalpana Gillespie, CNP, Shonda Potts, CNP, Rupa Jarquin, CNP, Rene Che, CNP, Ainsley Briggs, CNP, Zachary Palomino, CNS, Milli Sawant, [facility], Eliezer Frances, CNP, Miesha Casillas, CNP, [facility], [address] [place] [address]    Progress Note    9/2/2022    8:22 AM    Name:   Fabiola Alicea  MRN:     [id number]     Acct:      [de-identified]   Room:   [address] [place] [address] Day:  2  Admit Date:  8/31/2022  3:08 AM    PCP:   Salma Chacon MD  Code Status:  Full Code    Subjective:     C/C:   Chief Complaint   Patient presents with    Other     Hx of pyelonephritis; seen earlier today. difficulty urinating     Pyelonephritis     Interval History Status: improved    Pt afebrile overnight. Flank pain improving  White count improving  Tolerating her diet      Brief History: This is a 81-year-old female who initially presented to our hospital with complaints of left-sided flank pain, dysuria and increased frequency. She was found to have urinary tract infection was initially discharged on Keflex however return to the hospital due to worsening pain and nausea along with continued dysuria. Patient has a history of recurrent UTIs for which she follows with urology on an outpatient basis.   She sees Melisa Maldonado and was supposed to have cystoscopy. Urine culture did Grow E.coli     Review of Systems:     Constitutional:  negative for chills, fevers, sweats  Respiratory:  negative for cough, dyspnea on exertion, shortness of breath, wheezing  Cardiovascular:  negative for chest pain, chest pressure/discomfort, lower extremity edema, palpitations  Gastrointestinal:  negative for abdominal pain, constipation, diarrhea, nausea, vomiting  Neurological:  negative for dizziness, headache  : pain did improve    Medications: Allergies: Allergies   Allergen Reactions    Amoxicillin Hives    Lactose      Can have milk in foods, but not by itself (I.e., glass of milk)    Other Rash     Can have milk in foods, but not by itself (I.e., glass of milk)       Current Meds:   Scheduled Meds:    cefTRIAXone (ROCEPHIN) IV  1,000 mg IntraVENous Q24H    budesonide-formoterol  2 puff Inhalation BID    montelukast  10 mg Oral Daily    sodium chloride flush  5-40 mL IntraVENous 2 times per day    enoxaparin  40 mg SubCUTAneous Daily     Continuous Infusions:    sodium chloride       PRN Meds: sodium chloride flush, sodium chloride, ondansetron **OR** ondansetron, acetaminophen **OR** acetaminophen, polyethylene glycol, benzocaine-menthol, traMADol, potassium chloride **OR** potassium alternative oral replacement **OR** potassium chloride, magnesium sulfate    Data:     Past Medical History:   has a past medical history of Asthma, Cholecystitis, E. coli UTI (urinary tract infection), Gall stones, Kidney stone, Pyelonephritis, S/p lap right salpingectomy w/ evacuation of hemoperitoneum 12/10/21, and UTI (lower urinary tract infection). Social History:   reports that she has never smoked. She has never used smokeless tobacco. She reports current alcohol use of about 2.0 standard drinks per week. She reports that she does not use drugs.      Family History:   Family History   Problem Relation Age of Onset    Ovarian Cancer Maternal Grandmother     Prostate Cancer Maternal Grandfather        Vitals:  BP (!) 95/55   Pulse 83   Temp 98.2 °F (36.8 °C) (Oral)   Resp 18   Ht 5' 5\" (1.651 m)   Wt 179 lb 7.3 oz (81.4 kg)   SpO2 97%   BMI 29.86 kg/m²   Temp (24hrs), Av.2 °F (36.8 °C), Min:97.7 °F (36.5 °C), Max:98.6 °F (37 °C)    No results for input(s): POCGLU in the last 72 hours. I/O (24Hr):     Intake/Output Summary (Last 24 hours) at 2022  Last data filed at 2022 0549  Gross per 24 hour   Intake 1377.09 ml   Output --   Net 1377.09 ml         Labs:  Hematology:  Recent Labs     22  0804 22  0609   WBC 14.6*  --  11.6* 7.4   RBC 4.65  --  3.97 4.09   HGB 14.1  --  11.6* 12.5   HCT 39.6  --  37.2 35.3*   MCV 85.2  --  93.7 86.3   MCH 30.3  --  29.2 30.6   MCHC 35.6*  --  31.2 35.4*   RDW 13.1  --  13.5 13.2     --  146 157   MPV 10.8  --  11.3 10.7   INR 1.1 1.1  --   --        Chemistry:  Recent Labs     22  07522  0804 22  0609     --  140  --  138   K 3.3* 3.3* 3.7  --  3.9     --  110*  --  108*   CO2   --  -  22   GLUCOSE 115*  --  100*  --  80   BUN 8  --  3*  --  2*   CREATININE 0.96*  --  0.67  --  0.65   MG  --   --   --  1.8  --    ANIONGAP 12  --  8*  --  8*   LABGLOM >60  --  >60  --  >60   GFRAA >60  --  >60  --  >60   CALCIUM 8.4*  --  7.4*  --  8.2*       Recent Labs     22  0337   PROT 6.9   LABALBU 3.9   AST 61*   ALT 46*   ALKPHOS 60   BILITOT 0.95       ABG:No results found for: POCPH, PHART, PH, POCPCO2, DQF2IRC, PCO2, POCPO2, PO2ART, PO2, POCHCO3, DFI5WYY, HCO3, NBEA, PBEA, BEART, BE, THGBART, THB, HJF4WZV, CUGQ9UML, R6NAORNP, O2SAT, FIO2  Lab Results   Component Value Date/Time    SPECIAL LEFT FOREARM 11ML 2022 03:40 AM     Lab Results   Component Value Date/Time    CULTURE NO GROWTH 2 DAYS 2022 03:40 AM       Radiology:  CT ABDOMEN PELVIS WO CONTRAST Additional Contrast? None    Result Date: 8/31/2022  1. No renal calculi, ureteral calculi or significant hydroureteronephrosis. No perinephric stranding. 2. Bladder wall thickening is seen with perivesicular inflammatory change and free fluid in the pelvis which may be related to acute cystitis. XR NECK SOFT TISSUE    Result Date: 8/31/2022  No acute findings. XR CHEST (2 VW)    Result Date: 8/30/2022  Unremarkable PA and lateral chest.  No evidence of acute cardiopulmonary process. Physical Examination:        General appearance:  alert, cooperative and no distress  Mental Status:  oriented to person, place and time and normal affect  Lungs:  clear to auscultation bilaterally, normal effort  Heart:  regular rate and rhythm, no murmur  Abdomen:  soft, nontender, nondistended, normal bowel sounds, no masses, hepatomegaly, splenomegaly  Extremities:  no edema, redness, tenderness in the calves  Skin:  no gross lesions, rashes, induration    Assessment:        Hospital Problems             Last Modified POA    * (Principal) Sepsis due to gram-negative UTI ([facility] [address] 8/31/2022 Yes    Moderate persistent asthma without complication 1/12/6895 Yes    Pyelonephritis 8/31/2022 Yes    Hypokalemia 8/31/2022 Yes     Plan:        Sepsis 2/2 gram negative complicated UTI: SIRS: (Tmax: 38.4, HR:126, WBC:14.6). CT abdomen pelvis negative for abscess, hydroureteronephrosis. Cultures growing pan sensitive E.coli. Will transition to PO and discharge. Needs follow up with PCP in one week. Acute hypokalemia: Resolved  Nausea: improved with Reglan. Will give short course on dc  Overweight BMI 29: recommend weight loss and lifestyle modification  Moderate persistent asthma without complication : continue home Symbicort and Singulair. DVT ppx with Lovenox  Labs, imaging, ECG reviewed.   PTOT    Dispo: plan for d/c today   Asha Spear DO  9/2/2022  8:22 AM

## 2022-09-02 NOTE — PLAN OF CARE
Problem: Discharge Planning  Goal: Discharge to home or other facility with appropriate resources  9/2/2022 0723 by Alessandro Birch RN  Outcome: Progressing  9/2/2022 0458 by Maya Oropeza RN  Outcome: Progressing     Problem: Pain  Goal: Verbalizes/displays adequate comfort level or baseline comfort level  9/2/2022 0723 by Alessandro Birch RN  Outcome: Progressing  9/2/2022 0458 by Maya Oropeza RN  Outcome: Progressing     Problem: Safety - Adult  Goal: Free from fall injury  9/2/2022 0723 by Alessandro Birch RN  Outcome: Progressing  9/2/2022 0458 by Maya Oropeza RN  Outcome: Progressing     Problem: ABCDS Injury Assessment  Goal: Absence of physical injury  9/2/2022 0723 by Alessandro Birch RN  Outcome: Progressing  9/2/2022 0458 by Maya Oropeza RN  Outcome: Progressing     Problem: Infection - Adult  Goal: Absence of infection at discharge  9/2/2022 0723 by Alessandro Birch RN  Outcome: Progressing  9/2/2022 0458 by Maya Oropeza RN  Outcome: Progressing  Goal: Absence of fever/infection during anticipated neutropenic period  9/2/2022 0723 by Alessandro Birch RN  Outcome: Progressing  9/2/2022 0458 by Maya Oropeza RN  Outcome: Progressing     Problem: Genitourinary - Adult  Goal: Absence of urinary retention  9/2/2022 0723 by Alessandro Birch RN  Outcome: Progressing  9/2/2022 0458 by Maya Oropeza RN  Outcome: Progressing

## 2022-09-02 NOTE — DISCHARGE INSTRUCTIONS
Urinary Tract Infection (UTI) in Women: Care Instructions  Overview     A urinary tract infection, or UTI, is a general term for an infection anywhere between the kidneys and the urethra (where urine comes out). Most UTIs arebladder infections. They often cause pain or burning when you urinate. UTIs are caused by bacteria and can be cured with antibiotics. Be sure tocomplete your treatment so that the infection does not get worse. Follow-up care is a key part of your treatment and safety. Be sure to make and go to all appointments, and call your doctor if you are having problems. It's also a good idea to know your test results and keep alist of the medicines you take. How can you care for yourself at home? Take your antibiotics as directed. Do not stop taking them just because you feel better. You need to take the full course of antibiotics. Drink extra water and other fluids for the next day or two. This will help make the urine less concentrated and help wash out the bacteria that are causing the infection. (If you have kidney, heart, or liver disease and have to limit fluids, talk with your doctor before you increase the amount of fluids you drink.)  Avoid drinks that are carbonated or have caffeine. They can irritate the bladder. Urinate often. Try to empty your bladder each time. To relieve pain, take a hot bath or lay a heating pad set on low over your lower belly or genital area. Never go to sleep with a heating pad in place. To prevent UTIs  Drink plenty of water each day. This helps you urinate often, which clears bacteria from your system. (If you have kidney, heart, or liver disease and have to limit fluids, talk with your doctor before you increase the amount of fluids you drink.)  Urinate when you need to. If you are sexually active, urinate right after you have sex. Change sanitary pads often.   Avoid douches, bubble baths, feminine hygiene sprays, and other feminine hygiene products that have deodorants. After going to the bathroom, wipe from front to back. When should you call for help? Call your doctor now or seek immediate medical care if:    Symptoms such as fever, chills, nausea, or vomiting get worse or appear for the first time. You have new pain in your back just below your rib cage. This is called flank pain. There is new blood or pus in your urine. You have any problems with your antibiotic medicine. Watch closely for changes in your health, and be sure to contact your doctor if:    You are not getting better after taking an antibiotic for 2 days. Your symptoms go away but then come back. Where can you learn more? Go to https://[employer]eb.Epic!. org and sign in to your [employer] account. Enter N033 in the [facility] box to learn more about \"Urinary Tract Infection (UTI) in Women: Care Instructions. \"     If you do not have an account, please click on the \"Sign Up Now\" link. Current as of: October 18, 2021               Content Version: 13.3  © 2006-2022 Healthwise, Incorporated. Care instructions adapted under license by [facility] ([facility]). If you have questions about a medical condition or this instruction, always ask your healthcare professional. [place] [address] any warranty or liability for your use of this information.

## 2022-09-02 NOTE — DISCHARGE SUMMARY
[facility]  Office: [address], DO, Char Din, DO, Bebo Acron, DO, Lebron Friend Blood, DO, Jw Villanueva MD, Santos Catherine MD, Deonna Nichols MD, Veronica Pugh MD,  Raul Teixeira MD, Sarbjit Laughlin MD, Tom Briscoe, DO, Kristyn Roche MD,  Isidro Arellano, DO, Bette Tanner MD, Venu Sloan MD, Maria Del Carmen Rose, DO, Migel Vargas MD, Karolyn Martinez MD, Noreen Zuleta MD, Samina Haynes MD, June Wilson MD, Devi Kelsey MD, Juwan Holbrook DO, Ankit Lala MD, Alden Borrero MD, Carline Bundy, CNP,  Adelia Alpers, CNP, Garima Mcgregor, CNP, Adriana Smith, CNP, Trey Casiano PA-C, Ada Echols, [facility], [place] Iyer, [facility], Trish Jean, CNP, Margaux Boothe, CNP, Javan Guzman, CNP, Estelita Draper, CNP, Blanche Marroquin, CNS, Levar Roman, [facility], Guillaume Russo, CNP, Gordon Shelby, [facility], Marry Engel, [facility]    Discharge Summary     Patient ID: Julia Lind  :  1995   MRN: [id number]     ACCOUNT:  [de-identified]   Patient's PCP: Lennox Hook, MD  Admit Date: 2022   Discharge Date: 2022     Length of Stay: 2  Code Status:  Full Code  Admitting Physician: No admitting provider for patient encounter. Discharge Physician: Zina Meng DO     Active Discharge Diagnoses:     Hospital Problem Lists:  Principal Problem:    Sepsis due to gram-negative UTI [facility])  Active Problems: Moderate persistent asthma without complication    Pyelonephritis    Hypokalemia  Resolved Problems:    * No resolved hospital problems. *      Admission Condition:  stable     Discharged Condition: stable    Hospital Stay:     Hospital Course:  Julia Lind is a 56-year-old female who initially presented to our hospital with complaints of left-sided flank pain, dysuria and increased frequency.   She was found to have urinary tract infection was initially discharged on Keflex however return to the hospital due to worsening pain and nausea along with continued dysuria. Patient has a history of recurrent UTIs for which she follows with urology on an outpatient basis. She sees Melisa Maldonado and was supposed to have cystoscopy but has not had opportunity to follow up. Pt was admitted and started on IV rocephin with improvement in her symptoms. Pts urine culture did grow E.coli that was pan sensitive. She was transitioned to PO antibiotics and discharged. Currently, pt medically stable for discharge. Will need to follow up with her PCP and Urologist within one week. Discussed with pt who is agreeable and understanding. Significant therapeutic interventions: see above    Significant Diagnostic Studies:   Labs / Micro:  CBC:   Lab Results   Component Value Date/Time    WBC 7.4 09/02/2022 06:09 AM    RBC 4.09 09/02/2022 06:09 AM    HGB 12.5 09/02/2022 06:09 AM    HCT 35.3 09/02/2022 06:09 AM    MCV 86.3 09/02/2022 06:09 AM    MCH 30.6 09/02/2022 06:09 AM    MCHC 35.4 09/02/2022 06:09 AM    RDW 13.2 09/02/2022 06:09 AM     09/02/2022 06:09 AM     BMP:    Lab Results   Component Value Date/Time    GLUCOSE 80 09/02/2022 06:09 AM     09/02/2022 06:09 AM    K 3.9 09/02/2022 06:09 AM     09/02/2022 06:09 AM    CO2 22 09/02/2022 06:09 AM    ANIONGAP 8 09/02/2022 06:09 AM    BUN 2 09/02/2022 06:09 AM    CREATININE 0.65 09/02/2022 06:09 AM    BUNCRER NOT REPORTED 12/11/2021 02:27 AM    CALCIUM 8.2 09/02/2022 06:09 AM    LABGLOM >60 09/02/2022 06:09 AM    GFRAA >60 09/02/2022 06:09 AM    GFR      09/02/2022 06:09 AM        Radiology:  CT ABDOMEN PELVIS WO CONTRAST Additional Contrast? None    Result Date: 8/31/2022  1. No renal calculi, ureteral calculi or significant hydroureteronephrosis. No perinephric stranding. 2. Bladder wall thickening is seen with perivesicular inflammatory change and free fluid in the pelvis which may be related to acute cystitis.      XR NECK SOFT TISSUE    Result Date: 8/31/2022  No acute findings. XR CHEST (2 VW)    Result Date: 8/30/2022  Unremarkable PA and lateral chest.  No evidence of acute cardiopulmonary process. Consultations:    Consults:     Final Specialist Recommendations/Findings:   IP CONSULT TO HOSPITALIST      The patient was seen and examined on day of discharge and this discharge summary is in conjunction with any daily progress note from day of discharge. Discharge plan:     Disposition: Home    Physician Follow Up:     Mihaela Liu MD  [address] [address]  [phone]    Schedule an appointment as soon as possible for a visit in 3 day(s)  Hospital follow-up    Kassy Toscano MD  [phone] [place] [address] [address]    Schedule an appointment as soon as possible for a visit  Cystoscopy     Requiring Further Evaluation/Follow Up POST HOSPITALIZATION/Incidental Findings:        Urinary Tract Infection (UTI) in Women: Care Instructions  Overview     A urinary tract infection, or UTI, is a general term for an infection anywhere between the kidneys and the urethra (where urine comes out). Most UTIs arebladder infections. They often cause pain or burning when you urinate. UTIs are caused by bacteria and can be cured with antibiotics. Be sure tocomplete your treatment so that the infection does not get worse. Follow-up care is a key part of your treatment and safety. Be sure to make and go to all appointments, and call your doctor if you are having problems. It's also a good idea to know your test results and keep alist of the medicines you take. How can you care for yourself at home? Take your antibiotics as directed. Do not stop taking them just because you feel better. You need to take the full course of antibiotics. Drink extra water and other fluids for the next day or two. This will help make the urine less concentrated and help wash out the bacteria that are causing the infection. (If you have kidney, heart, or liver disease and have to limit fluids, talk with your doctor before you increase the amount of fluids you drink.)  Avoid drinks that are carbonated or have caffeine. They can irritate the bladder. Urinate often. Try to empty your bladder each time. To relieve pain, take a hot bath or lay a heating pad set on low over your lower belly or genital area. Never go to sleep with a heating pad in place. To prevent UTIs  Drink plenty of water each day. This helps you urinate often, which clears bacteria from your system. (If you have kidney, heart, or liver disease and have to limit fluids, talk with your doctor before you increase the amount of fluids you drink.)  Urinate when you need to. If you are sexually active, urinate right after you have sex. Change sanitary pads often. Avoid douches, bubble baths, feminine hygiene sprays, and other feminine hygiene products that have deodorants. After going to the bathroom, wipe from front to back. When should you call for help? Call your doctor now or seek immediate medical care if:    Symptoms such as fever, chills, nausea, or vomiting get worse or appear for the first time. You have new pain in your back just below your rib cage. This is called flank pain. There is new blood or pus in your urine. You have any problems with your antibiotic medicine. Watch closely for changes in your health, and be sure to contact your doctor if:    You are not getting better after taking an antibiotic for 2 days. Your symptoms go away but then come back. Where can you learn more? Go to https://[email or web address].[employer] [email or web address] org and sign in to your PeopleMatter account. Enter I444 in the [facility] box to learn more about \"Urinary Tract Infection (UTI) in Women: Care Instructions. \"     If you do not have an account, please click on the \"Sign Up Now\" link.   Current as of: October 18, 2021               Content Version: 13.3  © 2257-1552 Healthwise, Incorporated. Care instructions adapted under license by [facility] ([facility]). If you have questions about a medical condition or this instruction, always ask your healthcare professional. Juanisnettaägen Madalyn any warranty or liability for your use of this information. Diet: regular diet    Activity: As tolerated    Instructions to Patient: see above    Discharge Medications:      Medication List        START taking these medications      cefadroxil 1 g tablet  Commonly known as: DURICEF  Take 1 tablet by mouth 2 times daily for 9 days     metoclopramide 5 MG tablet  Commonly known as: Reglan  Take 1 tablet by mouth 3 times daily for 3 days     traMADol 50 MG tablet  Commonly known as: ULTRAM  Take 1 tablet by mouth every 6 hours as needed for Pain for up to 3 days. CONTINUE taking these medications      acetaminophen 500 MG tablet  Commonly known as: TYLENOL     albuterol sulfate  (90 Base) MCG/ACT inhaler  Commonly known as: Ventolin HFA  Inhale 2 puffs into the lungs every 4 hours as needed for Wheezing or Shortness of Breath     budesonide-formoterol 160-4.5 MCG/ACT Aero  Commonly known as: Symbicort  Inhale 2 puffs into the lungs in the morning and 2 puffs before bedtime. fluticasone 50 MCG/ACT nasal spray  Commonly known as: Flonase  1 spray by Nasal route daily     ibuprofen 600 MG tablet  Commonly known as: ADVIL;MOTRIN  Take 1 tablet by mouth 3 times daily as needed for Pain     loratadine 10 MG tablet  Commonly known as: Claritin  Take 1 tablet by mouth daily     montelukast 10 MG tablet  Commonly known as: SINGULAIR  Take 1 tablet by mouth in the morning. norethindrone-ethinyl estradiol 1-20 MG-MCG per tablet  Commonly known as: JUNEL FE 1/20  Take 1 tablet by mouth in the morning.             STOP taking these medications      cephALEXin 500 MG capsule  Commonly known as: KEFLEX     meloxicam 15 MG tablet  Commonly known as: Mobic     ondansetron 4 MG disintegrating tablet  Commonly known as: Zofran ODT            ASK your doctor about these medications      hydrocortisone 2.5 % ointment  Apply topically 2 times daily. Where to Get Your Medications        Information about where to get these medications is not yet available    Ask your nurse or doctor about these medications  cefadroxil 1 g tablet  metoclopramide 5 MG tablet  traMADol 50 MG tablet         No discharge procedures on file. Time Spent on discharge is  40 mins in patient examination, evaluation, counseling as well as medication reconciliation, prescriptions for required medications, discharge plan and follow up. Electronically signed by   Aldair Low DO  9/2/2022  1:09 PM      Thank you Dr. Modesta Paz MD for the opportunity to be involved in this patient's care.

## 2022-09-02 NOTE — PLAN OF CARE
Problem: Discharge Planning  Goal: Discharge to home or other facility with appropriate resources  Outcome: Progressing     Problem: Pain  Goal: Verbalizes/displays adequate comfort level or baseline comfort level  Outcome: Progressing     Problem: Safety - Adult  Goal: Free from fall injury  Outcome: Progressing     Problem: ABCDS Injury Assessment  Goal: Absence of physical injury  Outcome: Progressing     Problem: Infection - Adult  Goal: Absence of infection at discharge  Outcome: Progressing  Goal: Absence of fever/infection during anticipated neutropenic period  Outcome: Progressing     Problem: Genitourinary - Adult  Goal: Absence of urinary retention  Outcome: Progressing

## 2022-09-02 NOTE — PROGRESS NOTES
CLINICAL PHARMACY NOTE: MEDS TO BEDS    Total # of Prescriptions Filled: 3   The following medications were delivered to the patient:  METOCLOPRAMIDE 5   TRAMADOL 50   CEFADROXIL 500     Additional Documentation:

## 2022-09-06 ENCOUNTER — CARE COORDINATION (OUTPATIENT)
Dept: OTHER | Facility: CLINIC | Age: 27
End: 2022-09-06

## 2022-09-06 NOTE — CARE COORDINATION
Care Transitions Outreach Attempt    Call within 2 business days of discharge: Yes   Attempted to reach patient for transitions of care follow up. Unable to reach patient. ACM attempted to reach patient for introduction to Associate Care Management related to inpt discharge 22 for pyelonephritis, UTI, sepsis. ACM spoke very briefly with pt in morning and pt requested call back later in the afternoon, stating she was on her way to appt. ACM attempted to reach pt later in afternoon; HIPAA compliant message left requesting a return phone call. Will attempt to outreach patient again. Patient: Bijan Drown Patient : 1995 MRN: [id number]    Last Discharge [facility]       Date Complaint Diagnosis Description Type Department Provider    22 Other; Pyelonephritis Sepsis, due to unspecified organism, unspecified whether acute organ dysfunction present ([facility] [address] . .. ED to Hosp-Admission (Discharged) (ADMITTED) [facility]; [place] . .. Was this an external facility discharge? No Discharge Facility: [address] [place] [address]    Noted following upcoming appointments from discharge chart review:   [facility] follow up appointment(s):   Future Appointments   Date Time Provider Kevin Marsh   2022  3:00 PM Inge Kamara, [facility]   2022  5:30 PM [facility], [facility]   2022  8:00 AM [facility]   2023 11:00 AM Inge Kamara MD [facility] FP [facility]   2023  5:15 PM [facility]     Non-[facility] follow up appointment(s): none known    Jared Case MSN, RN   Ambulatory Care Manager  Associate Care Management  Cell [phone]  [email or web address]

## 2022-09-07 ENCOUNTER — CARE COORDINATION (OUTPATIENT)
Dept: OTHER | Facility: CLINIC | Age: 27
End: 2022-09-07

## 2022-09-07 NOTE — CARE COORDINATION
Care Transitions Outreach Attempt    Call within 2 business days of discharge: Yes   Attempted to reach patient for transitions of care follow up. Unable to reach patient. ACM attempted 2nd outreach to reach patient for introduction to Associate Care Management. HIPAA compliant message left requesting a return phone call at patients convenience. Unable to Reach Letter sent to patient via my chart. Will continue to outreach patient. Patient: Michael Perales Patient : 1995 MRN: [id number]    Last Discharge [facility]       Date Complaint Diagnosis Description Type Department Provider    22 Other; Pyelonephritis Sepsis, due to unspecified organism, unspecified whether acute organ dysfunction present ([facility] [address] . .. ED to Hosp-Admission (Discharged) (ADMITTED) 117 [facility], DO; Donna Harrington . .. Was this an external facility discharge? No Discharge Facility: [facility]    Noted following upcoming appointments from discharge chart review:   [facility] follow up appointment(s):   Future Appointments   Date Time Provider Kevin Marsh   2022  3:00 PM Inge King, [facility]   2022  5:30 PM Fernando Lucio,  [facility]   2022  8:00 AM Lily [facility]   2023 11:00 AM Inge King MD [facility] FP [facility]   2023  5:15 PM [facility]     Non-[facility] follow up appointment(s): none known    Chelsey ARCOS, RN   Ambulatory Care Manager  Associate Care Management  Cell [phone]  [email or web address]

## 2022-09-08 ENCOUNTER — OFFICE VISIT (OUTPATIENT)
Dept: FAMILY MEDICINE CLINIC | Age: 27
End: 2022-09-08
Payer: COMMERCIAL

## 2022-09-08 ENCOUNTER — HOSPITAL ENCOUNTER (OUTPATIENT)
Dept: PHYSICAL THERAPY | Facility: CLINIC | Age: 27
Setting detail: THERAPIES SERIES
Discharge: HOME | End: 2022-09-08
Payer: COMMERCIAL

## 2022-09-08 VITALS
SYSTOLIC BLOOD PRESSURE: 106 MMHG | HEART RATE: 79 BPM | DIASTOLIC BLOOD PRESSURE: 68 MMHG | BODY MASS INDEX: 29.39 KG/M2 | WEIGHT: 176.6 LBS | OXYGEN SATURATION: 99 % | TEMPERATURE: 98.1 F

## 2022-09-08 DIAGNOSIS — A41.50 SEPSIS DUE TO GRAM-NEGATIVE UTI (HCC): Primary | ICD-10-CM

## 2022-09-08 DIAGNOSIS — Z09 HOSPITAL DISCHARGE FOLLOW-UP: ICD-10-CM

## 2022-09-08 DIAGNOSIS — N12 RECURRENT PYELONEPHRITIS: ICD-10-CM

## 2022-09-08 DIAGNOSIS — N39.0 SEPSIS DUE TO GRAM-NEGATIVE UTI (HCC): Primary | ICD-10-CM

## 2022-09-08 DIAGNOSIS — N39.0 RECURRENT UTI: ICD-10-CM

## 2022-09-08 DIAGNOSIS — N94.6 DYSMENORRHEA: ICD-10-CM

## 2022-09-08 DIAGNOSIS — J45.40 MODERATE PERSISTENT ASTHMA WITHOUT COMPLICATION: ICD-10-CM

## 2022-09-08 PROCEDURE — 97161 PT EVAL LOW COMPLEX 20 MIN: CPT

## 2022-09-08 PROCEDURE — 1111F DSCHRG MED/CURRENT MED MERGE: CPT | Performed by: INTERNAL MEDICINE

## 2022-09-08 PROCEDURE — 97110 THERAPEUTIC EXERCISES: CPT

## 2022-09-08 PROCEDURE — 99496 TRANSJ CARE MGMT HIGH F2F 7D: CPT | Performed by: INTERNAL MEDICINE

## 2022-09-08 RX ORDER — PHENAZOPYRIDINE HYDROCHLORIDE 200 MG/1
200 TABLET, FILM COATED ORAL 3 TIMES DAILY PRN
Qty: 10 TABLET | Refills: 0 | Status: SHIPPED | OUTPATIENT
Start: 2022-09-08 | End: 2022-09-11

## 2022-09-08 ASSESSMENT — ENCOUNTER SYMPTOMS
VOMITING: 0
WHEEZING: 0
BLOOD IN STOOL: 0
ANAL BLEEDING: 0
CHEST TIGHTNESS: 0
COUGH: 0
CHOKING: 0
DIARRHEA: 0
NAUSEA: 0
SHORTNESS OF BREATH: 0
CONSTIPATION: 0
ABDOMINAL PAIN: 0

## 2022-09-08 ASSESSMENT — VISUAL ACUITY: OU: 1

## 2022-09-08 NOTE — PROGRESS NOTES
Patient is here for a hospital f/u due to sepsis and kidney infection.  She will be following w/[employer]  She has FMLA forms that need to be filled out  Last day of work 9/30/2022   Returning 9/12/2022

## 2022-09-08 NOTE — CONSULTS
Stress incontinence occurs with:  Amount of leakage:     Urge Incontinence: [] Yes   [x] No  Urge incontinence occurs when:  Amount of Leakage:    Urinary Urgency/Frequency: [] Yes   [x]  No     How many times do you urinate per day:           Nocturia: [] Yes   [x] No                How many times do you urinate per night:    Pad Use:  [] Yes   [x] No              How many pads do you use per day:       Pelvic Pain: [x] Yes  [] No     Dyspareunia: [x] Yes   [] No       Where does pain occur: pain with insertion and deep within vaginal canal, states pain will last hours to days after pain, describes pain as shooting and sharp, states that she will feel nauseas due to the pain         Dysuria: [] Yes   [x] No      Other: low back pain, lower abdominal pain    Constipation:  [] Yes   [x]  No    Diarrhea: [] Yes   [x]  No    Prolapse: [] Yes  [x]  No     Feels prolapse with:    Pregnancies: [x] Yes  []  No   [x] Vaginal  []      How many: 1 live birth, 1 , 3 miscarriages,      Tearing: Yes     Episiotomy: Yes      Complications: No     Surgeries: Gull bladder removed- 2019, Tubal ligation-     Menstrual cycle: patient reports increased pain in low back, pelvic, external pelvic diaphragm, and abdominals during ovulation and menstrual cycle, pain is described as aching, stabbing and shooting, at times experiences heaviness in the pelvic area, dysmenorrhea     Red Flag: No blood in the urine or stool, on antibiotic for possible UTI and pyelonephritis     Hx of verbal, physical or sexual abuse: []  Yes  [x] No         PMHx: [x] Unremarkable [] Diabetes [] HTN [] Pacemaker   [] MI/Heart Problems [] Cancer [] Arthritis [x] Other: Anemic, hx of seizures with anesthetic                [x] Refer to full medical chart  In EPIC       Comorbidities:   [] Obesity [] Dialysis  [] N/A   [x] Asthma/COPD [] Dementia [] Other:    [] Stroke [] Sleep apnea [] Other:   [] Vascular disease [] Rheumatic disease [] Other: Tests:  [] X-Ray: [] MRI:  [x] Other: US: 8/31/22  Impression   1. No renal calculi, ureteral calculi or significant hydroureteronephrosis. No perinephric stranding. 2. Bladder wall thickening is seen with perivesicular inflammatory change and   free fluid in the pelvis which may be related to acute cystitis. Medications: [x] Refer to full medical record [] None [] Other:  Allergies:       [x] Refer to full medical record [] None [] Other:    Function:  Hand Dominance  [] Right  [] Left  Job Status [x]  Normal duty   [] Light duty   [] Off due to condition    []  Retired   [] Not employed   [] Disability  [] Other:  []  Return to work:    Work activities/duties Scheduling, works from home        Pain:  [] Yes  [x] No Location: lumbar pain, abdominal, pelvic pain Pain Rating: (0-10 scale) 9/10  Pain altered Tx:  [] Yes  [x] No  Action:    Symptoms:  [] Improving [x] Worsening [] Same  Better:  [] AM    [] PM    [] Sit    [] Rise/Sit    []Stand    [] Walk    [] Lying    [x] Other: increased water intake, heating pad, rest  Worse: [] AM    [] PM    [] Sit    [] Rise/Sit    []Stand    [] Walk    [] Lying    [] Bend                      [] Valsalva    [x] Other: during menstrual cycle and ovulation   Sleep: [] OK    [x] Disturbed- a lot of repositioning with pain     Objective:      ROM  ° A/P STRENGTH  ROM    Left Right Left Right Cervical    Shoulder Flex     Flexion    Abd     Extension    Elbow Flex     Rotation L R   Ext     Sidebend L R   Wrist Flex     Retraction    Ext     Lumbar    Hand      Flexion 75% (p)    Hip Flex   4 4 Extension 75% (p)    Ext   5 5 Rotation L 100% (p) R  100%  (P)   Abd   5 5 Sidebend L  100% (p) R  100% (p)   Knee Flex   4 4      Ext   5 5      Ankle DF   5 5      PF   5 5        OBSERVATION No Deficit Deficit Not Tested Comments   Posture           Forward Head []  [x]  []      Rounded Shoulders []  [x]  []      Kyphosis []  []  []      Lordosis []  [x]  []  Increased Lateral Shift []  []  []      Palpation [x]  []  []  TTP at lower abdominals, external pelvic diaphragm, (B) ASIS, (B) piriformis, (B) PSIS    Diastasis Recti [x]  []  []     Pelvic Rotation [x]  []  []     Perineal Descent at rest [x]  []  []     Voluntary Perineal Body Elevation  [x]  []  []  Present      Perineal Body Relaxation [x]  []  []  Present    Perineal Body Movement with Sustained increase in IAP []  [x]  []  Present with minimal descent    Perineal Body Movement with Rapid Increase in IAP []  [x]  []  Absent    L[place]cock PERFECT Scale []  []  [x]   Deferred due to patient being on her menstrual cycle P:  E:  R:  F:  E:  C:  T:   Prolapse []  []  [x]  Anterior Prolapse:  Posterior Prolapse:  Apical Prolapse:    Sensation [x]  []  []          Comments: Patient verbalizes consent for all external and internal examination techniques this date. Patient was aware that they could stop examination at any point during assessment with good understanding verbalized by patient. Patient was draped properly, gloves were used and universal precautions were used. Patient offered chaperone with patient: [] Accepting chaperone and chaperone present during entire internal examination                [x] Denying need for chaperone at this time     Assessment:  Patient is a 33year old female who presents to physical therapy with pelvic pain, abdominal pain and low back pain. Patient demonstrates impairments in pain tolerance, lumbar AROM, perineal body descent, pelvic floor coordination, piriformis length, external pelvic diaphragm mobility and (B) LE strength. These impairments affect the patient's ability to complete ADLs, complete care taking tasks or work related tasks without increased pain or discomfort. Patient would benefit from skilled physical therapy in order to improve impairments and improve overall quality of life. Educated patient on evaluation findings and poc.  Educated patient on pelvic floor anatomy and function. Educated patient on endometriosis and benefits of pelvic floor physical therapy for endometriosis. Educated patient on importance of pelvic floor lengthening an relaxation techniques in order to reduce pain and improve overall quality of life. Educated patient on pain cycle with chronic pain. Patient verbalized understanding of all education at this time. Problems:       Patient would benefit from skilled physical therapy services in order to:   [x] ? Pain:    [x] ? Pain:  [x] ? ROM:    [x] ? ROM:  [x] ? Strength:   [x] ? Strength:  [x] ? Function:   [x] ? Function:  [] Other:    [] Other:       STG: (to be met in 6 treatments)  Patient will report pain as 5/10  Patient will improve lumbar AROM to 100% within all planes with decreased pain in order to increase ease of performing ADLs  Patient will demonstrate good understanding of how to utilize pelvic wand and other relaxation techniques in order to reduce pain and manage pain symptoms at home   Independent with Home Exercise Programs  LTG: (to be met in 12 treatments)  Patient will report Pelvic Pain Impact Scale to 20/32 in order to indicate improved overall quality of life  Patient will demonstrate improved piriformis length in order to reduce tension to the pelvic floor   ? Function: Patient will demonstrate improved perineal body descent without use of accessory muscles in order to improve pelvic floor relaxation and lengthening  Patient will improve (B) LE strength to 5/5  Patient will report pain as 2/10    Rehab Potential:  [] Good  [x] Fair  [] Poor   Suggested Professional Referral:  [x] No  [] Yes:  Barriers to Goal Achievement:  [x] No  [] Yes:  Domestic Concerns:  [x] No  [] Yes:    Pt. Education:  [x] Plans/Goals, Risks/Benefits discussed  [x] Home exercise program  Access Code: [email or web address]  URL: [email or web address]  Date: 09/08/2022  Prepared by: Tim Black    Exercises  Supine Diaphragmatic Breathing - 2 x daily - 7 x weekly - 2 sets - 15 reps  Supine Butterfly Groin Stretch - 2 x daily - 7 x weekly - 3 sets - 30 sec hold  Supine Pelvic Floor Stretch - Hands on Knees - 2 x daily - 7 x weekly - 3 sets - 30 sec hold  Supine Piriformis Stretch with Foot on Ground - 2 x daily - 7 x weekly - 3 sets - 30 sec hold  Supine Lower Trunk Rotation - 2 x daily - 7 x weekly - 2 sets - 10 reps  Child's Pose Stretch - 2 x daily - 7 x weekly - 3 sets - 30 sec hold      Method of Education: [x] Verbal  [x] Demo  [] Written  Comprehension of Education:  [x] Verbalizes understanding. [x] Demonstrates understanding. [x] Needs Review. [] Demonstrates/verbalizes understanding of HEP/Ed previously given.     Treatment Plan:  [x] Therapeutic Exercise   86425  [] Iontophoresis: 4 mg/mL Dexamethasone Sodium Phosphate  mAmin  51097   [x] Therapeutic Activity  15376 [] Vasopneumatic cold with compression  92100    [] Gait Training   42582 [] Ultrasound   60914   [x] Neuromuscular Re-education  19846 [] Electrical Stimulation Unattended  83841   [x] Manual Therapy  26034 [] Electrical Stimulation Attended  89522   [x] Instruction in HEP  [] Lumbar/Cervical Traction  86752   [] Aquatic Therapy   01923 [x] Cold/hotpack    [] Massage   97964      [] Dry Needling, 1 or 2 muscles  66467   [] Biofeedback, first 15 minutes   55766  [] Biofeedback, additional 15 minutes   78070 [] Dry Needling, 3 or more muscles  27388     Frequency:  1 x/week for 12 visits    Todays Treatment:  Modalities:   Precautions:  Exercises:   Exercise Reps/ Time Weight/ Level Comments   Diaphragmatic Breathing x20  Focus on relaxation of PF   Butterfly S 30\"x3     Happy baby S      Piriformis S       LTR         Child's pose 30\"x3       Specific Instructions for next treatment: Complete internal vaginal assessment, progress pelvic floor lengthening, discuss pelvic wand     Evaluation Complexity:  History (Personal factors, comorbidities) [x] 0 [] 1-2 [] 3+   Exam (limitations, restrictions) [x] 1-2 [] 3 [] 4+   Clinical presentation (progression) [x] Stable [] Evolving  [] Unstable   Decision Making [x] Low [] Moderate [] High    [x] Low Complexity [] Moderate Complexity [] High Complexity       Treatment Charges: Mins Units   [x] Evaluation       [x]  Low       []  Moderate       []  High 45 1   []  Modalities:     [x]  Ther Exercise 10 1   []  Manual Therapy     []  Ther Activities     []  Aquatics     [] Vasocompression     []  Other       TOTAL TREATMENT TIME: 55 min      Time in: 5:35 pm      Time out: 6:32 pm       Electronically signed by: Ean Hou PT      Physician Signature:________________________________Date:__________________  By signing above or cosigning this note, I have reviewed this plan of care and certify a need for medically necessary rehabilitation services.      *PLEASE SIGN ABOVE AND FAX BACK ALL PAGES*

## 2022-09-08 NOTE — PROGRESS NOTES
Post-Discharge Transitional Care Follow Up      Kenyetta Ervin   YOB: 1995    Date of Office Visit:  9/8/2022  Date of Hospital Admission: 8/31/22  Date of Hospital Discharge: 9/2/22  Readmission Risk Score (high >=14%. Medium >=10%):Readmission Risk Score: 9.1      Care management risk score Rising risk (score 2-5) and Complex Care (Scores >=6): No Risk Score On File     Non face to face  following discharge, date last encounter closed (first attempt may have been earlier): 09/07/2022     Call initiated 2 business days of discharge: Yes     Sepsis due to gram-negative UTI ([facility] [address]  -     Germain Mccallum MD, Urology, [facility]  Moderate persistent asthma without complication  Dysmenorrhea  Hospital discharge follow-up  -     MO DISCHARGE MEDS RECONCILED W/ CURRENT OUTPATIENT MED LIST  Recurrent UTI  -     AFL - Lise Henry MD, Urology, [facility]  -     phenazopyridine (PYRIDIUM) 200 MG tablet; Take 1 tablet by mouth 3 times daily as needed for Pain, Disp-10 tablet, R-0Normal  Recurrent pyelonephritis  -     AFL - Lise Henry MD, Urology, [facility]  -     phenazopyridine (PYRIDIUM) 200 MG tablet; Take 1 tablet by mouth 3 times daily as needed for Pain, Disp-10 tablet, R-0Normal    Going back to work on Monday 9/12/22  Urology scheduled 10/13/22 - will continue to call them   Medical Decision Making: high complexity  No follow-ups on file. On this date 9/8/2022 I have spent 15 minutes reviewing previous notes, test results and face to face with the patient discussing the diagnosis and importance of compliance with the treatment plan as well as documenting on the day of the visit. Subjective:   Per d/c summary, \"Isabell Holt is a 31-year-old female who initially presented to our hospital with complaints of left-sided flank pain, dysuria and increased frequency.   She was found to have urinary tract infection was initially discharged on Keflex however return to the hospital due to worsening pain and nausea along with continued dysuria. Patient has a history of recurrent UTIs for which she follows with urology on an outpatient basis. She sees Alicia Raymond and was supposed to have cystoscopy but has not had opportunity to follow up. Pt was admitted and started on IV rocephin with improvement in her symptoms. Pts urine culture did grow E.coli that was pan sensitive. She was transitioned to PO antibiotics and discharged. Currently, pt medically stable for discharge. Will need to follow up with her PCP and Urologist within one week. Discussed with pt who is agreeable and understanding. \"      Inpatient course: Discharge summary reviewed- see chart. Interval history/Current status: was seeing Dr Chai Roman a year ago for recurrent UTI - has not had issues so not seeing him for a while  Typically gets them around ovulation, start of menses or during menses   Has to call urology to schedule follow-up   Will be going for pelvic floor therapy due to bladder wall thickening  and pelvic pain - before can r/o endometriosis? Has been getting admitted at least twice a year for left sided pyelo since [employer] - seeing Dr Chai Roman, last cysto was in 2019, no h/o nephrolithiasis or urinary tract abnormalities      Patient Active Problem List   Diagnosis    Sepsis due to gram-negative UTI ([facility] [address]    History of kidney stones    Dysmenorrhea    Grief associated with loss of fetus    Class 1 obesity due to excess calories without serious comorbidity with body mass index (BMI) of 31.0 to 31.9 in adult    Bilateral serous otitis media    Exercise-induced bronchoconstriction    Moderate persistent asthma without complication    Pyelonephritis    Hypokalemia       Medications listed as ordered at the time of discharge from hospital     Medication List            Accurate as of September 8, 2022  3:22 PM. If you have any questions, ask your nurse or doctor.                 CONTINUE taking these medications      acetaminophen 500 MG tablet  Commonly known as: TYLENOL     albuterol sulfate  (90 Base) MCG/ACT inhaler  Commonly known as: Ventolin HFA  Inhale 2 puffs into the lungs every 4 hours as needed for Wheezing or Shortness of Breath     budesonide-formoterol 160-4.5 MCG/ACT Aero  Commonly known as: Symbicort  Inhale 2 puffs into the lungs in the morning and 2 puffs before bedtime. cefadroxil 1 g tablet  Commonly known as: DURICEF  Take 1 tablet by mouth 2 times daily for 9 days     fluticasone 50 MCG/ACT nasal spray  Commonly known as: Flonase  1 spray by Nasal route daily     ibuprofen 600 MG tablet  Commonly known as: ADVIL;MOTRIN  Take 1 tablet by mouth 3 times daily as needed for Pain     loratadine 10 MG tablet  Commonly known as: Claritin  Take 1 tablet by mouth daily     montelukast 10 MG tablet  Commonly known as: SINGULAIR  Take 1 tablet by mouth in the morning. norethindrone-ethinyl estradiol 1-20 MG-MCG per tablet  Commonly known as: JUNEL FE 1/20  Take 1 tablet by mouth in the morning. STOP taking these medications      hydrocortisone 2.5 % ointment  Stopped by: ILEANA PRADO MD     metoclopramide 5 MG tablet  Commonly known as: Reglan  Stopped by: Yolanda Garcia MD               Medications marked \"taking\" at this time  Outpatient Medications Marked as Taking for the 9/8/22 encounter (Office Visit) with Avis Oconnor MD   Medication Sig Dispense Refill    cefadroxil (DURICEF) 1 g tablet Take 1 tablet by mouth 2 times daily for 9 days 18 tablet 0    ibuprofen (ADVIL;MOTRIN) 600 MG tablet Take 1 tablet by mouth 3 times daily as needed for Pain 30 tablet 0    montelukast (SINGULAIR) 10 MG tablet Take 1 tablet by mouth in the morning. 90 tablet 1    acetaminophen (TYLENOL) 500 MG tablet Take 1,000 mg by mouth every 6 hours as needed for Pain      budesonide-formoterol (SYMBICORT) 160-4.5 MCG/ACT AERO Inhale 2 puffs into the lungs in the morning and 2 puffs before bedtime.  30.6 g 1 norethindrone-ethinyl estradiol (JUNEL FE 1/20) 1-20 MG-MCG per tablet Take 1 tablet by mouth in the morning. 1 packet 3    albuterol sulfate HFA (VENTOLIN HFA) 108 (90 Base) MCG/ACT inhaler Inhale 2 puffs into the lungs every 4 hours as needed for Wheezing or Shortness of Breath 18 g 3    loratadine (CLARITIN) 10 MG tablet Take 1 tablet by mouth daily 30 tablet 3    fluticasone (FLONASE) 50 MCG/ACT nasal spray 1 spray by Nasal route daily 16 g 2        Medications patient taking as of now reconciled against medications ordered at time of hospital discharge: Yes    Review of Systems   Constitutional:  Negative for fatigue, fever and unexpected weight change. Respiratory:  Negative for cough, choking, chest tightness, shortness of breath and wheezing. Cardiovascular:  Negative for chest pain, palpitations and leg swelling. Gastrointestinal:  Negative for abdominal pain, anal bleeding, blood in stool, constipation, diarrhea, nausea and vomiting. Endocrine: Negative. Musculoskeletal:  Negative for joint swelling and myalgias. Skin: Negative. Neurological:  Negative for dizziness. Psychiatric/Behavioral:  Negative for sleep disturbance. All other systems reviewed and are negative. Objective:    /68   Pulse 79   Temp 98.1 °F (36.7 °C) (Temporal)   Wt 176 lb 9.6 oz (80.1 kg)   LMP 09/06/2022   SpO2 99%   BMI 29.39 kg/m²   Physical Exam  Vitals and nursing note reviewed. Constitutional:       General: She is not in acute distress. Appearance: She is well-developed. She is not ill-appearing. Eyes:      General: Lids are normal. Vision grossly intact. Cardiovascular:      Rate and Rhythm: Normal rate and regular rhythm. Heart sounds: Normal heart sounds, S1 normal and S2 normal. No murmur heard. No friction rub. No gallop. Pulmonary:      Effort: Pulmonary effort is normal. No respiratory distress. Breath sounds: Normal breath sounds. No wheezing.    Abdominal: General: Bowel sounds are normal.      Palpations: Abdomen is soft. There is no mass. Tenderness: There is no abdominal tenderness. There is no guarding. Musculoskeletal:         General: Normal range of motion. Skin:     General: Skin is warm and dry. Capillary Refill: Capillary refill takes less than 2 seconds. Neurological:      General: No focal deficit present. Mental Status: She is alert and oriented to person, place, and time. An electronic signature was used to authenticate this note.   --Salomon Welch MD

## 2022-09-08 NOTE — LETTER
[address]  [address]  [place] [address]  Phone: [phone]  Fax: [phone]    Lennox Hook, MD        September 8, 2022     Patient: Julia Lind   YOB: 1995   Date of Visit: 9/8/2022       To Whom It May Concern: It is my medical opinion that Vanessa Carmona may return to full participation 9/12/22 with no restrictions. If you have any questions or concerns, please don't hesitate to call.     Sincerely,        Lennox Hook, MD

## 2022-09-12 ENCOUNTER — CARE COORDINATION (OUTPATIENT)
Dept: OTHER | Facility: CLINIC | Age: 27
End: 2022-09-12

## 2022-09-12 NOTE — CARE COORDINATION
Care Transitions Outreach Attempt    Call within 2 business days of discharge: Yes   Attempted to reach patient for transitions of care follow up. Unable to reach patient. ACM attempted to reach patient for  Final Care Transitions call. HIPAA compliant message left requesting a return phone call at patients convenience. Final Unable to Reach Letter sent via my chart, along with Nurse Access Line flyer and Right care/Right place/Right time flyer. No further outreach scheduled with this ACM, ACM will sign off care team at this time. Patient has been provided with this ACM's contact information. Patient: Shira Champion Patient : 1995 MRN: [id number]    Last Discharge [facility]       Date Complaint Diagnosis Description Type Department Provider    22 Other; Pyelonephritis Sepsis, due to unspecified organism, unspecified whether acute organ dysfunction present ([facility] [address] . .. ED to Hosp-Admission (Discharged) (ADMITTED) [address] [place] [address]; [place] . .. Was this an external facility discharge? No Discharge Facility: [facility]    Noted following upcoming appointments from discharge chart review:   [address] follow up appointment(s):   Future Appointments   Date Time Provider Kevin Marsh   2022  7:00 AM Mary Flowers, [address]   2022  8:00 AM [facility]y [facility]   10/19/2022  2:15 PM Inge Patterson MD [facility]   2023 11:00 AM Inge Patterson MD [facility]   2023  5:15 PM Inge Patterson MD [facility]     Non-BS follow up appointment(s): none known    Leisa ARCOS, RN   Ambulatory Care Manager  Associate Care Management  Cell [phone]  [email or web address]

## 2022-09-16 ENCOUNTER — HOSPITAL ENCOUNTER (OUTPATIENT)
Dept: PHYSICAL THERAPY | Facility: CLINIC | Age: 27
Setting detail: THERAPIES SERIES
Discharge: HOME | End: 2022-09-16
Payer: COMMERCIAL

## 2022-09-16 PROCEDURE — 97140 MANUAL THERAPY 1/> REGIONS: CPT

## 2022-09-16 PROCEDURE — 97530 THERAPEUTIC ACTIVITIES: CPT

## 2022-09-16 NOTE — FLOWSHEET NOTE
[] [facility]) [facility] &  Therapy  [address]  [phone]  F: [phone] [] [address]  [address]   [address]  P: [phone]  F: [phone] [x] [place] &  Therapy  [address]  P: [phone]  F: [phone] [] [address]  P: [phone]  F: [phone] [] [address]  [facility]   [address]   [place]: [phone]  F: [phone]      Physical Therapy Daily Treatment Note    Date:  2022  Patient Name:  Angelo Rashid    :  1995  MRN: [id number]  Aureliano Sandy [address] ( visits approved)  Medical Diagnosis:   Diagnosis   N94.10 (ICD-10-CM) - Dyspareunia, female                                Rehab Codes: R10.2, R10.30, M54.5, R27.8, M62.81, N94.1  Onset Date: 2014                        Next 's appt: 22       Visit# / total visits: 2/10; Cancels/No Shows: 0/0    Subjective:    Pain:  [x] Yes  [] No Location: lower abdomen, low back  Pain Rating: (0-10 scale) 7-8/10  Pain altered Tx:  [] No  [] Yes  Action:  Comments: Patient arrives 8 min late to PT session. Patient reports that she is having increased pain this date. States that she is no longer on her menstrual cycle, however has increased pain in her lower abdomen and low back. States she no longer has a UTI at this time. States no new changes since previous physical therapy visit.      Objective:  Modalities: MHP to low back completed with abdominal STM-   Precautions:  Exercises:  Exercise Reps/ Time Weight/ Level Comments   Diaphragmatic Breathing    Focus on relaxation of PF   Butterfly S        Happy baby S        Piriformis S         LTR        Child's pose        Other: STM to (L) rectus abdominus, external pelvic diaphragm-  Patient verbalizes consent for all external and internal examination techniques this date. Patient was aware that they could stop examination at any point during assessment with good understanding verbalized by patient. Patient was draped properly, gloves were used and universal precautions were used. Patient offered chaperone with patient: [] Accepting chaperone and chaperone present during entire internal examination                                                                       [x] Denying need for chaperone at this time   Palpation []  [x]  []  TTP at lower abdominals, external pelvic diaphragm, (B) ASIS, (B) piriformis, (B) PSIS, (L) bulbocavernosus, (L) ischiocavernosus, (L) levator ani, (B) obturator internus    Noted tension through (B) obturator internus, (L) levator ani, (L) obturator internus     Perineal Descent at rest [x]  []  []      Voluntary Perineal Body Elevation  [x]  []  []  Present       Perineal Body Relaxation [x]  []  []  Present    Perineal Body Movement with Sustained increase in IAP []  [x]  []  Present with minimal descent    Perineal Body Movement with Rapid Increase in IAP []  [x]  []  Absent    Laycock PERFECT Scale []  []  [x]    P: 4/5  E: 10 sec  R: 8 reps  F: 4 reps  E: Present  C: Present  T: Absent   Prolapse [x]  []  []  Anterior Prolapse: Absent  Posterior Prolapse:Absent  Apical Prolapse: Absent   Sensation [x]  []  []            Treatment Charges: Mins Units   [x]  Modalities- HP 17 -   []  Ther Exercise     [x]  Manual Therapy 17 1   [x]  Ther Activities 33 2   []  Aquatics     []  Vasocompression     []  Other     Total Treatment time 50 3       Assessment: [x] Progressing toward goals. Completed internal vaginal pelvic floor assessment this date in order to determine tension and strength within pelvic floor. Patient verbalized consent to internal pelvic floor assessment this date.  Internal pelvic floor assessment revealed increased pain and tension within (L) bulbocavernosus, (L) ischiocavernosus, (L) levator ani and (B) obturator internus. These impairments affect patient's ability to complete ADLs, work related tasks and care taking tasks without increased pain. Patient would cont to benefit from skilled physical therapy in order to reduce tension within superficial and deep pelvic floor mm and reduce pain. After internal vaginal assessment completed, patient states that pain on (L) side increased. Initiated hot pack to low back in order to reduce pain and promote relaxation. Initiated STM to external pelvic diaphragm and (L) rectus abdominus in order to reduce tension. Noted TP within external pelvic diaphragm and (L) rectus abdominus this date with fair release post manual. Patient reports pain as 6/10 post manual therapy this date. Ended session with discussion on pelvic wand including benefits and contraindications. Provided patient with handout on pelvic wands with patient verbalizing good understanding. [] No change. [] Other:  [x] Patient would continue to benefit from skilled physical therapy services in order to: progress towards goals as written     STG: (to be met in 6 treatments)  Patient will report pain as 5/10  Patient will improve lumbar AROM to 100% within all planes with decreased pain in order to increase ease of performing ADLs  Patient will demonstrate good understanding of how to utilize pelvic wand and other relaxation techniques in order to reduce pain and manage pain symptoms at home   Independent with Home Exercise Programs  LTG: (to be met in 12 treatments)  Patient will report Pelvic Pain Impact Scale to 20/32 in order to indicate improved overall quality of life  Patient will demonstrate improved piriformis length in order to reduce tension to the pelvic floor   ?  Function: Patient will demonstrate improved perineal body descent without use of accessory muscles in order to improve pelvic floor relaxation and lengthening  Patient will improve (B) LE strength to 5/5  Patient will report pain as 2/10    Pt. Education:  [x] Yes  [] No  [x] Reviewed Prior HEP/Ed  Method of Education: [x] Verbal  [x] Demo  [] Written  Comprehension of Education:  [x] Verbalizes understanding. [x] Demonstrates understanding. [x] Needs review. [] Demonstrates/verbalizes HEP/Ed previously given. Plan: [x] Continue current frequency toward long and short term goals.     [x] Specific Instructions for subsequent treatments: progress pelvic floor lengthening, manual to external pelvic diaphragm and abdominal mm      Time In: 7:10 am            Time Out: 8:00 am    Electronically signed by:  Andre Godoy PT

## 2022-09-23 ENCOUNTER — HOSPITAL ENCOUNTER (OUTPATIENT)
Dept: PHYSICAL THERAPY | Facility: CLINIC | Age: 27
Setting detail: THERAPIES SERIES
Discharge: HOME | End: 2022-09-23
Payer: COMMERCIAL

## 2022-09-23 PROCEDURE — 97140 MANUAL THERAPY 1/> REGIONS: CPT

## 2022-09-23 PROCEDURE — 97110 THERAPEUTIC EXERCISES: CPT

## 2022-09-23 NOTE — FLOWSHEET NOTE
[] [facility]) - [facility] &  Therapy  [address]  [phone]  F: [phone] [] [address]  [facility] [place] [address]   [address]  P: [phone]  F: [phone] [x] [place] &  Therapy  [address]  P: [phone]  F: [phone] [] [address]  P: [phone]  F: [phone] [] [address]  [facility]   [address]   [place]: [phone]  F: [phone]      Physical Therapy Daily Treatment Note    Date:  2022  Patient Name:  Clotilde Abraham    :  1995  MRN: [id number]  Kavitha Briscoe ( visits approved)  Medical Diagnosis:   Diagnosis   N94.10 (ICD-10-CM) - Dyspareunia, female                                Rehab Codes: R10.2, R10.30, M54.5, R27.8, M62.81, N94.1  Onset Date:                         Next 's appt: 22       Visit# / total visits: 3/10; Cancels/No Shows: 0/0    Subjective:    Pain:  [x] Yes  [] No Location: lower abdomen, low back  Pain Rating: (0-10 scale) 8/10  Pain altered Tx:  [] No  [] Yes  Action:  Comments: Patient arrives to physical therapy this date 12 min late. Patient reports she is ovulating at this time so pain has increased. States that after previous session she did have some bleeding after internal pelvic floor assessment that stopped that day. States that she also had increased cramping and pain and had to stop working that day due to the pain. States that she was able to find relief in lying down and resting.      Objective:  Modalities: MHP to low back completed with abdominal STM- 8'  Precautions:  Exercises:  Exercise Reps/ Time Weight/ Level Comments   Diaphragmatic Breathing x15   Focus on relaxation of PF   Butterfly S 30\"x3       Happy baby S 30\"x3       Piriformis S 30\"x3 LTR x20       Shital Canchola next     Child's pose 30\"x3       Other: STM to (L) rectus abdominus, external pelvic diaphragm- 12'         Patient verbalizes consent for all external and internal examination techniques this date. Patient was aware that they could stop examination at any point during assessment with good understanding verbalized by patient. Patient was draped properly, gloves were used and universal precautions were used. Patient offered chaperone with patient: [] Accepting chaperone and chaperone present during entire internal examination                                                                       [x] Denying need for chaperone at this time   Palpation []  [x]  []  TTP at lower abdominals, external pelvic diaphragm, (B) ASIS, (B) piriformis, (B) PSIS, (L) bulbocavernosus, (L) ischiocavernosus, (L) levator ani, (B) obturator internus    Noted tension through (B) obturator internus, (L) levator ani, (L) obturator internus     Perineal Descent at rest [x]  []  []      Voluntary Perineal Body Elevation  [x]  []  []  Present       Perineal Body Relaxation [x]  []  []  Present    Perineal Body Movement with Sustained increase in IAP []  [x]  []  Present with minimal descent    Perineal Body Movement with Rapid Increase in IAP []  [x]  []  Absent    Laycock PERFECT Scale []  []  [x]    P: 4/5  E: 10 sec  R: 8 reps  F: 4 reps  E: Present  C: Present  T: Absent   Prolapse [x]  []  []  Anterior Prolapse: Absent  Posterior Prolapse:Absent  Apical Prolapse: Absent   Sensation [x]  []  []            Treatment Charges: Mins Units   [x]  Modalities- HP 8 -   [x]  Ther Exercise 27 2   [x]  Manual Therapy 10 1   []  Ther Activities     []  Aquatics     []  Vasocompression     []  Other     Total Treatment time 45 3       Assessment: [x] Progressing toward goals. [] No change.      [x] Other: Began session this date with hot pack to low back in order to reduce tension and promote relaxation in order to reduce pain. Initiated hip and pelvic floor stretches this date in order to improve hip mobility and lengthen pelvic floor. Patient reports mild pain with happy baby stretch within the low back, however able to tolerate ex this date. Ended session with STM to external pelvic diaphragm and lower abdominal muscles in order to improve tension and reduce pain with ovulation and menstrual cycle. Noted TP within (L) rectus abdominus with fair relief post manual. Patient states she feels a little better leaving session this date, however states that pain is usually much worse near ovulation. Plan to continue to progress pelvic floor length and reduce tension as patient tolerates. [x] Patient would continue to benefit from skilled physical therapy services in order to: progress towards goals as written     STG: (to be met in 6 treatments)  Patient will report pain as 5/10  Patient will improve lumbar AROM to 100% within all planes with decreased pain in order to increase ease of performing ADLs  Patient will demonstrate good understanding of how to utilize pelvic wand and other relaxation techniques in order to reduce pain and manage pain symptoms at home   Independent with Home Exercise Programs  LTG: (to be met in 12 treatments)  Patient will report Pelvic Pain Impact Scale to 20/32 in order to indicate improved overall quality of life  Patient will demonstrate improved piriformis length in order to reduce tension to the pelvic floor   ? Function: Patient will demonstrate improved perineal body descent without use of accessory muscles in order to improve pelvic floor relaxation and lengthening  Patient will improve (B) LE strength to 5/5  Patient will report pain as 2/10    Pt. Education:  [x] Yes  [] No  [x] Reviewed Prior HEP/Ed  Method of Education: [x] Verbal  [x] Demo  [] Written  Comprehension of Education:  [x] Verbalizes understanding. [x] Demonstrates understanding. [x] Needs review.   [] Demonstrates/verbalizes HEP/Ed previously given. Plan: [x] Continue current frequency toward long and short term goals.     [x] Specific Instructions for subsequent treatments: progress pelvic floor lengthening, manual to external pelvic diaphragm and abdominal mm      Time In: 7:15 am            Time Out: 8:00 am    Electronically signed by:  David Stern PT

## 2022-09-27 ENCOUNTER — OFFICE VISIT (OUTPATIENT)
Dept: OBGYN | Age: 27
End: 2022-09-27
Payer: COMMERCIAL

## 2022-09-27 VITALS
WEIGHT: 178 LBS | SYSTOLIC BLOOD PRESSURE: 117 MMHG | DIASTOLIC BLOOD PRESSURE: 74 MMHG | HEIGHT: 65 IN | HEART RATE: 99 BPM | BODY MASS INDEX: 29.66 KG/M2

## 2022-09-27 DIAGNOSIS — R10.2 PELVIC PAIN: Primary | ICD-10-CM

## 2022-09-27 PROCEDURE — G8417 CALC BMI ABV UP PARAM F/U: HCPCS | Performed by: STUDENT IN AN ORGANIZED HEALTH CARE EDUCATION/TRAINING PROGRAM

## 2022-09-27 PROCEDURE — 1111F DSCHRG MED/CURRENT MED MERGE: CPT | Performed by: STUDENT IN AN ORGANIZED HEALTH CARE EDUCATION/TRAINING PROGRAM

## 2022-09-27 PROCEDURE — 1036F TOBACCO NON-USER: CPT | Performed by: STUDENT IN AN ORGANIZED HEALTH CARE EDUCATION/TRAINING PROGRAM

## 2022-09-27 PROCEDURE — 99213 OFFICE O/P EST LOW 20 MIN: CPT | Performed by: STUDENT IN AN ORGANIZED HEALTH CARE EDUCATION/TRAINING PROGRAM

## 2022-09-27 PROCEDURE — G8427 DOCREV CUR MEDS BY ELIG CLIN: HCPCS | Performed by: STUDENT IN AN ORGANIZED HEALTH CARE EDUCATION/TRAINING PROGRAM

## 2022-09-27 NOTE — PROGRESS NOTES
OB/GYN Problem Visit    Kenyetta Ervin  2022                       Primary Care Physician: Rodríguez Rey MD    CC:   Chief Complaint   Patient presents with    Consultation     Patient is here today for pelvic pain that is a 9/10. Pain is also in her lower pain and radiates to her stomach. She would like to discuss options to make sure it is endometriosis that she has before treatment is started for it. HPI: Kenyetta Ervin is a 33 y.o. female     The patient was seen and examined. She is here for problem visit and is complaining of pelvic pain. She reports bilateral lower pelvic pain that is worse on the left side and radiates to her back. She also complains of mild pain in suprapubic region and lower right quadrant. She has struggled with this pain since  but is has been worse since 2021. She had a diagnostic laparoscopy for a right tubal ectopic pregnancy in Dec 2021. She endorses increased pain during ovulation, during her periods and with intercourse. The pain is typically worse on one side and radiates to her back. In the past, she has had minimal or temporary relief with NSAIDs (800mg q8 hrs), heating pads, pelvic floor therapy, OCPs and a Mirena IUD. She had a Mirena IUD for 6 months but experienced continued pain and cramping so had it removed after 6 months. She is currently taking OCPs with minimal relief. She denies any constipation, diarrhea, nausea, vomiting. She also complains of frequent UTIs and \"kidney infections\" mostly on the left side. She is scheduled for a cystoscopy with urology in 2 weeks. A renal US on 22 was unremarkable. A CT on  demonstrated no renal calculi, ureteral calculi or significant hydroueteronephrosis and no perinephric stranding; bladder wall thickening with perivesicular inflammatory change and free fluid in the pelvis which may be related to acute cystitis. She has had minimal relief with pyridium.  She denies any dysuria, urinary frequency or urgency today. Her Patient's last menstrual period was 2022 (exact date). and she complains of dysmenorrhea. Her periods are regular and last 5 days. She describes them as moderately heavy. Her bowel habits are regular. She denies any bloating. She denies dysuria. She denies urinary leaking. She denies vaginal discharge. She is sexually active with single male partner, contraception - OCP (estrogen/progesterone).      REVIEW OF SYSTEMS:  Constitutional: negative fever, negative chills  HEENT: negative visual disturbances, negative headaches  Respiratory: negative dyspnea, negative cough  Cardiovascular: negative chest pain,  negative palpitations  Gastrointestinal: + abdominal pain, negative RUQ pain, negative N/V, negative diarrhea, negative constipation  Genitourinary: negative dysuria, negative vaginal discharge  Dermatological: negative rash  Hematologic: negative bruising  Immunologic/Lymphatic: negative recent illness, negative recent sick contact  Musculoskeletal: negative back pain, negative myalgias, negative arthralgias  Neurological:  negative dizziness, negative weakness  Behavior/Psych: negative depression, negative anxiety    ________________________________________________________________________    GYNECOLOGICAL HISTORY:    Sexually Active: single male partner, contraception - OCP (estrogen/progesterone)  STD History: no past history    Permanent Sterilization: no  Reversible Birth Control: yes - OCPs  Hormone Replacement Exposure: no    OBSTETRICAL HISTORY:  OB History    Para Term  AB Living   3       1     SAB IAB Ectopic Molar Multiple Live Births       1            # Outcome Date GA Lbr Zohaib/2nd Weight Sex Delivery Anes PTL Lv   3 Ectopic 12/10/21           2             1                 PAST MEDICAL HISTORY:      Diagnosis Date    Asthma     as a child    Cholecystitis     E. coli UTI (urinary tract infection) 2018    Gall stones Kidney stone     Patient denies    Pyelonephritis 2/15/2019    S/p lap right salpingectomy w/ evacuation of hemoperitoneum 12/10/21 12/10/2021    2/2 ectopic pregnancy    UTI (lower urinary tract infection)        PAST SURGICAL HISTORY:                                                                    Procedure Laterality Date    ABDOMEN SURGERY  12/10/2021    DIAGNOSTIC LAPAROSCOPY, UNILATERAL SALPINGECTOMY RIGHT    ARM SURGERY Right     BREAST REDUCTION SURGERY  07/25/2018    CYSTOSCOPY N/A 09/04/2019    CYSTOSCOPY RETROGRADE PYELOGRAM WITH  CYSTOGRAM, performed by Luis Rice MD at [facility] N/A 12/03/2021    DILATATION AND CURETTAGE SUCTION performed by Chico Molina MD at [facility] Right 12/10/2021    DIAGNOSTIC LAPAROSCOPY, UNILATERAL SALPINGECTOMY performed by Radha Means MD at [address] N/A 05/18/2018    CHOLECYSTECTOMY LAPAROSCOPIC performed by Halle Roach MD at [address]:  Current Outpatient Medications   Medication Sig Dispense Refill    ibuprofen (ADVIL;MOTRIN) 600 MG tablet Take 1 tablet by mouth 3 times daily as needed for Pain 30 tablet 0    montelukast (SINGULAIR) 10 MG tablet Take 1 tablet by mouth in the morning. 90 tablet 1    acetaminophen (TYLENOL) 500 MG tablet Take 1,000 mg by mouth every 6 hours as needed for Pain      albuterol sulfate HFA (VENTOLIN HFA) 108 (90 Base) MCG/ACT inhaler Inhale 2 puffs into the lungs every 4 hours as needed for Wheezing or Shortness of Breath 18 g 3    loratadine (CLARITIN) 10 MG tablet Take 1 tablet by mouth daily 30 tablet 3    fluticasone (FLONASE) 50 MCG/ACT nasal spray 1 spray by Nasal route daily 16 g 2    budesonide-formoterol (SYMBICORT) 160-4.5 MCG/ACT AERO Inhale 2 puffs into the lungs in the morning and 2 puffs before bedtime.  30.6 g 1    norethindrone-ethinyl estradiol (JUNEL FE 1/20) 1-20 MG-MCG per tablet Take 1 tablet by mouth in the morning. (Patient not taking: Reported on 2022) 1 packet 3     No current facility-administered medications for this visit. ALLERGIES:  Allergies as of 2022 - Fully Reviewed 2022   Allergen Reaction Noted    Amoxicillin Hives 2015    Lactose  2015    Other Hives 2020                                   VITALS:  Vitals:    22 1444   BP: 117/74   Site: Left Upper Arm   Position: Sitting   Cuff Size: Medium Adult   Pulse: 99   Weight: 178 lb (80.7 kg)   Height: 5' 5\" (1.651 m)                                                                                                                                                                       PHYSICAL EXAM:     General Appearance: Appears healthy. Alert; in no acute distress. Pleasant. HEENT: normocephalic and atraumatic  Respiratory: clear to auscultation, no wheezes, rales or rhonchi, symmetric air entry  Cardiovascular: regular rate and rhythm  Abdomen: soft, mild tenderness in bilateral lower quadrants and suprapubic region, no rebound or involuntary guarding, and non-distended, well healed laparoscopic abdominal scars   Pelvic Exam: declined  Extremities: non-tender BLE and non-edematous  Musculoskeletal: no gross abnormalities  Psych: Alert and oriented, appropriate affect. DATA:  No results found for this visit on 22.     ASSESSMENT & PLAN:    Zi Alexander is a 33 y.o. female  presents with pelvic pain   - VSS   - Patient endorses bilateral lower pelvic pain, left side > right and suprapubic pain that is worse with ovulation and during her period   - Also complains of dyspareunia   - Pelvic pain has been present for 7 years but worse since right tubal ectopic pregnancy and laparoscopy in 2021   - She has had minimal relief with NSAIDs, pelvic floor therapy, heating pads, OCPs and progesterone IUD  - Offered GnRH antagonists such as Gonzalez Kumar and patient declines at this time  - She is opting for diagnostic laparoscopy to further evaluate pelvic pain and possible endometriosis  - Patient has follow up with urology at [facility] with cystoscopy in 2 weeks  - Plan to schedule diagnostic laparoscopy in November after cystoscopy       Patient Active Problem List    Diagnosis Date Noted    Pyelonephritis 08/31/2022     Priority: Medium    Hypokalemia 08/31/2022     Priority: Medium    Moderate persistent asthma without complication 15/42/8082     Priority: Medium    Exercise-induced bronchoconstriction 07/27/2022     Priority: Medium    Grief associated with loss of fetus 03/23/2022    Class 1 obesity due to excess calories without serious comorbidity with body mass index (BMI) of 31.0 to 31.9 in adult 03/23/2022    Bilateral serous otitis media 03/23/2022    Dysmenorrhea 01/27/2022 1/27/22: Initiated on [address] 1-20      History of kidney stones 08/05/2019    Sepsis due to gram-negative UTI ([address]        Return in about 6 weeks (around 11/8/2022) for post op appt. Counseling Completed:      Counseled about birth control and barrier recommendations. Counseled about STD counseling and prevention. Routine health maintenance per patients PCP discussed. Patient was seen with total face to face time of 20 minutes. More than 50% of this visit was on counseling and education regarding her diagnose(s) as listed below and options. She was also counseled on her preventative health maintenance recommendations and follow-up. Diagnosis Orders   1.  Pelvic pain            Agata Howard DO  Ob/Gyn Resident  [facility], [facility]  9/27/2022, 5:21 PM

## 2022-09-27 NOTE — LETTER
[address] [address]  Phone: [phone]  Fax: [phone]    Mik Bryson DO        September 27, 2022     Patient: Iraida Redd   YOB: 1995   Date of Visit: 9/27/2022       To Whom it May Concern:    Oskar Hernandes was seen in my clinic on 9/27/2022. She may return to work on 9/28/2022. If you have any questions or concerns, please don't hesitate to call.     Sincerely,         Mik Bryson DO

## 2022-09-27 NOTE — Clinical Note
Surgeon: any available  2nd provider needed: No  OR time needed: 1 hour  Procedure(s): Diagnostic Laparoscopy  Diagnosis: Pelvic pain  PATs needed: No  Labs needed: CBC  Clearance needed: No  Clearance needed DX: N/A  Is patient aware they need clearance and/or referral sent: No  Surgery consent: Surgical consent will be done day of surgery  Pre op appt needed: None needed  Post op appt needed: 2 weeks  Proposed surgery date: November (after urology work up with cystoscopy)  I understand that the surgery consent form and/or medicaid form MUST be placed in Ken's box outside her door for EVERY PATIENT, and should NEVER be given to the staff members or left in the resident room.  Yes

## 2022-09-28 ENCOUNTER — HOSPITAL ENCOUNTER (OUTPATIENT)
Dept: PHYSICAL THERAPY | Facility: CLINIC | Age: 27
Setting detail: THERAPIES SERIES
Discharge: HOME | End: 2022-09-28
Payer: COMMERCIAL

## 2022-09-28 ENCOUNTER — NURSE ONLY (OUTPATIENT)
Dept: FAMILY MEDICINE CLINIC | Age: 27
End: 2022-09-28
Payer: COMMERCIAL

## 2022-09-28 DIAGNOSIS — Z23 NEED FOR INFLUENZA VACCINATION: Primary | ICD-10-CM

## 2022-09-28 PROCEDURE — 90674 CCIIV4 VAC NO PRSV 0.5 ML IM: CPT | Performed by: INTERNAL MEDICINE

## 2022-09-28 PROCEDURE — 90471 IMMUNIZATION ADMIN: CPT | Performed by: INTERNAL MEDICINE

## 2022-09-28 NOTE — PROGRESS NOTES
Attending Physician Statement  I have discussed the care of [facility], including pertinent history and exam findings,  with the resident. I have seen and examined the patient and the key elements of all parts of the encounter have been performed by me. I agree with the assessment, plan and orders as documented by the resident. Patient is requesting to proceed with diagnostic laparoscopy possible operative laparoscopy  as next step in the management and treatment of her CPP that has not responded to treatment with oral contraceptives. She declines Mirena IUD, GnRH agonist or antagonist therapy.  I discussed the surgery at length, al of her questions were answered to the best of my ability  ([address])

## 2022-09-28 NOTE — FLOWSHEET NOTE
[] [address]  [address]    [phone]  F: [phone]   [] [address]  [facility]   [address]  P: [phone]  F: [phone]  [x] [address]  P: [phone]  F: [phone] [] [address]  P: [phone]  F: [phone]  [] [address]  [address] [address] [facility]   [address]   [place]: [phone]  F: [phone]   [] [address]  [place]: [phone]   F: [phone]     Physical Therapy Cancel/No Show note    Date: 2022  Patient: Aleida Cook  : 1995  MRN: [id number]    Cancels/No Shows to date:     For today's appointment patient:    [x]  Cancelled    [] Rescheduled appointment    [] No-show     Reason given by patient:    []  Patient ill    []  Conflicting appointment    [] No transportation      [] Conflict with work    [] No reason given    [] Weather related    [] COVID-19    [x] Other:      Comments: Patient has a flat tire and is unable to get it repaired in time for appointment.  Patient to talk with work about getting rescheduled to 22 and will call back        [] Next appointment was confirmed    Electronically signed by: Mikhail Garcia, PT

## 2022-09-28 NOTE — PROGRESS NOTES
After obtaining consent, and per orders of Dr. Amish Rebollar, injection of influenza given in Left deltoid by Yohannes Robert MA. Patient instructed to remain in clinic for 20 minutes afterwards, and to report any adverse reaction to me immediately.

## 2022-10-10 ENCOUNTER — HOSPITAL ENCOUNTER (EMERGENCY)
Age: 27
Discharge: HOME | End: 2022-10-11
Attending: EMERGENCY MEDICINE
Payer: COMMERCIAL

## 2022-10-10 ENCOUNTER — TELEPHONE (OUTPATIENT)
Dept: OBGYN | Age: 27
End: 2022-10-10

## 2022-10-10 ENCOUNTER — PATIENT MESSAGE (OUTPATIENT)
Dept: FAMILY MEDICINE CLINIC | Age: 27
End: 2022-10-10

## 2022-10-10 VITALS
OXYGEN SATURATION: 100 % | TEMPERATURE: 100.4 F | RESPIRATION RATE: 18 BRPM | DIASTOLIC BLOOD PRESSURE: 75 MMHG | SYSTOLIC BLOOD PRESSURE: 121 MMHG | HEART RATE: 106 BPM

## 2022-10-10 DIAGNOSIS — N12 PYELONEPHRITIS: Primary | ICD-10-CM

## 2022-10-10 PROCEDURE — 96366 THER/PROPH/DIAG IV INF ADDON: CPT

## 2022-10-10 PROCEDURE — 96365 THER/PROPH/DIAG IV INF INIT: CPT

## 2022-10-10 PROCEDURE — 81025 URINE PREGNANCY TEST: CPT

## 2022-10-10 PROCEDURE — 99284 EMERGENCY DEPT VISIT MOD MDM: CPT

## 2022-10-10 PROCEDURE — 81001 URINALYSIS AUTO W/SCOPE: CPT

## 2022-10-10 RX ORDER — PREDNISONE 10 MG/1
TABLET ORAL
Qty: 30 TABLET | Refills: 0 | Status: SHIPPED | OUTPATIENT
Start: 2022-10-10 | End: 2022-10-19 | Stop reason: ALTCHOICE

## 2022-10-10 ASSESSMENT — PAIN SCALES - GENERAL: PAINLEVEL_OUTOF10: 9

## 2022-10-10 ASSESSMENT — PAIN - FUNCTIONAL ASSESSMENT: PAIN_FUNCTIONAL_ASSESSMENT: 0-10

## 2022-10-10 ASSESSMENT — PAIN DESCRIPTION - LOCATION: LOCATION: FLANK

## 2022-10-10 ASSESSMENT — PAIN DESCRIPTION - ORIENTATION: ORIENTATION: LEFT

## 2022-10-10 NOTE — TELEPHONE ENCOUNTER
From: Becky Walden  To: Dr. Sher Denzel: 10/10/2022 1:16 PM EDT  Subject: Covid    Good afternoon ,    I tested positive for Covid over a week and a half ago and the symptoms are very strong and present. I have been using my inhalers to help with the shortness of breath but I can not get rid of the headaches and fatigue. I also have pain and pressure in both ears that almost feels like an ear infection and a sore throat with congestion. Is there anything that I can take over the counter to help with any of this. I am willing to do a video visit as well. I hope to hear from you soon.

## 2022-10-11 LAB
BACTERIA: ABNORMAL
BILIRUBIN URINE: NEGATIVE
COLOR: YELLOW
EPITHELIAL CELLS UA: ABNORMAL /HPF (ref 0–5)
GLUCOSE URINE: NEGATIVE
HCG(URINE) PREGNANCY TEST: NEGATIVE
KETONES, URINE: ABNORMAL
LEUKOCYTE ESTERASE, URINE: NEGATIVE
MUCUS: ABNORMAL
NITRITE, URINE: NEGATIVE
PH UA: 6 (ref 5–8)
PROTEIN UA: NEGATIVE
RBC UA: ABNORMAL /HPF (ref 0–2)
SPECIFIC GRAVITY UA: 1.02 (ref 1–1.03)
TURBIDITY: CLEAR
URINE HGB: ABNORMAL
UROBILINOGEN, URINE: ABNORMAL
WBC UA: ABNORMAL /HPF (ref 0–5)

## 2022-10-11 PROCEDURE — 6370000000 HC RX 637 (ALT 250 FOR IP): Performed by: EMERGENCY MEDICINE

## 2022-10-11 PROCEDURE — 6360000002 HC RX W HCPCS: Performed by: EMERGENCY MEDICINE

## 2022-10-11 RX ORDER — IBUPROFEN 800 MG/1
800 TABLET ORAL ONCE
Status: COMPLETED | OUTPATIENT
Start: 2022-10-11 | End: 2022-10-11

## 2022-10-11 RX ORDER — LEVOFLOXACIN 750 MG/1
750 TABLET ORAL DAILY
Qty: 6 TABLET | Refills: 0 | Status: SHIPPED | OUTPATIENT
Start: 2022-10-12 | End: 2022-10-13

## 2022-10-11 RX ORDER — LEVOFLOXACIN 5 MG/ML
750 INJECTION, SOLUTION INTRAVENOUS ONCE
Status: COMPLETED | OUTPATIENT
Start: 2022-10-11 | End: 2022-10-11

## 2022-10-11 RX ORDER — ACETAMINOPHEN 500 MG
1000 TABLET ORAL ONCE
Status: COMPLETED | OUTPATIENT
Start: 2022-10-11 | End: 2022-10-11

## 2022-10-11 RX ADMIN — LEVOFLOXACIN 750 MG: 5 INJECTION, SOLUTION INTRAVENOUS at 03:11

## 2022-10-11 RX ADMIN — IBUPROFEN 800 MG: 800 TABLET, FILM COATED ORAL at 03:08

## 2022-10-11 RX ADMIN — ACETAMINOPHEN 1000 MG: 500 TABLET ORAL at 03:08

## 2022-10-11 NOTE — ED PROVIDER NOTES
EMERGENCY DEPARTMENT ENCOUNTER    Pt Name: Karyle Bees  MRN: [id number]  [place] 1995  Date of evaluation: 10/11/22  CHIEF COMPLAINT       Chief Complaint   Patient presents with    Flank Pain     Left sided. Pt believes she has kidney infection. Hx of kidney infections. Dysuria     HISTORY OF PRESENT ILLNESS   Patient is a 46-year-old female who presents to the ED with left-sided flank pain and pain with urination. Symptoms started yesterday. She has had kidney infections before and concerned that she has developed another one. No reports of fevers. No cough, shortness of breath, abdominal pain. No hematuria. REVIEW OF SYSTEMS     Review of Systems   All other systems reviewed and are negative.   PASTMEDICAL HISTORY     Past Medical History:   Diagnosis Date    Asthma     as a child    Cholecystitis     E. coli UTI (urinary tract infection) 5/14/2018    Gall stones     Kidney stone     Patient denies    Pyelonephritis 2/15/2019    S/p lap right salpingectomy w/ evacuation of hemoperitoneum 12/10/21 12/10/2021    2/2 ectopic pregnancy    UTI (lower urinary tract infection)      SURGICAL HISTORY       Past Surgical History:   Procedure Laterality Date    ABDOMEN SURGERY  12/10/2021    DIAGNOSTIC LAPAROSCOPY, UNILATERAL SALPINGECTOMY RIGHT    ARM SURGERY Right     BREAST REDUCTION SURGERY  07/25/2018    CYSTOSCOPY N/A 09/04/2019    CYSTOSCOPY RETROGRADE PYELOGRAM WITH  CYSTOGRAM, performed by Tolbert Landau, MD at [facility] N/A 12/03/2021    DILATATION AND CURETTAGE SUCTION performed by Andrew Santos MD at [address] [place] [address] 12/10/2021    DIAGNOSTIC LAPAROSCOPY, UNILATERAL SALPINGECTOMY performed by Tommy Conner MD at [facility] 05/18/2018    CHOLECYSTECTOMY LAPAROSCOPIC performed by Beverly Townsend MD at [address] [place] [address],[address]       Discharge Medication List as of 10/11/2022  3:02 AM        CONTINUE these medications which have NOT CHANGED    Details   predniSONE (DELTASONE) 10 MG tablet 4 tabs by mouth daily for 3 days, then 3 tabs daily for 3 days, then 2 tabs daily for 3 days, then 1 tab daily till gone., Disp-30 tablet, R-0Normal      ibuprofen (ADVIL;MOTRIN) 600 MG tablet Take 1 tablet by mouth 3 times daily as needed for Pain, Disp-30 tablet, R-0Print      montelukast (SINGULAIR) 10 MG tablet Take 1 tablet by mouth in the morning., Disp-90 tablet, R-1Normal      acetaminophen (TYLENOL) 500 MG tablet Take 1,000 mg by mouth every 6 hours as needed for PainHistorical Med      budesonide-formoterol (SYMBICORT) 160-4.5 MCG/ACT AERO Inhale 2 puffs into the lungs in the morning and 2 puffs before bedtime. , Disp-30.6 g, R-1Normal      albuterol sulfate HFA (VENTOLIN HFA) 108 (90 Base) MCG/ACT inhaler Inhale 2 puffs into the lungs every 4 hours as needed for Wheezing or Shortness of Breath, Disp-18 g, R-3Normal      loratadine (CLARITIN) 10 MG tablet Take 1 tablet by mouth daily, Disp-30 tablet, R-3Normal      fluticasone (FLONASE) 50 MCG/ACT nasal spray 1 spray by Nasal route daily, Disp-16 g, R-2Normal           ALLERGIES     is allergic to amoxicillin, lactose, and other. FAMILY HISTORY     She indicated that her mother is alive. She indicated that her father is alive. She indicated that the status of her maternal grandmother is unknown. She indicated that the status of her maternal grandfather is unknown. SOCIAL HISTORY       Social History     Tobacco Use    Smoking status: Never    Smokeless tobacco: Never   Vaping Use    Vaping Use: Never used   Substance Use Topics    Alcohol use:  Yes     Alcohol/week: 2.0 standard drinks     Types: 1 Glasses of wine, 1 Shots of liquor per week     Comment: social drinker     Drug use: No     PHYSICAL EXAM     INITIAL VITALS: /75   Pulse (!) 106   Temp 100.4 °F (38 °C) (Oral)   Resp 18   LMP 10/08/2022 (Exact Date)   SpO2 100%    Physical Exam  Constitutional:       Appearance: Normal appearance. HENT:      Head: Normocephalic. Right Ear: External ear normal.      Left Ear: External ear normal.      Nose: Nose normal.   Eyes:      Conjunctiva/sclera: Conjunctivae normal.   Cardiovascular:      Rate and Rhythm: Regular rhythm. Abdominal:      General: There is no distension. Tenderness: There is left CVA tenderness. Skin:     General: Skin is dry. Neurological:      Mental Status: She is alert. Mental status is at baseline. MEDICAL DECISION MAKING:   The patient is hemodynamically stable, afebrile, nontoxic-appearing. Physical exam notable for left flank tenderness. Based on history and exam differential is cystitis, pyelonephritis. ED plan for UA, reassess      ED Course as of 10/14/22 1438   Tue Oct 11, 2022   0302 Updated patient lab results. Started on Levaquin for pyelonephritis. [LEV]      ED Course User Index  [LEV] Manuelito Gray MD     CRITICAL CARE:     The 30 ml/kg fluid bolus is not ordered due to concern for fluid overload and/or heart failure. PROCEDURES:    Procedures    DIAGNOSTIC RESULTS   EKG:All EKG's are interpreted by the Emergency Department Physician who either signs or Co-signs this chart in the absence of a cardiologist.        RADIOLOGY:All plain film, CT, MRI, and formal ultrasound images (except ED bedside ultrasound) are read by the radiologist, see reports below, unless otherwisenoted in MDM or here. No orders to display     LABS: All lab results were reviewed by myself, and all abnormals are listed below.   Labs Reviewed   URINALYSIS WITH REFLEX TO CULTURE - Abnormal; Notable for the following components:       Result Value    Ketones, Urine TRACE (*)     Urine Hgb 1+ (*)     Urobilinogen, Urine ELEVATED (*)     All other components within normal limits   MICROSCOPIC URINALYSIS - Abnormal; Notable for the following components:    Bacteria, UA MODERATE (*)     Mucus, UA 2+ (*)     All other components within normal limits   PREGNANCY, URINE       EMERGENCY DEPARTMENTCOURSE:   Patient did well in the ED. UA with bacteria. Will treat for pyelonephritis. Given first dose of Levaquin in the ED. Given Rx for Levaquin for home. No further work-up indicated at this time. Nursing notes reviewed. At this time this is what I find, the patient appears well and does not appear sick or toxic. I gave my usual and customary discussion of the risks and benefits of discharge versus admission. I answered the patient's questions. I gave the patient strict return precautions. Patient expressed understanding of the discharge instructions. Vitals:    Vitals:    10/10/22 2303   BP: 121/75   Pulse: (!) 106   Resp: 18   Temp: 100.4 °F (38 °C)   TempSrc: Oral   SpO2: 100%       The patient was given the following medications while in the emergency department:  Orders Placed This Encounter   Medications    levoFLOXacin (LEVAQUIN) 750 MG/150ML infusion 750 mg     Order Specific Question:   Antimicrobial Indications     Answer:   Urinary Tract Infection    acetaminophen (TYLENOL) tablet 1,000 mg    ibuprofen (ADVIL;MOTRIN) tablet 800 mg    DISCONTD: levoFLOXacin (LEVAQUIN) 750 MG tablet     Sig: Take 1 tablet by mouth daily for 6 days     Dispense:  6 tablet     Refill:  0     CONSULTS:  None    FINAL IMPRESSION      1.  Pyelonephritis          DISPOSITION/PLAN   DISPOSITION Decision To Discharge 10/11/2022 03:40:02 AM      PATIENT REFERRED TO:  Inge Gonzalez, [address] [place] [address] [address] [id number] [phone]    In 2 days    DISCHARGE MEDICATIONS:  Discharge Medication List as of 10/11/2022  3:02 AM        START taking these medications    Details   levoFLOXacin (LEVAQUIN) 750 MG tablet Take 1 tablet by mouth daily for 6 days, Disp-6 tablet, R-0Normal           Inge Uriarte MD  Attending Emergency Physician                    Karla Parekh MD  10/14/22 3611

## 2022-10-11 NOTE — ED NOTES
Pt presenitng to the ED with left flank pain, and dysuria. Pt reports that for the last 24 hours, pt has had a fever and trouble urinating. Pt states that he has frequent UTIs and this feels like her typical UTIs. Pt states that she also has had a fever for over 24 hours, that is unrelieved by tylenol or advil. Pt is A&Ox4.       Sarkis Desai RN  10/11/22 0040

## 2022-10-11 NOTE — TELEPHONE ENCOUNTER
Patient was called regarding surgical appointment dates. I informed patient of her pre-admission testing appointment place, date, and time. She understands if she doesn't show for this appointment her surgery may be cancelled. She was then informed of the place, date, arrival time, and time of surgery. She was also told not to eat or drink anything after midnight the evening before her surgery. The patient's surgery was discussed at her appointment. Questions were answered and patient was encouraged to contact the office if she had any other questions. Written material was provided. Patient voiced understanding of the above. PVD (peripheral vascular disease)

## 2022-10-13 ENCOUNTER — TELEMEDICINE (OUTPATIENT)
Dept: FAMILY MEDICINE CLINIC | Age: 27
End: 2022-10-13
Payer: COMMERCIAL

## 2022-10-13 DIAGNOSIS — N12 PYELONEPHRITIS: Primary | ICD-10-CM

## 2022-10-13 PROCEDURE — 99213 OFFICE O/P EST LOW 20 MIN: CPT | Performed by: NURSE PRACTITIONER

## 2022-10-13 PROCEDURE — G8427 DOCREV CUR MEDS BY ELIG CLIN: HCPCS | Performed by: NURSE PRACTITIONER

## 2022-10-13 RX ORDER — CEFADROXIL 1000 MG/1
1 TABLET ORAL 2 TIMES DAILY
Qty: 18 TABLET | Refills: 0 | Status: SHIPPED | OUTPATIENT
Start: 2022-10-13 | End: 2022-10-19 | Stop reason: ALTCHOICE

## 2022-10-13 ASSESSMENT — ENCOUNTER SYMPTOMS
SHORTNESS OF BREATH: 0
COUGH: 0

## 2022-10-13 NOTE — PROGRESS NOTES
Aleida Cook (:  1995) is a Established patient, here for evaluation of the following:    Assessment & Plan   Below is the assessment and plan developed based on review of pertinent history, physical exam, labs, studies, and medications. 1. Pyelonephritis  -     cefadroxil (DURICEF) 1 g tablet; Take 1 tablet by mouth 2 times daily for 9 days, Disp-18 tablet, R-0Normal  No follow-ups on file. Tolerated cefadroxil, will switch from levaquin  Subjective   HPI  Isabell states she went to er for flank pain, fever, abd pain. States this is the second time being dx with pyelo in two months. History of recurrent infections. Has been referred to uro. Next available apt is . States keflex doesn't work for her. Levaquin is making her sick, nausea, loose stool, tried taking it food, didn't help. Review of Systems   Constitutional:  Negative for chills and fever. Respiratory:  Negative for cough and shortness of breath. Cardiovascular:  Negative for chest pain, palpitations and leg swelling.         Objective   Patient-Reported Vitals  No data recorded     Physical Exam  [INSTRUCTIONS:  \"[x]\" Indicates a positive item  \"[]\" Indicates a negative item  -- DELETE ALL ITEMS NOT EXAMINED]    Constitutional: [x] Appears well-developed and well-nourished [x] No apparent distress      [] Abnormal -     Mental status: [x] Alert and awake  [x] Oriented to person/place/time [x] Able to follow commands    [] Abnormal -     Eyes:   EOM    [x]  Normal    [] Abnormal -   Sclera  [x]  Normal    [] Abnormal -          Discharge [x]  None visible   [] Abnormal -     HENT: [x] Normocephalic, atraumatic  [] Abnormal -   [x] Mouth/Throat: Mucous membranes are moist    External Ears [x] Normal  [] Abnormal -    Neck: [x] No visualized mass [] Abnormal -     Pulmonary/Chest: [x] Respiratory effort normal   [x] No visualized signs of difficulty breathing or respiratory distress        [] Abnormal -      Musculoskeletal: [x] Normal gait with no signs of ataxia         [x] Normal range of motion of neck        [] Abnormal -     Neurological:        [x] No Facial Asymmetry (Cranial nerve 7 motor function) (limited exam due to video visit)          [x] No gaze palsy        [] Abnormal -          Skin:        [x] No significant exanthematous lesions or discoloration noted on facial skin         [] Abnormal -            Psychiatric:       [x] Normal Affect [] Abnormal -        [x] No Hallucinations    Other pertinent observable physical exam findings:-         Genna Skiff, was evaluated through a synchronous (real-time) audio-video encounter. The patient (or guardian if applicable) is aware that this is a billable service, which includes applicable co-pays. This Virtual Visit was conducted with patient's (and/or legal guardian's) consent. The visit was conducted pursuant to the emergency declaration under the [address] [place] [address], [address] [place] [address] authority and the [employer] Act. Patient identification was verified, and a caregiver was present when appropriate. The patient was located at Home: [address] [place] [address] [address] [facility],[address] [address].    Provider was [facility] [facility] KEMAR Anton - JING

## 2022-10-13 NOTE — PROGRESS NOTES
Patient is present for f/u from [facility] ED  Patient was in the ER on 10/10 for Pyelonephritis   Patient states she is starting to have more pain  States she has not had a fever  Patient states she is taking levaquin and states it upsets her stomach; cramping and diarrhea  States she has tried taking it with food with no relief

## 2022-10-19 ENCOUNTER — HOSPITAL ENCOUNTER (OUTPATIENT)
Age: 27
Setting detail: SPECIMEN
Discharge: HOME | End: 2022-10-19

## 2022-10-19 ENCOUNTER — OFFICE VISIT (OUTPATIENT)
Dept: FAMILY MEDICINE CLINIC | Age: 27
End: 2022-10-19
Payer: COMMERCIAL

## 2022-10-19 VITALS
WEIGHT: 178.6 LBS | OXYGEN SATURATION: 98 % | TEMPERATURE: 97.6 F | DIASTOLIC BLOOD PRESSURE: 68 MMHG | SYSTOLIC BLOOD PRESSURE: 106 MMHG | HEART RATE: 81 BPM | BODY MASS INDEX: 29.72 KG/M2

## 2022-10-19 DIAGNOSIS — R10.2 PELVIC PAIN: ICD-10-CM

## 2022-10-19 DIAGNOSIS — N39.0 RECURRENT UTI: ICD-10-CM

## 2022-10-19 DIAGNOSIS — N12 RECURRENT PYELONEPHRITIS: Primary | ICD-10-CM

## 2022-10-19 DIAGNOSIS — J45.40 MODERATE PERSISTENT ASTHMA WITHOUT COMPLICATION: ICD-10-CM

## 2022-10-19 DIAGNOSIS — N94.6 DYSMENORRHEA: ICD-10-CM

## 2022-10-19 LAB
BILIRUBIN, POC: ABNORMAL
BLOOD URINE, POC: ABNORMAL
CLARITY, POC: CLEAR
COLOR, POC: ABNORMAL
GLUCOSE URINE, POC: ABNORMAL
KETONES, POC: ABNORMAL
LEUKOCYTE EST, POC: ABNORMAL
NITRITE, POC: ABNORMAL
PH, POC: 6.5
PROTEIN, POC: ABNORMAL
SPECIFIC GRAVITY, POC: 1.01
UROBILINOGEN, POC: 0.2

## 2022-10-19 PROCEDURE — G8482 FLU IMMUNIZE ORDER/ADMIN: HCPCS | Performed by: INTERNAL MEDICINE

## 2022-10-19 PROCEDURE — G8427 DOCREV CUR MEDS BY ELIG CLIN: HCPCS | Performed by: INTERNAL MEDICINE

## 2022-10-19 PROCEDURE — 99214 OFFICE O/P EST MOD 30 MIN: CPT | Performed by: INTERNAL MEDICINE

## 2022-10-19 PROCEDURE — G8417 CALC BMI ABV UP PARAM F/U: HCPCS | Performed by: INTERNAL MEDICINE

## 2022-10-19 PROCEDURE — 81002 URINALYSIS NONAUTO W/O SCOPE: CPT | Performed by: INTERNAL MEDICINE

## 2022-10-19 PROCEDURE — 1036F TOBACCO NON-USER: CPT | Performed by: INTERNAL MEDICINE

## 2022-10-19 RX ORDER — NITROFURANTOIN 25; 75 MG/1; MG/1
100 CAPSULE ORAL DAILY
Qty: 30 CAPSULE | Refills: 3 | Status: SHIPPED | OUTPATIENT
Start: 2022-10-19

## 2022-10-19 NOTE — PROGRESS NOTES
[facility] PHYSICIANS  [facility] [address]  [address] [address]  Dept: [phone]  Dept Fax: [phone]      Aleida Cook is a 33 y.o. female who presents today for hermedical conditions/complaints as noted below. Aleida Cook is c/o of Follow-up and Depression        Assessment/Plan:     1. Recurrent pyelonephritis  -     nitrofurantoin, macrocrystal-monohydrate, (MACROBID) 100 MG capsule; Take 1 capsule by mouth daily, Disp-30 capsule, R-3Normal  2. Recurrent UTI  -     POCT Urinalysis no Micro  -     Culture, Urine; Future  -     nitrofurantoin, macrocrystal-monohydrate, (MACROBID) 100 MG capsule; Take 1 capsule by mouth daily, Disp-30 capsule, R-3Normal  3. Ignacia Pretty MD OB/GYN, [place]  4. Pelvic pain  -     Serene Raymond MD OB/GYN, [place]  5. Moderate persistent asthma without complication        No follow-ups on file. HPI     HPI  Has appointment with Dr Preston Seen on 11/7 - is on a cancel list. She has been to the ER again on 10/10 for pyelo. Still having flank pain, feels like she has UTI again. Was given levaquin in ER, switched to cefadroxil at [facility] 10/13 because of severe diarrhea - still haivng diarrhea but not as bad  Has diarrhea everytime she eats now   She had COVID again 10/6 - asthma flared up, doing better after prednisone burst   Has been having a lot of depressive thoughts - lack of motivation, emotional, overwhelmed. Has endometriosis suegery coming up but doesn't feel like she is being listened to at gyn either, had to fight to be heard.      BP Readings from Last 3 Encounters:   10/19/22 106/68   10/10/22 121/75   09/27/22 117/74              Past Medical History:   Diagnosis Date    Asthma     as a child    Cholecystitis     E. coli UTI (urinary tract infection) 5/14/2018    Gall stones     Kidney stone     Patient denies    Pyelonephritis 2/15/2019    S/p lap right salpingectomy w/ evacuation of hemoperitoneum 12/10/21 12/10/2021    2/2 ectopic pregnancy    UTI (lower urinary tract infection)       Past Surgical History:   Procedure Laterality Date    ABDOMEN SURGERY  12/10/2021    DIAGNOSTIC LAPAROSCOPY, UNILATERAL SALPINGECTOMY RIGHT    ARM SURGERY Right     BREAST REDUCTION SURGERY  07/25/2018    CYSTOSCOPY N/A 09/04/2019    CYSTOSCOPY RETROGRADE PYELOGRAM WITH  CYSTOGRAM, performed by Chris Herrera MD at [address] N/A 12/03/2021    DILATATION AND CURETTAGE SUCTION performed by Al Whitaker MD at [facility] Right 12/10/2021    DIAGNOSTIC LAPAROSCOPY, UNILATERAL SALPINGECTOMY performed by Mj Piedra MD at [address] N/A 05/18/2018    CHOLECYSTECTOMY LAPAROSCOPIC performed by Pablito Hung MD at [address]         Family History   Problem Relation Age of Onset    Ovarian Cancer Maternal Grandmother     Prostate Cancer Maternal Grandfather        Social History     Tobacco Use    Smoking status: Never    Smokeless tobacco: Never   Substance Use Topics    Alcohol use: Yes     Alcohol/week: 2.0 standard drinks     Types: 1 Glasses of wine, 1 Shots of liquor per week     Comment: social drinker         Allergies   Allergen Reactions    Amoxicillin Hives    Lactose      Can have milk in foods, but not by itself (I.e., glass of milk)    Other Hives     Surgical glue     Prior to Visit Medications    Medication Sig Taking? Authorizing Provider   ibuprofen (ADVIL;MOTRIN) 600 MG tablet Take 1 tablet by mouth 3 times daily as needed for Pain Yes Kale House MD   montelukast (SINGULAIR) 10 MG tablet Take 1 tablet by mouth in the morning.  Yes Jalen Mccann MD   acetaminophen (TYLENOL) 500 MG tablet Take 1,000 mg by mouth every 6 hours as needed for Pain Yes Historical Provider, MD   budesonide-formoterol (SYMBICORT) 160-4.5 MCG/ACT AERO Inhale 2 puffs into the lungs in the morning and 2 puffs before bedtime. Yes KEMAR Blevins NP   albuterol sulfate HFA (VENTOLIN HFA) 108 (90 Base) MCG/ACT inhaler Inhale 2 puffs into the lungs every 4 hours as needed for Wheezing or Shortness of Breath Yes Inge Acuña MD   loratadine (CLARITIN) 10 MG tablet Take 1 tablet by mouth daily Yes Inge Acuña MD   fluticasone (FLONASE) 50 MCG/ACT nasal spray 1 spray by Nasal route daily Yes Inge Acuña MD   cefadroxil (DURICEF) 1 g tablet Take 1 tablet by mouth 2 times daily for 9 days  Patient not taking: Reported on 10/19/2022  KEMAR Goode CNP   predniSONE (DELTASONE) 10 MG tablet 4 tabs by mouth daily for 3 days, then 3 tabs daily for 3 days, then 2 tabs daily for 3 days, then 1 tab daily till gone. Patient not taking: Reported on 10/19/2022  Inge Acuña MD   meloxicam (MOBIC) 15 MG tablet Take 1 tablet by mouth in the morning. KEMAR Blevins NP       Review of Systems     Review of Systems    Objective     /68 (Site: Left Upper Arm, Position: Sitting, Cuff Size: Medium Adult)   Pulse 81   Temp 97.6 °F (36.4 °C)   Wt 178 lb 9.6 oz (81 kg)   LMP 10/08/2022 (Exact Date)   SpO2 98%   BMI 29.72 kg/m²   Physical Exam      Data Review       There are no preventive care reminders to display for this patient. Patient given educational materials- see patient instructions. Discussed use, benefit, and side effects of prescribedmedications. All patient questions answered. Pt voiced understanding. Reviewedhealth maintenance. Instructed to continue current medications, diet and exercise. Patient agreed with treatment plan. Follow up as directed.      Electronically signedby Humphrey Russell MD on 10/19/2022

## 2022-10-19 NOTE — PROGRESS NOTES
Pt is here today because she has been in the ER for her urinary issues, states she thinks she may still have a UTI    Pt states she has been feeling depressed due to all her health issues, no thoughts of hurting herself, no motivation, fatigue, very emotional, isolating herself       Was referred to a therapist nut none of them are accepting new patients     Pt is having a biopsy done 11/14/2022 for endometriosis

## 2022-10-20 LAB
CULTURE: NORMAL
SPECIMEN DESCRIPTION: NORMAL

## 2022-11-13 ENCOUNTER — ANESTHESIA EVENT (OUTPATIENT)
Dept: OPERATING ROOM | Age: 27
End: 2022-11-13
Payer: COMMERCIAL

## 2022-11-14 ENCOUNTER — ANESTHESIA (OUTPATIENT)
Dept: OPERATING ROOM | Age: 27
End: 2022-11-14
Payer: COMMERCIAL

## 2022-11-14 ENCOUNTER — HOSPITAL ENCOUNTER (OUTPATIENT)
Age: 27
Setting detail: OUTPATIENT SURGERY
Discharge: HOME | End: 2022-11-14
Attending: OBSTETRICS & GYNECOLOGY | Admitting: OBSTETRICS & GYNECOLOGY
Payer: COMMERCIAL

## 2022-11-14 VITALS
WEIGHT: 175 LBS | OXYGEN SATURATION: 98 % | DIASTOLIC BLOOD PRESSURE: 69 MMHG | TEMPERATURE: 98.1 F | RESPIRATION RATE: 14 BRPM | BODY MASS INDEX: 29.16 KG/M2 | SYSTOLIC BLOOD PRESSURE: 97 MMHG | HEART RATE: 63 BPM | HEIGHT: 65 IN

## 2022-11-14 DIAGNOSIS — Z98.890 H/O LAPAROSCOPY: Primary | ICD-10-CM

## 2022-11-14 LAB — HCG QUALITATIVE: NEGATIVE

## 2022-11-14 PROCEDURE — 7100000000 HC PACU RECOVERY - FIRST 15 MIN: Performed by: OBSTETRICS & GYNECOLOGY

## 2022-11-14 PROCEDURE — 3700000000 HC ANESTHESIA ATTENDED CARE: Performed by: OBSTETRICS & GYNECOLOGY

## 2022-11-14 PROCEDURE — 6360000002 HC RX W HCPCS: Performed by: NURSE ANESTHETIST, CERTIFIED REGISTERED

## 2022-11-14 PROCEDURE — 7100000041 HC SPAR PHASE II RECOVERY - ADDTL 15 MIN: Performed by: OBSTETRICS & GYNECOLOGY

## 2022-11-14 PROCEDURE — 2500000003 HC RX 250 WO HCPCS: Performed by: OBSTETRICS & GYNECOLOGY

## 2022-11-14 PROCEDURE — 7100000001 HC PACU RECOVERY - ADDTL 15 MIN: Performed by: OBSTETRICS & GYNECOLOGY

## 2022-11-14 PROCEDURE — 7100000040 HC SPAR PHASE II RECOVERY - FIRST 15 MIN: Performed by: OBSTETRICS & GYNECOLOGY

## 2022-11-14 PROCEDURE — 2709999900 HC NON-CHARGEABLE SUPPLY: Performed by: OBSTETRICS & GYNECOLOGY

## 2022-11-14 PROCEDURE — 6360000002 HC RX W HCPCS: Performed by: ANESTHESIOLOGY

## 2022-11-14 PROCEDURE — 84703 CHORIONIC GONADOTROPIN ASSAY: CPT

## 2022-11-14 PROCEDURE — 3600000005 HC SURGERY LEVEL 5 BASE: Performed by: OBSTETRICS & GYNECOLOGY

## 2022-11-14 PROCEDURE — 49320 DIAG LAPARO SEPARATE PROC: CPT | Performed by: OBSTETRICS & GYNECOLOGY

## 2022-11-14 PROCEDURE — 3600000015 HC SURGERY LEVEL 5 ADDTL 15MIN: Performed by: OBSTETRICS & GYNECOLOGY

## 2022-11-14 PROCEDURE — 6370000000 HC RX 637 (ALT 250 FOR IP): Performed by: ANESTHESIOLOGY

## 2022-11-14 PROCEDURE — 2500000003 HC RX 250 WO HCPCS: Performed by: NURSE ANESTHETIST, CERTIFIED REGISTERED

## 2022-11-14 PROCEDURE — 3700000001 HC ADD 15 MINUTES (ANESTHESIA): Performed by: OBSTETRICS & GYNECOLOGY

## 2022-11-14 PROCEDURE — 2580000003 HC RX 258: Performed by: NURSE ANESTHETIST, CERTIFIED REGISTERED

## 2022-11-14 RX ORDER — SODIUM CHLORIDE 0.9 % (FLUSH) 0.9 %
5-40 SYRINGE (ML) INJECTION EVERY 12 HOURS SCHEDULED
Status: DISCONTINUED | OUTPATIENT
Start: 2022-11-14 | End: 2022-11-14 | Stop reason: HOSPADM

## 2022-11-14 RX ORDER — MIDAZOLAM HYDROCHLORIDE 1 MG/ML
INJECTION INTRAMUSCULAR; INTRAVENOUS PRN
Status: DISCONTINUED | OUTPATIENT
Start: 2022-11-14 | End: 2022-11-14 | Stop reason: SDUPTHER

## 2022-11-14 RX ORDER — DEXAMETHASONE SODIUM PHOSPHATE 10 MG/ML
INJECTION INTRAMUSCULAR; INTRAVENOUS PRN
Status: DISCONTINUED | OUTPATIENT
Start: 2022-11-14 | End: 2022-11-14 | Stop reason: SDUPTHER

## 2022-11-14 RX ORDER — SODIUM CHLORIDE, SODIUM LACTATE, POTASSIUM CHLORIDE, CALCIUM CHLORIDE 600; 310; 30; 20 MG/100ML; MG/100ML; MG/100ML; MG/100ML
INJECTION, SOLUTION INTRAVENOUS CONTINUOUS PRN
Status: DISCONTINUED | OUTPATIENT
Start: 2022-11-14 | End: 2022-11-14 | Stop reason: SDUPTHER

## 2022-11-14 RX ORDER — DIPHENHYDRAMINE HYDROCHLORIDE 50 MG/ML
INJECTION INTRAMUSCULAR; INTRAVENOUS PRN
Status: DISCONTINUED | OUTPATIENT
Start: 2022-11-14 | End: 2022-11-14 | Stop reason: SDUPTHER

## 2022-11-14 RX ORDER — ONDANSETRON 2 MG/ML
4 INJECTION INTRAMUSCULAR; INTRAVENOUS
Status: DISCONTINUED | OUTPATIENT
Start: 2022-11-14 | End: 2022-11-14 | Stop reason: HOSPADM

## 2022-11-14 RX ORDER — HYDRALAZINE HYDROCHLORIDE 20 MG/ML
10 INJECTION INTRAMUSCULAR; INTRAVENOUS
Status: DISCONTINUED | OUTPATIENT
Start: 2022-11-14 | End: 2022-11-14 | Stop reason: HOSPADM

## 2022-11-14 RX ORDER — SODIUM CHLORIDE 9 MG/ML
INJECTION, SOLUTION INTRAVENOUS PRN
Status: DISCONTINUED | OUTPATIENT
Start: 2022-11-14 | End: 2022-11-14 | Stop reason: HOSPADM

## 2022-11-14 RX ORDER — LABETALOL HYDROCHLORIDE 5 MG/ML
10 INJECTION, SOLUTION INTRAVENOUS
Status: DISCONTINUED | OUTPATIENT
Start: 2022-11-14 | End: 2022-11-14 | Stop reason: HOSPADM

## 2022-11-14 RX ORDER — SIMETHICONE 80 MG
80 TABLET,CHEWABLE ORAL 4 TIMES DAILY PRN
Qty: 180 TABLET | Refills: 3 | Status: SHIPPED | OUTPATIENT
Start: 2022-11-14

## 2022-11-14 RX ORDER — IBUPROFEN 800 MG/1
800 TABLET ORAL EVERY 8 HOURS PRN
Qty: 60 TABLET | Refills: 0 | Status: SHIPPED | OUTPATIENT
Start: 2022-11-14

## 2022-11-14 RX ORDER — ROCURONIUM BROMIDE 10 MG/ML
INJECTION, SOLUTION INTRAVENOUS PRN
Status: DISCONTINUED | OUTPATIENT
Start: 2022-11-14 | End: 2022-11-14 | Stop reason: SDUPTHER

## 2022-11-14 RX ORDER — SODIUM CHLORIDE 0.9 % (FLUSH) 0.9 %
5-40 SYRINGE (ML) INJECTION PRN
Status: DISCONTINUED | OUTPATIENT
Start: 2022-11-14 | End: 2022-11-14 | Stop reason: HOSPADM

## 2022-11-14 RX ORDER — PROPOFOL 10 MG/ML
INJECTION, EMULSION INTRAVENOUS PRN
Status: DISCONTINUED | OUTPATIENT
Start: 2022-11-14 | End: 2022-11-14 | Stop reason: SDUPTHER

## 2022-11-14 RX ORDER — BUPIVACAINE HYDROCHLORIDE AND EPINEPHRINE 5; 5 MG/ML; UG/ML
INJECTION, SOLUTION EPIDURAL; INTRACAUDAL; PERINEURAL PRN
Status: DISCONTINUED | OUTPATIENT
Start: 2022-11-14 | End: 2022-11-14 | Stop reason: ALTCHOICE

## 2022-11-14 RX ORDER — OXYCODONE HYDROCHLORIDE 5 MG/1
5 TABLET ORAL EVERY 6 HOURS PRN
Qty: 20 TABLET | Refills: 0 | Status: SHIPPED | OUTPATIENT
Start: 2022-11-14 | End: 2022-11-21

## 2022-11-14 RX ORDER — ONDANSETRON 4 MG/1
4 TABLET, FILM COATED ORAL DAILY PRN
Qty: 30 TABLET | Refills: 0 | Status: SHIPPED | OUTPATIENT
Start: 2022-11-14

## 2022-11-14 RX ORDER — DOCUSATE SODIUM 100 MG/1
100 CAPSULE, LIQUID FILLED ORAL 2 TIMES DAILY
Qty: 60 CAPSULE | Refills: 0 | Status: SHIPPED | OUTPATIENT
Start: 2022-11-14 | End: 2022-12-14

## 2022-11-14 RX ORDER — ONDANSETRON 2 MG/ML
INJECTION INTRAMUSCULAR; INTRAVENOUS PRN
Status: DISCONTINUED | OUTPATIENT
Start: 2022-11-14 | End: 2022-11-14 | Stop reason: SDUPTHER

## 2022-11-14 RX ORDER — FENTANYL CITRATE 50 UG/ML
INJECTION, SOLUTION INTRAMUSCULAR; INTRAVENOUS PRN
Status: DISCONTINUED | OUTPATIENT
Start: 2022-11-14 | End: 2022-11-14 | Stop reason: SDUPTHER

## 2022-11-14 RX ORDER — LIDOCAINE HYDROCHLORIDE 10 MG/ML
INJECTION, SOLUTION EPIDURAL; INFILTRATION; INTRACAUDAL; PERINEURAL PRN
Status: DISCONTINUED | OUTPATIENT
Start: 2022-11-14 | End: 2022-11-14 | Stop reason: SDUPTHER

## 2022-11-14 RX ORDER — OXYCODONE HYDROCHLORIDE 5 MG/1
5 TABLET ORAL
Status: COMPLETED | OUTPATIENT
Start: 2022-11-14 | End: 2022-11-14

## 2022-11-14 RX ADMIN — OXYCODONE 5 MG: 5 TABLET ORAL at 10:14

## 2022-11-14 RX ADMIN — FENTANYL CITRATE 50 MCG: 50 INJECTION, SOLUTION INTRAMUSCULAR; INTRAVENOUS at 07:41

## 2022-11-14 RX ADMIN — DEXAMETHASONE SODIUM PHOSPHATE 10 MG: 10 INJECTION INTRAMUSCULAR; INTRAVENOUS at 07:53

## 2022-11-14 RX ADMIN — DIPHENHYDRAMINE HYDROCHLORIDE 12.5 MG: 50 INJECTION, SOLUTION INTRAMUSCULAR; INTRAVENOUS at 08:05

## 2022-11-14 RX ADMIN — PHENYLEPHRINE HYDROCHLORIDE 100 MCG: 10 INJECTION INTRAVENOUS at 07:49

## 2022-11-14 RX ADMIN — FENTANYL CITRATE 50 MCG: 50 INJECTION, SOLUTION INTRAMUSCULAR; INTRAVENOUS at 08:12

## 2022-11-14 RX ADMIN — SUGAMMADEX 200 MG: 100 INJECTION, SOLUTION INTRAVENOUS at 08:20

## 2022-11-14 RX ADMIN — SODIUM CHLORIDE, POTASSIUM CHLORIDE, SODIUM LACTATE AND CALCIUM CHLORIDE: 600; 310; 30; 20 INJECTION, SOLUTION INTRAVENOUS at 07:32

## 2022-11-14 RX ADMIN — LIDOCAINE HYDROCHLORIDE 5 ML: 10 INJECTION, SOLUTION EPIDURAL; INFILTRATION; INTRACAUDAL; PERINEURAL at 07:38

## 2022-11-14 RX ADMIN — ONDANSETRON 4 MG: 2 INJECTION INTRAMUSCULAR; INTRAVENOUS at 08:14

## 2022-11-14 RX ADMIN — PROPOFOL 150 MG: 10 INJECTION, EMULSION INTRAVENOUS at 07:38

## 2022-11-14 RX ADMIN — HYDROMORPHONE HYDROCHLORIDE 0.5 MG: 1 INJECTION, SOLUTION INTRAMUSCULAR; INTRAVENOUS; SUBCUTANEOUS at 09:40

## 2022-11-14 RX ADMIN — MIDAZOLAM 2 MG: 1 INJECTION INTRAMUSCULAR; INTRAVENOUS at 07:36

## 2022-11-14 RX ADMIN — ROCURONIUM BROMIDE 50 MG: 10 INJECTION, SOLUTION INTRAVENOUS at 07:40

## 2022-11-14 ASSESSMENT — PAIN - FUNCTIONAL ASSESSMENT: PAIN_FUNCTIONAL_ASSESSMENT: 0-10

## 2022-11-14 ASSESSMENT — PAIN SCALES - GENERAL
PAINLEVEL_OUTOF10: 8

## 2022-11-14 ASSESSMENT — PAIN DESCRIPTION - DESCRIPTORS
DESCRIPTORS: BURNING
DESCRIPTORS: BURNING
DESCRIPTORS: CRAMPING

## 2022-11-14 ASSESSMENT — PAIN DESCRIPTION - LOCATION
LOCATION: ABDOMEN

## 2022-11-14 ASSESSMENT — PAIN DESCRIPTION - ORIENTATION
ORIENTATION: LOWER

## 2022-11-14 NOTE — OP NOTE
Operative Note  Department of Obstetrics and Gynecology  [facility]       Patient: Nicki Chow   : 1995  MRN: [id number]       Acct: [de-identified]   PCP: Alexandre Ramirez MD  Date of Procedure: 22    Pre-operative Diagnosis: 33 y.o. female    Pelvic Pain  BMI 29     Post-operative Diagnosis: same as above, surgically absent right fallopian tube    Procedure: Diagnostic Laparoscopy    Surgeon: Dr. Mk Cash  Assistants: Jim Sabillon DO, PGY3; Linnea Verdugo MD, PGY1    Anesthesia: general    Indications: Patient is a 33year old  who initially presented to clinic with complaints of bilateral lower pelvic pain which is more pronounced on the left side with radiation to the back. She notes a more mild suprapubic pain that is associated. The pain becomes worse with ovulation and with intercourse. Conservative management was attempted with NSAIDs, heating pads, pelvic floor therapy, OCPs and a hormonal IUD. She underwent diagnostic laparoscopy in 2021 for a right tubal ectopic pregnancy. CT of the abdomen and pelvis are revealing for bladder wall thickening, and she has been following with Urology. The patient would like to proceed with surgical evaluation and management for these symptoms today. The procedure risks and complications were reviewed and a consent form was signed. Procedure Details   The patient was seen in the pre-op room. The risks, benefits, complications, treatment options, and expected outcomes were discussed with the patient. The patient concurred with the proposed plan, giving informed consent. The patient was taken to the Operating Room and identified as Nicki Chow and the procedure was verified. A Time Out was held and the above information confirmed. After administration of general anesthesia, the patient was placed in the  dorsolithotomy position with Yellofin stirrups.  A lr catheter was inserted into the bladder. The patient was prepped and draped in the usual sterile fashion. A sponge stick uterine manipulator was inserted into the cervical canal to aid in visualization during the laparoscopic portion of the case. Attention was then turned to the abdominal portion of the case. An approximately 5 mm supraumbilical incision was made. Hemostat used to bluntly dissect down to level of fascia. Perforating towel clamps placed on either side of incision and used to elevate abdominal wall while 5 mm trochar used to entered abdomen via optivue under direct visualization  CO2 gas was then used to create a pneumoperitoneum. The patient was then placed in trendelenberg position. A 5 mm port was then placed in the left lower quadrant under direct visualization. An atraumatic grasper was placed through the port for assistance in visualization. Survey of the pelvis was then performed, revealing a normal appearing uterus without lesions or endometriosis implants. The right fallopian tube was surgically absent, the left fallopian tube was normal appearing. Bilateral ovaries were normal appearing without cysts or lesions. No pelvic congestion was noted. The bladder appeared to be without inflammation, lesions, or lacerations. No evidence of inflammation or infection in the overall pelvic structures could be noted. The inferior liver edge, appendix and bowel were all unremarkable. A final survey of the pelvis was conducted, hemostasis was noted. All instruments were then removed. Pneumoperitoneum was evacuated. 0.5% marcaine was injected into each port site. Skin was closed with 4-0 Monocryl interrupted. Incisions were clean, dried and dressed in sterile fashion. All instruments were then removed from the vagina. Hemostasis was noted at the tenaculum site. Leroy catheter was removed. Dr. April Polanco was present for the entire operation. Findings: Normal appearing external genitalia.  Normal appearing left fallopian tube, surgically absent right fallopian tube. Normal appearing bilateral ovaries. No evidence of endometriosis or etiology for pelvic pain. Estimated Blood Loss: 10 ml  Drains:  None  Total IV Fluids: 300cc  Urine output: 250cc clear urine   Specimens:  None  Instrument and Sponge Count: Correct  Complications: none  Condition: stable, transfer to post anesthesia recovery    Lorne Scheuermann, MD  Ob/Gyn Resident  2022, 8:46 AM       Date: 2022  Time: 9:51 AM      Patient Name: Rebekah Miranda  Patient : 1995  Room/Bed: [facility]/NONE  Admission Date/Time: 2022  5:56 AM        Attending Physician Statement  I have discussed the care of Rebekah Miranda, including pertinent history and exam findings with the resident. I have reviewed and edited their note in the electronic medical record. The key elements of all parts of the encounter have been performed/reviewed by me . I agree with the assessment, plan and orders as documented by the resident. The level of care submitted represents to the best of my ability the care documented in the medical record today. GC Modifier. This service has been performed in part by a resident under the direction of a teaching physician.         Attending's Name:  Farida Johns DO

## 2022-11-14 NOTE — ANESTHESIA PRE PROCEDURE
Department of Anesthesiology  Preprocedure Note       Name:  Rosangela Watt   Age:  33 y.o.  :  1995                                          MRN:  [id number]         Date:  2022      Surgeon: Janae Slaughter):  Shelia Smyth DO    Procedure: Procedure(s):  DIAGNOSTIC LAPAROSCOPY    Medications prior to admission:   Prior to Admission medications    Medication Sig Start Date End Date Taking? Authorizing Provider   nitrofurantoin, macrocrystal-monohydrate, (MACROBID) 100 MG capsule Take 1 capsule by mouth daily 10/19/22   Inge Simon MD   ibuprofen (ADVIL;MOTRIN) 600 MG tablet Take 1 tablet by mouth 3 times daily as needed for Pain 8/30/22 10/19/22  Ruddy Goel MD   montelukast (SINGULAIR) 10 MG tablet Take 1 tablet by mouth in the morning. 22   Inge Simon MD   acetaminophen (TYLENOL) 500 MG tablet Take 1,000 mg by mouth every 6 hours as needed for Pain    Historical Provider, MD   budesonide-formoterol (SYMBICORT) 160-4.5 MCG/ACT AERO Inhale 2 puffs into the lungs in the morning and 2 puffs before bedtime.  22   KEMAR Hannon NP   meloxicam (MOBIC) 15 MG tablet Take 1 tablet by mouth in the morning. 22  KEMAR Hannon NP   albuterol sulfate HFA (VENTOLIN HFA) 108 (90 Base) MCG/ACT inhaler Inhale 2 puffs into the lungs every 4 hours as needed for Wheezing or Shortness of Breath 22  Inge Simon MD   loratadine (CLARITIN) 10 MG tablet Take 1 tablet by mouth daily 22   Inge Simon MD   fluticasone [facility]) 50 MCG/ACT nasal spray 1 spray by Nasal route daily 22   Inge Simon MD       Current medications:    Current Outpatient Medications   Medication Sig Dispense Refill    nitrofurantoin, macrocrystal-monohydrate, (MACROBID) 100 MG capsule Take 1 capsule by mouth daily 30 capsule 3    ibuprofen (ADVIL;MOTRIN) 600 MG tablet Take 1 tablet by mouth 3 times daily as needed for Pain 30 tablet 0    montelukast (SINGULAIR) 10 MG tablet Take 1 tablet by mouth in the morning. 90 tablet 1    acetaminophen (TYLENOL) 500 MG tablet Take 1,000 mg by mouth every 6 hours as needed for Pain      budesonide-formoterol (SYMBICORT) 160-4.5 MCG/ACT AERO Inhale 2 puffs into the lungs in the morning and 2 puffs before bedtime. 30.6 g 1    albuterol sulfate HFA (VENTOLIN HFA) 108 (90 Base) MCG/ACT inhaler Inhale 2 puffs into the lungs every 4 hours as needed for Wheezing or Shortness of Breath 18 g 3    loratadine (CLARITIN) 10 MG tablet Take 1 tablet by mouth daily 30 tablet 3    fluticasone (FLONASE) 50 MCG/ACT nasal spray 1 spray by Nasal route daily 16 g 2     No current facility-administered medications for this visit. Allergies:     Allergies   Allergen Reactions    Amoxicillin Hives    Lactose Diarrhea and Nausea And Vomiting     Can have milk in foods, but not by itself (I.e., glass of milk)    Other Hives and Rash     Surgical glue       Problem List:    Patient Active Problem List   Diagnosis Code    History of kidney stones Z87.442    Dysmenorrhea N94.6    Grief associated with loss of fetus F43.21    Class 1 obesity due to excess calories without serious comorbidity with body mass index (BMI) of 31.0 to 31.9 in adult E66.09, Z68.31    Bilateral serous otitis media H65.93    Exercise-induced bronchoconstriction J45.990    Moderate persistent asthma without complication L05.95    Pyelonephritis N12    Hypokalemia E87.6    Pelvic pain R10.2       Past Medical History:        Diagnosis Date    Abdominal pain     Abnormal uterine bleeding     Anesthesia complication     had seizure after anesthesia for wisdom teeth-hospitalized-has has no problem with anesthesia since then    Asthma     as a child    Back pain     Cholecystitis 2018    resolved with cholecystectomy    COVID 02/2021    no hospitalization    COVID 12/2021    iv antibody infusion given 12/23/2022    COVID 10/2022    HA, fatigue, congestion-no hospitalization    E. coli UTI (urinary tract infection) 2018    Gall stones     resolved with cholecystectomy    Kidney stone     Patient denies    Pelvic pain     Pyelonephritis 02/15/2019    S/p lap right salpingectomy w/ evacuation of hemoperitoneum 12/10/21 12/10/2021    2/2 ectopic pregnancy    Seasonal allergies     Spontaneous      3-4 miscarriages    Therapy     pelvic floor therapy-jeffery Debra-last visit sep 2022    Under care of service provider 11/10/2022    jyx-iyvhftkjxkf-vmqnjorey-last visit oct 2022    UTI (lower urinary tract infection)     frequent       Past Surgical History:        Procedure Laterality Date    ABDOMEN SURGERY  12/10/2021    DIAGNOSTIC LAPAROSCOPY, UNILATERAL SALPINGECTOMY RIGHT    ARM SURGERY Right     fracture repair    BREAST REDUCTION SURGERY  2018    CYSTOSCOPY N/A 2019    CYSTOSCOPY RETROGRADE PYELOGRAM WITH  CYSTOGRAM, performed by Nathen Gomez MD at [address] N/A 2021    DILATATION AND CURETTAGE SUCTION performed by Jessi Black MD at [address] [place] [address]      LAPAROSCOPY Right 12/10/2021    DIAGNOSTIC LAPAROSCOPY, UNILATERAL SALPINGECTOMY performed by Priscilla Lan MD at [facility] N/A 2018    CHOLECYSTECTOMY LAPAROSCOPIC performed by Cesilia Merritt MD at [facility] History:    Social History     Tobacco Use    Smoking status: Never    Smokeless tobacco: Never   Substance Use Topics    Alcohol use: Yes     Alcohol/week: 2.0 standard drinks     Types: 1 Glasses of wine, 1 Shots of liquor per week     Comment: social drinker                                 Counseling given: Not Answered      Vital Signs (Current): There were no vitals filed for this visit.                                            BP Readings from Last 3 Encounters:   10/19/22 106/68   10/10/22 121/75   22 117/74 Evaluation  Patient summary reviewed and Nursing notes reviewed history of anesthetic complications (LAST during wisdom teeth surgery): Airway: Mallampati: I  TM distance: >3 FB   Neck ROM: full  Mouth opening: > = 3 FB   Dental: normal exam         Pulmonary:Negative Pulmonary ROS and normal exam    (+) asthma:                            Cardiovascular:Negative CV ROS                      Neuro/Psych:   Negative Neuro/Psych ROS              GI/Hepatic/Renal: Neg GI/Hepatic/Renal ROS           ROS comment: Recent sepsis 2/2 pyelonephritis. Endo/Other: Negative Endo/Other ROS                    Abdominal:             Vascular: Other Findings: /71   Pulse 71   Temp 97 °F (36.1 °C) (Temporal)   Resp 18   Ht 5' 4.5\" (1.638 m)   Wt 175 lb (79.4 kg)   LMP 2022   SpO2 98%   BMI 29.57 kg/m²          Department of Anesthesiology  Preprocedure Note       Name:  Felicia Morton   Age:  33 y.o.  :  1995                                          MRN:  [id number]         Date:  2022      Surgeon: Jason Beyer):  Lata Castro DO    Procedure: DIAGNOSTIC LAPAROSCOPY    Medications prior to admission:   Prior to Admission medications    Medication Sig Start Date End Date Taking? Authorizing Provider   nitrofurantoin, macrocrystal-monohydrate, (MACROBID) 100 MG capsule Take 1 capsule by mouth daily 10/19/22   Inge Hernandez MD   ibuprofen (ADVIL;MOTRIN) 600 MG tablet Take 1 tablet by mouth 3 times daily as needed for Pain 8/30/22 10/19/22  Jake Preston MD   montelukast (SINGULAIR) 10 MG tablet Take 1 tablet by mouth in the morning. 22   Inge Hernandez MD   acetaminophen (TYLENOL) 500 MG tablet Take 1,000 mg by mouth every 6 hours as needed for Pain    Historical Provider, MD   budesonide-formoterol (SYMBICORT) 160-4.5 MCG/ACT AERO Inhale 2 puffs into the lungs in the morning and 2 puffs before bedtime.  22   KEMAR Josue - NP   meloxicam (MOBIC) 15 MG tablet Take 1 tablet by mouth in the morning. 7/27/22 9/2/22  KEMAR Hannon - NP   albuterol sulfate HFA (VENTOLIN HFA) 108 (90 Base) MCG/ACT inhaler Inhale 2 puffs into the lungs every 4 hours as needed for Wheezing or Shortness of Breath 6/20/22 6/20/23  Inge Simon MD   loratadine (CLARITIN) 10 MG tablet Take 1 tablet by mouth daily 6/20/22   Inge Simon MD   fluticasone [facility]) 50 MCG/ACT nasal spray 1 spray by Nasal route daily 6/20/22   Inge Simon MD       Current medications:    Current Outpatient Medications   Medication Sig Dispense Refill    nitrofurantoin, macrocrystal-monohydrate, (MACROBID) 100 MG capsule Take 1 capsule by mouth daily 30 capsule 3    ibuprofen (ADVIL;MOTRIN) 600 MG tablet Take 1 tablet by mouth 3 times daily as needed for Pain 30 tablet 0    montelukast (SINGULAIR) 10 MG tablet Take 1 tablet by mouth in the morning. 90 tablet 1    acetaminophen (TYLENOL) 500 MG tablet Take 1,000 mg by mouth every 6 hours as needed for Pain      budesonide-formoterol (SYMBICORT) 160-4.5 MCG/ACT AERO Inhale 2 puffs into the lungs in the morning and 2 puffs before bedtime. 30.6 g 1    albuterol sulfate HFA (VENTOLIN HFA) 108 (90 Base) MCG/ACT inhaler Inhale 2 puffs into the lungs every 4 hours as needed for Wheezing or Shortness of Breath 18 g 3    loratadine (CLARITIN) 10 MG tablet Take 1 tablet by mouth daily 30 tablet 3    fluticasone (FLONASE) 50 MCG/ACT nasal spray 1 spray by Nasal route daily 16 g 2     No current facility-administered medications for this visit. Allergies:     Allergies   Allergen Reactions    Amoxicillin Hives    Lactose Diarrhea and Nausea And Vomiting     Can have milk in foods, but not by itself (I.e., glass of milk)    Other Hives and Rash     Surgical glue       Problem List:    Patient Active Problem List   Diagnosis Code    History of kidney stones Z87.442    Dysmenorrhea N94.6    Grief associated with loss of fetus F43.21    Class 1 obesity due to excess calories without serious comorbidity with body mass index (BMI) of 31.0 to 31.9 in adult E66.09, Z68.31    Bilateral serous otitis media H65.93    Exercise-induced bronchoconstriction J45.990    Moderate persistent asthma without complication V24.16    Pyelonephritis N12    Hypokalemia E87.6    Pelvic pain R10.2       Past Medical History:        Diagnosis Date    Abdominal pain     Abnormal uterine bleeding     Anesthesia complication     had seizure after anesthesia for wisdom teeth-hospitalized-has has no problem with anesthesia since then    Asthma     as a child    Back pain     Cholecystitis     resolved with cholecystectomy    COVID 2021    no hospitalization    COVID 2021    iv antibody infusion given 2022    COVID 10/2022    HA, fatigue, congestion-no hospitalization    E. coli UTI (urinary tract infection) 2018    Gall stones     resolved with cholecystectomy    Kidney stone     Patient denies    Pelvic pain     Pyelonephritis 02/15/2019    S/p lap right salpingectomy w/ evacuation of hemoperitoneum 12/10/21 12/10/2021    2/2 ectopic pregnancy    Seasonal allergies     Spontaneous      3-4 miscarriages    Therapy     pelvic floor therapy-jeffery Richardson-last visit sep 2022    Under care of service provider 11/10/2022    vls-mczqdvbguwy-nlesswbyp-last visit oct 2022    UTI (lower urinary tract infection)     frequent       Past Surgical History:        Procedure Laterality Date    ABDOMEN SURGERY  12/10/2021    DIAGNOSTIC LAPAROSCOPY, UNILATERAL SALPINGECTOMY RIGHT    ARM SURGERY Right     fracture repair    BREAST REDUCTION SURGERY  2018    CYSTOSCOPY N/A 2019    CYSTOSCOPY RETROGRADE PYELOGRAM WITH  CYSTOGRAM, performed by Lory Dukes MD at [address] [place] [address] N/A 2021    DILATATION AND CURETTAGE SUCTION performed by Cristina Ma MD at [address] [facility]  2020    LAPAROSCOPY Right 12/10/2021    DIAGNOSTIC LAPAROSCOPY, UNILATERAL SALPINGECTOMY performed by Shayne Apgar, MD at [address] [place] [address] N/A 2018    CHOLECYSTECTOMY LAPAROSCOPIC performed by Janel Gibson MD at [facility]         Social History:    Social History     Tobacco Use    Smoking status: Never    Smokeless tobacco: Never   Substance Use Topics    Alcohol use: Yes     Alcohol/week: 2.0 standard drinks     Types: 1 Glasses of wine, 1 Shots of liquor per week     Comment: social drinker                                 Counseling given: Not Answered      Vital Signs (Current): There were no vitals filed for this visit.                                            BP Readings from Last 3 Encounters:   10/19/22 106/68   10/10/22 121/75   22 117/74       NPO Status:                                                                                 BMI:   Wt Readings from Last 3 Encounters:   10/19/22 178 lb 9.6 oz (81 kg)   22 178 lb (80.7 kg)   22 176 lb 9.6 oz (80.1 kg)     There is no height or weight on file to calculate BMI.    CBC:   Lab Results   Component Value Date/Time    WBC 7.4 2022 06:09 AM    RBC 4.09 2022 06:09 AM    HGB 12.5 2022 06:09 AM    HCT 35.3 2022 06:09 AM    MCV 86.3 2022 06:09 AM    RDW 13.2 2022 06:09 AM     2022 06:09 AM       CMP:   Lab Results   Component Value Date/Time     2022 06:09 AM    K 3.9 2022 06:09 AM     2022 06:09 AM    CO2 22 2022 06:09 AM    BUN 2 2022 06:09 AM    CREATININE 0.65 2022 06:09 AM    GFRAA >60 2022 06:09 AM    LABGLOM >60 2022 06:09 AM    GLUCOSE 80 2022 06:09 AM    PROT 6.9 2022 03:37 AM    CALCIUM 8.2 2022 06:09 AM    BILITOT 0.95 2022 03:37 AM    ALKPHOS 60 2022 03:37 AM    AST 61 2022 03:37 AM    ALT 46 2022 03:37 AM       POC Tests: No results for input(s): POCGLU, POCNA, POCK, POCCL, POCBUN, POCHEMO, POCHCT in the last 72 hours. Coags:   Lab Results   Component Value Date/Time    PROTIME 11.2 2022 07:22 AM    INR 1.1 2022 07:22 AM       HCG (If Applicable):   Lab Results   Component Value Date    PREGTESTUR NEGATIVE 10/10/2022    HCG NEGATIVE 2020    HCGQUANT 32,482 (H) 2022        ABGs: No results found for: PHART, PO2ART, NMN8DVR, KDW2NEW, BEART, Y8WJNDQH     Type & Screen (If Applicable):  No results found for: LABABO, LABRH    Anesthesia Evaluation   no history of anesthetic complications:   Airway: Mallampati: I  TM distance: >3 FB   Neck ROM: full  Mouth opening: > = 3 FB Dental: normal exam         Pulmonary:Negative Pulmonary ROS breath sounds clear to auscultation                             Cardiovascular:  Exercise tolerance: good (>4 METS),       (-)  angina and  BARONE      Rhythm: regular  Rate: normal           Beta Blocker:  Not on Beta Blocker         Neuro/Psych:   Negative Neuro/Psych ROS              GI/Hepatic/Renal:             Endo/Other:                     Abdominal:   (+) obese,           Vascular: negative vascular ROS. Other Findings:               Anesthesia Plan      general     ASA 1     (Chart review)  Induction: intravenous. Anesthetic plan and risks discussed with patient. Plan discussed with CRNA. Hilario Cedeno MD   2022        Department of Anesthesiology  Preprocedure Note       Name:  Saul Minors   Age:  33 y.o.  :  1995                                          MRN:  [id number]         Date:  2022      Surgeon: Alex Can):  Jarek Ceballos DO    Procedure: DIAGNOSTIC LAPAROSCOPY    Medications prior to admission:   Prior to Admission medications    Medication Sig Start Date End Date Taking?  Authorizing Provider   nitrofurantoin, macrocrystal-monohydrate, (MACROBID) 100 MG capsule Take 1 capsule by mouth daily 10/19/22   Inge Angie Sotelo MD   ibuprofen (ADVIL;MOTRIN) 600 MG tablet Take 1 tablet by mouth 3 times daily as needed for Pain 8/30/22 10/19/22  Karmen Cabot, MD   montelukast (SINGULAIR) 10 MG tablet Take 1 tablet by mouth in the morning. 8/8/22   Inge Sotelo MD   acetaminophen (TYLENOL) 500 MG tablet Take 1,000 mg by mouth every 6 hours as needed for Pain    Aldo Souza MD   budesonide-formoterol (SYMBICORT) 160-4.5 MCG/ACT AERO Inhale 2 puffs into the lungs in the morning and 2 puffs before bedtime. 7/27/22   KEMAR Bustillo NP   meloxicam (MOBIC) 15 MG tablet Take 1 tablet by mouth in the morning. 7/27/22 9/2/22  KEMAR Bustillo NP   albuterol sulfate HFA (VENTOLIN HFA) 108 (90 Base) MCG/ACT inhaler Inhale 2 puffs into the lungs every 4 hours as needed for Wheezing or Shortness of Breath 6/20/22 6/20/23  Inge Sotelo MD   loratadine (CLARITIN) 10 MG tablet Take 1 tablet by mouth daily 6/20/22   Inge Sotelo MD   fluticasone [facility]) 50 MCG/ACT nasal spray 1 spray by Nasal route daily 6/20/22   Inge Sotelo MD       Current medications:    Current Outpatient Medications   Medication Sig Dispense Refill    nitrofurantoin, macrocrystal-monohydrate, (MACROBID) 100 MG capsule Take 1 capsule by mouth daily 30 capsule 3    ibuprofen (ADVIL;MOTRIN) 600 MG tablet Take 1 tablet by mouth 3 times daily as needed for Pain 30 tablet 0    montelukast (SINGULAIR) 10 MG tablet Take 1 tablet by mouth in the morning. 90 tablet 1    acetaminophen (TYLENOL) 500 MG tablet Take 1,000 mg by mouth every 6 hours as needed for Pain      budesonide-formoterol (SYMBICORT) 160-4.5 MCG/ACT AERO Inhale 2 puffs into the lungs in the morning and 2 puffs before bedtime.  30.6 g 1    albuterol sulfate HFA (VENTOLIN HFA) 108 (90 Base) MCG/ACT inhaler Inhale 2 puffs into the lungs every 4 hours as needed for Wheezing or Shortness of Breath 18 g 3    loratadine (CLARITIN) 10 MG tablet Take 1 tablet by mouth daily 30 tablet 3    fluticasone (FLONASE) 50 MCG/ACT nasal spray 1 spray by Nasal route daily 16 g 2     No current facility-administered medications for this visit. Allergies:     Allergies   Allergen Reactions    Amoxicillin Hives    Lactose Diarrhea and Nausea And Vomiting     Can have milk in foods, but not by itself (I.e., glass of milk)    Other Hives and Rash     Surgical glue       Problem List:    Patient Active Problem List   Diagnosis Code    History of kidney stones Z87.442    Dysmenorrhea N94.6    Grief associated with loss of fetus F43.21    Class 1 obesity due to excess calories without serious comorbidity with body mass index (BMI) of 31.0 to 31.9 in adult E66.09, Z68.31    Bilateral serous otitis media H65.93    Exercise-induced bronchoconstriction J45.990    Moderate persistent asthma without complication G45.11    Pyelonephritis N12    Hypokalemia E87.6    Pelvic pain R10.2       Past Medical History:        Diagnosis Date    Abdominal pain     Abnormal uterine bleeding     Anesthesia complication     had seizure after anesthesia for wisdom teeth-hospitalized-has has no problem with anesthesia since then    Asthma     as a child    Back pain     Cholecystitis     resolved with cholecystectomy    COVID 2021    no hospitalization    COVID 2021    iv antibody infusion given 2022    COVID 10/2022    HA, fatigue, congestion-no hospitalization    E. coli UTI (urinary tract infection) 2018    Gall stones 2018    resolved with cholecystectomy    Kidney stone     Patient denies    Pelvic pain     Pyelonephritis 02/15/2019    S/p lap right salpingectomy w/ evacuation of hemoperitoneum 12/10/21 12/10/2021    2/2 ectopic pregnancy    Seasonal allergies     Spontaneous      3-4 miscarriages    Therapy     pelvic floor therapy-jeffery Richardson-last visit sep 2022    Under care of service provider 11/10/2022    xko-nlgddwzflwj-uouzwenxk-last visit oct 2022  UTI (lower urinary tract infection)     frequent       Past Surgical History:        Procedure Laterality Date    ABDOMEN SURGERY  12/10/2021    DIAGNOSTIC LAPAROSCOPY, UNILATERAL SALPINGECTOMY RIGHT    ARM SURGERY Right     fracture repair    BREAST REDUCTION SURGERY  07/25/2018    CYSTOSCOPY N/A 09/04/2019    CYSTOSCOPY RETROGRADE PYELOGRAM WITH  CYSTOGRAM, performed by Bruno Santana MD at [facility] N/A 12/03/2021    DILATATION AND CURETTAGE SUCTION performed by Andrew Leonard MD at [facility]  2020    LAPAROSCOPY Right 12/10/2021    DIAGNOSTIC LAPAROSCOPY, UNILATERAL SALPINGECTOMY performed by Soraya Antunez MD at [facility] N/A 05/18/2018    CHOLECYSTECTOMY LAPAROSCOPIC performed by Joan Casiano MD at [address]         Social History:    Social History     Tobacco Use    Smoking status: Never    Smokeless tobacco: Never   Substance Use Topics    Alcohol use: Yes     Alcohol/week: 2.0 standard drinks     Types: 1 Glasses of wine, 1 Shots of liquor per week     Comment: social drinker                                 Counseling given: Not Answered      Vital Signs (Current): There were no vitals filed for this visit.                                            BP Readings from Last 3 Encounters:   10/19/22 106/68   10/10/22 121/75   09/27/22 117/74       NPO Status:                                                                                 BMI:   Wt Readings from Last 3 Encounters:   10/19/22 178 lb 9.6 oz (81 kg)   09/27/22 178 lb (80.7 kg)   09/08/22 176 lb 9.6 oz (80.1 kg)     There is no height or weight on file to calculate BMI.    CBC:   Lab Results   Component Value Date/Time    WBC 7.4 09/02/2022 06:09 AM    RBC 4.09 09/02/2022 06:09 AM    HGB 12.5 09/02/2022 06:09 AM    HCT 35.3 09/02/2022 06:09 AM    MCV 86.3 09/02/2022 06:09 AM    RDW 13.2 09/02/2022 06:09 AM     09/02/2022 06:09 AM       CMP:   Lab Results   Component Value Date/Time     09/02/2022 06:09 AM    K 3.9 09/02/2022 06:09 AM     09/02/2022 06:09 AM    CO2 22 09/02/2022 06:09 AM    BUN 2 09/02/2022 06:09 AM    CREATININE 0.65 09/02/2022 06:09 AM    GFRAA >60 09/02/2022 06:09 AM    LABGLOM >60 09/02/2022 06:09 AM    GLUCOSE 80 09/02/2022 06:09 AM    PROT 6.9 08/31/2022 03:37 AM    CALCIUM 8.2 09/02/2022 06:09 AM    BILITOT 0.95 08/31/2022 03:37 AM    ALKPHOS 60 08/31/2022 03:37 AM    AST 61 08/31/2022 03:37 AM    ALT 46 08/31/2022 03:37 AM       POC Tests: No results for input(s): POCGLU, POCNA, POCK, POCCL, POCBUN, POCHEMO, POCHCT in the last 72 hours. Coags:   Lab Results   Component Value Date/Time    PROTIME 11.2 09/01/2022 07:22 AM    INR 1.1 09/01/2022 07:22 AM       HCG (If Applicable):   Lab Results   Component Value Date    PREGTESTUR NEGATIVE 10/10/2022    HCG NEGATIVE 01/27/2020    HCGQUANT 32,482 (H) 02/27/2022        ABGs: No results found for: PHART, PO2ART, KXR3QMJ, FTS5GVM, BEART, D7KQLOVY     Type & Screen (If Applicable):  No results found for: LABABO, LABRH    Anesthesia Evaluation   no history of anesthetic complications:   Airway: Mallampati: I  TM distance: >3 FB   Neck ROM: full  Mouth opening: > = 3 FB Dental:          Pulmonary:Negative Pulmonary ROS breath sounds clear to auscultation                             Cardiovascular:  Exercise tolerance: good (>4 METS),       (-)  angina and  BARONE      Rhythm: regular  Rate: normal           Beta Blocker:  Not on Beta Blocker         Neuro/Psych:   Negative Neuro/Psych ROS              GI/Hepatic/Renal:             Endo/Other:                     Abdominal:           Vascular:                                        Anesthesia Plan      MAC     ASA 1       Induction: intravenous. Anesthetic plan and risks discussed with patient.                       Danae Quiñones MD   11/13/2022               Anesthesia Plan      general ASA 2       Induction: intravenous. MIPS: Postoperative opioids intended and Prophylactic antiemetics administered. Anesthetic plan and risks discussed with patient. Use of blood products discussed with patient whom consented to blood products. Plan discussed with CRNA.                     Alex Gentile MD   11/13/2022

## 2022-11-15 NOTE — ANESTHESIA POSTPROCEDURE EVALUATION
Department of Anesthesiology  Postprocedure Note    Patient: Elvie Riedel  MRN: [id number]  YOB: 1995  Date of evaluation: 11/14/2022      Procedure Summary     Date: 11/14/22 Room / Location: [address]    Anesthesia Start: 0730 Anesthesia Stop: 0612    Procedure: DIAGNOSTIC LAPAROSCOPY Diagnosis:       Pelvic pain in female      (PELVIC PAIN)    Surgeons: Damian Chavez DO Responsible Provider: Francois Shaikh MD    Anesthesia Type: general ASA Status: 2          Anesthesia Type: No value filed.     Linda Phase I: Linda Score: 9    Linda Phase II: Linda Score: 9      Anesthesia Post Evaluation    Patient location during evaluation: PACU  Patient participation: complete - patient participated  Level of consciousness: awake  Airway patency: patent  Nausea & Vomiting: no nausea and no vomiting  Complications: no  Cardiovascular status: blood pressure returned to baseline  Respiratory status: acceptable  Hydration status: euvolemic  Comments: BP 97/69   Pulse 63   Temp 98.1 °F (36.7 °C)   Resp 14   Ht 5' 4.5\" (1.638 m)   Wt 175 lb (79.4 kg)   LMP 11/06/2022 Comment: hcg neg  SpO2 98%   BMI 29.57 kg/m²

## 2022-11-22 PROBLEM — J45.990 EXERCISE-INDUCED BRONCHOCONSTRICTION: Status: RESOLVED | Noted: 2022-07-27 | Resolved: 2022-11-22

## 2022-11-22 PROBLEM — E66.09 CLASS 1 OBESITY DUE TO EXCESS CALORIES WITHOUT SERIOUS COMORBIDITY WITH BODY MASS INDEX (BMI) OF 31.0 TO 31.9 IN ADULT: Status: RESOLVED | Noted: 2022-03-23 | Resolved: 2022-11-22

## 2022-11-22 PROBLEM — E66.811 CLASS 1 OBESITY DUE TO EXCESS CALORIES WITHOUT SERIOUS COMORBIDITY WITH BODY MASS INDEX (BMI) OF 31.0 TO 31.9 IN ADULT: Status: RESOLVED | Noted: 2022-03-23 | Resolved: 2022-11-22

## 2022-11-22 PROBLEM — R10.2 PELVIC PAIN: Status: RESOLVED | Noted: 2022-09-27 | Resolved: 2022-11-22

## 2022-11-22 NOTE — PROGRESS NOTES
Postoperative Visit    Pam Brown is a 33 y.o. female , 1 week Post Operative s/p Diagnostic Laparoscopy on 22    The patient was seen. She has no chief complaint today. She is tolerating oral intake. She denies any dizziness or shortness of breath or chest pain. Her bowels are regular and she denies any urinary tract symptomology. Patient denies headache, nausea, vomiting, fever, chills, abdominal pain, diarrhea, change in color/amount/odor of vaginal discharge, dysuria or, hematuria. Patient Active Problem List   Diagnosis    Hx Kidney Stones    Dysmenorrhea    Asthma    Hx Pyelonephritis    Dx Laparoscopy 22       Vitals:   Blood pressure 128/86, pulse 97, weight 178 lb (80.7 kg), last menstrual period 2022, not currently breastfeeding. Physical Exam:    General:  awake, alert, cooperative, no apparent distress, and appears stated age, no apparent distress, alert, and cooperative  Lungs:  No increased work of breathing, good air exchange, clear to auscultation bilaterally, no crackles or wheezing  Heart:  regular rate and rhythm and no murmur    Abdomen: Abdomen soft, non-tender.  BS normal. No masses,  No organomegaly  Incision: clean, dry, and intact  Extremities:  no calf tenderness, non edematous    Assessment:  Pam Brown is a 33 y.o. female , 1 week Post Operative s/p Diagnostic Laparoscopy on 22   - Doing well, continue routine post operative care    Health Maintenance   - Patient has completed both COVID and flu vaccine   - Patient due for pap smear and an annual exam   - Schedule pap and annual at next available opening       Patient Active Problem List    Diagnosis Date Noted    Dx Laparoscopy 22     Priority: Medium    Hx Pyelonephritis 2022     Priority: Medium    Asthma 2022     Priority: Medium    Dysmenorrhea 2022: Initiated on Junel Fe 1-20      Hx Kidney Stones 2019     Return in about 1 year (around 11/23/2023) for Annual Exam.    Counseling Completed:  Continue with post operative restrictions until 6 weeks post operative  No heavy lifting or [place] until 6 weeks post operative  Strict return precautions with fever, chills, nausea, vomiting, diarrhea, worsening abdominal pain, worsening vaginal bleeding, foul smelling vaginal discharge    Radha Harmon, DO  Ob/Gyn Resident  [facility], [address]  11/23/2022, 2:15 PM

## 2022-11-23 ENCOUNTER — TELEPHONE (OUTPATIENT)
Dept: FAMILY MEDICINE CLINIC | Age: 27
End: 2022-11-23

## 2022-11-23 ENCOUNTER — OFFICE VISIT (OUTPATIENT)
Dept: OBGYN | Age: 27
End: 2022-11-23

## 2022-11-23 VITALS
HEART RATE: 97 BPM | DIASTOLIC BLOOD PRESSURE: 86 MMHG | BODY MASS INDEX: 30.08 KG/M2 | SYSTOLIC BLOOD PRESSURE: 128 MMHG | WEIGHT: 178 LBS

## 2022-11-23 DIAGNOSIS — Z98.890 H/O LAPAROSCOPY: Primary | ICD-10-CM

## 2022-11-23 PROBLEM — H65.93 BILATERAL SEROUS OTITIS MEDIA: Status: RESOLVED | Noted: 2022-03-23 | Resolved: 2022-11-23

## 2022-11-23 PROBLEM — F43.21 GRIEF ASSOCIATED WITH LOSS OF FETUS: Status: RESOLVED | Noted: 2022-03-23 | Resolved: 2022-11-23

## 2022-11-23 PROBLEM — E87.6 HYPOKALEMIA: Status: RESOLVED | Noted: 2022-08-31 | Resolved: 2022-11-23

## 2022-11-23 PROCEDURE — 99024 POSTOP FOLLOW-UP VISIT: CPT | Performed by: STUDENT IN AN ORGANIZED HEALTH CARE EDUCATION/TRAINING PROGRAM

## 2022-11-23 RX ORDER — CYCLOBENZAPRINE HCL 5 MG
5 TABLET ORAL 2 TIMES DAILY PRN
Qty: 20 TABLET | Refills: 0 | Status: SHIPPED | OUTPATIENT
Start: 2022-11-23 | End: 2022-12-03

## 2022-11-23 RX ORDER — OXYCODONE HYDROCHLORIDE 5 MG/1
5 TABLET ORAL EVERY 6 HOURS PRN
Qty: 8 TABLET | Refills: 0 | Status: SHIPPED | OUTPATIENT
Start: 2022-11-23 | End: 2022-12-01

## 2022-11-23 NOTE — TELEPHONE ENCOUNTER
Ok to give letter. She had surgery with gyn on 11/14  - records in chart. Forms should be filled by gyn, but she can get letter to extend time off.

## 2022-11-23 NOTE — TELEPHONE ENCOUNTER
Received forms from [employer] to be filled out in regards to a surgery from 11/14/22. Dr Mi Payne states that surgeon should be the one to fill out the forms. I left a message for Isabell to call the office so she can be informed of this. The blank forms were placed in folder up front for P/U or if fax number given can be faxed to surgeons office.

## 2022-11-23 NOTE — PROGRESS NOTES
Attending Physician Statement  I have discussed the care of [facility], including pertinent history and exam findings,  with the resident. I have reviewed the key elements of all parts of the encounter with the resident. I agree with the assessment, plan and orders as documented by the resident.   (GE Modifier)    Perez Stokes, DO

## 2022-11-23 NOTE — TELEPHONE ENCOUNTER
Cara Jamieyobani is requesting a letter for Loli due to not returning to work on 11/01/2022. Her form listed she is to be off work 10/05/22 until 10/31/22. She states received a letter (11/03/22) about being terminated due to not returning to work on 11/01/22.   She did receive a letter 11/03/2022 stating she is terminated due to being [address] were faxed on 10/25/22, 11/02/22 and 11/04/22

## 2023-01-18 ENCOUNTER — OFFICE VISIT (OUTPATIENT)
Dept: FAMILY MEDICINE CLINIC | Age: 28
End: 2023-01-18
Payer: MEDICARE

## 2023-01-18 ENCOUNTER — HOSPITAL ENCOUNTER (OUTPATIENT)
Age: 28
Setting detail: SPECIMEN
Discharge: HOME | End: 2023-01-18

## 2023-01-18 VITALS
SYSTOLIC BLOOD PRESSURE: 108 MMHG | BODY MASS INDEX: 30.08 KG/M2 | WEIGHT: 178 LBS | DIASTOLIC BLOOD PRESSURE: 74 MMHG | TEMPERATURE: 97.9 F | OXYGEN SATURATION: 98 % | HEART RATE: 77 BPM

## 2023-01-18 DIAGNOSIS — R30.0 DYSURIA: ICD-10-CM

## 2023-01-18 DIAGNOSIS — R10.9 ACUTE RIGHT FLANK PAIN: ICD-10-CM

## 2023-01-18 DIAGNOSIS — M23.92 INTERNAL DERANGEMENT OF LEFT KNEE: Primary | ICD-10-CM

## 2023-01-18 LAB
BILIRUBIN, POC: NORMAL
BLOOD URINE, POC: NORMAL
CLARITY, POC: CLEAR
COLOR, POC: YELLOW
CONTROL: POSITIVE
GLUCOSE URINE, POC: NORMAL
KETONES, POC: NORMAL
LEUKOCYTE EST, POC: NORMAL
NITRITE, POC: NORMAL
PH, POC: 7
PREGNANCY TEST URINE, POC: NEGATIVE
PROTEIN, POC: NORMAL
SPECIFIC GRAVITY, POC: 1.02
UROBILINOGEN, POC: >=8

## 2023-01-18 PROCEDURE — G8417 CALC BMI ABV UP PARAM F/U: HCPCS | Performed by: NURSE PRACTITIONER

## 2023-01-18 PROCEDURE — 81025 URINE PREGNANCY TEST: CPT | Performed by: NURSE PRACTITIONER

## 2023-01-18 PROCEDURE — 81003 URINALYSIS AUTO W/O SCOPE: CPT | Performed by: NURSE PRACTITIONER

## 2023-01-18 PROCEDURE — 99213 OFFICE O/P EST LOW 20 MIN: CPT | Performed by: NURSE PRACTITIONER

## 2023-01-18 PROCEDURE — G8482 FLU IMMUNIZE ORDER/ADMIN: HCPCS | Performed by: NURSE PRACTITIONER

## 2023-01-18 PROCEDURE — G8427 DOCREV CUR MEDS BY ELIG CLIN: HCPCS | Performed by: NURSE PRACTITIONER

## 2023-01-18 PROCEDURE — 1036F TOBACCO NON-USER: CPT | Performed by: NURSE PRACTITIONER

## 2023-01-18 ASSESSMENT — ENCOUNTER SYMPTOMS
VOMITING: 0
NAUSEA: 0

## 2023-01-18 NOTE — PROGRESS NOTES
[address] [address]  Dept: [phone]  Dept Fax: [phone]    Derick Daniel is a 33 y.o. female who presents to the urgent care today for her medical conditions/complaints as notedbelow. Derick Daniel is c/o of Flank Pain (Started last night. Right side. ) and Knee Pain (Left knee pain. Started a week ago, she was playing soccer and she was sitting on the ground and when she went to stand up she heard it pop a couple times. She has pain when walking, or moving her leg at all. It was swollen, but now the swelling has gone down, just has pain behind her knee. )      HPI:     33 yr old female presents for left knee pain and possible uti. Hx kidney infections, sx started last night. No hx kidney stones. No change in vaginal discharge or concern for std  Trying to catch it early, has had to be hospitalized for pyelonephritis in past. Has cephalexin at pharmacy to  - doctor has refills for her. Just wanted urine dipped    She also was playing soccer cpl weeks ago. She is a  and was practicing when she fell in grass, landing on left side. Did not hit knee. . Seemed ok, but when put legs in [place] [facility] style position and went to stand, twisted left knee and felt 3 popping sensations  - both sides and across front. Had swelling, pain, bruising lateral aspect. Iced it and took otc's swelling decreased but still popping when walks. Feels like catches and grabs. Pain not getting better. No redness or increased heat to area. No previous issues with knee. Dysuria   This is a new problem. The current episode started yesterday. The problem occurs every urination. The problem has been gradually worsening. The quality of the pain is described as burning. The pain is mild. There has been no fever. She is Sexually active. There is A history of pyelonephritis.  Associated symptoms include flank pain (rt sided) and hesitancy. Pertinent negatives include no chills, discharge, frequency, hematuria, nausea, possible pregnancy, sweats, urgency or vomiting. She has tried NSAIDs and acetaminophen for the symptoms. Her past medical history is significant for recurrent UTIs. There is no history of kidney stones. Knee Pain   The incident occurred more than 1 week ago (approx 10 days ago). Incident location: at soccer practice. The injury mechanism was a twisting injury. The pain is present in the left knee. The quality of the pain is described as shooting (proximally to mid calf). The pain is moderate. The pain has been Constant since onset. Associated symptoms include an inability to bear weight and a loss of motion. Pertinent negatives include no loss of sensation, muscle weakness, numbness or tingling. She reports no foreign bodies present. The symptoms are aggravated by movement, palpation and weight bearing. She has tried ice, elevation, NSAIDs, acetaminophen and rest for the symptoms. The treatment provided mild relief.      Past Medical History:   Diagnosis Date    Abdominal pain     Abnormal uterine bleeding     Anesthesia complication     had seizure after anesthesia for wisdom teeth-hospitalized-has has no problem with anesthesia since then    Asthma     as a child    Back pain     Cholecystitis     resolved with cholecystectomy    COVID 2021    no hospitalization    COVID 2021    iv antibody infusion given 2022    COVID 10/2022    HA, fatigue, congestion-no hospitalization    E. 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    Therapy     pelvic floor therapy-jeffery Richardson-last visit sep 2022    Under care of service provider 11/10/2022    [id number]-last visit oct 2022    UTI (lower urinary tract infection)     frequent        Current Outpatient Medications   Medication Sig Dispense Refill    simethicone (MYLICON) 80 MG chewable tablet Take 1 tablet by mouth 4 times daily as needed for Flatulence 180 tablet 3    ondansetron (ZOFRAN) 4 MG tablet Take 1 tablet by mouth daily as needed for Nausea or Vomiting 30 tablet 0    ibuprofen (ADVIL;MOTRIN) 800 MG tablet Take 1 tablet by mouth every 8 hours as needed for Pain or Fever 60 tablet 0    montelukast (SINGULAIR) 10 MG tablet Take 1 tablet by mouth in the morning. 90 tablet 1    acetaminophen (TYLENOL) 500 MG tablet Take 1,000 mg by mouth every 6 hours as needed for Pain      budesonide-formoterol (SYMBICORT) 160-4.5 MCG/ACT AERO Inhale 2 puffs into the lungs in the morning and 2 puffs before bedtime. 30.6 g 1    albuterol sulfate HFA (VENTOLIN HFA) 108 (90 Base) MCG/ACT inhaler Inhale 2 puffs into the lungs every 4 hours as needed for Wheezing or Shortness of Breath 18 g 3    loratadine (CLARITIN) 10 MG tablet Take 1 tablet by mouth daily 30 tablet 3    fluticasone (FLONASE) 50 MCG/ACT nasal spray 1 spray by Nasal route daily 16 g 2    nitrofurantoin, macrocrystal-monohydrate, (MACROBID) 100 MG capsule Take 1 capsule by mouth daily (Patient not taking: Reported on 1/18/2023) 30 capsule 3    ibuprofen (ADVIL;MOTRIN) 600 MG tablet Take 1 tablet by mouth 3 times daily as needed for Pain 30 tablet 0     No current facility-administered medications for this visit. Allergies   Allergen Reactions    Amoxicillin Hives    Lactose Diarrhea and Nausea And Vomiting     Can have milk in foods, but not by itself (I.e., glass of milk)    Other Hives and Rash     Surgical glue       Subjective:      Review of Systems   Constitutional:  Negative for chills. Gastrointestinal:  Negative for nausea and vomiting.    Genitourinary:  Positive for dysuria, flank pain (rt sided) and hesitancy. Negative for frequency, hematuria and urgency. Neurological:  Negative for tingling and numbness. All other systems reviewed and are negative. 14 systems reviewed and negative except as listed in HPI. Objective:     Physical Exam  Vitals and nursing note reviewed. Constitutional:       General: She is not in acute distress. Appearance: Normal appearance. She is well-developed. She is not ill-appearing, toxic-appearing or diaphoretic. Comments: Well appearing, nontoxic   HENT:      Head: Normocephalic and atraumatic. Right Ear: External ear normal.      Left Ear: External ear normal.   Eyes:      General: No scleral icterus. Right eye: No discharge. Left eye: No discharge. Conjunctiva/sclera: Conjunctivae normal.      Pupils: Pupils are equal, round, and reactive to light. Cardiovascular:      Rate and Rhythm: Normal rate and regular rhythm. Pulses: Normal pulses. Heart sounds: Normal heart sounds. Pulmonary:      Effort: Pulmonary effort is normal.      Breath sounds: Normal breath sounds. Abdominal:      General: Bowel sounds are normal. There is no distension. Palpations: Abdomen is soft. There is no mass. Tenderness: There is no abdominal tenderness. There is no right CVA tenderness, left CVA tenderness, guarding or rebound. Hernia: No hernia is present. Musculoskeletal:         General: Tenderness present. No swelling, deformity or signs of injury. Normal range of motion. Cervical back: Neck supple. Right lower leg: No edema. Left lower leg: No edema. Comments: + Yong and varus maneuvers  + tenderness lateral aspect  No swelling or discoloration, no redness or increased warmth to area. Increased tenderness with flexion and extension  Joint is stable  No ankle or hip tendernes     Skin:     General: Skin is warm and dry.       Capillary Refill: Capillary refill takes less than 2 seconds. Findings: No erythema or rash ( no rash to visible skin). Neurological:      General: No focal deficit present. Mental Status: She is alert and oriented to person, place, and time. Motor: No abnormal muscle tone. Coordination: Coordination normal.   Psychiatric:         Mood and Affect: Mood normal.         Behavior: Behavior normal.     /74 (Site: Left Upper Arm, Position: Sitting, Cuff Size: Medium Adult)   Pulse 77   Temp 97.9 °F (36.6 °C)   Wt 178 lb (80.7 kg)   SpO2 98%   BMI 30.08 kg/m²     Assessment:       Diagnosis Orders   1. Internal derangement of left knee  [facility] [place], [facility], [employer]      2. Dysuria  POCT Urinalysis No Micro (Auto)    POCT urine pregnancy    Culture, Urine    [facility] [place], [employer]      3. Acute right flank pain  POCT Urinalysis No Micro (Auto)    POCT urine pregnancy    Culture, Urine          Plan:      Results for POC orders placed in visit on 01/18/23   POCT Urinalysis No Micro (Auto)   Result Value Ref Range    Color, UA yellow     Clarity, UA clear     Glucose, UA POC neg     Bilirubin, UA neg     Ketones, UA neg     Spec Grav, UA 1.020     Blood, UA POC neg     pH, UA 7.0     Protein, UA POC neg     Urobilinogen, UA >=8.0     Leukocytes, UA neg     Nitrite, UA neg    POCT urine pregnancy   Result Value Ref Range    Preg Test, Ur negative     Control positive        POCT urine neg  POCT preg neg  Hx pyelonephritis several times with hospitalization, sx started last night  Urine culture pending  Has standing order keflex and pyridium at pharmacy for uti's, will  and start tonight  Here for urine culture     Left knee + saad, sx concerning for meniscus tear or internal derangement  Ortho referral  RICE reviewed  Motrin otc  Declines crutches  Reviewed over-the-counter treatments for symptom management.     Return for make appt with Specialist - referral given.    No orders of the defined types were placed in this encounter. Patient given educational materials - see patient instructions. Discussed use, benefit, and side effects of prescribed medications. All patient questions answered. Pt voicedunderstanding.     Electronically signed by Hazel Dance, APRN - CNP on 1/18/2023 at 7:18 PM

## 2023-01-20 LAB
CULTURE: NORMAL
SPECIMEN DESCRIPTION: NORMAL

## 2023-01-23 ENCOUNTER — OFFICE VISIT (OUTPATIENT)
Dept: ORTHOPEDIC SURGERY | Age: 28
End: 2023-01-23
Payer: MEDICARE

## 2023-01-23 VITALS — WEIGHT: 178 LBS | BODY MASS INDEX: 29.66 KG/M2 | HEIGHT: 65 IN

## 2023-01-23 DIAGNOSIS — M23.92 INTERNAL DERANGEMENT OF LEFT KNEE: Primary | ICD-10-CM

## 2023-01-23 DIAGNOSIS — M25.562 LEFT KNEE PAIN, UNSPECIFIED CHRONICITY: ICD-10-CM

## 2023-01-23 PROCEDURE — G8482 FLU IMMUNIZE ORDER/ADMIN: HCPCS | Performed by: PHYSICIAN ASSISTANT

## 2023-01-23 PROCEDURE — 99203 OFFICE O/P NEW LOW 30 MIN: CPT | Performed by: PHYSICIAN ASSISTANT

## 2023-01-23 PROCEDURE — G8427 DOCREV CUR MEDS BY ELIG CLIN: HCPCS | Performed by: PHYSICIAN ASSISTANT

## 2023-01-23 PROCEDURE — G8417 CALC BMI ABV UP PARAM F/U: HCPCS | Performed by: PHYSICIAN ASSISTANT

## 2023-01-23 RX ORDER — METHYLPREDNISOLONE 4 MG/1
TABLET ORAL
Qty: 1 KIT | Refills: 0 | Status: SHIPPED | OUTPATIENT
Start: 2023-01-23 | End: 2023-01-29

## 2023-01-23 SDOH — HEALTH STABILITY: PHYSICAL HEALTH: ON AVERAGE, HOW MANY DAYS PER WEEK DO YOU ENGAGE IN MODERATE TO STRENUOUS EXERCISE (LIKE A BRISK WALK)?: 0 DAYS

## 2023-01-23 ASSESSMENT — SOCIAL DETERMINANTS OF HEALTH (SDOH)
WITHIN THE LAST YEAR, HAVE YOU BEEN KICKED, HIT, SLAPPED, OR OTHERWISE PHYSICALLY HURT BY YOUR PARTNER OR EX-PARTNER?: NO
WITHIN THE LAST YEAR, HAVE YOU BEEN AFRAID OF YOUR PARTNER OR EX-PARTNER?: NO
WITHIN THE LAST YEAR, HAVE YOU BEEN HUMILIATED OR EMOTIONALLY ABUSED IN OTHER WAYS BY YOUR PARTNER OR EX-PARTNER?: NO
WITHIN THE LAST YEAR, HAVE TO BEEN RAPED OR FORCED TO HAVE ANY KIND OF SEXUAL ACTIVITY BY YOUR PARTNER OR EX-PARTNER?: NO

## 2023-01-23 NOTE — PROGRESS NOTES
[address] [facility]  [facility] [address]  Dept: [phone]    Ambulatory Orthopedic Consult      CHIEF COMPLAINT:    Chief Complaint   Patient presents with    Knee Pain     NP L KNEE PAIN       HISTORY OF PRESENT ILLNESS:      Date of Injury: ~1/9/2023    The patient is a 33 y.o. female who is being seen at the request of  Lennox Hook, MD for consultation and evaluation of  left knee pain. Julia Lind  presents for left knee pain that has been present for  2 weeks. The patient does recall a specific injury. She notes that she was playing soccer with her daughter and fell with a flexed left knee and she felt like her knee popped in 3 places. She notes that her left knee locked and caused significant pain. The pain improves with  ibuprofen and rest.  The pain worsens with  prolonged walking, prolonged standing, stair climbing and arising from a seated position. Instability is not noted. The patient has not had a previous corticosteroid injection. The patient has not had previous physical therapy for this problem. The patient has tried oral NSAIDs for this problem previously. She notes continued posterior/lateral knee pain and hamstring tightness.       Past Medical History:    Past Medical History:   Diagnosis Date    Abdominal pain     Abnormal uterine bleeding     Anesthesia complication     had seizure after anesthesia for wisdom teeth-hospitalized-has has no problem with anesthesia since then    Asthma     as a child    Back pain     Cholecystitis 2018    resolved with cholecystectomy    COVID 02/2021    no hospitalization    COVID 12/2021    iv antibody infusion given 12/23/2022    COVID 10/2022    HA, fatigue, congestion-no hospitalization    E. coli UTI (urinary tract infection) 05/14/2018    Gall stones 2018    resolved with cholecystectomy    Kidney stone     Patient denies    Pelvic pain Pyelonephritis 02/15/2019    S/p lap right salpingectomy w/ evacuation of hemoperitoneum 12/10/21 12/10/2021    2/2 ectopic pregnancy    Seasonal allergies     Spontaneous      3-4 miscarriages    Therapy     pelvic floor therapy-jeffery Debra-last visit sep 2022    Under care of service provider 11/10/2022    fgk-ducvxhglech-yxlnmszuc-last visit oct 2022    UTI (lower urinary tract infection)     frequent       Past Surgical History:    Past Surgical History:   Procedure Laterality Date    ABDOMEN SURGERY  12/10/2021    DIAGNOSTIC LAPAROSCOPY, UNILATERAL SALPINGECTOMY RIGHT    ARM SURGERY Right     fracture repair    BREAST REDUCTION SURGERY  2018    CYSTOSCOPY N/A 2019    CYSTOSCOPY RETROGRADE PYELOGRAM WITH  CYSTOGRAM, performed by Tamera Roberts MD at [address] [place] [address] N/A 2021    DILATATION AND CURETTAGE SUCTION performed by Silvia Castaneda MD at [address] [place] [address]  2020    LAPAROSCOPY Right 12/10/2021    DIAGNOSTIC LAPAROSCOPY, UNILATERAL SALPINGECTOMY performed by Meir Lockwood MD at [address] [facility]  2022    diagnostic    LAPAROSCOPY N/A 2022    DIAGNOSTIC LAPAROSCOPY performed by Medina Light DO at [address] [facility] N/A 2018    CHOLECYSTECTOMY LAPAROSCOPIC performed by Mita Bruce MD at [address] [facility]         Current Medications:   Current Outpatient Medications   Medication Sig Dispense Refill    methylPREDNISolone (MEDROL DOSEPACK) 4 MG tablet Take by mouth. 1 kit 0    ibuprofen (ADVIL;MOTRIN) 800 MG tablet Take 1 tablet by mouth every 8 hours as needed for Pain or Fever 60 tablet 0    budesonide-formoterol (SYMBICORT) 160-4.5 MCG/ACT AERO Inhale 2 puffs into the lungs in the morning and 2 puffs before bedtime.  30.6 g 1    albuterol sulfate HFA (VENTOLIN HFA) 108 (90 Base) MCG/ACT inhaler Inhale 2 puffs into the lungs every 4 hours as needed for Wheezing or Shortness of Breath 18 g 3    loratadine (CLARITIN) 10 MG tablet Take 1 tablet by mouth daily 30 tablet 3    fluticasone (FLONASE) 50 MCG/ACT nasal spray 1 spray by Nasal route daily 16 g 2    montelukast (SINGULAIR) 10 MG tablet Take 1 tablet by mouth in the morning. 90 tablet 1     No current facility-administered medications for this visit. Allergies:    Amoxicillin, Lactose, and Other    Social History:   Social History     Socioeconomic History    Marital status: Single     Spouse name: Not on file    Number of children: Not on file    Years of education: Not on file    Highest education level: Not on file   Occupational History    Not on file   Tobacco Use    Smoking status: Never    Smokeless tobacco: Never   Vaping Use    Vaping Use: Never used   Substance and Sexual Activity    Alcohol use:  Yes     Alcohol/week: 2.0 standard drinks     Types: 1 Glasses of wine, 1 Shots of liquor per week     Comment: social drinker     Drug use: No    Sexual activity: Yes     Partners: Male   Other Topics Concern    Not on file   Social History Narrative    Not on file     Social Determinants of Health     Financial Resource Strain: Low Risk     Difficulty of Paying Living Expenses: Not very hard   Food Insecurity: No Food Insecurity    Worried About Running Out of Food in the Last Year: Never true    Ran Out of Food in the Last Year: Never true   Transportation Needs: Not on file   Physical Activity: Unknown    Days of Exercise per Week: 0 days    Minutes of Exercise per Session: Not on file   Stress: Not on file   Social Connections: Not on file   Intimate Partner Violence: Not At Risk    Fear of Current or Ex-Partner: No    Emotionally Abused: No    Physically Abused: No    Sexually Abused: No   Housing Stability: Not on file       Family History:  Family History   Problem Relation Age of Onset    No Known Problems Mother     No Known Problems Father     Cancer Maternal Grandmother     Ovarian Cancer Maternal Grandmother     Prostate Cancer Maternal Grandfather          REVIEW OF SYSTEMS:  Review of Systems   Constitutional:  Negative for activity change and fever. HENT:  Negative for sneezing. Respiratory:  Negative for cough and shortness of breath. Cardiovascular:  Negative for chest pain. Gastrointestinal:  Negative for vomiting. Musculoskeletal:  Positive for arthralgias (left knee). Negative for joint swelling and myalgias. Skin:  Negative for color change. Neurological:  Negative for weakness and numbness. Psychiatric/Behavioral:  Negative for sleep disturbance. PHYSICAL EXAM:  Ht 5' 4.5\" (1.638 m)   Wt 178 lb (80.7 kg)   BMI 30.08 kg/m²  Body mass index is 30.08 kg/m². Physical Exam  Gen: alert and oriented to person and place. Psych:  Appropriate affect; Appropriate knowledge base; Appropriate mood; No hallucinations; Head: normocephalic, atraumatic   Chest: symmetric chest excursion; nonlabored respiratory effort. Pelvis: stable; no obvious pelvis deformity  Ortho Exam  Extremity: Evaluation of the Left knee reveals no significant outward deformity. There is no erythema, skin warmth, skin lesions, or signs of infection appreciated. There is tenderness over the lateral joint line with palpation. There is a mild knee effusion. Range of motion of the Left knee is  5-95. No instability of the knee is appreciated at 0 and 30° of flexion. There is a negative anterior drawer and Lachman's test.  There is increased pain with varus Yong's testing. No calf tenderness is noted. There is a negative hip log roll and Stinchfield test on the Left. Motor, sensory, vascular examination to the Left lower extremity is intact. Patient has full range of motion of the Left ankle. Radiology:   XR KNEE LEFT (MIN 4 VIEWS)    Result Date: 1/23/2023  History:   Left knee pain.  Findings:   Standing AP/Lateral/Tunnel/Merchant view xrays of the Left knee done in the office today shows minimal  medial joint space narrowing. Mild knee effusion appreciated. No evidence of fracture, subluxation, dislocation, radioopaque foreign body/tumor is noted. Impression:   Left knee minimal degenerative changes as described above. ASSESSMENT:     1. Internal derangement of left knee    2. Left knee pain, unspecified chronicity           PLAN:     Julia Lind is a 33 y.o. female here today for left knee pain after an injury sustained 2 weeks ago while playing soccer. I personally interpreted and reviewed the patient's recent x-rays revealing no acute osseous abnormality and/or fracture within the left knee. The treatment options may include activity modification, oral anti-inflammatories, bracing, injections, advanced imaging, physical therapy and/or surgical intervention. The patient would like to proceed with:  1. MRI of the left knee to further evaluate for suspected meniscal pathology. 2. Prescription for a medrol dosepack for her continued pain and inflammation. 3. The patient was given a economy hinged  knee brace for the left knee. The patient is ambulatory but has weakness and instability of their condition specified in my notes which requires stabilization from the brace to improve their function. The patient will follow up after the left knee MRI for results review, or sooner if needed. We discussed that the patient should call us with any questions or concerns. The patient voiced her understanding. Return for MRI results. Total Time: 40 min      Orders Placed This Encounter   Medications    methylPREDNISolone (MEDROL DOSEPACK) 4 MG tablet     Sig: Take by mouth.      Dispense:  1 kit     Refill:  0       Orders Placed This Encounter   Procedures    XR KNEE LEFT (MIN 4 VIEWS)     Standing Status:   Future     Number of Occurrences:   1     Standing Expiration Date:   1/19/2024    MRI KNEE LEFT WO CONTRAST     Standing Status:   Future     Standing Expiration Date: 1/23/2024     Order Specific Question:   Reason for exam:     Answer:   suspicious for lateral meniscus tear     Order Specific Question:   What is the sedation requirement? Answer:   None       This note is created with the assistance of a speech recognition program.  While intending to generate a document that actually reflects the content of the visit, the document can still have some errors including those of syntax and sound a like substitutions which may escape proof reading. In such instances, actual meaning can be extrapolated by contextual diversion.      Electronically signed by Margo Zhao PA-C on 1/25/2023 at 7:26 PM

## 2023-01-25 ASSESSMENT — ENCOUNTER SYMPTOMS
SHORTNESS OF BREATH: 0
VOMITING: 0
COUGH: 0
COLOR CHANGE: 0

## 2023-01-28 ENCOUNTER — HOSPITAL ENCOUNTER (OUTPATIENT)
Dept: MRI IMAGING | Age: 28
End: 2023-01-28
Payer: MEDICARE

## 2023-01-28 DIAGNOSIS — M25.562 LEFT KNEE PAIN, UNSPECIFIED CHRONICITY: ICD-10-CM

## 2023-01-28 DIAGNOSIS — M23.92 INTERNAL DERANGEMENT OF LEFT KNEE: ICD-10-CM

## 2023-01-28 PROCEDURE — 73721 MRI JNT OF LWR EXTRE W/O DYE: CPT

## 2023-02-02 ENCOUNTER — OFFICE VISIT (OUTPATIENT)
Dept: ORTHOPEDIC SURGERY | Age: 28
End: 2023-02-02
Payer: MEDICAID

## 2023-02-02 VITALS — HEIGHT: 65 IN | WEIGHT: 173 LBS | BODY MASS INDEX: 28.82 KG/M2

## 2023-02-02 DIAGNOSIS — S83.92XD SPRAIN OF LEFT KNEE, UNSPECIFIED LIGAMENT, SUBSEQUENT ENCOUNTER: Primary | ICD-10-CM

## 2023-02-02 PROCEDURE — 99214 OFFICE O/P EST MOD 30 MIN: CPT | Performed by: PHYSICIAN ASSISTANT

## 2023-02-02 PROCEDURE — 20610 DRAIN/INJ JOINT/BURSA W/O US: CPT | Performed by: PHYSICIAN ASSISTANT

## 2023-02-02 RX ORDER — BUPIVACAINE HYDROCHLORIDE 2.5 MG/ML
2 INJECTION, SOLUTION INFILTRATION; PERINEURAL ONCE
Status: COMPLETED | OUTPATIENT
Start: 2023-02-02 | End: 2023-02-02

## 2023-02-02 RX ORDER — METHYLPREDNISOLONE ACETATE 80 MG/ML
80 INJECTION, SUSPENSION INTRA-ARTICULAR; INTRALESIONAL; INTRAMUSCULAR; SOFT TISSUE ONCE
Status: COMPLETED | OUTPATIENT
Start: 2023-02-02 | End: 2023-02-02

## 2023-02-02 RX ADMIN — METHYLPREDNISOLONE ACETATE 80 MG: 80 INJECTION, SUSPENSION INTRA-ARTICULAR; INTRALESIONAL; INTRAMUSCULAR; SOFT TISSUE at 09:12

## 2023-02-02 RX ADMIN — BUPIVACAINE HYDROCHLORIDE 5 MG: 2.5 INJECTION, SOLUTION INFILTRATION; PERINEURAL at 09:11

## 2023-02-02 ASSESSMENT — ENCOUNTER SYMPTOMS
VOMITING: 0
COLOR CHANGE: 0
COUGH: 0
SHORTNESS OF BREATH: 0

## 2023-02-02 NOTE — PROGRESS NOTES
[address]  [address] [facility] [address] [address]  Dept: [phone]  Dept Fax: [phone]        Ambulatory Follow Up      Subjective:   Inés Lema is a 33y.o. year old female who presents to our office today for routine followup regarding her   1. Sprain of left knee, unspecified ligament, subsequent encounter        Chief Complaint   Patient presents with    Results     MRI results left knee       Date of Injury: ~1/9/2023    HPI Inés Lmea  is a 33 y.o.  female who presents today in follow for knee injury sustained on 1/9/2023 while playing soccer with her daughter. The patient was last seen on 1/23/2023 and underwent treatment in the form of arrive the left knee to further evaluate for possible meniscal pathology. The patient is here today for her left knee MRI results. She notes that she is continue to wear the brace that she was given at her last appointment. She notes that she is taking ibuprofen intermittently. She continues to have medial and lateral joint line pain. She denies recent trauma or injury. She notes that she has not had to use crutches since her last appointment. Review of Systems   Constitutional:  Negative for activity change and fever. HENT:  Negative for sneezing. Respiratory:  Negative for cough and shortness of breath. Cardiovascular:  Negative for chest pain. Gastrointestinal:  Negative for vomiting. Musculoskeletal:  Positive for arthralgias (left knee). Negative for joint swelling and myalgias. Skin:  Negative for color change. Neurological:  Negative for weakness and numbness. Psychiatric/Behavioral:  Negative for sleep disturbance. Objective :   Ht 5' 5\" (1.651 m)   Wt 173 lb (78.5 kg)   BMI 28.79 kg/m²  Body mass index is 28.79 kg/m². General: Inés Lema is a 33 y.o. female who is alert and oriented and sitting comfortably in our office.   Ortho Exam  MS: Patient ambulates independently with a knee brace noted on the left knee. After removal of the brace, evaluation of the Left knee reveals no significant outward deformity. There is no erythema, skin warmth, skin lesions, or signs of infection appreciated. There is tenderness over the and lateral joint line with palpation. There is a trace knee effusion. Range of motion of the Left knee is  0-110. No instability of the knee is appreciated at 0 and 30° of flexion. There is a negative anterior drawer and Lachman's test.  There is increased pain with varus Yong's testing. No calf tenderness is noted. There is a negative hip log roll and Stinchfield test on the Left. Motor, sensory, vascular examination to the Left lower extremity is intact. Patient has full range of motion of the Left ankle. Neuro: alert and oriented to person and place. Eyes: Extra-ocular muscles intact  Mouth: Oral mucosa moist. No perioral lesions  Pulm: Respirations unlabored and regular. Symmetric chest excursion without outward deformity is noted. Skin: warm, well perfused  Psych:   Patient has good fund of knowledge and displays understanging of exam, diagnosis, and plan. Radiology:      XR KNEE LEFT (MIN 4 VIEWS)    Result Date: 1/30/2023  History:   Left knee pain. Findings:   Standing AP/Lateral/Tunnel/Merchant view xrays of the Left knee done in the office today shows minimal  medial joint space narrowing. Mild knee effusion appreciated. No evidence of fracture, subluxation, dislocation, radioopaque foreign body/tumor is noted. Impression:   Left knee minimal degenerative changes as described above. MRI KNEE LEFT WO CONTRAST    Result Date: 2/1/2023  EXAMINATION: MRI OF THE LEFT KNEE WITHOUT CONTRAST, 1/28/2023 1:21 pm TECHNIQUE: Multiplanar multisequence MRI of the left knee was performed without the administration of intravenous contrast. COMPARISON: None.  HISTORY: ORDERING SYSTEM PROVIDED HISTORY: Left knee pain, unspecified chronicity TECHNOLOGIST PROVIDED HISTORY: suspicious for lateral meniscus tear What is the sedation requirement?->None Reason for Exam: suspicious for lateral meniscus tear, Left knee pain, unspecified chronicity, Internal derangement of left knee FINDINGS: MENISCI: Intact medial and lateral menisci. CRUCIATE LIGAMENTS: Intact anterior cruciate and posterior cruciate ligaments. EXTENSOR MECHANISM: Intact quadriceps and patellar tendons. Intact patellar retinacula. LATERAL COLLATERAL LIGAMENT COMPLEX: Intact IT band, lateral collateral ligament proper, biceps femoris tendon and popliteus tendon. MEDIAL COLLATERAL LIGAMENT COMPLEX: The superficial and deep components of the medial collateral ligament are intact. KNEE JOINT: No evidence of significant chondromalacia. Physiologic amount of joint fluid. BONE MARROW: No evidence of fracture. Normal marrow signal.     No evidence of acute internal derangement of the left knee. No significant degenerative changes. Unremarkable exam.         Assessment:      1. Sprain of left knee, unspecified ligament, subsequent encounter         Plan:        Silke Butt is a 33 y.o. female here in follow for sprain sustained on 1/9/2023 while playing soccer with her daughter. I personally interpreted and reviewed the patient's recent MRI revealing ligamentous or meniscal pathology, and no significant degenerative changes. I discussed continued conservative treatment options including activity modification, bracing, injections, formal physical therapy and/or oral anti-inflammatories. Patient noted she would like to proceed with a left knee corticosteroid injection. The patient was given the injection today in office. She tolerated the procedure without complication. Patient was also given a referral to formal outpatient physical therapy to work on lower extremity strengthening, range of motion restoration, balance and mobility and gait training. Patient may continue to wear her brace as needed and she may also continue to take over-the-counter anti-inflammatories as needed for her left knee discomfort. The patient is to follow-up in 6 weeks for further evaluation. The patient was instructed to call our office with any questions or concerns prior to the next appointment. The patient noted her understanding. Follow up:Return in about 6 weeks (around 3/16/2023) for re-evaluation. Total Time: 35 min      Orders Placed This Encounter   Medications    methylPREDNISolone acetate (DEPO-MEDROL) injection 80 mg    bupivacaine (MARCAINE) 0.25 % injection 5 mg       Orders Placed This Encounter   Procedures    Elen Flood     Referral Priority:   Routine     Referral Type:   Eval and Treat     Referral Reason:   Specialty Services Required     Requested Specialty:   Physical Therapist     Number of Visits Requested:   1 [id number] - DRAIN/INJECT LARGE JOINT BURSA       This note is created with the assistance of a speech recognition program.  While intending to generate a document that actually reflects the content of the visit, the document can still have some errors including those of syntax and sound a like substitutions which may escape proof reading. In such instances, actual meaning can be extrapolated by contextual diversion.      Electronically signed by Patricia Kenny PA-C on 2/2/2023 at 8:41 PM

## 2023-03-03 ENCOUNTER — OFFICE VISIT (OUTPATIENT)
Dept: FAMILY MEDICINE CLINIC | Age: 28
End: 2023-03-03
Payer: MEDICAID

## 2023-03-03 VITALS
HEART RATE: 83 BPM | SYSTOLIC BLOOD PRESSURE: 110 MMHG | OXYGEN SATURATION: 97 % | DIASTOLIC BLOOD PRESSURE: 60 MMHG | BODY MASS INDEX: 30.79 KG/M2 | WEIGHT: 185 LBS

## 2023-03-03 DIAGNOSIS — B35.3 TINEA PEDIS OF BOTH FEET: Primary | ICD-10-CM

## 2023-03-03 PROCEDURE — 99213 OFFICE O/P EST LOW 20 MIN: CPT | Performed by: NURSE PRACTITIONER

## 2023-03-03 RX ORDER — KETOCONAZOLE 20 MG/G
1 CREAM TOPICAL 2 TIMES DAILY
Qty: 60 G | Refills: 3 | Status: SHIPPED | OUTPATIENT
Start: 2023-03-03

## 2023-03-03 SDOH — ECONOMIC STABILITY: FOOD INSECURITY: WITHIN THE PAST 12 MONTHS, THE FOOD YOU BOUGHT JUST DIDN'T LAST AND YOU DIDN'T HAVE MONEY TO GET MORE.: NEVER TRUE

## 2023-03-03 SDOH — ECONOMIC STABILITY: INCOME INSECURITY: HOW HARD IS IT FOR YOU TO PAY FOR THE VERY BASICS LIKE FOOD, HOUSING, MEDICAL CARE, AND HEATING?: NOT HARD AT ALL

## 2023-03-03 SDOH — ECONOMIC STABILITY: FOOD INSECURITY: WITHIN THE PAST 12 MONTHS, YOU WORRIED THAT YOUR FOOD WOULD RUN OUT BEFORE YOU GOT MONEY TO BUY MORE.: NEVER TRUE

## 2023-03-03 SDOH — ECONOMIC STABILITY: HOUSING INSECURITY
IN THE LAST 12 MONTHS, WAS THERE A TIME WHEN YOU DID NOT HAVE A STEADY PLACE TO SLEEP OR SLEPT IN A SHELTER (INCLUDING NOW)?: NO

## 2023-03-03 ASSESSMENT — PATIENT HEALTH QUESTIONNAIRE - PHQ9
SUM OF ALL RESPONSES TO PHQ9 QUESTIONS 1 & 2: 0
2. FEELING DOWN, DEPRESSED OR HOPELESS: 0
SUM OF ALL RESPONSES TO PHQ QUESTIONS 1-9: 0
1. LITTLE INTEREST OR PLEASURE IN DOING THINGS: 0
SUM OF ALL RESPONSES TO PHQ QUESTIONS 1-9: 0

## 2023-03-03 ASSESSMENT — ENCOUNTER SYMPTOMS
COUGH: 0
SHORTNESS OF BREATH: 0

## 2023-03-03 NOTE — PROGRESS NOTES
Cass Padilla (:  1995) is a 33 y.o. female,Established patient, here for evaluation of the following chief complaint(s):  Rash         ASSESSMENT/PLAN:  1. Tinea pedis of both feet  -     ketoconazole (NIZORAL) 2 % cream; Apply 1 Applicatorful topically 2 times daily Apply topically BID for 2-4 weeks, Topical, 2 TIMES DAILY Starting Fri 3/3/2023, Disp-60 g, R-3, Normal    No follow-ups on file. Subjective   SUBJECTIVE/OBJECTIVE:  HPI  Pt got a pedicure-skin around nail started swelling, painful, itchy. Most of the swelling, pain resolved but they are still very itchy and uncomfortable. No signs of infection. Review of Systems   Constitutional:  Negative for chills and fever. Respiratory:  Negative for cough and shortness of breath. Cardiovascular:  Negative for chest pain, palpitations and leg swelling. Objective   Vitals:    23 0733   BP: 110/60   Pulse: 83   SpO2: 97%       Physical Exam  Constitutional:       Appearance: She is well-developed. HENT:      Right Ear: External ear normal.      Left Ear: External ear normal.      Nose: Nose normal.   Cardiovascular:      Rate and Rhythm: Normal rate and regular rhythm. Heart sounds: Normal heart sounds, S1 normal and S2 normal.   Pulmonary:      Effort: Pulmonary effort is normal. No respiratory distress. Breath sounds: Normal breath sounds. Musculoskeletal:         General: No deformity. Normal range of motion. Cervical back: Full passive range of motion without pain and normal range of motion. Skin:     General: Skin is warm and dry. Neurological:      Mental Status: She is alert and oriented to person, place, and time. An electronic signature was used to authenticate this note.     --Yadiel Rogers, APRN - CNP

## 2023-03-03 NOTE — PROGRESS NOTES
Patient is present complaining of rash on toes  Patient states she had a pedicure last week and states she has had itching around her cuticles  States her cuticles are red and the area is swollen around them  States she has been using water with epsom salt, alcohol, neosporin

## 2023-03-06 ENCOUNTER — HOSPITAL ENCOUNTER (OUTPATIENT)
Age: 28
Setting detail: SPECIMEN
Discharge: HOME | End: 2023-03-06

## 2023-03-06 ENCOUNTER — OFFICE VISIT (OUTPATIENT)
Dept: FAMILY MEDICINE CLINIC | Age: 28
End: 2023-03-06
Payer: MEDICAID

## 2023-03-06 VITALS
OXYGEN SATURATION: 98 % | TEMPERATURE: 97.7 F | DIASTOLIC BLOOD PRESSURE: 66 MMHG | HEART RATE: 82 BPM | SYSTOLIC BLOOD PRESSURE: 106 MMHG

## 2023-03-06 DIAGNOSIS — R39.9 UTI SYMPTOMS: Primary | ICD-10-CM

## 2023-03-06 LAB
BILIRUBIN, POC: ABNORMAL
BLOOD URINE, POC: ABNORMAL
CLARITY, POC: CLEAR
COLOR, POC: ABNORMAL
CONTROL: PRESENT
GLUCOSE URINE, POC: ABNORMAL
KETONES, POC: ABNORMAL
LEUKOCYTE EST, POC: ABNORMAL
NITRITE, POC: ABNORMAL
PH, POC: 6
PREGNANCY TEST URINE, POC: NEGATIVE
PROTEIN, POC: 30
SPECIFIC GRAVITY, POC: >=1.03
UROBILINOGEN, POC: 4

## 2023-03-06 PROCEDURE — 81025 URINE PREGNANCY TEST: CPT | Performed by: NURSE PRACTITIONER

## 2023-03-06 PROCEDURE — 81003 URINALYSIS AUTO W/O SCOPE: CPT | Performed by: NURSE PRACTITIONER

## 2023-03-06 PROCEDURE — 99213 OFFICE O/P EST LOW 20 MIN: CPT | Performed by: NURSE PRACTITIONER

## 2023-03-06 RX ORDER — PHENAZOPYRIDINE HYDROCHLORIDE 200 MG/1
200 TABLET, FILM COATED ORAL 3 TIMES DAILY
Qty: 6 TABLET | Refills: 0 | Status: SHIPPED | OUTPATIENT
Start: 2023-03-06 | End: 2023-03-08

## 2023-03-06 RX ORDER — CEPHALEXIN 500 MG/1
500 CAPSULE ORAL 3 TIMES DAILY
Qty: 21 CAPSULE | Refills: 0 | Status: SHIPPED | OUTPATIENT
Start: 2023-03-06 | End: 2023-03-13

## 2023-03-06 ASSESSMENT — ENCOUNTER SYMPTOMS
VOMITING: 0
NAUSEA: 0

## 2023-03-06 NOTE — PROGRESS NOTES
[address] [address]  Dept: [phone]  Dept Fax: [phone]    Aaron Apodaca is a 33 y.o. female who presents to the urgent care today for her medical conditions/complaints as notedbelow. Aaron Apodaca is c/o of Flank Pain (Onset for 2 days, left side and abdominal pressure)      HPI:     33 yr old female presents for possible UTI  Hx recurrent UTI's, has referral to urology but has not scheduled appt yet. Sx started a few days ago, was out of town so waited until home to come  No fevers, + hesitancy, urgency, no burning  Left back starting to ache. No change in vaginal discharge  Feels well overall    Urinary Frequency   This is a new problem. The current episode started in the past 7 days (x2d). The problem occurs every urination. The problem has been gradually worsening. The quality of the pain is described as aching. The pain is at a severity of 0/10. The patient is experiencing no pain. There has been no fever. There is A history of pyelonephritis. Associated symptoms include flank pain, frequency, hesitancy and urgency. Pertinent negatives include no chills, discharge, hematuria, nausea, possible pregnancy, sweats or vomiting. She has tried increased fluids for the symptoms. The treatment provided no relief. Her past medical history is significant for kidney stones and recurrent UTIs.      Past Medical History:   Diagnosis Date    Abdominal pain     Abnormal uterine bleeding     Anesthesia complication     had seizure after anesthesia for wisdom teeth-hospitalized-has has no problem with anesthesia since then    Asthma     as a child    Back pain     Cholecystitis 2018    resolved with cholecystectomy    COVID 02/2021    no hospitalization    COVID 12/2021    iv antibody infusion given 12/23/2022    COVID 10/2022    HA, fatigue, congestion-no hospitalization    E. 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    Therapy     pelvic floor therapy-jeffery Debra-last visit sep 2022    Under care of service provider 11/10/2022    msw-zskmoyqnqdt-npbbrhagr-last visit oct 2022    UTI (lower urinary tract infection)     frequent        Current Outpatient Medications   Medication Sig Dispense Refill    cephALEXin (KEFLEX) 500 MG capsule Take 1 capsule by mouth 3 times daily for 7 days 21 capsule 0    phenazopyridine (PYRIDIUM) 200 MG tablet Take 1 tablet by mouth three times daily for 2 days 6 tablet 0    ketoconazole (NIZORAL) 2 % cream Apply 1 Applicatorful topically 2 times daily Apply topically BID for 2-4 weeks 60 g 3    ibuprofen (ADVIL;MOTRIN) 800 MG tablet Take 1 tablet by mouth every 8 hours as needed for Pain or Fever 60 tablet 0    montelukast (SINGULAIR) 10 MG tablet Take 1 tablet by mouth in the morning. 90 tablet 1    budesonide-formoterol (SYMBICORT) 160-4.5 MCG/ACT AERO Inhale 2 puffs into the lungs in the morning and 2 puffs before bedtime. 30.6 g 1    albuterol sulfate HFA (VENTOLIN HFA) 108 (90 Base) MCG/ACT inhaler Inhale 2 puffs into the lungs every 4 hours as needed for Wheezing or Shortness of Breath 18 g 3    loratadine (CLARITIN) 10 MG tablet Take 1 tablet by mouth daily 30 tablet 3    fluticasone (FLONASE) 50 MCG/ACT nasal spray 1 spray by Nasal route daily 16 g 2     No current facility-administered medications for this visit.      Allergies   Allergen Reactions    Amoxicillin Hives    Lactose Diarrhea and Nausea And Vomiting     Can have milk in foods, but not by itself (I.e., glass of milk)    Other Hives and Rash     Surgical glue       Subjective:      Review of Systems   Constitutional:  Negative for chills.   Gastrointestinal:  Negative for nausea and vomiting.   Genitourinary:  Positive for flank pain, frequency, hesitancy and urgency. Negative for hematuria.   All other systems reviewed and are negative.  14 systems reviewed and negative except as listed in HPI.    Objective:     Physical Exam  Vitals and nursing note reviewed.   Constitutional:       General: She is not in acute distress.     Appearance: Normal appearance. She is well-developed. She is not ill-appearing, toxic-appearing or diaphoretic.      Comments: nontoxic   HENT:      Head: Normocephalic and atraumatic.      Right Ear: External ear normal.      Left Ear: External ear normal.   Eyes:      General: No scleral icterus.        Right eye: No discharge.         Left eye: No discharge.      Conjunctiva/sclera: Conjunctivae normal.      Pupils: Pupils are equal, round, and reactive to light.   Cardiovascular:      Rate and Rhythm: Normal rate.   Pulmonary:      Effort: Pulmonary effort is normal.   Abdominal:      General: Bowel sounds are normal. There is no distension.      Palpations: Abdomen is soft. There is no mass.      Tenderness: There is abdominal tenderness (mild suprapubic). There is left CVA tenderness (mild). There is no right CVA tenderness, guarding or rebound.      Hernia: No hernia is present.   Musculoskeletal:         General: No tenderness or deformity. Normal range of motion.   Skin:     General: Skin is warm and dry.      Capillary Refill: Capillary refill takes less than 2 seconds.      Findings: No rash ( no rash to visible skin).   Neurological:      General: No focal deficit present.      Mental Status: She is alert and oriented to person, place, and time.      Motor: No abnormal muscle tone.      Coordination: Coordination normal.   Psychiatric:         Mood and Affect: Mood normal.         Behavior: Behavior normal.     /66 (Site: Left Upper Arm, Position: Sitting, Cuff  Size: Medium Adult)   Pulse 82   Temp 97.7 °F (36.5 °C) (Infrared)   SpO2 98%     Assessment:       Diagnosis Orders   1. UTI symptoms  POCT Urinalysis No Micro (Auto)    POCT urine pregnancy    Culture, Urine          Plan:     Results for POC orders placed in visit on 03/06/23   POCT Urinalysis No Micro (Auto)   Result Value Ref Range    Color, UA orange     Clarity, UA clear     Glucose, UA POC neg     Bilirubin, UA neg     Ketones, UA neg     Spec Grav, UA >=1.030     Blood, UA POC trace     pH, UA 6.0     Protein, UA POC 30     Urobilinogen, UA 4.0     Leukocytes, UA small     Nitrite, UA neg    POCT urine pregnancy   Result Value Ref Range    Preg Test, Ur negative     Control present    I reviewed pt chart - CT abdomen pelvis from 6 months ago was neg for any stones, did show signs of UTI.   POCT preg neg  POCT urine + leuks and trace blood  Urine culture pending  Based on sx, hx pyelonephritis, will tx as bacterial UTI  Keflex rx  Pyridium rx  Reviewed over-the-counter treatments for symptom management.  Enc pt to call and schedule appt with urology - referral provided by her PCP   Return for make appt with your Urologist.    Orders Placed This Encounter   Medications    cephALEXin (KEFLEX) 500 MG capsule     Sig: Take 1 capsule by mouth 3 times daily for 7 days     Dispense:  21 capsule     Refill:  0    phenazopyridine (PYRIDIUM) 200 MG tablet     Sig: Take 1 tablet by mouth three times daily for 2 days     Dispense:  6 tablet     Refill:  0           Patient given educational materials - see patient instructions.  Discussed use, benefit, and side effects of prescribed medications.  All patient questions answered.  Pt voicedunderstanding.    Electronically signed by KEMAR Thacker CNP on 3/6/2023 at 4:33 PM

## 2023-03-06 NOTE — LETTER
March 6, 2023       Aaron Apodaca YOB: 1995   [address]  Date of Visit:  3/6/2023       To Whom It May Concern:    Leela Frey was seen in my clinic on 3/6/2023. She may return to work on 3/7/2023. If you have any questions or concerns, please don't hesitate to call.     Sincerely,        Della Torres, KEMAR - CNP

## 2023-03-08 LAB
MICROORGANISM SPEC CULT: NORMAL
SPECIMEN DESCRIPTION: NORMAL

## 2023-03-19 ENCOUNTER — HOSPITAL ENCOUNTER (OUTPATIENT)
Age: 28
Setting detail: OBSERVATION
Discharge: HOME | End: 2023-03-22
Attending: STUDENT IN AN ORGANIZED HEALTH CARE EDUCATION/TRAINING PROGRAM | Admitting: INTERNAL MEDICINE
Payer: MEDICAID

## 2023-03-19 DIAGNOSIS — N30.00 ACUTE CYSTITIS WITHOUT HEMATURIA: Primary | ICD-10-CM

## 2023-03-19 PROCEDURE — 83690 ASSAY OF LIPASE: CPT

## 2023-03-19 PROCEDURE — 84703 CHORIONIC GONADOTROPIN ASSAY: CPT

## 2023-03-19 PROCEDURE — 83605 ASSAY OF LACTIC ACID: CPT

## 2023-03-19 PROCEDURE — 96365 THER/PROPH/DIAG IV INF INIT: CPT

## 2023-03-19 PROCEDURE — 96361 HYDRATE IV INFUSION ADD-ON: CPT

## 2023-03-19 PROCEDURE — 96376 TX/PRO/DX INJ SAME DRUG ADON: CPT

## 2023-03-19 PROCEDURE — 99285 EMERGENCY DEPT VISIT HI MDM: CPT

## 2023-03-19 PROCEDURE — 80053 COMPREHEN METABOLIC PANEL: CPT

## 2023-03-19 PROCEDURE — 83735 ASSAY OF MAGNESIUM: CPT

## 2023-03-19 PROCEDURE — 96375 TX/PRO/DX INJ NEW DRUG ADDON: CPT

## 2023-03-19 PROCEDURE — 85025 COMPLETE CBC W/AUTO DIFF WBC: CPT

## 2023-03-19 NOTE — LETTER
March 21, 2023       Kenyetta 8[address] YOB: 1995   [address] Date of Visit:  3/19/2023       To Whom It May Concern: It is my medical opinion that Lisa Stack should remain out of work until 3/25. If you have any questions or concerns, please don't hesitate to call.     Sincerely,      KEMAR Sawyer NP

## 2023-03-20 ENCOUNTER — APPOINTMENT (OUTPATIENT)
Dept: CT IMAGING | Age: 28
End: 2023-03-20
Payer: MEDICAID

## 2023-03-20 PROBLEM — N30.90 CYSTITIS: Status: ACTIVE | Noted: 2023-03-20

## 2023-03-20 PROBLEM — N30.00 ACUTE CYSTITIS WITHOUT HEMATURIA: Status: ACTIVE | Noted: 2023-03-20

## 2023-03-20 LAB
ABSOLUTE EOS #: <0.03 K/UL (ref 0–0.44)
ABSOLUTE IMMATURE GRANULOCYTE: 0.02 K/UL (ref 0–0.3)
ABSOLUTE LYMPH #: 1.02 K/UL (ref 1.1–3.7)
ABSOLUTE MONO #: 0.56 K/UL (ref 0.1–1.2)
ALBUMIN SERPL-MCNC: 4.2 G/DL (ref 3.5–5.2)
ALP SERPL-CCNC: 62 U/L (ref 35–104)
ALT SERPL-CCNC: 18 U/L (ref 5–33)
AMORPHOUS: ABNORMAL
ANION GAP SERPL CALCULATED.3IONS-SCNC: 13 MMOL/L (ref 9–17)
ANION GAP SERPL CALCULATED.3IONS-SCNC: 8 MMOL/L (ref 9–17)
AST SERPL-CCNC: 18 U/L
BACTERIA: ABNORMAL
BASOPHILS # BLD: 0 % (ref 0–2)
BASOPHILS ABSOLUTE: <0.03 K/UL (ref 0–0.2)
BILIRUB SERPL-MCNC: 1.4 MG/DL (ref 0.3–1.2)
BILIRUBIN URINE: NEGATIVE
BUN SERPL-MCNC: 11 MG/DL (ref 6–20)
BUN SERPL-MCNC: 14 MG/DL (ref 6–20)
BUN/CREAT BLD: 11 (ref 9–20)
BUN/CREAT BLD: 14 (ref 9–20)
CALCIUM SERPL-MCNC: 7.6 MG/DL (ref 8.6–10.4)
CALCIUM SERPL-MCNC: 9 MG/DL (ref 8.6–10.4)
CHLORIDE SERPL-SCNC: 108 MMOL/L (ref 98–107)
CHLORIDE SERPL-SCNC: 97 MMOL/L (ref 98–107)
CO2 SERPL-SCNC: 24 MMOL/L (ref 20–31)
CO2 SERPL-SCNC: 25 MMOL/L (ref 20–31)
COLOR: ABNORMAL
CREAT SERPL-MCNC: 0.98 MG/DL (ref 0.5–0.9)
CREAT SERPL-MCNC: 0.99 MG/DL (ref 0.5–0.9)
EOSINOPHILS RELATIVE PERCENT: 0 % (ref 1–4)
EPITHELIAL CELLS UA: ABNORMAL /HPF (ref 0–5)
GFR SERPL CREATININE-BSD FRML MDRD: >60 ML/MIN/1.73M2
GFR SERPL CREATININE-BSD FRML MDRD: >60 ML/MIN/1.73M2
GLUCOSE SERPL-MCNC: 115 MG/DL (ref 70–99)
GLUCOSE SERPL-MCNC: 98 MG/DL (ref 70–99)
GLUCOSE UR STRIP.AUTO-MCNC: NEGATIVE MG/DL
HCG QUALITATIVE: NEGATIVE
HCT VFR BLD AUTO: 46.7 % (ref 36.3–47.1)
HGB BLD-MCNC: 15.3 G/DL (ref 11.9–15.1)
IMMATURE GRANULOCYTES: 0 %
KETONES UR STRIP.AUTO-MCNC: ABNORMAL MG/DL
LACTIC ACID, SEPSIS: 1.5 MMOL/L (ref 0.5–1.9)
LEUKOCYTE ESTERASE UR QL STRIP.AUTO: ABNORMAL
LIPASE SERPL-CCNC: 32 U/L (ref 13–60)
LYMPHOCYTES # BLD: 14 % (ref 24–43)
MAGNESIUM SERPL-MCNC: 1.7 MG/DL (ref 1.6–2.6)
MCH RBC QN AUTO: 29 PG (ref 25.2–33.5)
MCHC RBC AUTO-ENTMCNC: 32.8 G/DL (ref 28.4–34.8)
MCV RBC AUTO: 88.6 FL (ref 82.6–102.9)
MONOCYTES # BLD: 7 % (ref 3–12)
MUCUS: ABNORMAL
NITRITE UR QL STRIP.AUTO: NEGATIVE
NRBC AUTOMATED: 0 PER 100 WBC
PDW BLD-RTO: 13.2 % (ref 11.8–14.4)
PLATELET # BLD AUTO: 246 K/UL (ref 138–453)
PMV BLD AUTO: 10 FL (ref 8.1–13.5)
POTASSIUM SERPL-SCNC: 3.2 MMOL/L (ref 3.7–5.3)
POTASSIUM SERPL-SCNC: 3.9 MMOL/L (ref 3.7–5.3)
PROT SERPL-MCNC: 7.9 G/DL (ref 6.4–8.3)
PROT UR STRIP.AUTO-MCNC: 6 MG/DL (ref 5–8)
PROT UR STRIP.AUTO-MCNC: ABNORMAL MG/DL
RBC # BLD: 5.27 M/UL (ref 3.95–5.11)
RBC CLUMPS #/AREA URNS AUTO: ABNORMAL /HPF (ref 0–2)
SEG NEUTROPHILS: 79 % (ref 36–65)
SEGMENTED NEUTROPHILS ABSOLUTE COUNT: 5.92 K/UL (ref 1.5–8.1)
SODIUM SERPL-SCNC: 135 MMOL/L (ref 135–144)
SODIUM SERPL-SCNC: 140 MMOL/L (ref 135–144)
SPECIFIC GRAVITY UA: 1.02 (ref 1–1.03)
TURBIDITY: ABNORMAL
URINE HGB: ABNORMAL
UROBILINOGEN, URINE: ABNORMAL
WBC # BLD AUTO: 7.5 K/UL (ref 3.5–11.3)
WBC UA: ABNORMAL /HPF (ref 0–5)

## 2023-03-20 PROCEDURE — 80048 BASIC METABOLIC PNL TOTAL CA: CPT

## 2023-03-20 PROCEDURE — APPSS30 APP SPLIT SHARED TIME 16-30 MINUTES: Performed by: NURSE PRACTITIONER

## 2023-03-20 PROCEDURE — 87086 URINE CULTURE/COLONY COUNT: CPT

## 2023-03-20 PROCEDURE — 6360000002 HC RX W HCPCS: Performed by: NURSE PRACTITIONER

## 2023-03-20 PROCEDURE — 2580000003 HC RX 258: Performed by: NURSE PRACTITIONER

## 2023-03-20 PROCEDURE — 96361 HYDRATE IV INFUSION ADD-ON: CPT

## 2023-03-20 PROCEDURE — 6370000000 HC RX 637 (ALT 250 FOR IP): Performed by: NURSE PRACTITIONER

## 2023-03-20 PROCEDURE — 96365 THER/PROPH/DIAG IV INF INIT: CPT

## 2023-03-20 PROCEDURE — G0378 HOSPITAL OBSERVATION PER HR: HCPCS

## 2023-03-20 PROCEDURE — A4216 STERILE WATER/SALINE, 10 ML: HCPCS | Performed by: NURSE PRACTITIONER

## 2023-03-20 PROCEDURE — 96376 TX/PRO/DX INJ SAME DRUG ADON: CPT

## 2023-03-20 PROCEDURE — 6370000000 HC RX 637 (ALT 250 FOR IP): Performed by: INTERNAL MEDICINE

## 2023-03-20 PROCEDURE — 2580000003 HC RX 258: Performed by: STUDENT IN AN ORGANIZED HEALTH CARE EDUCATION/TRAINING PROGRAM

## 2023-03-20 PROCEDURE — 99221 1ST HOSP IP/OBS SF/LOW 40: CPT | Performed by: INTERNAL MEDICINE

## 2023-03-20 PROCEDURE — 74176 CT ABD & PELVIS W/O CONTRAST: CPT

## 2023-03-20 PROCEDURE — 36415 COLL VENOUS BLD VENIPUNCTURE: CPT

## 2023-03-20 PROCEDURE — 96372 THER/PROPH/DIAG INJ SC/IM: CPT

## 2023-03-20 PROCEDURE — 81001 URINALYSIS AUTO W/SCOPE: CPT

## 2023-03-20 PROCEDURE — 51798 US URINE CAPACITY MEASURE: CPT

## 2023-03-20 PROCEDURE — 96375 TX/PRO/DX INJ NEW DRUG ADDON: CPT

## 2023-03-20 PROCEDURE — 2500000003 HC RX 250 WO HCPCS: Performed by: NURSE PRACTITIONER

## 2023-03-20 PROCEDURE — 6360000002 HC RX W HCPCS: Performed by: STUDENT IN AN ORGANIZED HEALTH CARE EDUCATION/TRAINING PROGRAM

## 2023-03-20 RX ORDER — POTASSIUM CHLORIDE 20 MEQ/1
40 TABLET, EXTENDED RELEASE ORAL PRN
Status: DISCONTINUED | OUTPATIENT
Start: 2023-03-20 | End: 2023-03-22 | Stop reason: HOSPADM

## 2023-03-20 RX ORDER — DIPHENHYDRAMINE HYDROCHLORIDE 50 MG/ML
25 INJECTION INTRAMUSCULAR; INTRAVENOUS ONCE
Status: COMPLETED | OUTPATIENT
Start: 2023-03-20 | End: 2023-03-20

## 2023-03-20 RX ORDER — METOCLOPRAMIDE HYDROCHLORIDE 5 MG/ML
10 INJECTION INTRAMUSCULAR; INTRAVENOUS ONCE
Status: COMPLETED | OUTPATIENT
Start: 2023-03-20 | End: 2023-03-20

## 2023-03-20 RX ORDER — POLYETHYLENE GLYCOL 3350 17 G/17G
17 POWDER, FOR SOLUTION ORAL DAILY PRN
Status: DISCONTINUED | OUTPATIENT
Start: 2023-03-20 | End: 2023-03-22 | Stop reason: HOSPADM

## 2023-03-20 RX ORDER — MAGNESIUM SULFATE 1 G/100ML
1000 INJECTION INTRAVENOUS PRN
Status: DISCONTINUED | OUTPATIENT
Start: 2023-03-20 | End: 2023-03-22 | Stop reason: HOSPADM

## 2023-03-20 RX ORDER — DROPERIDOL 2.5 MG/ML
0.62 INJECTION, SOLUTION INTRAMUSCULAR; INTRAVENOUS ONCE
Status: DISCONTINUED | OUTPATIENT
Start: 2023-03-20 | End: 2023-03-22 | Stop reason: HOSPADM

## 2023-03-20 RX ORDER — POTASSIUM CHLORIDE 7.45 MG/ML
10 INJECTION INTRAVENOUS
Status: DISPENSED | OUTPATIENT
Start: 2023-03-20 | End: 2023-03-20

## 2023-03-20 RX ORDER — CEPHALEXIN 500 MG/1
500 CAPSULE ORAL EVERY 8 HOURS SCHEDULED
Status: DISCONTINUED | OUTPATIENT
Start: 2023-03-20 | End: 2023-03-20

## 2023-03-20 RX ORDER — ONDANSETRON 2 MG/ML
4 INJECTION INTRAMUSCULAR; INTRAVENOUS ONCE
Status: COMPLETED | OUTPATIENT
Start: 2023-03-20 | End: 2023-03-20

## 2023-03-20 RX ORDER — 0.9 % SODIUM CHLORIDE 0.9 %
500 INTRAVENOUS SOLUTION INTRAVENOUS ONCE
Status: DISCONTINUED | OUTPATIENT
Start: 2023-03-20 | End: 2023-03-22 | Stop reason: HOSPADM

## 2023-03-20 RX ORDER — ONDANSETRON 2 MG/ML
4 INJECTION INTRAMUSCULAR; INTRAVENOUS EVERY 6 HOURS PRN
Status: DISCONTINUED | OUTPATIENT
Start: 2023-03-20 | End: 2023-03-22 | Stop reason: HOSPADM

## 2023-03-20 RX ORDER — ACETAMINOPHEN 325 MG/1
650 TABLET ORAL EVERY 6 HOURS PRN
Status: DISCONTINUED | OUTPATIENT
Start: 2023-03-20 | End: 2023-03-22 | Stop reason: HOSPADM

## 2023-03-20 RX ORDER — PHENAZOPYRIDINE HYDROCHLORIDE 200 MG/1
200 TABLET, FILM COATED ORAL
Status: DISCONTINUED | OUTPATIENT
Start: 2023-03-20 | End: 2023-03-22 | Stop reason: HOSPADM

## 2023-03-20 RX ORDER — POTASSIUM CHLORIDE 7.45 MG/ML
10 INJECTION INTRAVENOUS PRN
Status: DISCONTINUED | OUTPATIENT
Start: 2023-03-20 | End: 2023-03-22 | Stop reason: HOSPADM

## 2023-03-20 RX ORDER — ENOXAPARIN SODIUM 100 MG/ML
40 INJECTION SUBCUTANEOUS DAILY
Status: DISCONTINUED | OUTPATIENT
Start: 2023-03-20 | End: 2023-03-22 | Stop reason: HOSPADM

## 2023-03-20 RX ORDER — 0.9 % SODIUM CHLORIDE 0.9 %
1000 INTRAVENOUS SOLUTION INTRAVENOUS ONCE
Status: COMPLETED | OUTPATIENT
Start: 2023-03-20 | End: 2023-03-20

## 2023-03-20 RX ORDER — ONDANSETRON 4 MG/1
4 TABLET, ORALLY DISINTEGRATING ORAL EVERY 8 HOURS PRN
Status: DISCONTINUED | OUTPATIENT
Start: 2023-03-20 | End: 2023-03-22 | Stop reason: HOSPADM

## 2023-03-20 RX ORDER — PHENAZOPYRIDINE HYDROCHLORIDE 200 MG/1
200 TABLET, FILM COATED ORAL
Status: DISCONTINUED | OUTPATIENT
Start: 2023-03-21 | End: 2023-03-20

## 2023-03-20 RX ORDER — SODIUM CHLORIDE 9 MG/ML
INJECTION, SOLUTION INTRAVENOUS PRN
Status: DISCONTINUED | OUTPATIENT
Start: 2023-03-20 | End: 2023-03-22 | Stop reason: HOSPADM

## 2023-03-20 RX ORDER — SODIUM CHLORIDE 0.9 % (FLUSH) 0.9 %
5-40 SYRINGE (ML) INJECTION PRN
Status: DISCONTINUED | OUTPATIENT
Start: 2023-03-20 | End: 2023-03-22 | Stop reason: HOSPADM

## 2023-03-20 RX ORDER — SODIUM CHLORIDE 0.9 % (FLUSH) 0.9 %
5-40 SYRINGE (ML) INJECTION EVERY 12 HOURS SCHEDULED
Status: DISCONTINUED | OUTPATIENT
Start: 2023-03-20 | End: 2023-03-22 | Stop reason: HOSPADM

## 2023-03-20 RX ORDER — SODIUM CHLORIDE 9 MG/ML
INJECTION, SOLUTION INTRAVENOUS CONTINUOUS
Status: DISCONTINUED | OUTPATIENT
Start: 2023-03-20 | End: 2023-03-22 | Stop reason: HOSPADM

## 2023-03-20 RX ORDER — ACETAMINOPHEN 650 MG/1
650 SUPPOSITORY RECTAL EVERY 6 HOURS PRN
Status: DISCONTINUED | OUTPATIENT
Start: 2023-03-20 | End: 2023-03-22 | Stop reason: HOSPADM

## 2023-03-20 RX ORDER — MORPHINE SULFATE 4 MG/ML
4 INJECTION, SOLUTION INTRAMUSCULAR; INTRAVENOUS ONCE
Status: COMPLETED | OUTPATIENT
Start: 2023-03-20 | End: 2023-03-20

## 2023-03-20 RX ADMIN — FAMOTIDINE 20 MG: 10 INJECTION, SOLUTION INTRAVENOUS at 19:37

## 2023-03-20 RX ADMIN — SODIUM CHLORIDE: 9 INJECTION, SOLUTION INTRAVENOUS at 22:08

## 2023-03-20 RX ADMIN — SODIUM CHLORIDE: 9 INJECTION, SOLUTION INTRAVENOUS at 16:15

## 2023-03-20 RX ADMIN — FAMOTIDINE 20 MG: 10 INJECTION, SOLUTION INTRAVENOUS at 08:12

## 2023-03-20 RX ADMIN — MORPHINE SULFATE 4 MG: 4 INJECTION, SOLUTION INTRAMUSCULAR; INTRAVENOUS at 02:44

## 2023-03-20 RX ADMIN — METOCLOPRAMIDE 10 MG: 5 INJECTION, SOLUTION INTRAMUSCULAR; INTRAVENOUS at 04:07

## 2023-03-20 RX ADMIN — POTASSIUM CHLORIDE 10 MEQ: 7.46 INJECTION, SOLUTION INTRAVENOUS at 06:33

## 2023-03-20 RX ADMIN — DIPHENHYDRAMINE HYDROCHLORIDE 25 MG: 50 INJECTION, SOLUTION INTRAMUSCULAR; INTRAVENOUS at 04:07

## 2023-03-20 RX ADMIN — ONDANSETRON 4 MG: 2 INJECTION INTRAMUSCULAR; INTRAVENOUS at 00:22

## 2023-03-20 RX ADMIN — POTASSIUM CHLORIDE 10 MEQ: 7.46 INJECTION, SOLUTION INTRAVENOUS at 09:20

## 2023-03-20 RX ADMIN — SODIUM CHLORIDE 1000 ML: 9 INJECTION, SOLUTION INTRAVENOUS at 00:21

## 2023-03-20 RX ADMIN — POTASSIUM CHLORIDE 10 MEQ: 7.46 INJECTION, SOLUTION INTRAVENOUS at 08:12

## 2023-03-20 RX ADMIN — PHENAZOPYRIDINE 200 MG: 200 TABLET ORAL at 22:07

## 2023-03-20 RX ADMIN — Medication 500 ML: at 20:18

## 2023-03-20 RX ADMIN — ENOXAPARIN SODIUM 40 MG: 100 INJECTION SUBCUTANEOUS at 08:12

## 2023-03-20 RX ADMIN — SODIUM CHLORIDE: 9 INJECTION, SOLUTION INTRAVENOUS at 06:34

## 2023-03-20 RX ADMIN — CEFTRIAXONE 2000 MG: 2 INJECTION, POWDER, FOR SOLUTION INTRAMUSCULAR; INTRAVENOUS at 03:26

## 2023-03-20 RX ADMIN — SODIUM CHLORIDE: 9 INJECTION, SOLUTION INTRAVENOUS at 08:07

## 2023-03-20 RX ADMIN — SODIUM CHLORIDE 1000 ML: 9 INJECTION, SOLUTION INTRAVENOUS at 04:27

## 2023-03-20 RX ADMIN — ACETAMINOPHEN 650 MG: 325 TABLET ORAL at 13:55

## 2023-03-20 RX ADMIN — ONDANSETRON 4 MG: 2 INJECTION INTRAMUSCULAR; INTRAVENOUS at 02:44

## 2023-03-20 ASSESSMENT — PAIN DESCRIPTION - ONSET
ONSET: ON-GOING
ONSET: ON-GOING

## 2023-03-20 ASSESSMENT — PAIN DESCRIPTION - LOCATION
LOCATION: PELVIS
LOCATION: ABDOMEN
LOCATION: ABDOMEN
LOCATION: PELVIS
LOCATION: ABDOMEN;BACK

## 2023-03-20 ASSESSMENT — PAIN DESCRIPTION - ORIENTATION
ORIENTATION: MID
ORIENTATION: MID

## 2023-03-20 ASSESSMENT — PAIN SCALES - GENERAL
PAINLEVEL_OUTOF10: 5
PAINLEVEL_OUTOF10: 6
PAINLEVEL_OUTOF10: 7
PAINLEVEL_OUTOF10: 2
PAINLEVEL_OUTOF10: 0
PAINLEVEL_OUTOF10: 7

## 2023-03-20 ASSESSMENT — PAIN DESCRIPTION - DESCRIPTORS
DESCRIPTORS: DISCOMFORT
DESCRIPTORS: DISCOMFORT;BURNING
DESCRIPTORS: CRAMPING

## 2023-03-20 ASSESSMENT — PAIN DESCRIPTION - PAIN TYPE
TYPE: ACUTE PAIN
TYPE: ACUTE PAIN

## 2023-03-20 ASSESSMENT — ENCOUNTER SYMPTOMS
NAUSEA: 1
VOMITING: 1
ABDOMINAL PAIN: 1

## 2023-03-20 NOTE — ED NOTES
Urine sent to lab. Pt hooked back up to monitor. Call light in reach.      Neil Rizvi RN  03/20/23 8631

## 2023-03-20 NOTE — ED NOTES
[facility] ([facility]) would like to be informed of pt room number when pt is assigned room. Phone numeber [phone], ok to leave message.      Heidy Louise RN  03/20/23 2117

## 2023-03-20 NOTE — ED PROVIDER NOTES
Hypokalemia E87.6    Dx Laparoscopy 11/14/22 Z98.890    Acute cystitis without hematuria N30.00       SURGICAL HISTORY       Past Surgical History:   Procedure Laterality Date    ABDOMEN SURGERY  12/10/2021    DIAGNOSTIC LAPAROSCOPY, UNILATERAL SALPINGECTOMY RIGHT    ARM SURGERY Right     fracture repair    BREAST REDUCTION SURGERY  07/25/2018    CYSTOSCOPY N/A 09/04/2019    CYSTOSCOPY RETROGRADE PYELOGRAM WITH  CYSTOGRAM, performed by Taylor Rdz MD at [address] [facility] N/A 12/03/2021    DILATATION AND CURETTAGE SUCTION performed by Rebecca Orozco MD at [address] [place] [address]  2020    LAPAROSCOPY Right 12/10/2021    DIAGNOSTIC LAPAROSCOPY, UNILATERAL SALPINGECTOMY performed by Paola Dunham MD at [address] [place] [address]  11/14/2022    diagnostic    LAPAROSCOPY N/A 11/14/2022    DIAGNOSTIC LAPAROSCOPY performed by Zaynab Cummings DO at [address] [facility] N/A 05/18/2018    [facility]       Discharge Medication List as of 3/22/2023  1:49 PM        CONTINUE these medications which have NOT CHANGED    Details   ketoconazole (NIZORAL) 2 % cream Apply 1 Applicatorful topically 2 times daily Apply topically BID for 2-4 weeks, Topical, 2 TIMES DAILY Starting Fri 3/3/2023, Disp-60 g, R-3, Normal      ibuprofen (ADVIL;MOTRIN) 800 MG tablet Take 1 tablet by mouth every 8 hours as needed for Pain or Fever, Disp-60 tablet, R-0Normal      montelukast (SINGULAIR) 10 MG tablet Take 1 tablet by mouth in the morning., Disp-90 tablet, R-1Normal      budesonide-formoterol (SYMBICORT) 160-4.5 MCG/ACT AERO Inhale 2 puffs into the lungs in the morning and 2 puffs before bedtime. , Disp-30.6 g, R-1Normal      albuterol sulfate HFA (VENTOLIN HFA) 108 (90 Base) MCG/ACT inhaler Inhale 2 puffs into the lungs every 4 hours as needed for Wheezing or Shortness of

## 2023-03-20 NOTE — H&P
HCG Screen, Blood    Collection Time: 03/19/23 11:55 PM   Result Value Ref Range    hCG Qual NEGATIVE NEGATIVE   Magnesium    Collection Time: 03/19/23 11:55 PM   Result Value Ref Range    Magnesium 1.7 1.6 - 2.6 mg/dL   Lipase    Collection Time: 03/19/23 11:55 PM   Result Value Ref Range    Lipase 32 13 - 60 U/L   Urinalysis with Reflex to Culture    Collection Time: 03/20/23  2:21 AM    Specimen: Urine   Result Value Ref Range    Color, UA Dark Yellow (A) Yellow    Turbidity UA Cloudy (A) Clear    Glucose, Ur NEGATIVE NEGATIVE    Bilirubin Urine NEGATIVE NEGATIVE    Ketones, Urine 1+ (A) NEGATIVE    Specific Gravity, UA 1.025 1.005 - 1.030    Urine Hgb TRACE (A) NEGATIVE    pH, UA 6.0 5.0 - 8.0    Protein, UA TRACE (A) NEGATIVE    Urobilinogen, Urine ELEVATED (A) Normal    Nitrite, Urine NEGATIVE NEGATIVE    Leukocyte Esterase, Urine TRACE (A) NEGATIVE   Microscopic Urinalysis    Collection Time: 03/20/23  2:21 AM   Result Value Ref Range    WBC, UA 5 TO 10 0 - 5 /HPF    RBC, UA 10 TO 20 0 - 2 /HPF    Epithelial Cells UA 10 TO 20 0 - 5 /HPF    Bacteria, UA MODERATE (A) None    Mucus, UA 1+ (A) None    Amorphous, UA 1+ (A) None       Imaging/Diagnostics:  CT ABDOMEN PELVIS WO CONTRAST Additional Contrast? None    Result Date: 3/20/2023  No acute inflammatory process identified.        Assessment :      Hospital Problems             Last Modified POA    * (Principal) Cystitis 3/20/2023 Yes    Hypokalemia 3/20/2023 Yes       Plan:     Patient status observation in the  Med/Surge    Acute cystitis with nausea and vomiting  IV fluids   Rocephin pending urine culture results  Antiemetics and Tylenol as needed  PPI bid    Hypokalemia  Replacement ordered  Continue to monitor    DVT prophylaxis        On this date 3/20/2023 I have spent 30 minutes reviewing previous notes, test results and face to face with the patient discussing the diagnosis and importance of compliance with the treatment plan as well as documenting

## 2023-03-20 NOTE — CARE COORDINATION
Case Management Assessment  Initial Evaluation    Date/Time of Evaluation: 3/20/2023 4:08 PM  Assessment Completed by: Remington Tejeda RN    If patient is discharged prior to next notation, then this note serves as note for discharge by case management. Patient Name: Jose L Roger                   YOB: 1995  Diagnosis: Cystitis [N30.90]                   Date / Time: 3/19/2023 11:44 PM    Patient Admission Status: Observation   Readmission Risk (Low < 19, Mod (19-27), High > 27): Readmission Risk Score: 9.1    Current PCP: Rodrick Teran MD  PCP verified by CM? Yes (LOV march)    Chart Reviewed: Yes      History Provided by: Patient  Patient Orientation: Alert and Oriented    Patient Cognition: Alert    Hospitalization in the last 30 days (Readmission):  Yes    If yes, Readmission Assessment in CM Navigator will be completed. Advance Directives:      Code Status: Full Code   Patient's Primary Decision Maker is: Legal Next of Kin    Primary Decision Maker: Neli Chambers [facility] [phone]    Discharge Planning:    Patient lives with: Children Type of Home: Apartment  Primary Care Giver:    Patient Support Systems include: Family Members   Current Financial resources:    Current community resources:    Current services prior to admission: None            Current DME:              Type of Home Care services:       ADLS  Prior functional level: Independent in ADLs/IADLs  Current functional level: Independent in ADLs/IADLs    PT AM-PAC:   /24  OT AM-PAC:   /24    Family can provide assistance at DC: Yes  Would you like Case Management to discuss the discharge plan with any other family members/significant others, and if so, who?     Plans to Return to Present Housing: Yes  Other Identified Issues/Barriers to RETURNING to current housing: iv atb   Potential Assistance needed at discharge: N/A            Potential DME:    Patient expects to discharge to: [address] [place] [address] for transportation

## 2023-03-20 NOTE — ED NOTES
Pt placed on cardiac monitoring prior to IV potassium being started.       Gerda Schwarz RN  03/20/23 2467

## 2023-03-20 NOTE — ED NOTES
ED to inpatient nurses report     Chief Complaint   Patient presents with    Abdominal Pain     Onset 0230 am    Emesis     Unable to keep fluids or food down    Dizziness     Reports started after the vomiting      Flank Pain    Dysuria      Present to ED from home  LOC: alert and orientated to name, place, date  Vital signs   Vitals:    03/19/23 2332 03/19/23 2352 03/20/23 0300 03/20/23 0400   BP: 105/71  (!) 92/51 (!) 108/94   Pulse: (!) 106      Resp: 19      Temp:  99.1 °F (37.3 °C)     TempSrc:  Oral     SpO2: 99%  95% 98%   Weight: 170 lb (77.1 kg)      Height: 5' 5\" (1.651 m)         Oxygen Baseline room air    Current needs required room air   SEPSIS: yes  [no] Lactate X 2 ordered (Yes or No)  [yes] Antibiotics given (Yes or No)  [yes] IV Fluids ordered (Yes or No)             [no] 2nd IV completed (Yes or No)  [no] Hourly Vital Signs (Validated)  [none] Outstanding Orders:     LDAs:   Peripheral IV 03/19/23 Right Antecubital (Active)   Site Assessment Clean, dry & intact 03/20/23 0000   Line Status Blood return noted;Brisk blood return 03/20/23 0000   Line Care Cap changed 03/20/23 0000   Phlebitis Assessment No symptoms 03/20/23 0000   Infiltration Assessment 0 03/20/23 0000     Mobility: Independent  Fall Risk: [place] 1 Fall Risk  Presents to emergency department  because of falls (Syncope, seizure, or loss of consciousness): No (03/19/23 2334)  Age > 79: No (03/19/23 2334)  Altered Mental Status, Intoxication with alcohol or substance confusion (Disorientation, impaired judgment, poor safety awaremess, or inability to follow instructions): Yes (03/19/23 2334)  Impaired Mobility: Ambulates or transfers with assistive devices or assistance;  Unable to ambulate or transer.: No (03/19/23 2334)  Nursing Judgement: Yes (03/19/23 2334)  Pending ED orders: none  Code Status: full  Consults: IP CONSULT TO HOSPITALIST  []  Hospitalist  Completed  [] yes [] no Who:   []  Medicine  Completed  [] yes [] No Who:

## 2023-03-21 LAB
MICROORGANISM SPEC CULT: NORMAL
SPECIMEN DESCRIPTION: NORMAL

## 2023-03-21 PROCEDURE — 2500000003 HC RX 250 WO HCPCS: Performed by: NURSE PRACTITIONER

## 2023-03-21 PROCEDURE — 2580000003 HC RX 258: Performed by: NURSE PRACTITIONER

## 2023-03-21 PROCEDURE — G0378 HOSPITAL OBSERVATION PER HR: HCPCS

## 2023-03-21 PROCEDURE — 96372 THER/PROPH/DIAG INJ SC/IM: CPT

## 2023-03-21 PROCEDURE — 6370000000 HC RX 637 (ALT 250 FOR IP): Performed by: INTERNAL MEDICINE

## 2023-03-21 PROCEDURE — 6370000000 HC RX 637 (ALT 250 FOR IP): Performed by: NURSE PRACTITIONER

## 2023-03-21 PROCEDURE — 6360000002 HC RX W HCPCS: Performed by: NURSE PRACTITIONER

## 2023-03-21 PROCEDURE — 96376 TX/PRO/DX INJ SAME DRUG ADON: CPT

## 2023-03-21 PROCEDURE — A4216 STERILE WATER/SALINE, 10 ML: HCPCS | Performed by: NURSE PRACTITIONER

## 2023-03-21 PROCEDURE — 96366 THER/PROPH/DIAG IV INF ADDON: CPT

## 2023-03-21 RX ORDER — ONDANSETRON 4 MG/1
4 TABLET, ORALLY DISINTEGRATING ORAL 3 TIMES DAILY PRN
Qty: 21 TABLET | Refills: 0 | Status: SHIPPED | OUTPATIENT
Start: 2023-03-21

## 2023-03-21 RX ORDER — PROMETHAZINE HYDROCHLORIDE 12.5 MG/1
12.5 TABLET ORAL EVERY 6 HOURS PRN
Qty: 12 TABLET | Refills: 0 | Status: SHIPPED | OUTPATIENT
Start: 2023-03-21 | End: 2023-03-28

## 2023-03-21 RX ORDER — LEVOFLOXACIN 500 MG/1
500 TABLET, FILM COATED ORAL DAILY
Qty: 7 TABLET | Refills: 0 | Status: SHIPPED | OUTPATIENT
Start: 2023-03-21 | End: 2023-03-28

## 2023-03-21 RX ORDER — TRAMADOL HYDROCHLORIDE 50 MG/1
50 TABLET ORAL EVERY 6 HOURS PRN
Status: DISCONTINUED | OUTPATIENT
Start: 2023-03-21 | End: 2023-03-22 | Stop reason: HOSPADM

## 2023-03-21 RX ORDER — PROMETHAZINE HYDROCHLORIDE 12.5 MG/1
12.5 TABLET ORAL EVERY 6 HOURS PRN
Status: DISCONTINUED | OUTPATIENT
Start: 2023-03-21 | End: 2023-03-22 | Stop reason: HOSPADM

## 2023-03-21 RX ORDER — PHENAZOPYRIDINE HYDROCHLORIDE 200 MG/1
200 TABLET, FILM COATED ORAL
Qty: 9 TABLET | Refills: 0 | Status: SHIPPED | OUTPATIENT
Start: 2023-03-21 | End: 2023-03-24

## 2023-03-21 RX ORDER — HYDROCODONE BITARTRATE AND ACETAMINOPHEN 5; 325 MG/1; MG/1
1 TABLET ORAL EVERY 6 HOURS PRN
Status: DISCONTINUED | OUTPATIENT
Start: 2023-03-21 | End: 2023-03-22 | Stop reason: HOSPADM

## 2023-03-21 RX ORDER — TRAMADOL HYDROCHLORIDE 50 MG/1
50 TABLET ORAL EVERY 6 HOURS PRN
Qty: 12 TABLET | Refills: 0 | Status: SHIPPED | OUTPATIENT
Start: 2023-03-21 | End: 2023-03-21 | Stop reason: HOSPADM

## 2023-03-21 RX ORDER — LEVOFLOXACIN 750 MG/1
750 TABLET ORAL DAILY
Qty: 10 TABLET | Refills: 0 | Status: SHIPPED | OUTPATIENT
Start: 2023-03-21 | End: 2023-03-21 | Stop reason: HOSPADM

## 2023-03-21 RX ORDER — HYDROCODONE BITARTRATE AND ACETAMINOPHEN 5; 325 MG/1; MG/1
1 TABLET ORAL EVERY 6 HOURS PRN
Qty: 6 TABLET | Refills: 0 | Status: SHIPPED | OUTPATIENT
Start: 2023-03-21 | End: 2023-03-24

## 2023-03-21 RX ADMIN — PHENAZOPYRIDINE 200 MG: 200 TABLET ORAL at 19:19

## 2023-03-21 RX ADMIN — CEFTRIAXONE SODIUM 1000 MG: 1 INJECTION, POWDER, FOR SOLUTION INTRAMUSCULAR; INTRAVENOUS at 05:40

## 2023-03-21 RX ADMIN — ONDANSETRON 4 MG: 2 INJECTION INTRAMUSCULAR; INTRAVENOUS at 12:18

## 2023-03-21 RX ADMIN — FAMOTIDINE 20 MG: 10 INJECTION, SOLUTION INTRAVENOUS at 19:31

## 2023-03-21 RX ADMIN — SODIUM CHLORIDE, PRESERVATIVE FREE 10 ML: 5 INJECTION INTRAVENOUS at 19:32

## 2023-03-21 RX ADMIN — PHENAZOPYRIDINE 200 MG: 200 TABLET ORAL at 08:19

## 2023-03-21 RX ADMIN — PHENAZOPYRIDINE 200 MG: 200 TABLET ORAL at 13:36

## 2023-03-21 RX ADMIN — TRAMADOL HYDROCHLORIDE 50 MG: 50 TABLET ORAL at 13:36

## 2023-03-21 RX ADMIN — HYDROCODONE BITARTRATE AND ACETAMINOPHEN 1 TABLET: 5; 325 TABLET ORAL at 20:13

## 2023-03-21 RX ADMIN — FAMOTIDINE 20 MG: 10 INJECTION, SOLUTION INTRAVENOUS at 08:22

## 2023-03-21 RX ADMIN — ENOXAPARIN SODIUM 40 MG: 100 INJECTION SUBCUTANEOUS at 08:21

## 2023-03-21 RX ADMIN — SODIUM CHLORIDE: 9 INJECTION, SOLUTION INTRAVENOUS at 03:54

## 2023-03-21 RX ADMIN — PROMETHAZINE HYDROCHLORIDE 12.5 MG: 12.5 TABLET ORAL at 16:20

## 2023-03-21 RX ADMIN — SODIUM CHLORIDE: 9 INJECTION, SOLUTION INTRAVENOUS at 12:22

## 2023-03-21 RX ADMIN — SODIUM CHLORIDE: 9 INJECTION, SOLUTION INTRAVENOUS at 20:08

## 2023-03-21 RX ADMIN — ONDANSETRON 4 MG: 4 TABLET, ORALLY DISINTEGRATING ORAL at 05:41

## 2023-03-21 ASSESSMENT — PAIN DESCRIPTION - ONSET: ONSET: ON-GOING

## 2023-03-21 ASSESSMENT — PAIN DESCRIPTION - PAIN TYPE: TYPE: ACUTE PAIN

## 2023-03-21 ASSESSMENT — ENCOUNTER SYMPTOMS
WHEEZING: 0
SHORTNESS OF BREATH: 0
VOMITING: 0
CONSTIPATION: 0
NAUSEA: 0
SINUS PAIN: 0
DIARRHEA: 0
SINUS PRESSURE: 0
ABDOMINAL PAIN: 0
PHOTOPHOBIA: 0
COUGH: 0

## 2023-03-21 ASSESSMENT — PAIN DESCRIPTION - DESCRIPTORS
DESCRIPTORS: CRAMPING;SHARP;SHOOTING
DESCRIPTORS: SHOOTING;STABBING;SHARP
DESCRIPTORS: CRAMPING
DESCRIPTORS: CRAMPING;SHARP;SHOOTING

## 2023-03-21 ASSESSMENT — PAIN DESCRIPTION - LOCATION
LOCATION: FLANK
LOCATION: PELVIS;FLANK
LOCATION: FLANK
LOCATION: ABDOMEN;FLANK

## 2023-03-21 ASSESSMENT — PAIN SCALES - GENERAL
PAINLEVEL_OUTOF10: 8
PAINLEVEL_OUTOF10: 3
PAINLEVEL_OUTOF10: 7

## 2023-03-21 ASSESSMENT — PAIN DESCRIPTION - ORIENTATION
ORIENTATION: LEFT
ORIENTATION: MID;LEFT
ORIENTATION: LEFT
ORIENTATION: LEFT

## 2023-03-21 ASSESSMENT — PAIN - FUNCTIONAL ASSESSMENT: PAIN_FUNCTIONAL_ASSESSMENT: ACTIVITIES ARE NOT PREVENTED

## 2023-03-21 NOTE — PLAN OF CARE
Problem: Discharge Planning  Goal: Discharge to home or other facility with appropriate resources  Outcome: Progressing  Flowsheets (Taken 3/21/2023 0033)  Discharge to home or other facility with appropriate resources: Identify barriers to discharge with patient and caregiver     Problem: Pain  Goal: Verbalizes/displays adequate comfort level or baseline comfort level  Outcome: Progressing  Flowsheets (Taken 3/21/2023 0033)  Verbalizes/displays adequate comfort level or baseline comfort level:   Encourage patient to monitor pain and request assistance   Assess pain using appropriate pain scale   Administer analgesics based on type and severity of pain and evaluate response   Implement non-pharmacological measures as appropriate and evaluate response   Consider cultural and social influences on pain and pain management   Notify Licensed Independent Practitioner if interventions unsuccessful or patient reports new pain

## 2023-03-21 NOTE — PLAN OF CARE
Problem: Discharge Planning  Goal: Discharge to home or other facility with appropriate resources  Outcome: Adequate for Discharge     Problem: Pain  Goal: Verbalizes/displays adequate comfort level or baseline comfort level  Outcome: Not Progressing     Problem: Pain  Goal: Verbalizes/displays adequate comfort level or baseline comfort level  Outcome: Not Progressing   Pt has not been able to eat more than 25%. Persistent left flank pain and nausea. Pt did voice the phenagran was more effective than the zofran. Bing Askew reevaluated this afternoon and said pt will stay another night and will see in the morning. IV fluids maintained. Voiding well.

## 2023-03-22 ENCOUNTER — TELEPHONE (OUTPATIENT)
Dept: FAMILY MEDICINE CLINIC | Age: 28
End: 2023-03-22

## 2023-03-22 VITALS
OXYGEN SATURATION: 97 % | HEIGHT: 65 IN | TEMPERATURE: 97.7 F | RESPIRATION RATE: 18 BRPM | HEART RATE: 70 BPM | BODY MASS INDEX: 31.97 KG/M2 | SYSTOLIC BLOOD PRESSURE: 110 MMHG | WEIGHT: 191.9 LBS | DIASTOLIC BLOOD PRESSURE: 73 MMHG

## 2023-03-22 PROCEDURE — 2580000003 HC RX 258: Performed by: NURSE PRACTITIONER

## 2023-03-22 PROCEDURE — 6370000000 HC RX 637 (ALT 250 FOR IP): Performed by: INTERNAL MEDICINE

## 2023-03-22 PROCEDURE — 99239 HOSP IP/OBS DSCHRG MGMT >30: CPT | Performed by: NURSE PRACTITIONER

## 2023-03-22 PROCEDURE — 96361 HYDRATE IV INFUSION ADD-ON: CPT

## 2023-03-22 PROCEDURE — G0378 HOSPITAL OBSERVATION PER HR: HCPCS

## 2023-03-22 PROCEDURE — 6360000002 HC RX W HCPCS: Performed by: NURSE PRACTITIONER

## 2023-03-22 PROCEDURE — A4216 STERILE WATER/SALINE, 10 ML: HCPCS | Performed by: NURSE PRACTITIONER

## 2023-03-22 PROCEDURE — 6370000000 HC RX 637 (ALT 250 FOR IP): Performed by: NURSE PRACTITIONER

## 2023-03-22 PROCEDURE — 96366 THER/PROPH/DIAG IV INF ADDON: CPT

## 2023-03-22 PROCEDURE — 2500000003 HC RX 250 WO HCPCS: Performed by: NURSE PRACTITIONER

## 2023-03-22 RX ADMIN — SODIUM CHLORIDE: 9 INJECTION, SOLUTION INTRAVENOUS at 03:26

## 2023-03-22 RX ADMIN — PHENAZOPYRIDINE 200 MG: 200 TABLET ORAL at 10:55

## 2023-03-22 RX ADMIN — FAMOTIDINE 20 MG: 10 INJECTION, SOLUTION INTRAVENOUS at 10:55

## 2023-03-22 RX ADMIN — PHENAZOPYRIDINE 200 MG: 200 TABLET ORAL at 14:48

## 2023-03-22 RX ADMIN — CEFTRIAXONE SODIUM 1000 MG: 1 INJECTION, POWDER, FOR SOLUTION INTRAMUSCULAR; INTRAVENOUS at 05:34

## 2023-03-22 RX ADMIN — HYDROCODONE BITARTRATE AND ACETAMINOPHEN 1 TABLET: 5; 325 TABLET ORAL at 10:54

## 2023-03-22 ASSESSMENT — PAIN SCALES - GENERAL
PAINLEVEL_OUTOF10: 8
PAINLEVEL_OUTOF10: 7

## 2023-03-22 ASSESSMENT — PAIN DESCRIPTION - PAIN TYPE: TYPE: ACUTE PAIN

## 2023-03-22 ASSESSMENT — PAIN DESCRIPTION - ORIENTATION
ORIENTATION: LEFT
ORIENTATION: LEFT

## 2023-03-22 ASSESSMENT — PAIN DESCRIPTION - DESCRIPTORS
DESCRIPTORS: ACHING
DESCRIPTORS: ACHING

## 2023-03-22 ASSESSMENT — PAIN DESCRIPTION - LOCATION
LOCATION: FLANK;ABDOMEN
LOCATION: ABDOMEN;FLANK

## 2023-03-22 ASSESSMENT — PAIN DESCRIPTION - ONSET: ONSET: ON-GOING

## 2023-03-22 NOTE — TELEPHONE ENCOUNTER
Care Transitions Initial Follow Up Call    Outreach made within 2 business days of discharge: Yes    Patient: Chema Gentile Patient : 1995   MRN: [id number]  Reason for Admission: There are no discharge diagnoses documented for the most recent discharge. Discharge Date: 3/22/23       Spoke with: YVONNE for patient to call the office    Discharge department/facility: [facility] Patient Contact:  Was patient able to fill all prescriptions:   Was patient instructed to bring all medications to the follow-up visit:   Is patient taking all medications as directed in the discharge summary?    Does patient understand their discharge instructions:   Does patient have questions or concerns that need addressed prior to 7-14 day follow up office visit:     Scheduled appointment with PCP within 7-14 days    Follow Up  Future Appointments   Date Time Provider Kevin Marsh   2023  1:00 PM Inge Sams, [address] [place] [address]

## 2023-03-22 NOTE — DISCHARGE INSTR - COC
Continuity of Care Form    Patient Name: Elina Churchill   :  1995  MRN:  [id number]    Admit date:  3/19/2023  Discharge date:  ***    Code Status Order: Full Code   Advance Directives:     Admitting Physician:  Matt Saldaña DO  PCP: Joselyn Ramirez MD    Discharging Nurse: [facility] Unit/Room#: 2103/2103-01  Discharging Unit Phone Number: ***    Emergency Contact:   Extended Emergency Contact Information  Primary Emergency Contact: Leigh Rae of [facility] Phone: [phone]  Relation: Parent  Secondary Emergency Contact: [address] of [facility] Phone: [phone]  Relation: Parent    Past Surgical History:  Past Surgical History:   Procedure Laterality Date    ABDOMEN SURGERY  12/10/2021    DIAGNOSTIC LAPAROSCOPY, UNILATERAL SALPINGECTOMY RIGHT    ARM SURGERY Right     fracture repair    BREAST REDUCTION SURGERY  2018    CYSTOSCOPY N/A 2019    CYSTOSCOPY RETROGRADE PYELOGRAM WITH  CYSTOGRAM, performed by Ulices Vergara MD at [address] N/A 2021    DILATATION AND CURETTAGE SUCTION performed by Jose Goel MD at [address] [place] [address] 12/10/2021    DIAGNOSTIC LAPAROSCOPY, UNILATERAL SALPINGECTOMY performed by Subha Perkins MD at [address] [facility]  2022    diagnostic    LAPAROSCOPY N/A 2022    DIAGNOSTIC LAPAROSCOPY performed by Deuce Toussaint DO at [address] N/A 2018    CHOLECYSTECTOMY LAPAROSCOPIC performed by Jacquelin Zhao MD at [facility]         Immunization History:   Immunization History   Administered Date(s) Administered    COVID-19, PFIZER PURPLE top, DILUTE for use, (age 15 y+), 30mcg/0.3mL 2022, 2022    DTP 1995, 1996, 1997, 1998    DTaP vaccine 08/15/2001    HPV (Human Papilloma Virus)Vaccine 2012    HPV Assistance OOB with selected safe patient handling equipment/Communicate Fall Risk and Risk Factors to all staff, patient, and family/Discuss with provider need for PT consult/Monitor gait and stability/Provide patient with walking aids - walker, cane, crutches/Reinforce activity limits and safety measures with patient and family/Visual Cue: Yellow wristband/Bed in lowest position, wheels locked, appropriate side rails in place/Call bell, personal items and telephone in reach/Instruct patient to call for assistance before getting out of bed or chair/Non-slip footwear when patient is out of bed/[place] to call system/Physically safe environment - no spills, clutter or unnecessary equipment/Purposeful Proactive Rounding/Room/bathroom lighting operational, light cord in reach

## 2023-03-22 NOTE — PROGRESS NOTES
CLINICAL PHARMACY NOTE: MEDS TO BEDS    Total # of Prescriptions Filled: 4   The following medications were delivered to the patient:  Levofloxacin 500 mg  Phenazopyrdine 200 mg   Promethazine 12.5  Hydrocodone/ace 5/325    Additional Documentation:
Discharge order in. The patient is awaiting her Dad to arrive.
PATIENT brought to room by er. No ivf are infusing, no iv potasium infusing as discussed during report.
The patient is discharged home at this time; family member present to drive her home.
Rate and Rhythm: Normal rate and regular rhythm. Pulses: Normal pulses. Heart sounds: Normal heart sounds. No murmur heard. Pulmonary:      Effort: Pulmonary effort is normal. No respiratory distress. Abdominal:      General: Abdomen is flat. Bowel sounds are normal. There is no distension. Palpations: Abdomen is soft. Tenderness: There is no abdominal tenderness. Musculoskeletal:         General: No swelling or tenderness. Normal range of motion. Cervical back: Normal range of motion and neck supple. Skin:     General: Skin is warm and dry. Capillary Refill: Capillary refill takes less than 2 seconds. Coloration: Skin is not pale. Neurological:      General: No focal deficit present. Mental Status: She is alert and oriented to person, place, and time. Mental status is at baseline. Psychiatric:         Mood and Affect: Mood normal.         Behavior: Behavior normal.         Thought Content:  Thought content normal.         Judgment: Judgment normal.       Assessment:        Hospital Problems             Last Modified POA    * (Principal) Acute cystitis without hematuria 3/20/2023 Yes    Hypokalemia 3/20/2023 Yes       Plan:        Acute cystitis with electrolyte abnormalities likely secondary to dehydration  IV hydration completed overnight, electrolytes corrected  Transition to oral antibiotic  We will transition antibiotic to Levaquin to broaden coverage due to recurrence  Discharge today    KEMAR Donald NP  3/21/2023  11:45 AM

## 2023-03-22 NOTE — TELEPHONE ENCOUNTER
Care Transitions Initial Follow Up Call     Outreach made within 2 business days of discharge: Yes     Patient: Franklyn Marion     Patient : 1995   MRN: [id number]     Reason for Admission: There are no discharge diagnoses documented for the most recent discharge. Discharge Date: 3/22/23                        Spoke with: Oh Liu     Discharge department/facility: [facility] Interactive Patient Contact:  Was patient able to fill all prescriptions:  yes  Was patient instructed to bring all medications to the follow-up visit: yes  Is patient taking all medications as directed in the discharge summary?  yes  Does patient understand their discharge instructions: yes  Does patient have questions or concerns that need addressed prior to 7-14 day follow up office visit: no     Scheduled appointment with PCP within 7-14 days     Follow Up         Future Appointments   Date Time Provider Kevin Marsh   2023  1:00 PM Inge Suarez, [facility]       patient also has F/U scheduled with Dr Damon Meng on 23

## 2023-03-22 NOTE — PLAN OF CARE
Problem: Discharge Planning  Goal: Discharge to home or other facility with appropriate resources  3/21/2023 2232 by Yvette Driver RN  Outcome: Progressing  Flowsheets (Taken 3/21/2023 2232)  Discharge to home or other facility with appropriate resources:   Identify barriers to discharge with patient and caregiver   Identify discharge learning needs (meds, wound care, etc)     Problem: Pain  Goal: Verbalizes/displays adequate comfort level or baseline comfort level  3/21/2023 2232 by Yvette Driver RN  Outcome: Progressing  Flowsheets (Taken 3/21/2023 2232)  Verbalizes/displays adequate comfort level or baseline comfort level:   Encourage patient to monitor pain and request assistance   Assess pain using appropriate pain scale   Administer analgesics based on type and severity of pain and evaluate response   Consider cultural and social influences on pain and pain management   Implement non-pharmacological measures as appropriate and evaluate response   Notify Licensed Independent Practitioner if interventions unsuccessful or patient reports new pain

## 2023-03-22 NOTE — DISCHARGE INSTR - DIET

## 2023-03-22 NOTE — PLAN OF CARE
Problem: Discharge Planning  Goal: Discharge to home or other facility with appropriate resources  3/22/2023 1218 by Tiffanie Javed RN  Outcome: Progressing  3/21/2023 2232 by Berhane Pina RN  Outcome: Progressing  Flowsheets (Taken 3/21/2023 2232)  Discharge to home or other facility with appropriate resources:   Identify barriers to discharge with patient and caregiver   Identify discharge learning needs (meds, wound care, etc)     Problem: Pain  Goal: Verbalizes/displays adequate comfort level or baseline comfort level  3/22/2023 1218 by Tiffanie Javed RN  Outcome: Progressing  3/21/2023 2232 by Berhane Pina RN  Outcome: Progressing  Flowsheets (Taken 3/21/2023 2232)  Verbalizes/displays adequate comfort level or baseline comfort level:   Encourage patient to monitor pain and request assistance   Assess pain using appropriate pain scale   Administer analgesics based on type and severity of pain and evaluate response   Consider cultural and social influences on pain and pain management   Implement non-pharmacological measures as appropriate and evaluate response   Notify Licensed Independent Practitioner if interventions unsuccessful or patient reports new pain

## 2023-03-22 NOTE — DISCHARGE SUMMARY
CONTRAST Additional Contrast? None    Result Date: 3/20/2023  No acute inflammatory process identified. Consultations:    Consults:     Final Specialist Recommendations/Findings:   IP CONSULT TO HOSPITALIST      The patient was seen and examined on day of discharge and this discharge summary is in conjunction with any daily progress note from day of discharge. Discharge plan:     Disposition: Home    Physician Follow Up:     Fortino Vance MD  [address] [phone]    Call in 1 week(s)  If symptoms worsen    Inge Canchola MD  [address] [address] [phone]           Requiring Further Evaluation/Follow Up POST HOSPITALIZATION/Incidental Findings: Recurrent UTIs    Diet: regular diet and encourage fluids    Activity: As tolerated    Instructions to Patient: Take the antibiotic all the way to completion. Call your primary care provider if your symptoms worsen. You may benefit from urology evaluation due to your recurrent UTIs. Discharge Medications:      Medication List        START taking these medications      HYDROcodone-acetaminophen 5-325 MG per tablet  Commonly known as: NORCO  Take 1 tablet by mouth every 6 hours as needed for Pain for up to 3 days. Max Daily Amount: 4 tablets     levoFLOXacin 500 MG tablet  Commonly known as: LEVAQUIN  Take 1 tablet by mouth daily for 7 days     ondansetron 4 MG disintegrating tablet  Commonly known as: ZOFRAN-ODT  Take 1 tablet by mouth 3 times daily as needed for Nausea or Vomiting     phenazopyridine 200 MG tablet  Commonly known as: PYRIDIUM  Take 1 tablet by mouth 3 times daily (with meals) for 3 days     promethazine 12.5 MG tablet  Commonly known as: PHENERGAN  Take 1 tablet by mouth every 6 hours as needed for Nausea     traMADol 50 MG tablet  Commonly known as: ULTRAM  Take 1 tablet by mouth every 6 hours as needed for Pain for up to 3 days.  Max Daily Amount: 200 mg            CONTINUE taking
known as: Ventolin HFA  Inhale 2 puffs into the lungs every 4 hours as needed for Wheezing or Shortness of Breath     budesonide-formoterol 160-4.5 MCG/ACT Aero  Commonly known as: Symbicort  Inhale 2 puffs into the lungs in the morning and 2 puffs before bedtime. fluticasone 50 MCG/ACT nasal spray  Commonly known as: Flonase  1 spray by Nasal route daily     ibuprofen 800 MG tablet  Commonly known as: ADVIL;MOTRIN  Take 1 tablet by mouth every 8 hours as needed for Pain or Fever     ketoconazole 2 % cream  Commonly known as: NIZORAL  Apply 1 Applicatorful topically 2 times daily Apply topically BID for 2-4 weeks     loratadine 10 MG tablet  Commonly known as: Claritin  Take 1 tablet by mouth daily     montelukast 10 MG tablet  Commonly known as: SINGULAIR  Take 1 tablet by mouth in the morning. STOP taking these medications      meloxicam 15 MG tablet  Commonly known as: Mobic               Where to Get Your Medications        These medications were sent to [facility] [address], [address] [place] [address] [phone] Mitesh Humphrey [phone]  [address] [place] [id number]      Phone: [phone]   HYDROcodone-acetaminophen 5-325 MG per tablet  levoFLOXacin 500 MG tablet  ondansetron 4 MG disintegrating tablet  phenazopyridine 200 MG tablet  promethazine 12.5 MG tablet         No discharge procedures on file. Time Spent on discharge is  38 mins in patient examination, evaluation, counseling as well as medication reconciliation, prescriptions for required medications, discharge plan and follow up. Electronically signed by   KEMAR Meng NP  3/22/2023  10:46 AM      Thank you Dr. Chai Quevedo MD for the opportunity to be involved in this patient's care.

## 2023-03-23 ENCOUNTER — PATIENT MESSAGE (OUTPATIENT)
Dept: FAMILY MEDICINE CLINIC | Age: 28
End: 2023-03-23

## 2023-03-23 NOTE — TELEPHONE ENCOUNTER
From: Warren Olea  To: Dr. Samantha Herr: 3/23/2023 10:56 AM EDT  Subject: Return to work     Good morning,   I just called the office, I was discharged from [facility] yesterday afternoon and my note states that I am unable to return to work until 03/25. I was feeling okay enough to return today but my employer stated I need a note saying I can return as of today 03/23. Is there any way possible that I can get a note stating that ?

## 2023-03-23 NOTE — LETTER
March 23, 2023       Kenyetta 812 YOB: 1995   [address] Date of Visit:  3/23/2023       To Whom It May Concern: It is my medical opinion that Catalina Ferrari may return to full participation immediately with no restrictions. If you have any questions or concerns, please don't hesitate to call.     Sincerely,        Martin Castaneda MD

## 2023-04-04 ENCOUNTER — TELEMEDICINE (OUTPATIENT)
Dept: FAMILY MEDICINE CLINIC | Age: 28
End: 2023-04-04
Payer: MEDICAID

## 2023-04-04 DIAGNOSIS — J45.40 MODERATE PERSISTENT ASTHMA WITHOUT COMPLICATION: ICD-10-CM

## 2023-04-04 DIAGNOSIS — Z09 HOSPITAL DISCHARGE FOLLOW-UP: Primary | ICD-10-CM

## 2023-04-04 DIAGNOSIS — J30.1 SEASONAL ALLERGIC RHINITIS DUE TO POLLEN: ICD-10-CM

## 2023-04-04 DIAGNOSIS — J45.990 EXERCISE-INDUCED BRONCHOCONSTRICTION: ICD-10-CM

## 2023-04-04 PROCEDURE — 99214 OFFICE O/P EST MOD 30 MIN: CPT | Performed by: INTERNAL MEDICINE

## 2023-04-04 PROCEDURE — 1111F DSCHRG MED/CURRENT MED MERGE: CPT | Performed by: INTERNAL MEDICINE

## 2023-04-04 RX ORDER — MONTELUKAST SODIUM 10 MG/1
10 TABLET ORAL DAILY
Qty: 90 TABLET | Refills: 1 | Status: SHIPPED | OUTPATIENT
Start: 2023-04-04

## 2023-04-04 RX ORDER — BUDESONIDE AND FORMOTEROL FUMARATE DIHYDRATE 160; 4.5 UG/1; UG/1
2 AEROSOL RESPIRATORY (INHALATION) 2 TIMES DAILY
Qty: 30.6 G | Refills: 1 | Status: SHIPPED | OUTPATIENT
Start: 2023-04-04

## 2023-04-04 RX ORDER — LORATADINE 10 MG/1
10 TABLET ORAL DAILY
Qty: 30 TABLET | Refills: 3 | Status: SHIPPED | OUTPATIENT
Start: 2023-04-04

## 2023-04-04 RX ORDER — ALBUTEROL SULFATE 90 UG/1
2 AEROSOL, METERED RESPIRATORY (INHALATION) EVERY 4 HOURS PRN
Qty: 18 G | Refills: 3 | Status: SHIPPED | OUTPATIENT
Start: 2023-04-04 | End: 2024-04-03

## 2023-04-04 NOTE — PROGRESS NOTES
Post-Discharge Transitional Care Follow Up      Kenyetta Ervin   YOB: 1995    Date of Office Visit:  4/4/2023  Date of Hospital Admission: 3/19/23  Date of Hospital Discharge: 3/22/23  Readmission Risk Score(high >=14%. Medium >=10%):Readmission Risk Score: 9.1      Care management risk score Rising risk (score 2-5) and Complex Care (Scores >=6): No Risk Score On File     Non face to face  following discharge, date last encounter closed (first attempt may have been earlier): 03/22/2023     Call initiated 2 business days of discharge: Yes     Below is the assessment and plan developed based on review of pertinent history, physical exam, labs, studies, and medications. Hospital discharge follow-up  -     MD DISCHARGE MEDS RECONCILED W/ CURRENT OUTPATIENT MED LIST  Exercise-induced bronchoconstriction  -     budesonide-formoterol (SYMBICORT) 160-4.5 MCG/ACT AERO; Inhale 2 puffs into the lungs 2 times daily, Disp-30.6 g, R-1Normal  -     albuterol sulfate HFA (VENTOLIN HFA) 108 (90 Base) MCG/ACT inhaler; Inhale 2 puffs into the lungs every 4 hours as needed for Wheezing or Shortness of Breath, Disp-18 g, R-3Normal  Seasonal allergic rhinitis due to pollen  -     loratadine (CLARITIN) 10 MG tablet; Take 1 tablet by mouth daily, Disp-30 tablet, R-3Normal  Moderate persistent asthma without complication  -     montelukast (SINGULAIR) 10 MG tablet; Take 1 tablet by mouth daily, Disp-90 tablet, R-1Normal      Medical Decision Making: high complexity  No follow-ups on file. On this date 4/4/2023 I have spent 20 minutes reviewing previous notes, test results and face to face with the patient discussing the diagnosis and importance of compliance with the treatment plan as well as documenting on the day of the visit. Subjective:   HPI  Presented to the ER with abdominal pain nausea and vomiting, as well as flank pain and dysuria.   Diagnosed with UTI, treated with initially with IV antibiotics and then

## 2023-04-04 NOTE — PROGRESS NOTES
Pt is being seen today via [facility] for a hospital f/u    Pt was at [address] on 03/19/2023 til 03/22/2023 pt had a UTI, pt was prescribed an antibiotic and pain meds, pt finished the antibiotic UTI is gone     Pt states she is feeling better     Pt does a have a f/u with urology on 04/24/2023

## 2023-05-08 NOTE — CONSULTS
[facility]    Patient Name: Yoly Rodriguez     Patient : 1995  Room/Bed:   Admission Date/Time: 12/10/2021  4:03 AM  Primary Care Physician: No primary care provider on file. Consulting Provider: Dr. Darío Suazo  Reason for Consult: Abdominal pain s/p D&C    CC:   Chief Complaint   Patient presents with    Abdominal Pain     d&c 2 days ago, presents with worsening abd/pelvic pain                HPI: Yoly Rodriguez is a 23 y.o. female  presents to the ED w/ persistent abdominal pain 7 days post op suction dilation and curettage. She had an abnormally rising bHCG quant that miguel from  345>  927>  991. On 2421 she had an ultrasound that showed a small intrauterine gestational sac. With the inappropriately rising bHCG quant suggestive of a failed pregnancy patient elected surgical management. She had a suction D&C on 12/3. Path returned decidua and secretory endometrium, negative for trophoblastic tissue. Today returns with increasing pain the last 4 days, nausea, decreased appetite, fever at home. She reports before the surgery she had right sided pain and now that pain remains persistent. She describes it as someone wringing out a towel and it getting tighter and tighter. 10/10 at its worse, 6/10 at current.     REVIEW OF SYSTEMS:  Constitutional: + fever, + chills  HEENT: negative visual disturbances, negative headaches  Respiratory: negative dyspnea, negative cough  Cardiovascular: negative chest pain,  negative palpitations  Gastrointestinal: + abdominal pain, negative RUQ pain, negative N/V, negative diarrhea, negative constipation  Genitourinary: negative dysuria, negative vaginal discharge  Dermatological: negative rash  Hematologic: negative bruising  Immunologic/Lymphatic: negative recent illness, negative recent sick contact  Musculoskeletal: negative back pain, negative myalgias, negative arthralgias  Neurological:  negative dizziness, negative weakness  Behavior/Psych: negative depression, negative anxiety        OBSTETRICAL HISTORY:   OB History    Para Term  AB Living   2 0 0 0 0 0   SAB IAB Ectopic Molar Multiple Live Births   0 0 0 0 0 0      # Outcome Date GA Lbr Zohaib/2nd Weight Sex Delivery Anes PTL Lv   2             1                 PAST MEDICAL HISTORY:   has a past medical history of Gall stones, Kidney stone, and UTI (lower urinary tract infection). PAST SURGICAL HISTORY:   has a past surgical history that includes Arm Surgery (Right); [place] tooth extraction; Cholecystectomy (2018); pr lap,cholecystectomy (N/A, 2018); Breast reduction surgery (2018); Cystocopy (2019); Cystoscopy (N/A, 2019); Dilation and curettage of uterus (2021); and Dilation and curettage of uterus (N/A, 12/3/2021). ALLERGIES:  Allergies as of 12/10/2021 - Fully Reviewed 2021   Allergen Reaction Noted    Amoxicillin Hives 2015    Lactose  2015    Other  2021       MEDICATIONS:  No current facility-administered medications for this encounter. Current Outpatient Medications   Medication Sig Dispense Refill    metroNIDAZOLE (FLAGYL) 500 MG tablet Take 500 mg by mouth twice a week      ibuprofen (ADVIL;MOTRIN) 600 MG tablet Take 1 tablet by mouth every 6 hours as needed for Pain 30 tablet 0    ondansetron (ZOFRAN ODT) 4 MG disintegrating tablet Take 1 tablet by mouth every 8 hours as needed for Nausea or Vomiting 12 tablet 0    docusate sodium (COLACE) 100 MG capsule Take 1 capsule by mouth daily 30 capsule 0    acetaminophen (TYLENOL) 500 MG tablet Take 2 tablets by mouth every 6 hours as needed for Pain 120 tablet 0       FAMILY HISTORY:  family history is not on file. SOCIAL HISTORY:   reports that she has never smoked. She has never used smokeless tobacco. She reports current alcohol use.  She reports that she does not use drugs.  ________________________________________________________________________                                    VITALS:  Vitals:    12/10/21 0402 12/10/21 0412   BP: 131/85 124/75   Pulse: 119 94   Resp: 22 20   Temp: 97.5 °F (36.4 °C) 98.3 °F (36.8 °C)   TempSrc: Oral Oral   SpO2: 99% 97%   Weight:  185 lb (83.9 kg)   Height:  5' 4\" (1.626 m)                                                 INPUT/OUTPUT:  No intake/output data recorded. No intake/output data recorded. PHYSICAL EXAM:     General Appearance: Appears healthy. Alert; in no acute distress. Pleasant. Respiratory: Normal expansion. Clear to auscultation. No rales, rhonchi, or wheezing.   Cardiovascular: normal, regular rate and rhythm  Abdomen: soft, tender in RLQ, non-distended, no right upper quadrant tenderness and no CVA tenderness, no rebound, guarding, or rigidity, no peritoneal signs  Pelvic Exam:   Chaperone for Intimate Exam: Chaperone was present for entire exam, Chaperone Name: ED RN  External genitalia: General appearance; normal, Hair distribution; normal, Lesions absent  Urinary system: urethral meatus normal  Vaginal: normal mucosa, scant blood  Cervix: normal appearing cervix without discharge or lesions  Adnexa: normal adnexa in size, nontender and no masses, tender to palpation over right adnexa   Uterus: normal single, nontender  Musculoskeletal: no gross abnormalities  Extremities: non-tender BLE and non-edematous    LAB RESULTS:  Results for orders placed or performed during the hospital encounter of 12/10/21   CBC WITH AUTO DIFFERENTIAL   Result Value Ref Range    WBC 14.0 (H) 3.5 - 11.3 k/uL    RBC 4.73 3.95 - 5.11 m/uL    Hemoglobin 14.2 11.9 - 15.1 g/dL    Hematocrit 40.8 36.3 - 47.1 %    MCV 86.3 82.6 - 102.9 fL    MCH 30.0 25.2 - 33.5 pg    MCHC 34.8 28.4 - 34.8 g/dL    RDW 13.2 11.8 - 14.4 % Platelets 617 385 - 471 k/uL    MPV 10.8 8.1 - 13.5 fL    NRBC Automated 0.0 0.0 per 100 WBC    Differential Type NOT REPORTED     Seg Neutrophils 66 (H) 36 - 65 %    Lymphocytes 27 24 - 43 %    Monocytes 6 3 - 12 %    Eosinophils % 1 1 - 4 %    Basophils 0 0 - 2 %    Immature Granulocytes 0 0 %    Segs Absolute 9.12 (H) 1.50 - 8.10 k/uL    Absolute Lymph # 3.79 (H) 1.10 - 3.70 k/uL    Absolute Mono # 0.88 0.10 - 1.20 k/uL    Absolute Eos # 0.11 0.00 - 0.44 k/uL    Basophils Absolute 0.06 0.00 - 0.20 k/uL    Absolute Immature Granulocyte 0.06 0.00 - 0.30 k/uL    WBC Morphology NOT REPORTED     RBC Morphology NOT REPORTED     Platelet Estimate NOT REPORTED    HCG, QUANTITATIVE, PREGNANCY   Result Value Ref Range    hCG Quant 727 (H) <5 IU/L   BASIC METABOLIC PANEL   Result Value Ref Range    Glucose 105 (H) 70 - 99 mg/dL    BUN 12 6 - 20 mg/dL    CREATININE 0.75 0.50 - 0.90 mg/dL    Bun/Cre Ratio NOT REPORTED 9 - 20    Calcium 9.4 8.6 - 10.4 mg/dL    Sodium 137 135 - 144 mmol/L    Potassium 3.6 (L) 3.7 - 5.3 mmol/L    Chloride 101 98 - 107 mmol/L    CO2 20 20 - 31 mmol/L    Anion Gap 16 9 - 17 mmol/L    GFR Non-African American >60 >60 mL/min    GFR African American >60 >60 mL/min    GFR Comment          GFR Staging NOT REPORTED        DIAGNOSTICS:    US OB LESS THAN 14 WEEKS SINGLE OR FIRST GESTATION    Result Date: 11/24/2021  EXAMINATION: TRANSABDOMINAL FIRST TRIMESTER OBSTETRIC PELVIC ULTRASOUND WITH COLOR DOPPLER FLOW; FIRST TRIMESTER OBSTETRIC ULTRASOUND 11/24/2021 TECHNIQUE: TRANSABDOMINAL PELVIC ULTRASOUND WITH COLOR DOPPLER FLOW; Transvaginal first trimester obstetric pelvic ultrasound was performed with color Doppler flow evaluation. COMPARISON: None HISTORY: ORDERING SYSTEM PROVIDED HISTORY: pregnant, pain, bleeding TECHNOLOGIST PROVIDED HISTORY: Pain and bleeding FINDINGS: Uterus: 9.0 x 4.6 x 5.9 cm Endometrium: 1.3 cm.  Gestational Sac(s):  Tiny anechoic area within the endometrium measuring 0.29 cm, possibly gestational sac. No visualized IUP. Small anechoic area in the right uterus measuring 5 mm suggestive of benign cyst. Right ovary: 4.0 x 2.6 x 2.1 cm. Likely corpus luteum cyst measuring 1.6 cm. Left ovary: 1.9 x 1.51.7 cm Free fluid: Small amount of free fluid throughout the pelvis. Possible small intrauterine gestational sac. Recommend short-term follow-up ultrasound to reassess. US OB TRANSVAGINAL    Result Date: 2021  EXAMINATION: TRANSABDOMINAL FIRST TRIMESTER OBSTETRIC PELVIC ULTRASOUND WITH COLOR DOPPLER FLOW; FIRST TRIMESTER OBSTETRIC ULTRASOUND 2021 TECHNIQUE: TRANSABDOMINAL PELVIC ULTRASOUND WITH COLOR DOPPLER FLOW; Transvaginal first trimester obstetric pelvic ultrasound was performed with color Doppler flow evaluation. COMPARISON: None HISTORY: ORDERING SYSTEM PROVIDED HISTORY: pregnant, pain, bleeding TECHNOLOGIST PROVIDED HISTORY: Pain and bleeding FINDINGS: Uterus: 9.0 x 4.6 x 5.9 cm Endometrium: 1.3 cm. Gestational Sac(s):  Tiny anechoic area within the endometrium measuring 0.29 cm, possibly gestational sac. No visualized IUP. Small anechoic area in the right uterus measuring 5 mm suggestive of benign cyst. Right ovary: 4.0 x 2.6 x 2.1 cm. Likely corpus luteum cyst measuring 1.6 cm. Left ovary: 1.9 x 1.51.7 cm Free fluid: Small amount of free fluid throughout the pelvis. Possible small intrauterine gestational sac. Recommend short-term follow-up ultrasound to reassess.        ASSESSMENT & PLAN:    Pierre Adkins is a 23 y.o. female  with persistent RLQ pain after D&C on 12/3/21   - V[facility]   [facility]  927[facility]  991 followed by suction D&C 12/3    - [facility] today 727   - CMP unremarkable   - Path from [facility]  returned decidua and secretory endometrium, negative for trophoblastic tissue.   - Hgb 14.2 today, small leukocytosis of 14 but afebrile   - Patient w/ persistent right sided pain since surgery, now w/ nausea and fevers at home   - Abdominal exam is not peritoneal   - Will obtain stat TVUS for concern for right sided ectopic   - Will continue to monitor, patient is stable at this time, please contact OBGYN w/ change in status or concerns while awaiting ultrasound     Hx SAB x1   - Patient reports uncomplicated       Hx TAB x1   - Surgical at 8 weeks per patient       Hx Pyelo      Hx kidney stones      Hx cholecystectomy       Patient Active Problem List    Diagnosis Date Noted    Missed      History of kidney stones 2019    Pyelonephritis 02/15/2019    Cholecystitis     E. coli UTI (urinary tract infection) 2018    Acute pyelonephritis 2018    Allergic reaction caused by a drug        Plan discussed with Dr. Rosy Brunson, who is agreeable.      Attending's Name: Dr. Mg Jones DO  Ob/Gyn Resident    Angel [address]  12/10/2021, 6:10 AM Donor Site Anesthesia Type: same as repair anesthesia

## 2023-07-28 NOTE — ED PROVIDER NOTES
Team [address] ED  eMERGENCY dEPARTMENT eNCOUnter      Pt Name: Nupur Rachel  MRN: [id number]  Susangfcheryl 1995  Date of evaluation: 2020  Provider: KEMAR Wheat [id number]       Chief Complaint   Patient presents with    Vaginal Bleeding     pregnant         HISTORY OF PRESENT ILLNESS  (Location/Symptom, Timing/Onset, Context/Setting, Quality, Duration, Modifying Factors, Severity.)   Nupur Rachel is a 26 y.o. female who presents to the emergency department via private auto for low abd, right flank pain. Onset was 5 days ago. States she is pregnant. She also has a history of kidney infections. Her LNMP was 2020. She developed vaginal spotting today. Denies fever, chills, injury, vomiting, diarrhea. Rates her pain 5/10 at this time. Her per records her blood type is A+. She is .      Nursing Notes were reviewed.     ALLERGIES     Amoxicillin and Milk-related compounds    CURRENT MEDICATIONS       Discharge Medication List as of 2020 10:01 PM      CONTINUE these medications which have NOT CHANGED    Details   ibuprofen (IBU) 600 MG tablet Take 1 tablet by mouth every 6 hours as needed for Pain, Disp-40 tablet, R-0Print      acetaminophen (TYLENOL) 500 MG tablet Take 2 tablets by mouth every 6 hours as needed for Pain, Disp-120 tablet, R-0Print      magnesium citrate solution Take 296 mLs by mouth once for 1 dose, Disp-296 mL, R-0Print      ondansetron (ZOFRAN ODT) 4 MG disintegrating tablet Take 1 tablet by mouth every 4 hours as needed for Nausea, Disp-20 tablet, R-0Print             PAST MEDICAL HISTORY         Diagnosis Date    Gall stones     Kidney stone     Patient denies    UTI (lower urinary tract infection)        SURGICAL HISTORY           Procedure Laterality Date    ARM SURGERY Right     BREAST REDUCTION SURGERY  2018    CHOLECYSTECTOMY  2018    Laparoscopic    CYSTOSCOPY  2019    CYSTOSCOPY N/A 2019 CYSTOSCOPY RETROGRADE PYELOGRAM WITH  CYSTOGRAM, performed by Jaz Bradley MD at [address] [place] [address] N/A 5/18/2018    CHOLECYSTECTOMY LAPAROSCOPIC performed by Burt Camp MD at [facility]     History reviewed. No pertinent family history. No family status information on file. SOCIAL HISTORY      reports that she has never smoked. She has never used smokeless tobacco. She reports current alcohol use. She reports that she does not use drugs. REVIEW OF SYSTEMS    (2-9 systems for level 4, 10 or more for level 5)     Review of Systems   Constitutional: Negative for chills, diaphoresis, fatigue and fever. Respiratory: Negative for cough and shortness of breath. Cardiovascular: Negative for chest pain. Gastrointestinal: Positive for abdominal pain and nausea. Negative for diarrhea and vomiting. Genitourinary: Positive for flank pain and vaginal bleeding. Negative for dysuria, frequency, genital sores and hematuria. Musculoskeletal: Positive for back pain. Skin: Negative for color change, rash and wound. Neurological: Negative for dizziness, weakness and headaches. Except as noted above the remainder of the review of systems was reviewed and negative. PHYSICAL EXAM    (up to 7 for level 4, 8 or more for level 5)     ED Triage Vitals   BP Temp Temp src Pulse Resp SpO2 Height Weight   12/16/20 1813 12/16/20 1813 -- 12/16/20 1813 12/16/20 1813 12/16/20 1813 -- 12/16/20 1811   131/65 98 °F (36.7 °C)  95 14 100 %  185 lb 12.8 oz (84.3 kg)     Physical Exam  Vitals signs reviewed. Exam conducted with a chaperone present. Constitutional:       General: She is not in acute distress. Appearance: She is well-developed. She is not diaphoretic. Eyes:      General: No scleral icterus. Conjunctiva/sclera: Conjunctivae normal.   Neck:      Vascular: No JVD. Cardiovascular:      Rate and Rhythm: Normal rate. Pulmonary:      Effort: Pulmonary effort is normal. No respiratory distress. Abdominal:      General: There is no distension. Palpations: Abdomen is soft. Tenderness: There is no abdominal tenderness. Genitourinary:     Labia:         Right: No rash, tenderness or lesion. Left: No rash, tenderness or lesion. Vagina: No signs of injury and foreign body. Vaginal discharge (mild, white) present. No erythema or bleeding. Cervix: No cervical motion tenderness. Comments: Cervical os closed. Musculoskeletal:      Comments: Moves extremities   Skin:     General: Skin is warm and dry. Findings: No rash. Neurological:      Mental Status: She is alert and oriented to person, place, and time.    Psychiatric:         Behavior: Behavior normal.           DIAGNOSTIC RESULTS     RADIOLOGY:   Non-plain film images such as CT, Ultrasound and MRI are read by the radiologist. Plain radiographic images are visualized and preliminarily interpreted by the emergency physician with the below findings:    Interpretation per the Radiologist below, if available at the time of this note:    Us Ob Less Than 14 Weeks Single Or First Gestation    Result Date: 12/16/2020 EXAMINATION: FIRST TRIMESTER OBSTETRIC ULTRASOUND; TRANSABDOMINAL FIRST TRIMESTER OBSTETRIC PELVIC ULTRASOUND WITH COLOR DOPPLER FLOW 12/16/2020 TECHNIQUE: Transvaginal first trimester obstetric pelvic ultrasound was performed without color Doppler flow evaluation.; TRANSABDOMINAL PELVIC ULTRASOUND COMPARISON: None HISTORY: ORDERING SYSTEM PROVIDED HISTORY: r/o ectopic TECHNOLOGIST PROVIDED HISTORY: r/o ectopic FINDINGS: Uterus: 9 x 5.1 x 6.1 cm Gestational Sac(s):  Single normal appearing gestational sac. No evidence of subchorionic hemorrhage. Yolk Sac:  Present Fetal Pole:  Single fetal pole Crown Rump Length:  4 mm Fetal Heart Rate:  109 beats per minute Right ovary: Normal, measuring 2.6 x 1.3 x 1.6 cm Left ovary: Normal, measuring 3.5 x 3.1 x 3.2 cm including a 2 x 2.4 x 1.8 cm corpus luteum. Free fluid: Small amount of pelvic free fluid is likely physiologic. Measurements: Estimated gestational age by current ultrasound: 6 weeks, 0 days Estimated gestational by LMP/prior ultrasound: 6 weeks, 3 days Estimated Due Date: 08/11/2021     1. Single live intrauterine gestation of 6 weeks, 0 days estimated sonographic gestational age. No evidence of subchronic hemorrhage. 2. Normal ovaries. LABS:  Labs Reviewed   VAGINITIS DNA PROBE - Abnormal; Notable for the following components:       Result Value    Direct Exam POSITIVE for Gardnerella vaginalis.  (*)     All other components within normal limits   CBC WITH AUTO DIFFERENTIAL - Abnormal; Notable for the following components:    WBC 11.5 (*)     Seg Neutrophils 79 (*)     Lymphocytes 15 (*)     Eosinophils % 0 (*)     Segs Absolute 8.89 (*)     All other components within normal limits   BASIC METABOLIC PANEL - Abnormal; Notable for the following components:    Bun/Cre Ratio 8 (*)     Potassium 3.5 (*)     All other components within normal limits   HCG, QUANTITATIVE, PREGNANCY - Abnormal; Notable for the following components:    hCG Quant 34,600 (*) All other components within normal limits   URINALYSIS WITH MICROSCOPIC - Abnormal; Notable for the following components:    Turbidity UA SLIGHTLY CLOUDY (*)     Ketones, Urine TRACE (*)     Leukocyte Esterase, Urine SMALL (*)     Mucus, UA 1+ (*)     Amorphous, UA 1+ (*)     All other components within normal limits   POCT URINE PREGNANCY - Normal   CULTURE, URINE   C.TRACHOMATIS N.GONORRHOEAE DNA       All other labs were within normal range or not returned as of this dictation. EMERGENCY DEPARTMENT COURSE and DIFFERENTIAL DIAGNOSIS/MDM:   Vitals:    Vitals:    12/16/20 1811 12/16/20 1813   BP:  131/65   Pulse:  95   Resp:  14   Temp:  98 °F (36.7 °C)   SpO2:  100%   Weight: 185 lb 12.8 oz (84.3 kg)        CLINICAL DECISION MAKING:  The patient presented alert with a nontoxic appearance and was seen in conjunction with Dr. Edyth Severe. No bleeding was noted on examination. Ultrasound showed a single live intrauterine gestation of 6 weeks, 0 days estimated sonographic gestational age. She tested positive for gardnerella; she was advised to follow up with her OB/GYN for treatment. A prescription was written for Macrobid for her UTI. Follow up with OB/GYN, return to ED if condition worsens. FINAL IMPRESSION      1. Intrauterine pregnancy    2. Urinary tract infection without hematuria, site unspecified    3.  BV (bacterial vaginosis)            Problem List  Patient Active Problem List   Diagnosis Code    Allergic reaction caused by a drug T78.40XA    E. coli UTI (urinary tract infection) N39.0, B96.20    Acute pyelonephritis N10    Cholecystitis K81.9    Pyelonephritis N12    History of kidney stones Z87.442         DISPOSITION/PLAN   DISPOSITION  DISCHARGE      PATIENT REFERRED TO:   Martha Amaya, APRN - CNP  [address]  [address]  [place] [address]  [phone]    Schedule an appointment as soon as possible for a visit         [facility] Obstetrics/Gynecology  [address]   [address]   [phone]  Schedule an appointment as soon as possible for a visit         DISCHARGE MEDICATIONS:     Discharge Medication List as of 12/16/2020 10:01 PM      START taking these medications    Details   nitrofurantoin, macrocrystal-monohydrate, (MACROBID) 100 MG capsule Take 1 capsule by mouth 2 times daily for 7 days, Disp-14 capsule, R-0Print                 (Please note that portions of this note were completed with a voice recognition program.  Efforts were made to edit the dictations but occasionally words are mis-transcribed.)    Alen Barragan, KEMAR - KEMAR Crespo CNP  12/16/20 [id number] no

## 2023-10-19 ENCOUNTER — TELEMEDICINE (OUTPATIENT)
Dept: FAMILY MEDICINE CLINIC | Age: 28
End: 2023-10-19
Payer: MEDICAID

## 2023-10-19 DIAGNOSIS — B35.3 TINEA PEDIS OF BOTH FEET: ICD-10-CM

## 2023-10-19 DIAGNOSIS — N30.00 ACUTE CYSTITIS WITHOUT HEMATURIA: Primary | ICD-10-CM

## 2023-10-19 PROCEDURE — 99213 OFFICE O/P EST LOW 20 MIN: CPT | Performed by: NURSE PRACTITIONER

## 2023-10-19 RX ORDER — NITROFURANTOIN MACROCRYSTALS 100 MG/1
100 CAPSULE ORAL 2 TIMES DAILY
Qty: 10 CAPSULE | Refills: 0 | Status: SHIPPED | OUTPATIENT
Start: 2023-10-19 | End: 2023-10-24

## 2023-10-19 RX ORDER — PHENAZOPYRIDINE HYDROCHLORIDE 100 MG/1
100 TABLET, FILM COATED ORAL 3 TIMES DAILY PRN
Qty: 9 TABLET | Refills: 0 | Status: SHIPPED | OUTPATIENT
Start: 2023-10-19 | End: 2023-10-22

## 2023-10-19 RX ORDER — KETOCONAZOLE 20 MG/G
1 CREAM TOPICAL 2 TIMES DAILY
Qty: 60 G | Refills: 3 | Status: SHIPPED | OUTPATIENT
Start: 2023-10-19

## 2023-10-19 ASSESSMENT — ENCOUNTER SYMPTOMS: NAUSEA: 0

## 2023-10-19 NOTE — PROGRESS NOTES
Patient is present complaining of burning with urination, urinary frequency x5 days    Patient states she is not able to provider a urine sample today due to being at work    Patient is asking for a refill on Ketoconazole cream for her feet

## 2023-11-06 ENCOUNTER — APPOINTMENT (OUTPATIENT)
Dept: CT IMAGING | Age: 28
End: 2023-11-06
Payer: MEDICAID

## 2023-11-06 ENCOUNTER — HOSPITAL ENCOUNTER (EMERGENCY)
Age: 28
Discharge: HOME | End: 2023-11-06
Attending: EMERGENCY MEDICINE
Payer: MEDICAID

## 2023-11-06 VITALS
RESPIRATION RATE: 16 BRPM | TEMPERATURE: 97.7 F | HEIGHT: 64 IN | BODY MASS INDEX: 30.73 KG/M2 | HEART RATE: 73 BPM | SYSTOLIC BLOOD PRESSURE: 107 MMHG | WEIGHT: 180 LBS | DIASTOLIC BLOOD PRESSURE: 72 MMHG | OXYGEN SATURATION: 100 %

## 2023-11-06 DIAGNOSIS — R10.9 FLANK PAIN: Primary | ICD-10-CM

## 2023-11-06 LAB
ANION GAP SERPL CALCULATED.3IONS-SCNC: 10 MMOL/L (ref 9–17)
BACTERIA URNS QL MICRO: ABNORMAL
BASOPHILS # BLD: 0 K/UL (ref 0–0.2)
BASOPHILS NFR BLD: 1 % (ref 0–2)
BILIRUB UR QL STRIP: NEGATIVE
BUN SERPL-MCNC: 7 MG/DL (ref 6–20)
CALCIUM SERPL-MCNC: 8.8 MG/DL (ref 8.6–10.4)
CANDIDA SPECIES: NEGATIVE
CHARACTER UR: ABNORMAL
CHLORIDE SERPL-SCNC: 104 MMOL/L (ref 98–107)
CLARITY UR: CLEAR
CO2 SERPL-SCNC: 26 MMOL/L (ref 20–31)
COLOR UR: YELLOW
CREAT SERPL-MCNC: 0.8 MG/DL (ref 0.5–0.9)
EOSINOPHIL # BLD: 0.1 K/UL (ref 0–0.4)
EOSINOPHILS RELATIVE PERCENT: 1 % (ref 1–4)
EPI CELLS #/AREA URNS HPF: ABNORMAL /HPF (ref 0–5)
ERYTHROCYTE [DISTWIDTH] IN BLOOD BY AUTOMATED COUNT: 13.9 % (ref 12.5–15.4)
GARDNERELLA VAGINALIS: POSITIVE
GFR SERPL CREATININE-BSD FRML MDRD: >60 ML/MIN/1.73M2
GLUCOSE SERPL-MCNC: 92 MG/DL (ref 70–99)
GLUCOSE UR STRIP-MCNC: NEGATIVE MG/DL
HCG UR QL: NEGATIVE
HCT VFR BLD AUTO: 40.2 % (ref 36–46)
HGB BLD-MCNC: 13.7 G/DL (ref 12–16)
HGB UR QL STRIP.AUTO: ABNORMAL
KETONES UR STRIP-MCNC: ABNORMAL MG/DL
LEUKOCYTE ESTERASE UR QL STRIP: NEGATIVE
LYMPHOCYTES NFR BLD: 1.5 K/UL (ref 1–4.8)
LYMPHOCYTES RELATIVE PERCENT: 23 % (ref 24–44)
MCH RBC QN AUTO: 30.1 PG (ref 26–34)
MCHC RBC AUTO-ENTMCNC: 34.2 G/DL (ref 31–37)
MCV RBC AUTO: 88.1 FL (ref 80–100)
MONOCYTES NFR BLD: 0.3 K/UL (ref 0.1–1.2)
MONOCYTES NFR BLD: 5 % (ref 2–11)
NEUTROPHILS NFR BLD: 70 % (ref 36–66)
NEUTS SEG NFR BLD: 4.5 K/UL (ref 1.8–7.7)
NITRITE UR QL STRIP: NEGATIVE
PH UR STRIP: 6 [PH] (ref 5–8)
PLATELET # BLD AUTO: 225 K/UL (ref 140–450)
PMV BLD AUTO: 8.2 FL (ref 6–12)
POTASSIUM SERPL-SCNC: 3.7 MMOL/L (ref 3.7–5.3)
PROT UR STRIP-MCNC: NEGATIVE MG/DL
RBC # BLD AUTO: 4.56 M/UL (ref 4–5.2)
RBC #/AREA URNS HPF: ABNORMAL /HPF (ref 0–2)
SODIUM SERPL-SCNC: 140 MMOL/L (ref 135–144)
SOURCE: ABNORMAL
SP GR UR STRIP: 1.02 (ref 1–1.03)
TRICHOMONAS: NEGATIVE
UROBILINOGEN UR STRIP-ACNC: NORMAL EU/DL (ref 0–1)
WBC #/AREA URNS HPF: ABNORMAL /HPF (ref 0–5)
WBC OTHER # BLD: 6.5 K/UL (ref 3.5–11)

## 2023-11-06 PROCEDURE — 2580000003 HC RX 258: Performed by: EMERGENCY MEDICINE

## 2023-11-06 PROCEDURE — 87510 GARDNER VAG DNA DIR PROBE: CPT

## 2023-11-06 PROCEDURE — 74176 CT ABD & PELVIS W/O CONTRAST: CPT

## 2023-11-06 PROCEDURE — 87480 CANDIDA DNA DIR PROBE: CPT

## 2023-11-06 PROCEDURE — 81025 URINE PREGNANCY TEST: CPT

## 2023-11-06 PROCEDURE — 99284 EMERGENCY DEPT VISIT MOD MDM: CPT

## 2023-11-06 PROCEDURE — 96374 THER/PROPH/DIAG INJ IV PUSH: CPT

## 2023-11-06 PROCEDURE — 87491 CHLMYD TRACH DNA AMP PROBE: CPT

## 2023-11-06 PROCEDURE — 87529 HSV DNA AMP PROBE: CPT

## 2023-11-06 PROCEDURE — 87660 TRICHOMONAS VAGIN DIR PROBE: CPT

## 2023-11-06 PROCEDURE — 80048 BASIC METABOLIC PNL TOTAL CA: CPT

## 2023-11-06 PROCEDURE — 85025 COMPLETE CBC W/AUTO DIFF WBC: CPT

## 2023-11-06 PROCEDURE — 81001 URINALYSIS AUTO W/SCOPE: CPT

## 2023-11-06 PROCEDURE — 87591 N.GONORRHOEAE DNA AMP PROB: CPT

## 2023-11-06 PROCEDURE — 87086 URINE CULTURE/COLONY COUNT: CPT

## 2023-11-06 PROCEDURE — 6360000002 HC RX W HCPCS: Performed by: NURSE PRACTITIONER

## 2023-11-06 PROCEDURE — 36415 COLL VENOUS BLD VENIPUNCTURE: CPT

## 2023-11-06 RX ORDER — KETOROLAC TROMETHAMINE 15 MG/ML
15 INJECTION, SOLUTION INTRAMUSCULAR; INTRAVENOUS ONCE
Status: COMPLETED | OUTPATIENT
Start: 2023-11-06 | End: 2023-11-06

## 2023-11-06 RX ORDER — IBUPROFEN 600 MG/1
600 TABLET ORAL EVERY 6 HOURS PRN
Qty: 30 TABLET | Refills: 0 | Status: SHIPPED | OUTPATIENT
Start: 2023-11-06

## 2023-11-06 RX ORDER — 0.9 % SODIUM CHLORIDE 0.9 %
1000 INTRAVENOUS SOLUTION INTRAVENOUS ONCE
Status: COMPLETED | OUTPATIENT
Start: 2023-11-06 | End: 2023-11-06

## 2023-11-06 RX ADMIN — KETOROLAC TROMETHAMINE 15 MG: 15 INJECTION, SOLUTION INTRAMUSCULAR; INTRAVENOUS at 16:22

## 2023-11-06 RX ADMIN — SODIUM CHLORIDE 1000 ML: 9 INJECTION, SOLUTION INTRAVENOUS at 16:18

## 2023-11-06 ASSESSMENT — PAIN DESCRIPTION - ORIENTATION
ORIENTATION: RIGHT;LOWER
ORIENTATION: LEFT

## 2023-11-06 ASSESSMENT — LIFESTYLE VARIABLES
HOW OFTEN DO YOU HAVE A DRINK CONTAINING ALCOHOL: MONTHLY OR LESS
HOW MANY STANDARD DRINKS CONTAINING ALCOHOL DO YOU HAVE ON A TYPICAL DAY: 1 OR 2

## 2023-11-06 ASSESSMENT — PAIN DESCRIPTION - LOCATION
LOCATION: ABDOMEN;FLANK
LOCATION: ABDOMEN

## 2023-11-06 ASSESSMENT — PAIN SCALES - GENERAL: PAINLEVEL_OUTOF10: 5

## 2023-11-06 ASSESSMENT — PAIN - FUNCTIONAL ASSESSMENT: PAIN_FUNCTIONAL_ASSESSMENT: 0-10

## 2023-11-06 NOTE — DISCHARGE INSTRUCTIONS
Warm Moist compresses to affected area 3 times a day    Please follow up with you OB/GYN for re evaluation and continued care for pap test. We obtained cultures to test for STD not cancer.  So follow up in important    Take Motrin as directed as needed for pain     Your urine was sent for culture if it returns with a problem you will receive a phone call in antibiotics will be called in appropriately     Drink plenty of fluids to maintain hydration    Please follow-up with your family doctor in the next 2 to 3 days for reevaluation of symptoms    If your symptoms worsen or any new or concerning symptoms arise please return to the emergency department Closure 2 Information: This tab is for additional flaps and grafts, including complex repair and grafts and complex repair and flaps. You can also specify a different location for the additional defect, if the location is the same you do not need to select a new one. We will insert the automated text for the repair you select below just as we do for solitary flaps and grafts. Please note that at this time if you select a location with a different insurance zone you will need to override the ICD10 and CPT if appropriate.

## 2023-11-06 NOTE — ED NOTES
Assist provider in completing pelvic exam and cultures obtained. Pt tolerated well.      Christopher Peterson, RN  11/06/23 8086

## 2023-11-07 LAB
C TRACH DNA SPEC QL PROBE+SIG AMP: NEGATIVE
HSV1 DNA SPEC QL NAA+PROBE: NEGATIVE
HSV2 DNA SPEC QL NAA+PROBE: NEGATIVE
MICROORGANISM SPEC CULT: NORMAL
N GONORRHOEA DNA SPEC QL PROBE+SIG AMP: NEGATIVE
SPECIMEN DESCRIPTION: NORMAL

## 2023-11-08 NOTE — RESULT ENCOUNTER NOTE
Per Dr Viry Townsend- Flagyl 500 mg PO BID x 10 days. Contacted pt relayed results.  Derrell Lees at [phone] to fill

## 2023-12-12 ENCOUNTER — HOSPITAL ENCOUNTER (OUTPATIENT)
Age: 28
Setting detail: SPECIMEN
Discharge: HOME | End: 2023-12-12

## 2023-12-12 PROBLEM — N90.7 LABIAL CYST: Status: ACTIVE | Noted: 2023-12-12

## 2023-12-13 DIAGNOSIS — N89.8 VAGINAL DISCHARGE: ICD-10-CM

## 2023-12-13 LAB
C TRACH DNA SPEC QL PROBE+SIG AMP: NEGATIVE
CANDIDA SPECIES: NEGATIVE
GARDNERELLA VAGINALIS: NEGATIVE
N GONORRHOEA DNA SPEC QL PROBE+SIG AMP: NEGATIVE
SOURCE: NORMAL
SPECIMEN DESCRIPTION: NORMAL
TRICHOMONAS: NEGATIVE

## 2024-01-02 ENCOUNTER — HOSPITAL ENCOUNTER (EMERGENCY)
Age: 29
Discharge: HOME | End: 2024-01-02
Attending: EMERGENCY MEDICINE
Payer: MEDICAID

## 2024-01-02 VITALS
SYSTOLIC BLOOD PRESSURE: 120 MMHG | OXYGEN SATURATION: 99 % | TEMPERATURE: 100.3 F | DIASTOLIC BLOOD PRESSURE: 80 MMHG | RESPIRATION RATE: 16 BRPM | WEIGHT: 180 LBS | BODY MASS INDEX: 30.9 KG/M2 | HEART RATE: 114 BPM

## 2024-01-02 DIAGNOSIS — J10.1 INFLUENZA A: Primary | ICD-10-CM

## 2024-01-02 LAB
FLUAV RNA RESP QL NAA+PROBE: DETECTED
FLUBV RNA RESP QL NAA+PROBE: NOT DETECTED
SARS-COV-2 RNA RESP QL NAA+PROBE: NOT DETECTED
SOURCE: ABNORMAL
SPECIMEN DESCRIPTION: ABNORMAL

## 2024-01-02 PROCEDURE — 87636 SARSCOV2 & INF A&B AMP PRB: CPT

## 2024-01-02 PROCEDURE — 6370000000 HC RX 637 (ALT 250 FOR IP): Performed by: EMERGENCY MEDICINE

## 2024-01-02 PROCEDURE — 6360000002 HC RX W HCPCS: Performed by: EMERGENCY MEDICINE

## 2024-01-02 PROCEDURE — 99284 EMERGENCY DEPT VISIT MOD MDM: CPT

## 2024-01-02 PROCEDURE — 96372 THER/PROPH/DIAG INJ SC/IM: CPT

## 2024-01-02 RX ORDER — KETOROLAC TROMETHAMINE 30 MG/ML
30 INJECTION, SOLUTION INTRAMUSCULAR; INTRAVENOUS ONCE
Status: COMPLETED | OUTPATIENT
Start: 2024-01-02 | End: 2024-01-02

## 2024-01-02 RX ORDER — DEXAMETHASONE SODIUM PHOSPHATE 10 MG/ML
10 INJECTION, SOLUTION INTRAMUSCULAR; INTRAVENOUS ONCE
Status: COMPLETED | OUTPATIENT
Start: 2024-01-02 | End: 2024-01-02

## 2024-01-02 RX ORDER — OSELTAMIVIR PHOSPHATE 75 MG/1
75 CAPSULE ORAL 2 TIMES DAILY
Qty: 10 CAPSULE | Refills: 0 | Status: SHIPPED | OUTPATIENT
Start: 2024-01-02 | End: 2024-01-07

## 2024-01-02 RX ORDER — ACETAMINOPHEN 325 MG/1
650 TABLET ORAL EVERY 6 HOURS PRN
Qty: 40 TABLET | Refills: 0 | Status: SHIPPED | OUTPATIENT
Start: 2024-01-02

## 2024-01-02 RX ORDER — IBUPROFEN 600 MG/1
600 TABLET ORAL 3 TIMES DAILY PRN
Qty: 30 TABLET | Refills: 0 | Status: SHIPPED | OUTPATIENT
Start: 2024-01-02 | End: 2024-01-04 | Stop reason: ALTCHOICE

## 2024-01-02 RX ORDER — ACETAMINOPHEN 500 MG
1000 TABLET ORAL ONCE
Status: COMPLETED | OUTPATIENT
Start: 2024-01-02 | End: 2024-01-02

## 2024-01-02 RX ADMIN — KETOROLAC TROMETHAMINE 30 MG: 30 INJECTION, SOLUTION INTRAMUSCULAR; INTRAVENOUS at 07:31

## 2024-01-02 RX ADMIN — ACETAMINOPHEN 1000 MG: 500 TABLET ORAL at 07:31

## 2024-01-02 RX ADMIN — DEXAMETHASONE SODIUM PHOSPHATE 10 MG: 10 INJECTION, SOLUTION INTRAMUSCULAR; INTRAVENOUS at 07:31

## 2024-01-02 ASSESSMENT — PAIN DESCRIPTION - LOCATION: LOCATION: GENERALIZED

## 2024-01-02 ASSESSMENT — PAIN - FUNCTIONAL ASSESSMENT: PAIN_FUNCTIONAL_ASSESSMENT: 0-10

## 2024-01-02 ASSESSMENT — PAIN SCALES - GENERAL
PAINLEVEL_OUTOF10: 8
PAINLEVEL_OUTOF10: 8

## 2024-01-02 NOTE — DISCHARGE INSTRUCTIONS
Keep yourself well-hydrated, use Tylenol and ibuprofen as needed for fevers and bodyaches.  Take Tamiflu as prescribed for 5 days.  If you develop significant difficulty breathing or any other concerning symptoms come back to the ER.

## 2024-01-02 NOTE — ED PROVIDER NOTES
the following components:       Result Value    INFLUENZA A DETECTED (*)     All other components within normal limits                Vitals:    Vitals:    01/02/24 0630   BP: 120/80   Pulse: (!) 114   Resp: 16   Temp: 100.3 °F (37.9 °C)   TempSrc: Oral   SpO2: 99%   Weight: 81.6 kg (180 lb)       The patient was given the following medications while in the emergency department:  Orders Placed This Encounter   Medications    ketorolac (TORADOL) injection 30 mg    acetaminophen (TYLENOL) tablet 1,000 mg    dexAMETHasone (DECADRON) Oral 10 mg    oseltamivir (TAMIFLU) 75 MG capsule     Sig: Take 1 capsule by mouth 2 times daily for 5 days     Dispense:  10 capsule     Refill:  0    acetaminophen (TYLENOL) 325 MG tablet     Sig: Take 2 tablets by mouth every 6 hours as needed for Pain     Dispense:  40 tablet     Refill:  0    ibuprofen (ADVIL;MOTRIN) 600 MG tablet     Sig: Take 1 tablet by mouth 3 times daily as needed for Pain     Dispense:  30 tablet     Refill:  0     CONSULTS:  None    FINAL IMPRESSION      1. Influenza A          DISPOSITION/PLAN   DISPOSITION Decision To Discharge 01/02/2024 07:56:06 AM      PATIENT REFERRED TO:  Inge Acevedo MD  [facility] [address]  [phone]    Schedule an appointment as soon as possible for a visit       DISCHARGE MEDICATIONS:  Discharge Medication List as of 1/2/2024  7:59 AM        START taking these medications    Details   oseltamivir (TAMIFLU) 75 MG capsule Take 1 capsule by mouth 2 times daily for 5 days, Disp-10 capsule, R-0Normal      acetaminophen (TYLENOL) 325 MG tablet Take 2 tablets by mouth every 6 hours as needed for Pain, Disp-40 tablet, R-0Normal      !! ibuprofen (ADVIL;MOTRIN) 600 MG tablet Take 1 tablet by mouth 3 times daily as needed for Pain, Disp-30 tablet, R-0Normal       !! - Potential duplicate medications found. Please discuss with provider.        Emily Beard MD  Attending Emergency Physician

## 2024-01-04 ENCOUNTER — OFFICE VISIT (OUTPATIENT)
Dept: OBGYN | Age: 29
End: 2024-01-04
Payer: MEDICAID

## 2024-01-04 ENCOUNTER — TELEMEDICINE (OUTPATIENT)
Dept: FAMILY MEDICINE CLINIC | Age: 29
End: 2024-01-04
Payer: MEDICAID

## 2024-01-04 VITALS
HEART RATE: 78 BPM | BODY MASS INDEX: 34.5 KG/M2 | WEIGHT: 201 LBS | SYSTOLIC BLOOD PRESSURE: 109 MMHG | DIASTOLIC BLOOD PRESSURE: 73 MMHG

## 2024-01-04 DIAGNOSIS — N90.7 VULVAR CYST: Primary | ICD-10-CM

## 2024-01-04 DIAGNOSIS — Z87.09 HISTORY OF ASTHMA: ICD-10-CM

## 2024-01-04 DIAGNOSIS — R10.2 VULVAR PAIN: ICD-10-CM

## 2024-01-04 DIAGNOSIS — J10.1 INFLUENZA A: Primary | ICD-10-CM

## 2024-01-04 PROCEDURE — 99213 OFFICE O/P EST LOW 20 MIN: CPT | Performed by: NURSE PRACTITIONER

## 2024-01-04 PROCEDURE — 99213 OFFICE O/P EST LOW 20 MIN: CPT | Performed by: STUDENT IN AN ORGANIZED HEALTH CARE EDUCATION/TRAINING PROGRAM

## 2024-01-04 RX ORDER — CLINDAMYCIN PHOSPHATE 10 UG/ML
LOTION TOPICAL
Qty: 60 G | Refills: 1 | Status: SHIPPED | OUTPATIENT
Start: 2024-01-04 | End: 2024-02-03

## 2024-01-04 RX ORDER — CEPHALEXIN 500 MG/1
500 CAPSULE ORAL 2 TIMES DAILY
Qty: 14 CAPSULE | Refills: 0 | Status: SHIPPED | OUTPATIENT
Start: 2024-01-04 | End: 2024-01-11

## 2024-01-04 RX ORDER — GUAIFENESIN 600 MG/1
600 TABLET, EXTENDED RELEASE ORAL 2 TIMES DAILY
Qty: 14 TABLET | Refills: 0 | Status: SHIPPED | OUTPATIENT
Start: 2024-01-04 | End: 2024-01-11

## 2024-01-04 RX ORDER — PREDNISONE 20 MG/1
20 TABLET ORAL 2 TIMES DAILY
Qty: 10 TABLET | Refills: 0 | Status: SHIPPED | OUTPATIENT
Start: 2024-01-04 | End: 2024-01-09

## 2024-01-04 ASSESSMENT — PATIENT HEALTH QUESTIONNAIRE - PHQ9
2. FEELING DOWN, DEPRESSED OR HOPELESS: 0
SUM OF ALL RESPONSES TO PHQ QUESTIONS 1-9: 0
SUM OF ALL RESPONSES TO PHQ QUESTIONS 1-9: 0
SUM OF ALL RESPONSES TO PHQ9 QUESTIONS 1 & 2: 0
SUM OF ALL RESPONSES TO PHQ QUESTIONS 1-9: 0
1. LITTLE INTEREST OR PLEASURE IN DOING THINGS: 0
SUM OF ALL RESPONSES TO PHQ QUESTIONS 1-9: 0

## 2024-01-04 ASSESSMENT — ENCOUNTER SYMPTOMS
COUGH: 1
WHEEZING: 1
SHORTNESS OF BREATH: 0
CHEST TIGHTNESS: 1

## 2024-01-04 NOTE — PROGRESS NOTES
OB/GYN Problem Visit    Isabell Putnam  2024                       Primary Care Physician: Inge Acevedo MD    CC:   Chief Complaint   Patient presents with    Follow-up     Patient is here today for a 3 week follow up for lump on mons pubis.  Patient states it is getting smaller but still irritated         HPI: Isabell Putnam is a 28 y.o. female     The patient was seen and examined. She is here for 3 week follow up on a lump on her mons pubis. Overall the lump has improved and gotten smaller, but she can still sense it is there and notices it sometimes when her pants or underwear are rubbing. She states sometimes it gets smaller and then bigger. Right now it is small. She denies redness, fevers, chills, severe, or limiting pain.     She is not yet ready to discuss contraception and will make an additional appointment in the future.    REVIEW OF SYSTEMS:   Constitutional: negative fever, negative chills   HEENT: negative visual disturbances, negative headaches  Respiratory: negative dyspnea, negative cough  Cardiovascular: negative chest pain,  negative palpitations  Gastrointestinal: negative abdominal pain, negative RUQ pain, negative N/V, negative diarrhea, negative constipation  Genitourinary: negative dysuria, negative vaginal discharge  Dermatological: negative rash  Hematologic: negative bruising  Immunologic/Lymphatic: negative recent illness, negative recent sick contact  Musculoskeletal: negative back pain, negative myalgias, negative arthralgias  Neurological:  negative dizziness, negative weakness  Behavior/Psych: negative depression, negative anxiety      OBSTETRICAL HISTORY:  OB History    Para Term  AB Living   3       1     SAB IAB Ectopic Molar Multiple Live Births       1            # Outcome Date GA Lbr Zohaib/2nd Weight Sex Delivery Anes PTL Lv   3 Ectopic 12/10/21           2             1                 PAST MEDICAL HISTORY:      Diagnosis Date

## 2024-01-04 NOTE — PROGRESS NOTES
100.   Review of Systems   Constitutional:  Positive for fever. Negative for chills.   HENT:  Positive for congestion.    Respiratory:  Positive for cough, chest tightness and wheezing. Negative for shortness of breath.    Cardiovascular:  Negative for chest pain, palpitations and leg swelling.          Objective   Patient-Reported Vitals  No data recorded     Physical Exam  [INSTRUCTIONS:  \"[x]\" Indicates a positive item  \"[]\" Indicates a negative item  -- DELETE ALL ITEMS NOT EXAMINED]    Constitutional: [x] Appears well-developed and well-nourished [x] No apparent distress      [] Abnormal -     Mental status: [x] Alert and awake  [x] Oriented to person/place/time [x] Able to follow commands    [] Abnormal -     Eyes:   EOM    [x]  Normal    [] Abnormal -   Sclera  [x]  Normal    [] Abnormal -          Discharge [x]  None visible   [] Abnormal -     HENT: [x] Normocephalic, atraumatic  [] Abnormal -   [x] Mouth/Throat: Mucous membranes are moist    External Ears [x] Normal  [] Abnormal -    Neck: [x] No visualized mass [] Abnormal -     Pulmonary/Chest: [x] Respiratory effort normal   [x] No visualized signs of difficulty breathing or respiratory distress        [] Abnormal -      Musculoskeletal:   [x] Normal gait with no signs of ataxia         [x] Normal range of motion of neck        [] Abnormal -     Neurological:        [x] No Facial Asymmetry (Cranial nerve 7 motor function) (limited exam due to video visit)          [x] No gaze palsy        [] Abnormal -          Skin:        [x] No significant exanthematous lesions or discoloration noted on facial skin         [] Abnormal -            Psychiatric:       [x] Normal Affect [] Abnormal -        [x] No Hallucinations    Other pertinent observable physical exam findings:-         --LAURIE BROOKS, KEMAR - CNP

## 2024-01-05 NOTE — PROGRESS NOTES
Attending Physician Statement  I have discussed the care of Isabell GLASS Jersey, including pertinent history and exam findings,  with the resident. I have reviewed the key elements of all parts of the encounter with the resident.  I agree with the assessment, plan and orders as documented by the resident.  (GE Modifier)    Judith Raphael,

## 2024-01-08 ENCOUNTER — TELEPHONE (OUTPATIENT)
Dept: FAMILY MEDICINE CLINIC | Age: 29
End: 2024-01-08

## 2024-01-08 NOTE — TELEPHONE ENCOUNTER
Had a VV 01/04/2024. Pt states she needs a return to work note. Pt states she returned today 01/08/2024.     Please email to [email or web address]

## 2024-02-07 ENCOUNTER — OFFICE VISIT (OUTPATIENT)
Dept: FAMILY MEDICINE CLINIC | Age: 29
End: 2024-02-07
Payer: MEDICAID

## 2024-02-07 VITALS
SYSTOLIC BLOOD PRESSURE: 116 MMHG | TEMPERATURE: 98.5 F | HEART RATE: 75 BPM | WEIGHT: 195.4 LBS | OXYGEN SATURATION: 98 % | BODY MASS INDEX: 33.54 KG/M2 | DIASTOLIC BLOOD PRESSURE: 84 MMHG

## 2024-02-07 DIAGNOSIS — J45.40 MODERATE PERSISTENT ASTHMA WITHOUT COMPLICATION: ICD-10-CM

## 2024-02-07 DIAGNOSIS — J30.1 SEASONAL ALLERGIC RHINITIS DUE TO POLLEN: ICD-10-CM

## 2024-02-07 DIAGNOSIS — J45.990 EXERCISE-INDUCED BRONCHOCONSTRICTION: ICD-10-CM

## 2024-02-07 PROCEDURE — 99214 OFFICE O/P EST MOD 30 MIN: CPT | Performed by: INTERNAL MEDICINE

## 2024-02-07 RX ORDER — LORATADINE 10 MG/1
10 TABLET ORAL DAILY
Qty: 30 TABLET | Refills: 3 | Status: SHIPPED | OUTPATIENT
Start: 2024-02-07

## 2024-02-07 RX ORDER — BUDESONIDE AND FORMOTEROL FUMARATE DIHYDRATE 160; 4.5 UG/1; UG/1
2 AEROSOL RESPIRATORY (INHALATION) 2 TIMES DAILY
Qty: 30.6 G | Refills: 1 | Status: SHIPPED | OUTPATIENT
Start: 2024-02-07

## 2024-02-07 RX ORDER — MONTELUKAST SODIUM 10 MG/1
10 TABLET ORAL DAILY
Qty: 90 TABLET | Refills: 1 | Status: SHIPPED | OUTPATIENT
Start: 2024-02-07

## 2024-02-07 RX ORDER — ALBUTEROL SULFATE 90 UG/1
2 AEROSOL, METERED RESPIRATORY (INHALATION) EVERY 4 HOURS PRN
Qty: 18 G | Refills: 3 | Status: SHIPPED | OUTPATIENT
Start: 2024-02-07 | End: 2025-02-06

## 2024-02-07 NOTE — PROGRESS NOTES
[facility]  [facility]  [address] [place] [address]  Dept: [phone]  Dept Fax: [phone]      Isabell Putnam is a 28 y.o. female who presents today for hermedical conditions/complaints as noted below.  Isabell Putnam is c/o of Weight Loss (Discuss weight loss options, pt did bring in a food and exercise log, pt did Adipex about 2 years ago, would like to try something different, pt has changed her eating habits, started drinking a lot more water, and has been working out)        Assessment/Plan:     1. Exercise-induced bronchoconstriction  -     albuterol sulfate HFA (VENTOLIN HFA) 108 (90 Base) MCG/ACT inhaler; Inhale 2 puffs into the lungs every 4 hours as needed for Wheezing or Shortness of Breath, Disp-18 g, R-3Normal  -     budesonide-formoterol (SYMBICORT) 160-4.5 MCG/ACT AERO; Inhale 2 puffs into the lungs 2 times daily, Disp-30.6 g, R-1Normal  2. Seasonal allergic rhinitis due to pollen  -     loratadine (CLARITIN) 10 MG tablet; Take 1 tablet by mouth daily, Disp-30 tablet, R-3Normal  3. Moderate persistent asthma without complication  -     montelukast (SINGULAIR) 10 MG tablet; Take 1 tablet by mouth daily, Disp-90 tablet, R-1Normal          No follow-ups on file.      HPI     Started changing her diet right around new year, eating three meals and two snacks a day   Drinking water for most part   Exercising nearly daily, going to the gym 5 days a week - cardio, weight lifting, elliptical.  She reports over the holidays had gone over 205 lbs which is the highest she has ever weighed and it motivated her to lose weight   Feels wonderful energy-wise, didn't realize she is already down to 195 lbs and very excited with progress  Asthma:  Current treatment includes beta agonist inhalers, combination beta agonists/steroid inhalers.  Using preventive medication(s) consistently: yes.  Residual symptoms: none.  Patient denies any other symptoms.  She requires her

## 2024-02-13 ENCOUNTER — PATIENT MESSAGE (OUTPATIENT)
Dept: FAMILY MEDICINE CLINIC | Age: 29
End: 2024-02-13

## 2024-02-13 RX ORDER — IBUPROFEN 600 MG/1
600 TABLET ORAL EVERY 6 HOURS PRN
Qty: 30 TABLET | Refills: 0 | Status: SHIPPED | OUTPATIENT
Start: 2024-02-13

## 2024-02-13 NOTE — TELEPHONE ENCOUNTER
From: Isabell Putnam  To: Dr. Inge Acevedo  Sent: 2/13/2024 2:14 PM EST  Subject: Sinus Pressure/Headaches    Good afternoon,   I was wondering if a prescription for ibuprofen 800 could be sent in to the pharmacy, with the weather changing I have been having terrible sinus pressure and It is causing headaches throughout the day for me. I have been taking my loratadine daily which does seem the help but the headaches are not relieved.

## 2024-02-16 ENCOUNTER — TELEPHONE (OUTPATIENT)
Dept: FAMILY MEDICINE CLINIC | Age: 29
End: 2024-02-16

## 2024-02-16 NOTE — TELEPHONE ENCOUNTER
Received letter stating PA was denied, spoke with pt, she stated she picked up the Symbicort with no problems       Letter attached

## 2024-03-04 ENCOUNTER — HOSPITAL ENCOUNTER (OUTPATIENT)
Age: 29
Discharge: HOME | End: 2024-03-04

## 2024-03-04 PROCEDURE — 36415 COLL VENOUS BLD VENIPUNCTURE: CPT

## 2024-03-04 PROCEDURE — 86481 TB AG RESPONSE T-CELL SUSP: CPT

## 2024-03-06 LAB — T-SPOT TB TEST: NORMAL

## 2024-03-21 ENCOUNTER — OFFICE VISIT (OUTPATIENT)
Dept: FAMILY MEDICINE CLINIC | Age: 29
End: 2024-03-21
Payer: MEDICAID

## 2024-03-21 VITALS
DIASTOLIC BLOOD PRESSURE: 80 MMHG | HEART RATE: 64 BPM | WEIGHT: 199.2 LBS | SYSTOLIC BLOOD PRESSURE: 118 MMHG | BODY MASS INDEX: 34.19 KG/M2

## 2024-03-21 DIAGNOSIS — E66.9 CLASS 1 OBESITY WITHOUT SERIOUS COMORBIDITY WITH BODY MASS INDEX (BMI) OF 34.0 TO 34.9 IN ADULT, UNSPECIFIED OBESITY TYPE: Primary | ICD-10-CM

## 2024-03-21 DIAGNOSIS — J45.40 MODERATE PERSISTENT ASTHMA WITHOUT COMPLICATION: ICD-10-CM

## 2024-03-21 PROCEDURE — 99213 OFFICE O/P EST LOW 20 MIN: CPT | Performed by: INTERNAL MEDICINE

## 2024-03-21 SDOH — ECONOMIC STABILITY: FOOD INSECURITY: WITHIN THE PAST 12 MONTHS, THE FOOD YOU BOUGHT JUST DIDN'T LAST AND YOU DIDN'T HAVE MONEY TO GET MORE.: NEVER TRUE

## 2024-03-21 SDOH — ECONOMIC STABILITY: INCOME INSECURITY: HOW HARD IS IT FOR YOU TO PAY FOR THE VERY BASICS LIKE FOOD, HOUSING, MEDICAL CARE, AND HEATING?: NOT HARD AT ALL

## 2024-03-21 SDOH — ECONOMIC STABILITY: FOOD INSECURITY: WITHIN THE PAST 12 MONTHS, YOU WORRIED THAT YOUR FOOD WOULD RUN OUT BEFORE YOU GOT MONEY TO BUY MORE.: NEVER TRUE

## 2024-03-21 ASSESSMENT — VISUAL ACUITY: OU: 1

## 2024-03-21 ASSESSMENT — ENCOUNTER SYMPTOMS
ABDOMINAL PAIN: 0
COUGH: 0
SHORTNESS OF BREATH: 0
CONSTIPATION: 0
BLOOD IN STOOL: 0
CHOKING: 0
NAUSEA: 0
DIARRHEA: 0
ANAL BLEEDING: 0
WHEEZING: 0
CHEST TIGHTNESS: 0
VOMITING: 0

## 2024-03-21 ASSESSMENT — PATIENT HEALTH QUESTIONNAIRE - PHQ9
SUM OF ALL RESPONSES TO PHQ QUESTIONS 1-9: 0
2. FEELING DOWN, DEPRESSED OR HOPELESS: NOT AT ALL
SUM OF ALL RESPONSES TO PHQ QUESTIONS 1-9: 0
SUM OF ALL RESPONSES TO PHQ9 QUESTIONS 1 & 2: 0
SUM OF ALL RESPONSES TO PHQ QUESTIONS 1-9: 0
SUM OF ALL RESPONSES TO PHQ QUESTIONS 1-9: 0
1. LITTLE INTEREST OR PLEASURE IN DOING THINGS: NOT AT ALL

## 2024-03-21 NOTE — PROGRESS NOTES
[facility]  [facility]  [address] [place] [address]  Dept: [phone]  Dept Fax: [phone]      Isabell MARIA LUISA Putnam is a 28 y.o. female who presents today for hermedical conditions/complaints as noted below.  Isabell Putnam is c/o of 6 week follow up (Weight check- Pt was 195 on 24)        Assessment/Plan:     1. Class 1 obesity without serious comorbidity with body mass index (BMI) of 34.0 to 34.9 in adult, unspecified obesity type  2. Moderate persistent asthma without complication          No follow-ups on file.      HPI     Had a rough time breathing with weather change and has been using her inhaler a lot more the past few days   Continues to diet and exercise, going to the gym   Going to hot pilates,   Eating more protein, smaller meals with more veggies and protein and snacking on nuts, fruit, veggies through the day.         BP Readings from Last 3 Encounters:   24 118/80   24 116/84   24 109/73              Past Medical History:   Diagnosis Date    Abdominal pain     Abnormal uterine bleeding     Anesthesia complication     had seizure after anesthesia for wisdom teeth-hospitalized-has has no problem with anesthesia since then    Asthma     as a child    Back pain     Cholecystitis     resolved with cholecystectomy    COVID 2021    no hospitalization    COVID 2021    iv antibody infusion given 2022    COVID 10/2022    HA, fatigue, congestion-no hospitalization    E. 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    Therapy     pelvic floor therapy-jeffery Richardson-last visit sep 2022    Under care of service provider 11/10/2022

## 2024-09-19 ENCOUNTER — PATIENT MESSAGE (OUTPATIENT)
Dept: FAMILY MEDICINE CLINIC | Age: 29
End: 2024-09-19

## 2024-09-19 RX ORDER — FLUCONAZOLE 150 MG/1
150 TABLET ORAL ONCE
Qty: 1 TABLET | Refills: 0 | Status: SHIPPED | OUTPATIENT
Start: 2024-09-19 | End: 2024-09-19

## 2024-10-18 ENCOUNTER — OFFICE VISIT (OUTPATIENT)
Dept: FAMILY MEDICINE CLINIC | Age: 29
End: 2024-10-18
Payer: COMMERCIAL

## 2024-10-18 VITALS
RESPIRATION RATE: 18 BRPM | BODY MASS INDEX: 35.85 KG/M2 | SYSTOLIC BLOOD PRESSURE: 118 MMHG | OXYGEN SATURATION: 99 % | DIASTOLIC BLOOD PRESSURE: 78 MMHG | HEART RATE: 82 BPM | HEIGHT: 64 IN | TEMPERATURE: 97.4 F | WEIGHT: 210 LBS

## 2024-10-18 DIAGNOSIS — H69.93 EUSTACHIAN TUBE DISORDER, BILATERAL: Primary | ICD-10-CM

## 2024-10-18 DIAGNOSIS — J30.1 SEASONAL ALLERGIC RHINITIS DUE TO POLLEN: ICD-10-CM

## 2024-10-18 DIAGNOSIS — J45.40 MODERATE PERSISTENT ASTHMA WITHOUT COMPLICATION: ICD-10-CM

## 2024-10-18 DIAGNOSIS — J45.990 EXERCISE-INDUCED BRONCHOCONSTRICTION: ICD-10-CM

## 2024-10-18 PROCEDURE — 99214 OFFICE O/P EST MOD 30 MIN: CPT | Performed by: STUDENT IN AN ORGANIZED HEALTH CARE EDUCATION/TRAINING PROGRAM

## 2024-10-18 RX ORDER — LORATADINE 10 MG/1
10 TABLET ORAL DAILY
Qty: 30 TABLET | Refills: 3 | Status: SHIPPED | OUTPATIENT
Start: 2024-10-18

## 2024-10-18 RX ORDER — MONTELUKAST SODIUM 10 MG/1
10 TABLET ORAL DAILY
Qty: 90 TABLET | Refills: 1 | Status: SHIPPED | OUTPATIENT
Start: 2024-10-18

## 2024-10-18 RX ORDER — FLUTICASONE PROPIONATE 50 MCG
1 SPRAY, SUSPENSION (ML) NASAL DAILY
Qty: 16 G | Refills: 0 | Status: SHIPPED | OUTPATIENT
Start: 2024-10-18

## 2024-10-18 RX ORDER — ALBUTEROL SULFATE 90 UG/1
2 INHALANT RESPIRATORY (INHALATION) EVERY 4 HOURS PRN
Qty: 18 G | Refills: 3 | Status: SHIPPED | OUTPATIENT
Start: 2024-10-18 | End: 2025-10-18

## 2024-10-18 RX ORDER — BUDESONIDE AND FORMOTEROL FUMARATE DIHYDRATE 160; 4.5 UG/1; UG/1
2 AEROSOL RESPIRATORY (INHALATION) 2 TIMES DAILY
Qty: 30.6 G | Refills: 1 | Status: SHIPPED | OUTPATIENT
Start: 2024-10-18

## 2024-10-18 ASSESSMENT — ENCOUNTER SYMPTOMS
GASTROINTESTINAL NEGATIVE: 1
SINUS PRESSURE: 1
RESPIRATORY NEGATIVE: 1
EYES NEGATIVE: 1
ALLERGIC/IMMUNOLOGIC NEGATIVE: 1

## 2024-10-18 NOTE — PROGRESS NOTES
Isabell MARIA LUISA Putnam (:  1995) is a 29 y.o. female,Established patient, here for evaluation of the following chief complaint(s):  Otalgia (Bilateral ear pain x 2 weeks. Pt also states that she is also having itching in both. /)         Assessment & Plan  Eustachian tube disorder, bilateral   Ongoing for 2 weeks with bilateral ear congestion, soreness and itching around ear and within ear  -s/p sinus infection   -Denies any fever, nausea, vomiting or vision changes   -Vitals stable, PE: mild fluid behind ear right more than left otherwise unremarkable  -Will prescribe Flonase, discussed proper usage and discussed to use Claritin or zyrtec for itching   -discussed to follow back up if symptoms dont improve          Moderate persistent asthma without complication   Stable, will refill medication     Orders:    montelukast (SINGULAIR) 10 MG tablet; Take 1 tablet by mouth daily    Seasonal allergic rhinitis due to pollen   Provided Flonase along with Claritin     Orders:    loratadine (CLARITIN) 10 MG tablet; Take 1 tablet by mouth daily    Exercise-induced bronchoconstriction   Stable will refill medication     Orders:    budesonide-formoterol (SYMBICORT) 160-4.5 MCG/ACT AERO; Inhale 2 puffs into the lungs 2 times daily    albuterol sulfate HFA (VENTOLIN HFA) 108 (90 Base) MCG/ACT inhaler; Inhale 2 puffs into the lungs every 4 hours as needed for Wheezing or Shortness of Breath      Return if symptoms worsen or fail to improve.       Subjective   29 yr old female here due to concerns of itchy ears and feeling fullness, itching, and pain coming up and around the side of her face. Patient states that this has been happening for the past 2 and half weeks, per patient she felt that she had a really bad  sinus infection. Has been having drainage, mildly painful. Feel dry and irritated. Denies any fever, has been having headaches past 4 days temples around her eyes states that they are itching. Denies any nausea,

## 2024-10-18 NOTE — ASSESSMENT & PLAN NOTE
Stable, will refill medication     Orders:    montelukast (SINGULAIR) 10 MG tablet; Take 1 tablet by mouth daily

## 2024-10-29 ENCOUNTER — OFFICE VISIT (OUTPATIENT)
Dept: FAMILY MEDICINE CLINIC | Age: 29
End: 2024-10-29
Payer: COMMERCIAL

## 2024-10-29 ENCOUNTER — HOSPITAL ENCOUNTER (OUTPATIENT)
Age: 29
Setting detail: SPECIMEN
Discharge: HOME | End: 2024-10-29

## 2024-10-29 VITALS
BODY MASS INDEX: 35.96 KG/M2 | WEIGHT: 209.6 LBS | DIASTOLIC BLOOD PRESSURE: 68 MMHG | OXYGEN SATURATION: 100 % | SYSTOLIC BLOOD PRESSURE: 102 MMHG | HEART RATE: 71 BPM | TEMPERATURE: 98.2 F

## 2024-10-29 DIAGNOSIS — E55.9 VITAMIN D DEFICIENCY: ICD-10-CM

## 2024-10-29 DIAGNOSIS — N39.0 RECURRENT UTI: ICD-10-CM

## 2024-10-29 DIAGNOSIS — E66.812 CLASS 2 SEVERE OBESITY DUE TO EXCESS CALORIES WITH SERIOUS COMORBIDITY AND BODY MASS INDEX (BMI) OF 35.0 TO 35.9 IN ADULT: Primary | ICD-10-CM

## 2024-10-29 DIAGNOSIS — E66.01 CLASS 2 SEVERE OBESITY DUE TO EXCESS CALORIES WITH SERIOUS COMORBIDITY AND BODY MASS INDEX (BMI) OF 35.0 TO 35.9 IN ADULT: Primary | ICD-10-CM

## 2024-10-29 DIAGNOSIS — J45.40 MODERATE PERSISTENT ASTHMA WITHOUT COMPLICATION: ICD-10-CM

## 2024-10-29 DIAGNOSIS — Z13.220 SCREENING, LIPID: ICD-10-CM

## 2024-10-29 DIAGNOSIS — Z13.0 SCREENING, ANEMIA, DEFICIENCY, IRON: ICD-10-CM

## 2024-10-29 DIAGNOSIS — Z13.29 SCREENING FOR THYROID DISORDER: ICD-10-CM

## 2024-10-29 LAB
BILIRUBIN, POC: NEGATIVE
BLOOD URINE, POC: ABNORMAL
CLARITY, POC: ABNORMAL
COLOR, POC: ABNORMAL
GLUCOSE URINE, POC: NEGATIVE MG/DL
KETONES, POC: NEGATIVE MG/DL
LEUKOCYTE EST, POC: ABNORMAL
NITRITE, POC: POSITIVE
PH, POC: 6
PROTEIN, POC: NEGATIVE MG/DL
SPECIFIC GRAVITY, POC: 1.02
UROBILINOGEN, POC: 0.2 MG/DL

## 2024-10-29 PROCEDURE — G8484 FLU IMMUNIZE NO ADMIN: HCPCS | Performed by: INTERNAL MEDICINE

## 2024-10-29 PROCEDURE — 1036F TOBACCO NON-USER: CPT | Performed by: INTERNAL MEDICINE

## 2024-10-29 PROCEDURE — 81002 URINALYSIS NONAUTO W/O SCOPE: CPT | Performed by: INTERNAL MEDICINE

## 2024-10-29 PROCEDURE — G8427 DOCREV CUR MEDS BY ELIG CLIN: HCPCS | Performed by: INTERNAL MEDICINE

## 2024-10-29 PROCEDURE — G8417 CALC BMI ABV UP PARAM F/U: HCPCS | Performed by: INTERNAL MEDICINE

## 2024-10-29 PROCEDURE — 99214 OFFICE O/P EST MOD 30 MIN: CPT | Performed by: INTERNAL MEDICINE

## 2024-10-29 RX ORDER — PHENTERMINE HYDROCHLORIDE 37.5 MG/1
37.5 TABLET ORAL
Qty: 30 TABLET | Refills: 0 | Status: SHIPPED | OUTPATIENT
Start: 2024-10-29 | End: 2024-11-28

## 2024-10-29 RX ORDER — FLUTICASONE FUROATE AND VILANTEROL 200; 25 UG/1; UG/1
1 POWDER RESPIRATORY (INHALATION)
Qty: 1 EACH | Refills: 5 | Status: SHIPPED | OUTPATIENT
Start: 2024-10-29

## 2024-10-29 ASSESSMENT — VISUAL ACUITY: OU: 1

## 2024-10-29 ASSESSMENT — ENCOUNTER SYMPTOMS
SHORTNESS OF BREATH: 0
CONSTIPATION: 0
COUGH: 0
DIARRHEA: 0
CHEST TIGHTNESS: 0
NAUSEA: 0
WHEEZING: 0
CHOKING: 0
BLOOD IN STOOL: 0
ABDOMINAL PAIN: 0
VOMITING: 0
ANAL BLEEDING: 0

## 2024-10-29 NOTE — PROGRESS NOTES
[facility]  [facility]  [address] [place], [place] [id number]  Dept: [phone]  Dept Fax: [phone]      Isabell Putnam is a 29 y.o. female who presents today for hermedical conditions/complaints as noted below.  Isabell Putnam is c/o of Asthma (F/u) and Weight Management (Discuss weight loss options, pt brought in food diary, pt exercising about 6 times a week, )        Assessment/Plan:     1. Class 2 severe obesity due to excess calories with serious comorbidity and body mass index (BMI) of 35.0 to 35.9 in adult  -     phentermine (ADIPEX-P) 37.5 MG tablet; Take 1 tablet by mouth every morning (before breakfast) for 30 days. Max Daily Amount: 37.5 mg, Disp-30 tablet, R-0Normal  2. Moderate persistent asthma without complication  -     fluticasone furoate-vilanterol (BREO ELLIPTA) 200-25 MCG/ACT AEPB inhaler; Inhale 1 puff into the lungs daily, Disp-1 each, R-5Normal  3. Recurrent UTI  -     POCT Urinalysis Dipstick no Micro  -     Culture, Urine; Future  4. Screening, anemia, deficiency, iron  -     CBC with Auto Differential; Future  -     Comprehensive Metabolic Panel; Future  5. Screening for thyroid disorder  -     TSH with Reflex; Future  6. Screening, lipid  -     Lipid, Fasting; Future  7. Vitamin D deficiency  -     Vitamin D 25 Hydroxy; Future          No follow-ups on file.      HPI     Gaining weight despite low carb diet, consistent exercise six days a week  Clothes sizes are going up significantly, has gone from large to feeling XL is too tight for her   Dysuria - for past week, comes and goes, not sure if she has an infection or not   Asthma:  Current treatment includes beta agonist inhalers, combination beta agonists/steroid inhalers, leukotriene antagonists, over the counter medications- claritin, flonase, which has been somewhat effective.  Using preventive medication(s) consistently: yes.  Residual symptoms: dyspnea on exertion.  Patient denies any other

## 2024-11-01 LAB
MICROORGANISM SPEC CULT: ABNORMAL
SERVICE CMNT-IMP: ABNORMAL
SPECIMEN DESCRIPTION: ABNORMAL

## 2024-11-25 ENCOUNTER — OFFICE VISIT (OUTPATIENT)
Dept: FAMILY MEDICINE CLINIC | Age: 29
End: 2024-11-25
Payer: COMMERCIAL

## 2024-11-25 VITALS
SYSTOLIC BLOOD PRESSURE: 108 MMHG | OXYGEN SATURATION: 98 % | WEIGHT: 200 LBS | DIASTOLIC BLOOD PRESSURE: 76 MMHG | BODY MASS INDEX: 34.31 KG/M2 | TEMPERATURE: 98.2 F | HEART RATE: 96 BPM

## 2024-11-25 DIAGNOSIS — E66.812 CLASS 2 SEVERE OBESITY DUE TO EXCESS CALORIES WITH SERIOUS COMORBIDITY AND BODY MASS INDEX (BMI) OF 35.0 TO 35.9 IN ADULT: ICD-10-CM

## 2024-11-25 DIAGNOSIS — B37.31 VULVOVAGINAL CANDIDIASIS: ICD-10-CM

## 2024-11-25 DIAGNOSIS — E66.01 CLASS 2 SEVERE OBESITY DUE TO EXCESS CALORIES WITH SERIOUS COMORBIDITY AND BODY MASS INDEX (BMI) OF 35.0 TO 35.9 IN ADULT: ICD-10-CM

## 2024-11-25 DIAGNOSIS — N39.0 URINARY TRACT INFECTION WITHOUT HEMATURIA, SITE UNSPECIFIED: Primary | ICD-10-CM

## 2024-11-25 PROCEDURE — G8427 DOCREV CUR MEDS BY ELIG CLIN: HCPCS | Performed by: INTERNAL MEDICINE

## 2024-11-25 PROCEDURE — 99214 OFFICE O/P EST MOD 30 MIN: CPT | Performed by: INTERNAL MEDICINE

## 2024-11-25 PROCEDURE — G8484 FLU IMMUNIZE NO ADMIN: HCPCS | Performed by: INTERNAL MEDICINE

## 2024-11-25 PROCEDURE — G8417 CALC BMI ABV UP PARAM F/U: HCPCS | Performed by: INTERNAL MEDICINE

## 2024-11-25 PROCEDURE — 1036F TOBACCO NON-USER: CPT | Performed by: INTERNAL MEDICINE

## 2024-11-25 RX ORDER — FLUCONAZOLE 150 MG/1
150 TABLET ORAL ONCE
Qty: 1 TABLET | Refills: 0 | Status: SHIPPED | OUTPATIENT
Start: 2024-11-25 | End: 2024-11-25

## 2024-11-25 RX ORDER — SULFAMETHOXAZOLE AND TRIMETHOPRIM 800; 160 MG/1; MG/1
1 TABLET ORAL 2 TIMES DAILY
Qty: 10 TABLET | Refills: 0 | Status: SHIPPED | OUTPATIENT
Start: 2024-11-25 | End: 2024-11-30

## 2024-11-25 RX ORDER — PHENTERMINE HYDROCHLORIDE 37.5 MG/1
37.5 TABLET ORAL
Qty: 30 TABLET | Refills: 0 | Status: SHIPPED | OUTPATIENT
Start: 2024-11-25 | End: 2024-12-25

## 2024-11-25 ASSESSMENT — ENCOUNTER SYMPTOMS
CONSTIPATION: 0
CHOKING: 0
CHEST TIGHTNESS: 0
ANAL BLEEDING: 0
BLOOD IN STOOL: 0
WHEEZING: 0
SHORTNESS OF BREATH: 0
DIARRHEA: 0

## 2024-11-25 ASSESSMENT — VISUAL ACUITY: OU: 1

## 2024-11-25 NOTE — PROGRESS NOTES
[facility]  [facility]  [address] [place], [place] [address]  Dept: [phone]  Dept Fax: [phone]      Isabell MARIA LUISA Putnam is a 29 y.o. female who presents today for hermedical conditions/complaints as noted below.  Isabell Putnam is c/o of Weight Management (Weight check )        Assessment/Plan:     1. Urinary tract infection without hematuria, site unspecified  -     sulfamethoxazole-trimethoprim (BACTRIM DS) 800-160 MG per tablet; Take 1 tablet by mouth 2 times daily for 5 days, Disp-10 tablet, R-0Normal  2. Class 2 severe obesity due to excess calories with serious comorbidity and body mass index (BMI) of 35.0 to 35.9 in adult  -     phentermine (ADIPEX-P) 37.5 MG tablet; Take 1 tablet by mouth every morning (before breakfast) for 30 days. Max Daily Amount: 37.5 mg, Disp-30 tablet, R-0Normal  3. Vulvovaginal candidiasis  -     fluconazole (DIFLUCAN) 150 MG tablet; Take 1 tablet by mouth once for 1 dose, Disp-1 tablet, R-0Normal          No follow-ups on file.      HPI     Doing well with adipex - sleep schedule was off the first week but then settled back down   Following low carb diet, continues to exercise 6 days a week - not counting calories. Not eating past 8 PM   Clothes fit better, scrubs are looser, jeans are not as tight on her wait, t-shirts are a little looser, not as constricting   Continues to have urinary odor, would like to review urine culture results           BP Readings from Last 3 Encounters:   11/25/24 108/76   10/29/24 102/68   10/18/24 118/78              Past Medical History:   Diagnosis Date    Abdominal pain     Abnormal uterine bleeding     Anesthesia complication     had seizure after anesthesia for wisdom teeth-hospitalized-has has no problem with anesthesia since then    Asthma     as a child    Back pain     Cholecystitis 2018    resolved with cholecystectomy    COVID 02/2021    no hospitalization    COVID 12/2021    iv antibody infusion given

## 2024-12-18 ENCOUNTER — OFFICE VISIT (OUTPATIENT)
Dept: FAMILY MEDICINE CLINIC | Age: 29
End: 2024-12-18
Payer: COMMERCIAL

## 2024-12-18 VITALS
SYSTOLIC BLOOD PRESSURE: 120 MMHG | TEMPERATURE: 97.9 F | DIASTOLIC BLOOD PRESSURE: 82 MMHG | HEART RATE: 108 BPM | OXYGEN SATURATION: 99 % | BODY MASS INDEX: 33.76 KG/M2 | WEIGHT: 196.8 LBS

## 2024-12-18 DIAGNOSIS — E66.01 CLASS 2 SEVERE OBESITY DUE TO EXCESS CALORIES WITH SERIOUS COMORBIDITY AND BODY MASS INDEX (BMI) OF 35.0 TO 35.9 IN ADULT: ICD-10-CM

## 2024-12-18 DIAGNOSIS — E66.812 CLASS 2 SEVERE OBESITY DUE TO EXCESS CALORIES WITH SERIOUS COMORBIDITY AND BODY MASS INDEX (BMI) OF 35.0 TO 35.9 IN ADULT: ICD-10-CM

## 2024-12-18 PROCEDURE — G8427 DOCREV CUR MEDS BY ELIG CLIN: HCPCS | Performed by: INTERNAL MEDICINE

## 2024-12-18 PROCEDURE — G8484 FLU IMMUNIZE NO ADMIN: HCPCS | Performed by: INTERNAL MEDICINE

## 2024-12-18 PROCEDURE — 99214 OFFICE O/P EST MOD 30 MIN: CPT | Performed by: INTERNAL MEDICINE

## 2024-12-18 PROCEDURE — 1036F TOBACCO NON-USER: CPT | Performed by: INTERNAL MEDICINE

## 2024-12-18 PROCEDURE — G8417 CALC BMI ABV UP PARAM F/U: HCPCS | Performed by: INTERNAL MEDICINE

## 2024-12-18 RX ORDER — PHENTERMINE HYDROCHLORIDE 37.5 MG/1
37.5 TABLET ORAL
Qty: 30 TABLET | Refills: 0 | Status: SHIPPED | OUTPATIENT
Start: 2024-12-18 | End: 2025-01-17

## 2024-12-18 ASSESSMENT — ENCOUNTER SYMPTOMS
WHEEZING: 0
CONSTIPATION: 0
ANAL BLEEDING: 0
DIARRHEA: 0
SHORTNESS OF BREATH: 0
CHOKING: 0
BLOOD IN STOOL: 0
CHEST TIGHTNESS: 0

## 2024-12-18 ASSESSMENT — VISUAL ACUITY: OU: 1

## 2024-12-18 NOTE — PROGRESS NOTES
[facility]  [address] [address]  Dept: [phone]  Dept Fax: [phone]      Isabell Putnam is a 29 y.o. female who presents today for hermedical conditions/complaints as noted below.  Isabell Putnam is c/o of Weight Management (Med seems to be working okay, no side effects )        Assessment/Plan:     1. Class 2 severe obesity due to excess calories with serious comorbidity and body mass index (BMI) of 35.0 to 35.9 in adult  -     phentermine (ADIPEX-P) 37.5 MG tablet; Take 1 tablet by mouth every morning (before breakfast) for 30 days. Max Daily Amount: 37.5 mg, Disp-30 tablet, R-0Normal      PDMP reviewed-patient states she picked up refills from last visit at the end of November-according to PDMP this refill was picked up 12/17/2024-she will reach out to the pharmacy to get this corrected.      No follow-ups on file.      HPI     Doing well with adipex - sleep schedule is settled - only time has trouble sleeping is if she takes the adipex later in the day   Clothes are looser, ignoring the scale and not weighing herself   Following low carb diet, continues to exercise 6 days a week - not counting calories. Not eating past 8 PM --> unchanged             BP Readings from Last 3 Encounters:   12/18/24 120/82   11/25/24 108/76   10/29/24 102/68              Past Medical History:   Diagnosis Date    Abdominal pain     Abnormal uterine bleeding     Anesthesia complication     had seizure after anesthesia for wisdom teeth-hospitalized-has has no problem with anesthesia since then    Asthma     as a child    Back pain     Cholecystitis 2018    resolved with cholecystectomy    COVID 02/2021    no hospitalization    COVID 12/2021    iv antibody infusion given 12/23/2022    COVID 10/2022    HA, fatigue, congestion-no hospitalization    E. coli UTI (urinary tract infection) 05/14/2018    Gall stones 2018    resolved with cholecystectomy    Kidney stone

## 2025-01-08 ENCOUNTER — OFFICE VISIT (OUTPATIENT)
Dept: OBGYN CLINIC | Age: 30
End: 2025-01-08
Payer: COMMERCIAL

## 2025-01-08 ENCOUNTER — HOSPITAL ENCOUNTER (OUTPATIENT)
Age: 30
Setting detail: SPECIMEN
Discharge: HOME | End: 2025-01-08

## 2025-01-08 VITALS
WEIGHT: 198.8 LBS | HEIGHT: 65 IN | DIASTOLIC BLOOD PRESSURE: 78 MMHG | HEART RATE: 85 BPM | BODY MASS INDEX: 33.12 KG/M2 | SYSTOLIC BLOOD PRESSURE: 122 MMHG

## 2025-01-08 DIAGNOSIS — Z13.29 SCREENING FOR THYROID DISORDER: ICD-10-CM

## 2025-01-08 DIAGNOSIS — Z13.0 SCREENING, ANEMIA, DEFICIENCY, IRON: ICD-10-CM

## 2025-01-08 DIAGNOSIS — Z20.2 POSSIBLE EXPOSURE TO STD: ICD-10-CM

## 2025-01-08 DIAGNOSIS — Z13.220 SCREENING, LIPID: ICD-10-CM

## 2025-01-08 DIAGNOSIS — Z01.419 WOMEN'S ANNUAL ROUTINE GYNECOLOGICAL EXAMINATION: ICD-10-CM

## 2025-01-08 DIAGNOSIS — E55.9 VITAMIN D DEFICIENCY: ICD-10-CM

## 2025-01-08 DIAGNOSIS — Z80.41 FAMILY HISTORY OF OVARIAN CANCER: ICD-10-CM

## 2025-01-08 DIAGNOSIS — N89.8 VAGINAL DISCHARGE: ICD-10-CM

## 2025-01-08 DIAGNOSIS — Z01.419 WOMEN'S ANNUAL ROUTINE GYNECOLOGICAL EXAMINATION: Primary | ICD-10-CM

## 2025-01-08 DIAGNOSIS — Z12.4 CERVICAL CANCER SCREENING: ICD-10-CM

## 2025-01-08 LAB
BASOPHILS # BLD: 0.05 K/UL (ref 0–0.2)
BASOPHILS NFR BLD: 1 % (ref 0–2)
EOSINOPHIL # BLD: 0.1 K/UL (ref 0–0.44)
EOSINOPHILS RELATIVE PERCENT: 2 % (ref 1–4)
ERYTHROCYTE [DISTWIDTH] IN BLOOD BY AUTOMATED COUNT: 14 % (ref 11.8–14.4)
HCT VFR BLD AUTO: 46.3 % (ref 36.3–47.1)
HGB BLD-MCNC: 14.5 G/DL (ref 11.9–15.1)
IMM GRANULOCYTES # BLD AUTO: <0.03 K/UL (ref 0–0.3)
IMM GRANULOCYTES NFR BLD: 0 %
LYMPHOCYTES NFR BLD: 1.71 K/UL (ref 1.1–3.7)
LYMPHOCYTES RELATIVE PERCENT: 25 % (ref 24–43)
MCH RBC QN AUTO: 28.5 PG (ref 25.2–33.5)
MCHC RBC AUTO-ENTMCNC: 31.3 G/DL (ref 28.4–34.8)
MCV RBC AUTO: 91.1 FL (ref 82.6–102.9)
MONOCYTES NFR BLD: 0.33 K/UL (ref 0.1–1.2)
MONOCYTES NFR BLD: 5 % (ref 3–12)
NEUTROPHILS NFR BLD: 67 % (ref 36–65)
NEUTS SEG NFR BLD: 4.57 K/UL (ref 1.5–8.1)
NRBC BLD-RTO: 0 PER 100 WBC
PLATELET # BLD AUTO: 269 K/UL (ref 138–453)
PMV BLD AUTO: 11 FL (ref 8.1–13.5)
RBC # BLD AUTO: 5.08 M/UL (ref 3.95–5.11)
WBC OTHER # BLD: 6.8 K/UL (ref 3.5–11.3)

## 2025-01-08 PROCEDURE — M1308 PR FLU IMMUNIZE NO ADMIN: HCPCS | Performed by: ADVANCED PRACTICE MIDWIFE

## 2025-01-08 PROCEDURE — 36415 COLL VENOUS BLD VENIPUNCTURE: CPT | Performed by: ADVANCED PRACTICE MIDWIFE

## 2025-01-08 PROCEDURE — 99459 PELVIC EXAMINATION: CPT | Performed by: ADVANCED PRACTICE MIDWIFE

## 2025-01-08 PROCEDURE — 99395 PREV VISIT EST AGE 18-39: CPT | Performed by: ADVANCED PRACTICE MIDWIFE

## 2025-01-08 ASSESSMENT — PATIENT HEALTH QUESTIONNAIRE - PHQ9
1. LITTLE INTEREST OR PLEASURE IN DOING THINGS: NOT AT ALL
2. FEELING DOWN, DEPRESSED OR HOPELESS: NOT AT ALL
SUM OF ALL RESPONSES TO PHQ QUESTIONS 1-9: 0
SUM OF ALL RESPONSES TO PHQ9 QUESTIONS 1 & 2: 0
SUM OF ALL RESPONSES TO PHQ QUESTIONS 1-9: 0

## 2025-01-08 ASSESSMENT — ENCOUNTER SYMPTOMS
EYES NEGATIVE: 1
GASTROINTESTINAL NEGATIVE: 1
RESPIRATORY NEGATIVE: 1
ALLERGIC/IMMUNOLOGIC NEGATIVE: 1

## 2025-01-08 NOTE — PROGRESS NOTES
[facility]  [address]   [address]  [facility] [address]  Dept: [phone]    Patient Name: Isabell Putnam  Patient Age: 29 y.o.  Date of Visit: 1/8/2025    Subjective  Chief Complaint   Patient presents with    New Patient     HPI:  Isabell Putnam is a 29 y.o. female who arrives to office as a new patient for annual exam and pap.     Patient denies concerns today.   Patient reports is sexually active with 1 partner.   Patient denies pain with sex.  Patient denies a history of sexually transmitted infection(s).  Patient does want screening for sexually transmitted infection(s).  Reports is not on contraception - reports has been on OCPs in the past, had an IUD which she did not like. At this time desires to monitor cycles. Reports cycles are 29-31 days, rarely more than that. Bleeds for 3-4 days. First day is heavy then lightens up. No clots.       Review of Systems   Constitutional: Negative.  Negative for chills, fatigue, fever and unexpected weight change.   HENT: Negative.     Eyes: Negative.    Respiratory: Negative.     Cardiovascular: Negative.    Gastrointestinal: Negative.    Endocrine: Negative.  Negative for cold intolerance and heat intolerance.   Genitourinary:  Positive for menstrual problem (heavy then lightens up) and vaginal discharge. Negative for dyspareunia, dysuria, flank pain, frequency, pelvic pain and vaginal pain.   Musculoskeletal: Negative.    Skin: Negative.    Allergic/Immunologic: Negative.    Neurological: Negative.    Hematological: Negative.    Psychiatric/Behavioral: Negative.       Preventive Health Screening:   Date of last pap: normal 12/12/23  History of abnormal pap: denies          HPV typing/date: N/A  Date of last mammogram: 2018 (breast pain), had breast reduction  Date of last DEXA scan: N/A   Date of last colonoscopy (start age 46): N/A    Preventive screening: Yes,

## 2025-01-09 DIAGNOSIS — N76.0 BV (BACTERIAL VAGINOSIS): Primary | ICD-10-CM

## 2025-01-09 DIAGNOSIS — B96.89 BV (BACTERIAL VAGINOSIS): Primary | ICD-10-CM

## 2025-01-09 LAB
25(OH)D3 SERPL-MCNC: 21.1 NG/ML (ref 30–100)
ALBUMIN SERPL-MCNC: 4.9 G/DL (ref 3.5–5.2)
ALBUMIN/GLOB SERPL: 1.9 {RATIO} (ref 1–2.5)
ALP SERPL-CCNC: 57 U/L (ref 35–104)
ALT SERPL-CCNC: 32 U/L (ref 10–35)
ANION GAP SERPL CALCULATED.3IONS-SCNC: 10 MMOL/L (ref 9–16)
AST SERPL-CCNC: 19 U/L (ref 10–35)
BILIRUB SERPL-MCNC: 0.4 MG/DL (ref 0–1.2)
BUN SERPL-MCNC: 12 MG/DL (ref 6–20)
CALCIUM SERPL-MCNC: 10.2 MG/DL (ref 8.6–10.4)
CANDIDA SPECIES: NEGATIVE
CHLORIDE SERPL-SCNC: 105 MMOL/L (ref 98–107)
CHOLEST SERPL-MCNC: 190 MG/DL (ref 0–199)
CHOLESTEROL/HDL RATIO: 3.3
CO2 SERPL-SCNC: 29 MMOL/L (ref 20–31)
CREAT SERPL-MCNC: 1 MG/DL (ref 0.6–0.9)
GARDNERELLA VAGINALIS: POSITIVE
GFR, ESTIMATED: 79 ML/MIN/1.73M2
GLUCOSE SERPL-MCNC: 90 MG/DL (ref 74–99)
HBV SURFACE AG SERPL QL IA: NONREACTIVE
HCV AB SERPL QL IA: NONREACTIVE
HDLC SERPL-MCNC: 58 MG/DL
HIV 1+2 AB+HIV1 P24 AG SERPL QL IA: NONREACTIVE
LDLC SERPL CALC-MCNC: 109 MG/DL (ref 0–100)
POTASSIUM SERPL-SCNC: 5.3 MMOL/L (ref 3.7–5.3)
PROT SERPL-MCNC: 7.5 G/DL (ref 6.6–8.7)
SODIUM SERPL-SCNC: 144 MMOL/L (ref 136–145)
SOURCE: ABNORMAL
T PALLIDUM AB SER QL IA: NONREACTIVE
TRICHOMONAS: NEGATIVE
TRIGL SERPL-MCNC: 117 MG/DL (ref 0–149)
TSH SERPL DL<=0.05 MIU/L-ACNC: 1.16 UIU/ML (ref 0.27–4.2)
VLDLC SERPL CALC-MCNC: 23 MG/DL (ref 1–30)

## 2025-01-09 RX ORDER — METRONIDAZOLE 7.5 MG/G
1 GEL VAGINAL NIGHTLY
Qty: 1 EACH | Refills: 0 | Status: SHIPPED | OUTPATIENT
Start: 2025-01-09 | End: 2025-01-14

## 2025-01-10 LAB
C TRACH DNA SPEC QL PROBE+SIG AMP: NEGATIVE
N GONORRHOEA DNA SPEC QL PROBE+SIG AMP: NEGATIVE
SPECIMEN DESCRIPTION: NORMAL

## 2025-01-17 LAB — CYTOLOGY REPORT: NORMAL

## 2025-01-23 ENCOUNTER — TELEPHONE (OUTPATIENT)
Dept: OBGYN CLINIC | Age: 30
End: 2025-01-23

## 2025-01-23 DIAGNOSIS — Z15.09 FUMARATE HYDRATASE GENE MUTATION: ICD-10-CM

## 2025-01-23 DIAGNOSIS — Z15.89 FUMARATE HYDRATASE GENE MUTATION: ICD-10-CM

## 2025-01-23 DIAGNOSIS — Z80.41 FAMILY HISTORY OF OVARIAN CANCER: Primary | ICD-10-CM

## 2025-01-23 NOTE — TELEPHONE ENCOUNTER
Reviewed BRCA mutation/panel testing for Fhx of ovarian cancer was NEGATIVE for gene mutations.   1 VUS noted (AXIN2)  [employer] [facility] (lifetime risk of breast cancer 12.5%, Tyrer-Aashishck liftime risk 11.0%  Reviewed since both calculations are < 20% no indication for management changes.     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   Per the report:   \"The biological children of this patient  are at risk for FHD if the other parent is also a carrier of a pathogenic FH variant. Screening the other biological parent of any children for  FH variants and genetic counseling to discuss reproductive risks may be appropriate.\"    Recommend she call the [employer] genetic counselor number on her report to discuss further and inquire about partner testing if they recommend it.   Offered local  referral if she desires, she will reach out if she wants referral.   No further questions at this time.

## 2025-02-08 ENCOUNTER — NURSE TRIAGE (OUTPATIENT)
Dept: OTHER | Facility: CLINIC | Age: 30
End: 2025-02-08

## 2025-02-08 ENCOUNTER — APPOINTMENT (OUTPATIENT)
Dept: CT IMAGING | Age: 30
End: 2025-02-08
Payer: COMMERCIAL

## 2025-02-08 ENCOUNTER — HOSPITAL ENCOUNTER (EMERGENCY)
Age: 30
Discharge: HOME | End: 2025-02-08
Attending: EMERGENCY MEDICINE
Payer: COMMERCIAL

## 2025-02-08 VITALS
SYSTOLIC BLOOD PRESSURE: 123 MMHG | WEIGHT: 198 LBS | HEART RATE: 98 BPM | TEMPERATURE: 98.2 F | DIASTOLIC BLOOD PRESSURE: 84 MMHG | OXYGEN SATURATION: 99 % | BODY MASS INDEX: 33.46 KG/M2 | RESPIRATION RATE: 16 BRPM

## 2025-02-08 DIAGNOSIS — S09.90XA INJURY OF HEAD, INITIAL ENCOUNTER: Primary | ICD-10-CM

## 2025-02-08 PROCEDURE — 70450 CT HEAD/BRAIN W/O DYE: CPT

## 2025-02-08 PROCEDURE — 99284 EMERGENCY DEPT VISIT MOD MDM: CPT

## 2025-02-08 PROCEDURE — 6370000000 HC RX 637 (ALT 250 FOR IP): Performed by: EMERGENCY MEDICINE

## 2025-02-08 RX ORDER — ACETAMINOPHEN 500 MG
1000 TABLET ORAL ONCE
Status: COMPLETED | OUTPATIENT
Start: 2025-02-08 | End: 2025-02-08

## 2025-02-08 RX ADMIN — ACETAMINOPHEN 1000 MG: 500 TABLET, FILM COATED ORAL at 12:04

## 2025-02-08 ASSESSMENT — ENCOUNTER SYMPTOMS
ABDOMINAL PAIN: 0
BACK PAIN: 0
COLOR CHANGE: 0
EYE PAIN: 0
SHORTNESS OF BREATH: 0

## 2025-02-08 ASSESSMENT — VISUAL ACUITY: OU: 1

## 2025-02-08 ASSESSMENT — LIFESTYLE VARIABLES
HOW MANY STANDARD DRINKS CONTAINING ALCOHOL DO YOU HAVE ON A TYPICAL DAY: 1 OR 2
HOW OFTEN DO YOU HAVE A DRINK CONTAINING ALCOHOL: MONTHLY OR LESS
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
## 2025-02-08 NOTE — ED NOTES
C= \"Have you ever felt that you should Cut down on your drinking?\"  No  A= \"Have people Annoyed you by criticizing your drinking?\"  No  G= \"Have you ever felt bad or Guilty about your drinking?\"  No  E= \"Have you ever had a drink as an Eye-opener first thing in the morning to steady your nerves or to help a hangover?\"  No

## 2025-04-09 ENCOUNTER — OFFICE VISIT (OUTPATIENT)
Dept: FAMILY MEDICINE CLINIC | Age: 30
End: 2025-04-09
Payer: COMMERCIAL

## 2025-04-09 VITALS
BODY MASS INDEX: 34.07 KG/M2 | SYSTOLIC BLOOD PRESSURE: 118 MMHG | TEMPERATURE: 98.1 F | OXYGEN SATURATION: 99 % | DIASTOLIC BLOOD PRESSURE: 86 MMHG | HEART RATE: 102 BPM | WEIGHT: 201.6 LBS

## 2025-04-09 DIAGNOSIS — J45.41 MODERATE PERSISTENT ASTHMA WITH ACUTE EXACERBATION: Primary | ICD-10-CM

## 2025-04-09 DIAGNOSIS — R53.83 OTHER FATIGUE: ICD-10-CM

## 2025-04-09 PROCEDURE — G8427 DOCREV CUR MEDS BY ELIG CLIN: HCPCS | Performed by: INTERNAL MEDICINE

## 2025-04-09 PROCEDURE — G8417 CALC BMI ABV UP PARAM F/U: HCPCS | Performed by: INTERNAL MEDICINE

## 2025-04-09 PROCEDURE — 99214 OFFICE O/P EST MOD 30 MIN: CPT | Performed by: INTERNAL MEDICINE

## 2025-04-09 PROCEDURE — 1036F TOBACCO NON-USER: CPT | Performed by: INTERNAL MEDICINE

## 2025-04-09 RX ORDER — PREDNISONE 20 MG/1
40 TABLET ORAL DAILY
Qty: 10 TABLET | Refills: 0 | Status: SHIPPED | OUTPATIENT
Start: 2025-04-09 | End: 2025-04-14

## 2025-04-09 SDOH — ECONOMIC STABILITY: FOOD INSECURITY: WITHIN THE PAST 12 MONTHS, YOU WORRIED THAT YOUR FOOD WOULD RUN OUT BEFORE YOU GOT MONEY TO BUY MORE.: NEVER TRUE

## 2025-04-09 SDOH — ECONOMIC STABILITY: FOOD INSECURITY: WITHIN THE PAST 12 MONTHS, THE FOOD YOU BOUGHT JUST DIDN'T LAST AND YOU DIDN'T HAVE MONEY TO GET MORE.: NEVER TRUE

## 2025-04-09 NOTE — PROGRESS NOTES
[facility]  [facility]  [address] [facility] [address]  Dept: [phone]  Dept Fax: [phone]      Isabell Putnam is a 29 y.o. female who presents today for hermedical conditions/complaints as noted below.  Isabell Putnam is c/o of Congestion (Heaviness on chest, chest hurts when coughing, wheezing, some rattling in chest /Tried Dayquil Mucinex and cough drops ), Cough (Started last week, no fever, no vomiting, no diarrhea, no headache no body ache, no sore throat, started as a dry cough ), Ear Fullness (RT ear feels like there is water in it at all times ), and Other (Would like labs drawn, did send my chart message in regards to this )        Assessment/Plan:     1. Moderate persistent asthma with acute exacerbation  -     predniSONE (DELTASONE) 20 MG tablet; Take 2 tablets by mouth daily for 5 days, Disp-10 tablet, R-0Normal  2. Other fatigue  Likely due to asthma exacerbation   Labs done in January were normal - unlikely that repeating labs is going to help find answers   Consistent sleep, exercise, meal times and rest advised         No follow-ups on file.      HPI     Cough  This is a new problem. The current episode started 1 to 4 weeks ago. The problem has been gradually worsening. The problem occurs constantly. The cough is Non-productive. Associated symptoms include ear pain, nasal congestion, postnasal drip, rhinorrhea, shortness of breath and wheezing. Pertinent negatives include no chest pain, chills, ear congestion, fever, headaches, heartburn, hemoptysis, myalgias, rash, sore throat, sweats or weight loss. The symptoms are aggravated by pollens and cold air. She has tried a beta-agonist inhaler for the symptoms. Her past medical history is significant for asthma and environmental allergies.       BP Readings from Last 3 Encounters:   04/09/25 118/86   02/08/25 123/84   01/08/25 122/78              Past Medical History:   Diagnosis Date    Abdominal pain

## (undated) DEVICE — TRAP TISS DISP FOR COLL SYS BERK SAFETOUCH

## (undated) DEVICE — GOWN,AURORA,NONREINFORCED,LARGE: Brand: MEDLINE

## (undated) DEVICE — INSUFFLATION NEEDLE TO ESTABLISH PNEUMOPERITONEUM.: Brand: INSUFFLATION NEEDLE

## (undated) DEVICE — PAD PT POS 36 IN SURGYPAD DISP

## (undated) DEVICE — BAG SPEC LAP H6IN DIA3IN 250ML 10 12MM CANN ATTCH MEM WIRE

## (undated) DEVICE — GLOVE SURG SZ 6 THK91MIL LTX FREE SYN POLYISOPRENE ANTI

## (undated) DEVICE — SCISSOR SURG METZ CRV TIP

## (undated) DEVICE — APPLICATOR MEDICATED 26 CC SOLUTION HI LT ORNG CHLORAPREP

## (undated) DEVICE — GLOVE ORANGE PI 7   MSG9070

## (undated) DEVICE — TISSUE RETRIEVAL SYSTEM: Brand: INZII RETRIEVAL SYSTEM

## (undated) DEVICE — TOWEL,OR,DSP,ST,NATURAL,DLX,4/PK,20PK/CS: Brand: MEDLINE

## (undated) DEVICE — COVER OR TBL W40XL90IN ABSRB STD AND GRIPPY BK SAHARA

## (undated) DEVICE — DISCONTINUED USE 405792 GLOVE SURG SENSICARE ALOE LT LF PF ST GRN SZ 7

## (undated) DEVICE — SOLUTION ANTIFOG VIS SYS CLEARIFY LAPSCP

## (undated) DEVICE — STERILE PREPRINTED LABELS W/ D: Brand: MEDLINE

## (undated) DEVICE — APPLIER CLP M/L SHFT DIA5MM 15 LIG LIGAMAX 5

## (undated) DEVICE — CONTAINER,SPECIMEN,OR STERILE,4OZ: Brand: MEDLINE

## (undated) DEVICE — CATHETER URET 8FR L65CM PLAS OPN CONE TIP RADPQ DISP

## (undated) DEVICE — Device

## (undated) DEVICE — GLOVE SURG SZ 65 THK91MIL LTX FREE SYN POLYISOPRENE

## (undated) DEVICE — SKIN AFFIX SURG ADHESIVE 72/CS 0.55ML: Brand: MEDLINE

## (undated) DEVICE — SOLUTION SCRB 4OZ 2% CHG FOR SURG SCRBBING HND WSH

## (undated) DEVICE — 40580 - THE PINK PAD - ADVANCED TRENDELENBURG POSITIONING KIT: Brand: 40580 - THE PINK PAD - ADVANCED TRENDELENBURG POSITIONING KIT

## (undated) DEVICE — SET COLL TBNG L6FT DIA3/8IN W/ INTEGR SWVL HNDL SLIP RNG M

## (undated) DEVICE — Z DISCONTINUED BY MEDLINE USE 2711682 TRAY SKIN PREP DRY W/ PREM GLV

## (undated) DEVICE — GLOVE ORANGE PI 7 1/2   MSG9075

## (undated) DEVICE — LEGGINGS, PAIR, 31X48, STERILE: Brand: MEDLINE

## (undated) DEVICE — DRAPE,UNDRBUT,WHT GRAD PCH,CAPPORT,20/CS: Brand: MEDLINE

## (undated) DEVICE — SPONGE GZ W3XL3IN 4 PLY RAYON POLY STD NONWOVEN

## (undated) DEVICE — TROCAR: Brand: KII FIOS FIRST ENTRY

## (undated) DEVICE — SVMMC GYN MIN PK

## (undated) DEVICE — KIT,ANTI FOG,W/SPONGE & FLUID,SOFT PACK: Brand: MEDLINE

## (undated) DEVICE — SYRINGE IRRIG 60ML SFT PLIABLE BLB EZ TO GRP 1 HND USE W/

## (undated) DEVICE — SYRINGE, LUER LOCK, 10ML: Brand: MEDLINE

## (undated) DEVICE — ELECTRODE PT RET AD L9FT HI MOIST COND ADH HYDRGEL CORDED

## (undated) DEVICE — GLOVE ORANGE PI 8   MSG9080

## (undated) DEVICE — GARMENT,MEDLINE,DVT,INT,CALF,MED, GEN2: Brand: MEDLINE

## (undated) DEVICE — DRAPE,REIN 53X77,STERILE: Brand: MEDLINE

## (undated) DEVICE — PREMIUM DRY TRAY LF: Brand: MEDLINE INDUSTRIES, INC.

## (undated) DEVICE — GLOVE SURG SZ 7 L12IN FNGR THK87MIL WHT LTX FREE

## (undated) DEVICE — DISSECTOR LAP DIA5MM BLNT TIP ENDOPATH

## (undated) DEVICE — GLOVE SURG SZ 65 L12IN FNGR THK87MIL WHT LTX FREE

## (undated) DEVICE — TUBING, SUCTION, 1/4" X 12', STRAIGHT: Brand: MEDLINE

## (undated) DEVICE — GOWN,SIRUS,NONRNF,SETINSLV,XL,20/CS: Brand: MEDLINE

## (undated) DEVICE — PAD,SANITARY,11 IN,MAXI,W/WINGS,N-STRL: Brand: MEDLINE

## (undated) DEVICE — 1016 S-DRAPE IRRIG POUCH 10/BOX: Brand: STERI-DRAPE™

## (undated) DEVICE — 20 ML SYRINGE LUER-LOCK TIP: Brand: MONOJECT

## (undated) DEVICE — PACK LAP BASIC

## (undated) DEVICE — UNDERPANTS MAT L XL SEAMLESS CLR CODE WAISTBAND KNIT

## (undated) DEVICE — STANDARD HYPODERMIC NEEDLE,POLYPROPYLENE HUB: Brand: MONOJECT

## (undated) DEVICE — RADIFOCUS GLIDEWIRE: Brand: GLIDEWIRE

## (undated) DEVICE — SUTURE VIC + BR UD PS2 4-0 18IN VCP496G

## (undated) DEVICE — STRIP,CLOSURE,WOUND,MEDI-STRIP,1/2X4: Brand: MEDLINE

## (undated) DEVICE — TROCAR: Brand: KII® SLEEVE

## (undated) DEVICE — TOTAL TRAY, 16FR 10ML SIL FOLEY, URN: Brand: MEDLINE

## (undated) DEVICE — PLUMEPORT SEO LAPAROSCOPIC SMOKE FILTRATION DEVICE: Brand: PLUMEPORT

## (undated) DEVICE — COVER LT HNDL BLU PLAS

## (undated) DEVICE — 3M™ WARMING BLANKET, UPPER BODY, 10 PER CASE, 42268: Brand: BAIR HUGGER™

## (undated) DEVICE — SUTURE VCRL + SZ 0 L27IN ABSRB VLT L26MM UR-6 5/8 CIR VCP603H

## (undated) DEVICE — GLOVE SURG SZ 65 L12IN THK91MIL BRN LTX FREE

## (undated) DEVICE — SUTURE MCRYL SZ 4-0 L18IN ABSRB UD L16MM PC-3 3/8 CIR PRIM Y845G

## (undated) DEVICE — ADHESIVE SKIN CLOSURE TOP 36 CC HI VISC DERMBND MINI

## (undated) DEVICE — INSTRUMENT REPROC SEAL/DIVIDE LAP BLUNT TP LIGASURE NANO-COAT 5MMX37CM

## (undated) DEVICE — MEDICINE CUP, GRADUATED, STER: Brand: MEDLINE

## (undated) DEVICE — DEVICE TRCR 12X9X3IN WHT CLSR DISP OMNICLOSE

## (undated) DEVICE — DRESSING TRNSPAR W2XL2.75IN FLM SHT SEMIPERMEABLE WIND

## (undated) DEVICE — SCISSOR REPROCESSD TP LAPSCP ENDOSCP CRV

## (undated) DEVICE — SYSTEM COLL CANSTR LID SEAL CAP W/O TISS TRAP FOR 3/8IN

## (undated) DEVICE — TUBING, SUCTION, 9/32" X 20', STRAIGHT: Brand: MEDLINE INDUSTRIES, INC.

## (undated) DEVICE — PAD,NON-ADHERENT,3X8,STERILE,LF,1/PK: Brand: MEDLINE

## (undated) DEVICE — ELECTRODE LAPAROSCOPIC LHOOK

## (undated) DEVICE — GLOVE SURG SZ 75 L12IN FNGR THK87MIL DK GRN LTX FREE ISOLEX